# Patient Record
Sex: FEMALE | Race: WHITE | Employment: OTHER | URBAN - METROPOLITAN AREA
[De-identification: names, ages, dates, MRNs, and addresses within clinical notes are randomized per-mention and may not be internally consistent; named-entity substitution may affect disease eponyms.]

---

## 2017-01-04 ENCOUNTER — APPOINTMENT (OUTPATIENT)
Dept: PHYSICAL THERAPY | Age: 57
End: 2017-01-04

## 2017-01-05 ENCOUNTER — OFFICE VISIT (OUTPATIENT)
Dept: ORTHOPEDIC SURGERY | Age: 57
End: 2017-01-05

## 2017-01-05 VITALS
SYSTOLIC BLOOD PRESSURE: 111 MMHG | WEIGHT: 174 LBS | BODY MASS INDEX: 27.25 KG/M2 | TEMPERATURE: 96.5 F | HEART RATE: 70 BPM | DIASTOLIC BLOOD PRESSURE: 96 MMHG

## 2017-01-05 DIAGNOSIS — G89.29 CHRONIC LOW BACK PAIN, UNSPECIFIED BACK PAIN LATERALITY, WITH SCIATICA PRESENCE UNSPECIFIED: ICD-10-CM

## 2017-01-05 DIAGNOSIS — M54.5 CHRONIC LOW BACK PAIN, UNSPECIFIED BACK PAIN LATERALITY, WITH SCIATICA PRESENCE UNSPECIFIED: ICD-10-CM

## 2017-01-05 DIAGNOSIS — Z96.641 STATUS POST RIGHT HIP REPLACEMENT: Primary | ICD-10-CM

## 2017-01-05 RX ORDER — METHYLPREDNISOLONE 4 MG/1
TABLET ORAL
Qty: 1 DOSE PACK | Refills: 0 | Status: SHIPPED | OUTPATIENT
Start: 2017-01-05 | End: 2017-07-13

## 2017-01-05 RX ORDER — HYDROCODONE BITARTRATE AND ACETAMINOPHEN 7.5; 325 MG/1; MG/1
1-2 TABLET ORAL
Qty: 60 TAB | Refills: 0 | Status: SHIPPED | OUTPATIENT
Start: 2017-01-05 | End: 2017-09-12 | Stop reason: SDUPTHER

## 2017-01-05 NOTE — LETTER
NOTIFICATION RETURN TO WORK / SCHOOL 
 
1/5/2017 4:27 PM 
 
Ms. Javid Burk [facility] To Whom It May Concern: 
 
Javid Burk is currently under the care of [address] [place] [address] Damir Root. She will remain on the same restrictions: no longer than 6 hour days for the next 4 weeks. Please excuse her for missing work on 1-6-17. If there are questions or concerns please have the patient contact our office.  
 
 
 
Sincerely, 
 
 
Grace Giordano PA-C

## 2017-01-05 NOTE — PROGRESS NOTES
[address] [place] [address]  [phone]           Patient: Trena Martin                MRN: [id number]       SSN: [id number]  YOB: 1960        AGE: 61 y.o. SEX: female  Body mass index is 27.25 kg/(m^2). PCP: Dean Arce MD  01/05/17      This office note has been dictated. REVIEW OF SYSTEMS:  Constitutional: Negative for fever, chills, weight loss and malaise/fatigue. HENT: Negative. Eyes: Negative. Respiratory: Negative. Cardiovascular: Negative. Gastrointestinal: No bowel incontinence or constipation. Genitourinary: No bladder incontinence or saddle anesthesia. Skin: Negative. Neurological: Negative. Endo/Heme/Allergies: Negative. Psychiatric/Behavioral: Negative. Musculoskeletal: As per HPI above. Past Medical History   Diagnosis Date    Allergic rhinitis     Dysphagia     GERD (gastroesophageal reflux disease)     Nausea & vomiting     Neuralgia     Osteoarthritis of hip     Spinal stenosis          Current Outpatient Prescriptions:     methylPREDNISolone (MEDROL, CATHY,) 4 mg tablet, Per dose pack instructions, Disp: 1 Dose Pack, Rfl: 0    HYDROcodone-acetaminophen (NORCO) 7.5-325 mg per tablet, Take 1-2 Tabs by mouth every eight (8) hours as needed for Pain. Max Daily Amount: 6 Tabs., Disp: 60 Tab, Rfl: 0    meloxicam (MOBIC) 15 mg tablet, Take 15 mg by mouth daily. substituted for celebrex, Disp: , Rfl:     aspirin (ASPIRIN) 325 mg tablet, Take 1 Tab by mouth two (2) times a day., Disp: 60 Tab, Rfl: 0    venlafaxine-SR (EFFEXOR-XR) 75 mg capsule, Take 37.5 mg by mouth daily. , Disp: , Rfl:     estradiol (ESTRACE) 1 mg tablet, Take 1 mg by mouth every seven (7) days.  Indications: Patient takes half tab daily, Disp: , Rfl:     albuterol (PROVENTIL HFA, VENTOLIN HFA) 90 mcg/actuation inhaler, Take 2 Puffs by inhalation every four (4) hours as needed for Wheezing., Disp: 1 Inhaler, Rfl: 0    alprazolam (XANAX) 0.25 mg tablet, Take 0.25 mg by mouth nightly as needed. PRN sleep, Disp: , Rfl:     oxyCODONE-acetaminophen (PERCOCET 10)  mg per tablet, Take 1-2 Tabs by mouth every six (6) hours as needed. Max Daily Amount: 8 Tabs., Disp: 60 Tab, Rfl: 0    promethazine (PHENERGAN) 25 mg tablet, Take 1 Tab by mouth every six (6) hours as needed for Nausea., Disp: 30 Tab, Rfl: 0    ondansetron hcl (ZOFRAN, AS HYDROCHLORIDE,) 4 mg tablet, Take 1 Tab by mouth every eight (8) hours as needed for Nausea., Disp: 30 Tab, Rfl: 1    celecoxib (CELEBREX) 200 mg capsule, Take 1 Cap by mouth two (2) times a day for 90 days. , Disp: 60 Cap, Rfl: 2    ferrous sulfate 325 mg (65 mg iron) tablet, Take 1 Tab by mouth two (2) times daily (with meals). , Disp: 60 Tab, Rfl: 2    zolpidem (AMBIEN) 5 mg tablet, Take 1 Tab by mouth nightly as needed for Sleep., Disp: 25 Tab, Rfl: 0    Allergies   Allergen Reactions    Other Plant, Animal, Environmental Anaphylaxis     poison ivy       Social History     Social History    Marital status:      Spouse name: N/A    Number of children: N/A    Years of education: N/A     Occupational History    Not on file. Social History Main Topics    Smoking status: Never Smoker    Smokeless tobacco: Never Used    Alcohol use 0.0 oz/week     0 Standard drinks or equivalent per week      Comment: socially    Drug use: No    Sexual activity: Not on file      Comment: Hysterectomy     Other Topics Concern    Not on file     Social History Narrative       Past Surgical History   Procedure Laterality Date    Endoscopy, colon, diagnostic  03-18-11     normal colon to cecum    Hx tonsil and adenoidectomy      Hx tubal ligation      Hx hysterectomy      Hx breast reduction      Hx tonsillectomy      Hx heent       skin cancer between eyes    Hx cholecystectomy  11/2015             We did see MsSuzanne  Fatuma Ireneon for follow-up in regards to her right lateral approach hip replacement. The patient is now approximately 10 to 11 weeks status hip replacement surgery. The patient has had some discomfort in her hip bilateral based and some buttocks discomfort, as well as some anterior discomfort. She is having discomfort with extended sitting, as well as ambulation. There has been no radiating pain, numbness, or tingling down the lower extremity. There have been no recent fevers, chills, systemic changes, or injuries to report. PHYSICAL EXAMINATION: In general the patient is alert and oriented x 3 and is in no acute distress. The patient is well-developed and well-nourished with a normal affect. The patient is afebrile. Examination of the right hip reveals the skin to be intact. The surgical wound has healed nicely. There is no erythema or ecchymosis. There is no warmth or signs for infection or cellulitis. There is discomfort with palpation to the trochanteric bursa, as well as discomfort with palpation to the psoas tendon. There is discomfort with resisted hip flexion. She has negative calf tenderness and swelling. There is negative Homans. There is no evidence of DVT noted. Abduction strength is progressing at 4+ to 5-/5 on the right and 5/5 on the left. RADIOGRAPHS:  Review of radiographs including AP pelvis and AP crosstable lateral of the right hip reveals the total hip components to be well fixed and in excellent position without evidence for loosening or fracture noted. ASSESSMENT:      1. Status post right hip replacement. 2. Trochanteric bursitis right hip. 3. Psoas tendinitis right hip.    4. Ischial tuberosity bursitis right side. PLAN:  At this point I will start her on a Medrol Dosepak. We will obtain some basic labs including a CBC, ESR, CRP, and IL-6, which I expect will come back as negative. She is given a note to be off tomorrow and Monday and then resume six hours per day.  We will plan on seeing her back next week for reevaluation and possibility of a Cortisone injection for the trochanteric bursitis.                   JR Wolf KENNEDY, PAMALIKA, ATC

## 2017-01-05 NOTE — LETTER
NOTIFICATION RETURN TO WORK / SCHOOL 
 
1/5/2017 4:33 PM 
 
Ms. Jimmy Loaiza [facility] To Whom It May Concern: 
 
Jimmy Loaiza is currently under the care of [address] [place], [place] [address]. She will remain on the same restrictions: no longer than 6 hour days for the next 4 weeks. She will remain out of work until 1/9/17. If there are questions or concerns please have the patient contact our office.  
 
 
 
Sincerely, 
 
 
Geraldine Saeed PA-C

## 2017-01-06 ENCOUNTER — HOSPITAL ENCOUNTER (OUTPATIENT)
Dept: LAB | Age: 57
Discharge: HOME | End: 2017-01-06

## 2017-01-06 LAB — SENTARA SPECIMEN COL,SENBCF: NORMAL

## 2017-01-06 PROCEDURE — 99001 SPECIMEN HANDLING PT-LAB: CPT | Performed by: PHYSICIAN ASSISTANT

## 2017-01-07 LAB
ABSOLUTE LYMPHOCYTE COUNT, 10803: 2.7 K/UL (ref 1–4.8)
BASOPHILS # BLD: 0 K/UL (ref 0–0.2)
BASOPHILS NFR BLD: 1 % (ref 0–2)
C-REACTIVE PROTEIN, QT, 006627: 0.5 MG/DL (ref 0–0.5)
EOSINOPHIL # BLD: 0.3 K/UL (ref 0–0.5)
EOSINOPHIL NFR BLD: 6 % (ref 0–6)
ERYTHROCYTE [DISTWIDTH] IN BLOOD BY AUTOMATED COUNT: 12.9 % (ref 10–16)
GRANULOCYTES,GRANS: 40 % (ref 40–75)
HCT VFR BLD AUTO: 41.2 % (ref 35.1–48)
HGB BLD-MCNC: 12.6 G/DL (ref 11.7–16)
LYMPHOCYTES, LYMLT: 46 % (ref 27–45)
MCH RBC QN AUTO: 29 PG (ref 26–34)
MCHC RBC AUTO-ENTMCNC: 31 G/DL (ref 32–36)
MCV RBC AUTO: 94 FL (ref 80–95)
MONOCYTES # BLD: 0.4 K/UL (ref 0.1–0.9)
MONOCYTES NFR BLD: 7 % (ref 3–9)
NEUTROPHILS # BLD AUTO: 2.3 K/UL (ref 1.8–7.7)
PLATELET # BLD AUTO: 290 K/UL (ref 140–440)
PMV BLD AUTO: 11.6 FL (ref 6–10.8)
RBC # BLD AUTO: 4.39 M/UL (ref 3.8–5.2)
SED RATE (ESR): 14 MM/HR (ref 0–30)
URATE SERPL-MCNC: 3.7 MG/DL (ref 2.2–7.7)
WBC # BLD AUTO: 5.8 K/UL (ref 4–11)

## 2017-01-10 DIAGNOSIS — M21.70 LEG LENGTH DISCREPANCY: Primary | ICD-10-CM

## 2017-01-10 LAB — IL-6, 10347: 1.6 PG/ML

## 2017-01-20 ENCOUNTER — TELEPHONE (OUTPATIENT)
Dept: ORTHOPEDIC SURGERY | Facility: CLINIC | Age: 57
End: 2017-01-20

## 2017-01-20 NOTE — TELEPHONE ENCOUNTER
Sujatha Manning called, she would like to be released to work full time. Please write Return to Work full time note as of 1/23/2017.

## 2017-01-20 NOTE — TELEPHONE ENCOUNTER
[address] notifying patient that a work note has been written for her to go back to work full duty as of Jan. 23, 2017 per PA Tauna Snellen

## 2017-01-23 ENCOUNTER — TELEPHONE (OUTPATIENT)
Dept: ORTHOPEDIC SURGERY | Facility: CLINIC | Age: 57
End: 2017-01-23

## 2017-01-23 NOTE — TELEPHONE ENCOUNTER
Patient phoned the RTW note was supposed to start 8 hours as tolerated on 1/30/2017. I had mistakenly requested 1/23/2017. Could we please issue new adjusted RTW note.

## 2017-01-23 NOTE — TELEPHONE ENCOUNTER
New note written. Contacted patient and informed changed and available for  at the  of any of our offices. It can be printed from her chart.

## 2017-01-23 NOTE — LETTER
NOTIFICATION RETURN TO WORK / SCHOOL 
 
1/23/2017 4:30 PM 
 
Ms. Clydia Angelucci [facility] To Whom It May Concern: 
 
Clydia Angelucci is currently under the care of [address] [place] [address]. She will return to work/school on: 1/30/17 with no restrictions. If there are questions or concerns please have the patient contact our office. Sincerely, Cherry Herrera MD

## 2017-01-26 DIAGNOSIS — Z96.641 STATUS POST RIGHT HIP REPLACEMENT: ICD-10-CM

## 2017-02-02 ENCOUNTER — OFFICE VISIT (OUTPATIENT)
Dept: ORTHOPEDIC SURGERY | Facility: CLINIC | Age: 57
End: 2017-02-02

## 2017-02-02 VITALS
HEIGHT: 67 IN | DIASTOLIC BLOOD PRESSURE: 75 MMHG | WEIGHT: 174 LBS | HEART RATE: 72 BPM | SYSTOLIC BLOOD PRESSURE: 124 MMHG | BODY MASS INDEX: 27.31 KG/M2 | TEMPERATURE: 97.2 F

## 2017-02-02 DIAGNOSIS — M17.12 PRIMARY OSTEOARTHRITIS OF LEFT KNEE: ICD-10-CM

## 2017-02-02 DIAGNOSIS — Z96.641 STATUS POST RIGHT HIP REPLACEMENT: ICD-10-CM

## 2017-02-02 DIAGNOSIS — M16.12 PRIMARY OSTEOARTHRITIS OF LEFT HIP: Primary | ICD-10-CM

## 2017-02-02 RX ORDER — TRIAMCINOLONE ACETONIDE 40 MG/ML
40 INJECTION, SUSPENSION INTRA-ARTICULAR; INTRAMUSCULAR ONCE
Qty: 1 ML | Refills: 0
Start: 2017-02-02 | End: 2017-02-02 | Stop reason: CLARIF

## 2017-02-02 RX ORDER — MELOXICAM 15 MG/1
15 TABLET ORAL DAILY
Qty: 30 TAB | Refills: 2 | Status: SHIPPED | OUTPATIENT
Start: 2017-02-02 | End: 2017-11-02

## 2017-02-02 NOTE — MR AVS SNAPSHOT
Visit Information Date & Time Provider Department Dept. Phone Encounter #  
 2/2/2017  3:15 PM Cedrick Burnham, [address] [facility] [phone] [id number] Upcoming Health Maintenance Date Due  
 BREAST CANCER SCRN MAMMOGRAM 7/7/2018 COLONOSCOPY 3/18/2021 DTaP/Tdap/Td series (2 - Td) 11/10/2024 Allergies as of 2/2/2017  Review Complete On: 2/2/2017 By: Susanne Stephens Severity Noted Reaction Type Reactions Other Plant, Animal, Environmental High 06/05/2014   Systemic Anaphylaxis  
 poison ivy Current Immunizations  Reviewed on 11/10/2014 Name Date Influenza Vaccine 10/12/2016 Tdap 11/10/2014 Not reviewed this visit You Were Diagnosed With   
  
 Codes Comments Primary osteoarthritis of left hip    -  Primary ICD-10-CM: M16.12 
ICD-9-CM: 715.15 Vitals BP Pulse Temp Height(growth percentile) Weight(growth percentile) BMI  
 124/75 72 97.2 °F (36.2 °C) 5' 7\" (1.702 m) 174 lb (78.9 kg) 27.25 kg/m2 OB Status Smoking Status Hysterectomy Never Smoker Vitals History BMI and BSA Data Body Mass Index Body Surface Area  
 27.25 kg/m 2 1.93 m 2 Preferred Pharmacy Pharmacy Name Phone [phone], [phone] [address] [phone] Your Updated Medication List  
  
   
This list is accurate as of: 2/2/17  3:37 PM.  Always use your most recent med list.  
  
  
  
  
 albuterol 90 mcg/actuation inhaler Commonly known as:  PROVENTIL HFA, VENTOLIN HFA, PROAIR HFA Take 2 Puffs by inhalation every four (4) hours as needed for Wheezing. aspirin 325 mg tablet Commonly known as:  ASPIRIN Take 1 Tab by mouth two (2) times a day. estradiol 1 mg tablet Commonly known as:  ESTRACE Take 1 mg by mouth every seven (7) days. Indications: Patient takes half tab daily  
  
 ferrous sulfate 325 mg (65 mg iron) tablet Take 1 Tab by mouth two (2) times daily (with meals). HYDROcodone-acetaminophen 7.5-325 mg per tablet Commonly known as:  Brendan Ill Take 1-2 Tabs by mouth every eight (8) hours as needed for Pain. Max Daily Amount: 6 Tabs. methylPREDNISolone 4 mg tablet Commonly known as:  MEDROL (CATHY) Per dose pack instructions MOBIC 15 mg tablet Generic drug:  meloxicam  
Take 15 mg by mouth daily. substituted for celebrex  
  
 ondansetron hcl 4 mg tablet Commonly known as:  ZOFRAN (AS HYDROCHLORIDE) Take 1 Tab by mouth every eight (8) hours as needed for Nausea. oxyCODONE-acetaminophen  mg per tablet Commonly known as:  PERCOCET 10 Take 1-2 Tabs by mouth every six (6) hours as needed. Max Daily Amount: 8 Tabs. promethazine 25 mg tablet Commonly known as:  PHENERGAN Take 1 Tab by mouth every six (6) hours as needed for Nausea. venlafaxine-SR 75 mg capsule Commonly known as:  EFFEXOR-XR Take 37.5 mg by mouth daily. XANAX 0.25 mg tablet Generic drug:  ALPRAZolam  
Take 0.25 mg by mouth nightly as needed. PRN sleep  
  
 zolpidem 5 mg tablet Commonly known as:  AMBIEN Take 1 Tab by mouth nightly as needed for Sleep. Introducing [facility]! Dear Neha Ordaz: Thank you for requesting a [email or web address] account. Our records indicate that you have previously registered for a [email or web address] account but its currently inactive. Please call our [email or web address] support line at [phone]. Additional Information If you have questions, please visit the Frequently Asked Questions section of the [email or web address] website at [email or web address] Remember, [email or web address] is NOT to be used for urgent needs. For medical emergencies, dial 911. Now available from your iPhone and Android! Please provide this summary of care documentation to your next provider. Your primary care clinician is listed as Tremayne Red.  If you have any questions after today's visit, please call [phone].

## 2017-02-02 NOTE — PROGRESS NOTES
HISTORY OF PRESENT ILLNESS:  Ms. Nicholas Gill returns to the office for followup regarding her left knee. She has end-staged osteoarthritis of the left knee medial joint space. She is limited in her abilities to walk for extended period or stand for an extended period secondary to severe pain. She has developed swelling and effusion, again, over the past week to 10 days. Her pain is an easy 8-9/10 to the left knee. She underwent a right total hip replacement on October 14, 2016. She has done well from the replacement. PHYSICAL EXAM:  On examination today, she is a healthy-appearing, well-nourished, well-developed, pleasant, 23-year-old, obese,  female, atraumatic, normocephalic, alert and oriented times three sitting on the table comfortable. She lies supine without difficulties. The left knee reveals a 2+ effusion. There is no warmth, erythema, or ecchymosis. Her motion is guarded today at 95-5° with pain and crepitus throughout. The patella does track midline. PROCEDURE:  Using sterile technique, after verbal and written consent were obtained and appropriate time out performed, 5 cc of 1% Lidocaine was used to anesthetize the left knee using a superior lateral intra-articular approach. There were no complications. Through this same portal, 22 cc of straw-colored synovial fluid was removed with no complications. To follow, 10 cc of 0.25% Marcaine mixed with 2 cc of Kenalog 40 mg per ml were injected. There were no complications. Post-procedure assessment revealed the patients pain improved to barely a 3-4/10. She did leave the office under her own power in good spirits with a reported more comfortable left knee.

## 2017-02-06 RX ORDER — TRIAMCINOLONE ACETONIDE 40 MG/ML
40 INJECTION, SUSPENSION INTRA-ARTICULAR; INTRAMUSCULAR ONCE
Qty: 1 ML | Refills: 0
Start: 2017-02-06 | End: 2017-02-06

## 2017-03-20 ENCOUNTER — TELEPHONE (OUTPATIENT)
Dept: ORTHOPEDIC SURGERY | Facility: CLINIC | Age: 57
End: 2017-03-20

## 2017-03-20 DIAGNOSIS — M25.551 BILATERAL HIP PAIN: Primary | ICD-10-CM

## 2017-03-20 DIAGNOSIS — M25.552 BILATERAL HIP PAIN: Primary | ICD-10-CM

## 2017-03-20 NOTE — TELEPHONE ENCOUNTER
Patient phoned and said she is still pain, stiffness and limp. She is requesting to be sent back to therapy. Please advise.

## 2017-03-29 ENCOUNTER — TELEPHONE (OUTPATIENT)
Dept: ORTHOPEDIC SURGERY | Facility: CLINIC | Age: 57
End: 2017-03-29

## 2017-03-29 NOTE — TELEPHONE ENCOUNTER
Called and spoke with Huong at [facility] and advised her that patients with total joint replacements must be pre-medicated before any dental work procedures.

## 2017-03-29 NOTE — TELEPHONE ENCOUNTER
KEENAN FROM [facility] CALLED FOR DR. Micaela Sibley. KEENAN SAID THAT THE PATIENT HAD A HIP REPLACEMENT DONE BY DR. SCHAEFER AND WOULD LIKE TO KNOW IF THE PATIENT NEEDS TO BE PREMEDICATED. KEENAN SAID THE PATIENT IS ON THE DENTIST CHAIR NOW. [address] TEL. [phone].

## 2017-03-30 RX ORDER — AMOXICILLIN 500 MG/1
CAPSULE ORAL
Qty: 4 CAP | Refills: 3 | Status: SHIPPED | OUTPATIENT
Start: 2017-03-30 | End: 2017-07-13

## 2017-03-30 NOTE — TELEPHONE ENCOUNTER
Patient called she has upcoming dental work and needs the antibiotic pre-med prescription. She had total hip October 19,2016. Patient is requesting that the pre-med rx be sufficient to cover several upcoming dental appointments. Also, she would like this called in to the [id number].

## 2017-04-19 ENCOUNTER — OFFICE VISIT (OUTPATIENT)
Dept: ORTHOPEDIC SURGERY | Facility: CLINIC | Age: 57
End: 2017-04-19

## 2017-04-19 VITALS
WEIGHT: 175 LBS | BODY MASS INDEX: 27.41 KG/M2 | DIASTOLIC BLOOD PRESSURE: 79 MMHG | TEMPERATURE: 98 F | SYSTOLIC BLOOD PRESSURE: 116 MMHG | HEART RATE: 88 BPM

## 2017-04-19 DIAGNOSIS — Z96.641 HISTORY OF RIGHT HIP REPLACEMENT: ICD-10-CM

## 2017-04-19 DIAGNOSIS — M17.12 PRIMARY OSTEOARTHRITIS OF LEFT KNEE: Primary | ICD-10-CM

## 2017-04-19 RX ORDER — MELOXICAM 15 MG/1
15 TABLET ORAL DAILY
Qty: 30 TAB | Refills: 2 | Status: SHIPPED | OUTPATIENT
Start: 2017-04-19 | End: 2017-07-13 | Stop reason: SDUPTHER

## 2017-04-19 RX ORDER — HYDROCODONE BITARTRATE AND ACETAMINOPHEN 7.5; 325 MG/1; MG/1
1 TABLET ORAL
Qty: 40 TAB | Refills: 0 | Status: SHIPPED | OUTPATIENT
Start: 2017-04-19 | End: 2017-07-13 | Stop reason: SDUPTHER

## 2017-04-19 NOTE — PATIENT INSTRUCTIONS
You should follow up in 3-4 weeks. If your condition worsens, contact our office. Hip Bursitis: Exercises  Your Care Instructions  Here are some examples of typical rehabilitation exercises for your condition. Start each exercise slowly. Ease off the exercise if you start to have pain. Your doctor or physical therapist will tell you when you can start these exercises and which ones will work best for you. How to do the exercises  Hip rotator stretch    1. Lie on your back with both knees bent and your feet flat on the floor. 2. Put the ankle of your affected leg on your opposite thigh near your knee. 3. Use your hand to gently push your knee away from your body until you feel a gentle stretch around your hip. 4. Hold the stretch for 15 to 30 seconds. 5. Repeat 2 to 4 times. 6. Repeat steps 1 through 5, but this time use your hand to gently pull your knee toward your opposite shoulder. Iliotibial band stretch    1. Lean sideways against a wall. If you are not steady on your feet, hold on to a chair or counter. 2. Stand on the leg with the affected hip, with that leg close to the wall. Then cross your other leg in front of it. 3. Let your affected hip drop out to the side of your body and against wall. Then lean away from your affected hip until you feel a stretch. 4. Hold the stretch for 15 to 30 seconds. 5. Repeat 2 to 4 times. Straight-leg raises to the outside    1. Lie on your side, with your affected hip on top. 2. Tighten the front thigh muscles of your top leg to keep your knee straight. 3. Keep your hip and your leg straight in line with the rest of your body, and keep your knee pointing forward. Do not drop your hip back. 4. Lift your top leg straight up toward the ceiling, about 12 inches off the floor. Hold for about 6 seconds, then slowly lower your leg. 5. Repeat 8 to 12 times. Clamshell    1. Lie on your side, with your affected hip on top and your head propped on a pillow.  Keep your feet and knees together and your knees bent. 2. Raise your top knee, but keep your feet together. Do not let your hips roll back. Your legs should open up like a clamshell. 3. Hold for 6 seconds. 4. Slowly lower your knee back down. Rest for 10 seconds. 5. Repeat 8 to 12 times. Follow-up care is a key part of your treatment and safety. Be sure to make and go to all appointments, and call your doctor if you are having problems. It's also a good idea to know your test results and keep a list of the medicines you take. Where can you learn more? Go to http://sarahi-musa.info/. Enter K861 in the search box to learn more about \"Hip Bursitis: Exercises. \"  Current as of: May 23, 2016  Content Version: 11.2  © 5649-0416 [employer]. Care instructions adapted under license by [employer] (which disclaims liability or warranty for this information). If you have questions about a medical condition or this instruction, always ask your healthcare professional. [address] any warranty or liability for your use of this information.

## 2017-04-19 NOTE — PROGRESS NOTES
Patient: Nava Fonseca                MRN: [id number]       SSN: [id number]  YOB: 1960        AGE: 61 y.o. SEX: female  Body mass index is 27.41 kg/(m^2). PCP: Joseline Oropeza MD  04/19/17    HISTORY: Ms. Garcia is seen today in followup. She had a total hip replacement six months ago. She is really not having much pain at all with the hip. It is mainly the left knee. She received an injection and an aspiration from Sudhakarjean-paul Tineo just over two and a half months ago. She did fairly well. She gets a good eight weeks. She takes one Norco per day. She is otherwise feeling well, and she is quite happy with the hip replacement other than that she notices she still limps at the end of the day, but the pain is just minimal.  It is the knee that slows her down the most.      PHYSICAL EXAMINATION:  On examination today, she does have a slight Trendelenburg gait and an antalgic gait owing to the opposite knee. She is examined with a female assistant present. Her wound looks perfect. There is no evidence for infection or DVT. Her strength in the hip is actually pretty good. The abductors are about 80-85% normal.  The rest of the muscle strength is very good. The calf is nontender. Fredy's sign is negative. The left knee is consistent with end-staged arthritis, varus malalignment, and fixed flexion deformity of a degree or two. The calf is nontender. Fredy's sign is negative. Examination of the knee reveals what looks like a herniation regarding her medial scope portal.  We will make sure this is what it is and also rule out lipoma. RADIOGRAPHS:  X-rays of the knee, including AP, tunnel, lateral, and skyline, confirm end-staged arthritis. AP of the pelvis and lateral of the hip shows the bone graft is incorporated beautifully. I am very pleased with this. PLAN:  I would like her to have some gait training physical therapy to get the hip better.   She is thinking of the fall for knee replacement. I think that is very reasonable, sooner if we need to. We will plan on injecting the knee at the next visit. We can do a knee replacement when she would like. She is thinking of the fall. We will see her back in a week or two to review the ultrasound and inject the knee. REVIEW OF SYSTEMS:      CON: negative for weight loss, fever  EYE: negative for double vision  ENT: negative for hoarseness  RS:   negative for Tb  GI:    negative for blood in stool  :  negative for blood in urine  Other systems reviewed and noted below. Past Medical History:   Diagnosis Date    Allergic rhinitis     Dysphagia     GERD (gastroesophageal reflux disease)     Nausea & vomiting     Neuralgia     Osteoarthritis of hip     Spinal stenosis        Family History   Problem Relation Age of Onset    Other Mother      atrial fibrillation    Colon Polyps Mother     Breast Cancer Mother     Colon Polyps Brother      half brother    Heart Disease Sister      half sister ASHD    Heart Attack Sister     Other Son      transposition of the great vessels and surgery for this    Other Son      congestive heart failure       Current Outpatient Prescriptions   Medication Sig Dispense Refill    meloxicam (MOBIC) 15 mg tablet Take 1 Tab by mouth daily. 30 Tab 2    HYDROcodone-acetaminophen (NORCO) 7.5-325 mg per tablet Take 1-2 Tabs by mouth every eight (8) hours as needed for Pain. Max Daily Amount: 6 Tabs. 60 Tab 0    venlafaxine-SR (EFFEXOR-XR) 75 mg capsule Take 37.5 mg by mouth daily.  estradiol (ESTRACE) 1 mg tablet Take 1 mg by mouth every seven (7) days. Indications: Patient takes half tab daily      albuterol (PROVENTIL HFA, VENTOLIN HFA) 90 mcg/actuation inhaler Take 2 Puffs by inhalation every four (4) hours as needed for Wheezing.  1 Inhaler 0    amoxicillin (AMOXIL) 500 mg capsule Take 4 capsules one hour prior to appointment 4 Cap 3    methylPREDNISolone (MEDROL, CATHY,) 4 mg tablet Per dose pack instructions 1 Dose Pack 0    oxyCODONE-acetaminophen (PERCOCET 10)  mg per tablet Take 1-2 Tabs by mouth every six (6) hours as needed. Max Daily Amount: 8 Tabs. 60 Tab 0    meloxicam (MOBIC) 15 mg tablet Take 15 mg by mouth daily. substituted for celebrex      promethazine (PHENERGAN) 25 mg tablet Take 1 Tab by mouth every six (6) hours as needed for Nausea. 30 Tab 0    ondansetron hcl (ZOFRAN, AS HYDROCHLORIDE,) 4 mg tablet Take 1 Tab by mouth every eight (8) hours as needed for Nausea. 30 Tab 1    ferrous sulfate 325 mg (65 mg iron) tablet Take 1 Tab by mouth two (2) times daily (with meals). 60 Tab 2    aspirin (ASPIRIN) 325 mg tablet Take 1 Tab by mouth two (2) times a day. 60 Tab 0    zolpidem (AMBIEN) 5 mg tablet Take 1 Tab by mouth nightly as needed for Sleep. 25 Tab 0    alprazolam (XANAX) 0.25 mg tablet Take 0.25 mg by mouth nightly as needed. PRN sleep         Allergies   Allergen Reactions    Other Plant, Animal, Environmental Anaphylaxis     poison ivy       Past Surgical History:   Procedure Laterality Date    ENDOSCOPY, COLON, DIAGNOSTIC  03-18-11    normal colon to cecum    HX BREAST REDUCTION      HX CHOLECYSTECTOMY  11/2015    HX HEENT      skin cancer between eyes    HX HYSTERECTOMY      HX TONSIL AND ADENOIDECTOMY      HX TONSILLECTOMY      HX TUBAL LIGATION         Social History     Social History    Marital status:      Spouse name: N/A    Number of children: N/A    Years of education: N/A     Occupational History    Not on file.      Social History Main Topics    Smoking status: Never Smoker    Smokeless tobacco: Never Used    Alcohol use 0.0 oz/week     0 Standard drinks or equivalent per week      Comment: socially    Drug use: No    Sexual activity: Not on file      Comment: Hysterectomy     Other Topics Concern    Not on file     Social History Narrative       Visit Vitals    /79 (BP 1 Location: Left arm, BP Patient Position: Sitting)    Pulse 88    Temp 98 °F (36.7 °C) (Oral)    Wt 175 lb (79.4 kg)    BMI 27.41 kg/m2         PHYSICAL EXAMINATION:  GENERAL: Alert and oriented x3, in no acute distress, well-developed, well-nourished, afebrile. HEART: No JVD. EYES: No scleral icterus   NECK: No significant lymphadenopathy   LUNGS: No respiratory compromise or indrawing  ABDOMEN: Soft, non-tender, non-distended. Electronically signed by:  Nat Lynne MD

## 2017-04-25 ENCOUNTER — OFFICE VISIT (OUTPATIENT)
Dept: ORTHOPEDIC SURGERY | Facility: CLINIC | Age: 57
End: 2017-04-25

## 2017-04-25 VITALS
BODY MASS INDEX: 28.41 KG/M2 | HEART RATE: 80 BPM | WEIGHT: 181 LBS | TEMPERATURE: 96 F | HEIGHT: 67 IN | DIASTOLIC BLOOD PRESSURE: 75 MMHG | SYSTOLIC BLOOD PRESSURE: 124 MMHG

## 2017-04-25 DIAGNOSIS — M25.662 DECREASED ROM OF LEFT KNEE: ICD-10-CM

## 2017-04-25 DIAGNOSIS — M25.40 EFFUSION INTO JOINT: ICD-10-CM

## 2017-04-25 DIAGNOSIS — M17.12 PRIMARY OSTEOARTHRITIS OF LEFT KNEE: Primary | ICD-10-CM

## 2017-04-25 DIAGNOSIS — R52 PAIN: ICD-10-CM

## 2017-04-25 RX ORDER — TRIAMCINOLONE ACETONIDE 40 MG/ML
40 INJECTION, SUSPENSION INTRA-ARTICULAR; INTRAMUSCULAR ONCE
Qty: 1 ML | Refills: 0
Start: 2017-04-25 | End: 2017-04-25

## 2017-04-25 NOTE — PROGRESS NOTES
HISTORY OF PRESENT ILLNESS:  Augustina Right returns for followup regarding her left knee. She has a well-documented history of end-staged osteoarthritis of the left knee and has undergone an arthroscopic debridement of her meniscus under the care of Dr. Bharathi Delgado. She had a recent total hip replacement under the care of Dr. Lizzy Prado. Date of surgery was January 12, 2017, to the right hip. She has completed her initial courses of outpatient and in-home physical therapy to the right hip. Regarding her left knee, she has pain when she stands for any extended period and walks both short and long distances. She has taken Norco in the past for symptom management. She was recently refilled in the form of Norco by Dr. Hermelinda galaviz. REVIEW OF SYSTEMS:  No chest pain, shortness of breath, and no fevers, chills, or night sweats. Her pain at rest to the left knee is a 6/10 and with activity increases to 10/10. She has swelling that she has noted to the left knee increasing over the past two weeks. She has difficulty bending her knee and relaxing comfortably in the overnight hours due to the swelling. PHYSICAL EXAM:  She is a healthy-appearing, well-developed, well-nourished, pleasant, 58-year-old  female, atraumatic, normocephalic, alert and oriented times three sitting on the table comfortably. Examination to the left knee reveals a 2+ effusion. There is pain associated with the lateral joint line. Her active range of motion is noted at 80-20°. The patella tracks midline. PROCEDURE:  Using sterile technique, after verbal and written consent obtained and appropriate time out performed, 5 cc of 1% Lidocaine was used to anesthetize the left knee using a superolateral, intra-articular approach. There were no complications. The patient tolerated the procedure well.  To follow, 31 cc of clear, straw-colored aspirate was removed from the same portal with a hint of blood tint at the very end of the aspiration. Two follow, 12 cc of Marcaine 0.25% mixed with 2 mL of Kenalog at 40 mg per mL was reinjected through the same portal.  There are no complications. There was some pain at the injection site following the procedure. The patient did stand safely and was able to ambulate from the office with no difficulties. PLAN:  We are going to see her back on a PRN basis. Consideration for a left total knee replacement in the future if her pain persists and becomes unresponsive to aggressive conservative management to include cortisone as today.

## 2017-04-25 NOTE — MR AVS SNAPSHOT
Visit Information Date & Time Provider Department Dept. Phone Encounter #  
 4/25/2017  2:30 PM Sanjana Garcia, [address] [place] [facility] [phone] [id number] Upcoming Health Maintenance Date Due  
 BREAST CANCER SCRN MAMMOGRAM 7/7/2018 COLONOSCOPY 3/18/2021 DTaP/Tdap/Td series (2 - Td) 11/10/2024 Allergies as of 4/25/2017  Review Complete On: 4/25/2017 By: Sanjana Garcia PA-C Severity Noted Reaction Type Reactions Other Plant, Animal, Environmental High 06/05/2014   Systemic Anaphylaxis  
 poison ivy Current Immunizations  Reviewed on 11/10/2014 Name Date Influenza Vaccine 10/12/2016 Tdap 11/10/2014 Not reviewed this visit You Were Diagnosed With   
  
 Codes Comments Primary osteoarthritis of left knee    -  Primary ICD-10-CM: M17.12 
ICD-9-CM: 715.16 Decreased ROM of left knee     ICD-10-CM: K80.201 ICD-9-CM: 719.56 Pain     ICD-10-CM: R52 ICD-9-CM: 780.96 Effusion into joint     ICD-10-CM: M25.40 ICD-9-CM: 719.00 Vitals BP Pulse Temp Height(growth percentile) Weight(growth percentile) BMI  
 124/75 80 96 °F (35.6 °C) 5' 7\" (1.702 m) 181 lb (82.1 kg) 28.35 kg/m2 OB Status Smoking Status Hysterectomy Never Smoker Vitals History BMI and BSA Data Body Mass Index Body Surface Area  
 28.35 kg/m 2 1.97 m 2 Preferred Pharmacy Pharmacy Name Phone [phone] [place] [address] [phone] Your Updated Medication List  
  
   
This list is accurate as of: 4/25/17  3:34 PM.  Always use your most recent med list.  
  
  
  
  
 albuterol 90 mcg/actuation inhaler Commonly known as:  PROVENTIL HFA, VENTOLIN HFA, PROAIR HFA Take 2 Puffs by inhalation every four (4) hours as needed for Wheezing. amoxicillin 500 mg capsule Commonly known as:  AMOXIL Take 4 capsules one hour prior to appointment aspirin 325 mg tablet Commonly known as:  ASPIRIN Take 1 Tab by mouth two (2) times a day. estradiol 1 mg tablet Commonly known as:  ESTRACE Take 1 mg by mouth every seven (7) days. Indications: Patient takes half tab daily  
  
 ferrous sulfate 325 mg (65 mg iron) tablet Take 1 Tab by mouth two (2) times daily (with meals). * HYDROcodone-acetaminophen 7.5-325 mg per tablet Commonly known as:  [place] Gonsales Take 1-2 Tabs by mouth every eight (8) hours as needed for Pain. Max Daily Amount: 6 Tabs. * HYDROcodone-acetaminophen 7.5-325 mg per tablet Commonly known as:  [place] Gonsales Take 1 Tab by mouth daily as needed for Pain. methylPREDNISolone 4 mg tablet Commonly known as:  MEDROL (CATHY) Per dose pack instructions * MOBIC 15 mg tablet Generic drug:  meloxicam  
Take 15 mg by mouth daily. substituted for celebrex * meloxicam 15 mg tablet Commonly known as:  MOBIC Take 1 Tab by mouth daily. * meloxicam 15 mg tablet Commonly known as:  MOBIC Take 1 Tab by mouth daily. ondansetron hcl 4 mg tablet Commonly known as:  ZOFRAN (AS HYDROCHLORIDE) Take 1 Tab by mouth every eight (8) hours as needed for Nausea. oxyCODONE-acetaminophen  mg per tablet Commonly known as:  PERCOCET 10 Take 1-2 Tabs by mouth every six (6) hours as needed. Max Daily Amount: 8 Tabs. promethazine 25 mg tablet Commonly known as:  PHENERGAN Take 1 Tab by mouth every six (6) hours as needed for Nausea. venlafaxine-SR 75 mg capsule Commonly known as:  EFFEXOR-XR Take 37.5 mg by mouth daily. XANAX 0.25 mg tablet Generic drug:  ALPRAZolam  
Take 0.25 mg by mouth nightly as needed. PRN sleep  
  
 zolpidem 5 mg tablet Commonly known as:  AMBIEN Take 1 Tab by mouth nightly as needed for Sleep. * Notice: This list has 5 medication(s) that are the same as other medications prescribed for you.  Read the directions carefully, and ask your doctor or other care provider to review them with you. To-Do List   
 05/08/2017 4:00 PM  
  Appointment with Christopher Oliver PT at [facility] [address] [place] [address] ([phone]) Introducing hospitals & [facility] SERVICES! Dear Blaine Bowen: Thank you for requesting a SIZES[employer] account. Our records indicate that you have previously registered for a [employer]ESEEKER account but its currently inactive. Please call our SIZESEEKER support line at [phone]. Additional Information If you have questions, please visit the Frequently Asked Questions section of the SI[employer]EEK[employer] website at [email or web address] Remember, SIZESEEKER is NOT to be used for urgent needs. For medical emergencies, dial 911. Now available from your iPhone and Android! Please provide this summary of care documentation to your next provider. Your primary care clinician is listed as Shlomo Carrillo. If you have any questions after today's visit, please call [phone].

## 2017-05-08 ENCOUNTER — HOSPITAL ENCOUNTER (OUTPATIENT)
Dept: PHYSICAL THERAPY | Age: 57
End: 2017-05-08

## 2017-07-13 ENCOUNTER — OFFICE VISIT (OUTPATIENT)
Dept: INTERNAL MEDICINE CLINIC | Age: 57
End: 2017-07-13

## 2017-07-13 VITALS
SYSTOLIC BLOOD PRESSURE: 118 MMHG | WEIGHT: 176 LBS | DIASTOLIC BLOOD PRESSURE: 72 MMHG | OXYGEN SATURATION: 94 % | TEMPERATURE: 98 F | HEART RATE: 68 BPM | RESPIRATION RATE: 14 BRPM | BODY MASS INDEX: 27.62 KG/M2 | HEIGHT: 67 IN

## 2017-07-13 DIAGNOSIS — R10.9 ABDOMINAL PAIN, UNSPECIFIED LOCATION: Primary | ICD-10-CM

## 2017-07-13 DIAGNOSIS — M19.90 OSTEOARTHRITIS, UNSPECIFIED OSTEOARTHRITIS TYPE, UNSPECIFIED SITE: ICD-10-CM

## 2017-07-13 LAB
ABSOLUTE LYMPHOCYTE COUNT, 10803: 2.8 K/UL (ref 1–4.8)
BASOPHILS # BLD: 0 K/UL (ref 0–0.2)
BASOPHILS NFR BLD: 0 % (ref 0–2)
EOSINOPHIL # BLD: 0.2 K/UL (ref 0–0.5)
EOSINOPHIL NFR BLD: 3 % (ref 0–6)
ERYTHROCYTE [DISTWIDTH] IN BLOOD BY AUTOMATED COUNT: 12.9 % (ref 10–16)
GRANULOCYTES,GRANS: 46 % (ref 40–75)
HCT VFR BLD AUTO: 43.4 % (ref 35.1–48)
HGB BLD-MCNC: 13.5 G/DL (ref 11.7–16)
LYMPHOCYTES, LYMLT: 45 % (ref 27–45)
MCH RBC QN AUTO: 30 PG (ref 26–34)
MCHC RBC AUTO-ENTMCNC: 31 G/DL (ref 32–36)
MCV RBC AUTO: 98 FL (ref 80–95)
MONOCYTES # BLD: 0.4 K/UL (ref 0.1–0.9)
MONOCYTES NFR BLD: 6 % (ref 3–9)
NEUTROPHILS # BLD AUTO: 2.9 K/UL (ref 1.8–7.7)
PLATELET # BLD AUTO: 306 K/UL (ref 140–440)
PMV BLD AUTO: 11.2 FL (ref 6–10.8)
RBC # BLD AUTO: 4.44 M/UL (ref 3.8–5.2)
WBC # BLD AUTO: 6.3 K/UL (ref 4–11)
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
## 2017-07-13 NOTE — PROGRESS NOTES
1. Have you been to the ER, urgent care clinic or hospitalized since your last visit? YES.     2. Have you seen or consulted any other health care providers outside of the [address] [place] [address] since your last visit (Include any pap smears or colon screening)? NO      Do you have an Advanced Directive? NO    Would you like information on Advanced Directives?  NO

## 2017-07-13 NOTE — MR AVS SNAPSHOT
Visit Information Date & Time Provider Department Dept. Phone Encounter #  
 7/13/2017  3:00 PM Negra Joshi Internist of [address] [id number] Upcoming Health Maintenance Date Due INFLUENZA AGE 9 TO ADULT 8/1/2017 BREAST CANCER SCRN MAMMOGRAM 7/7/2018 COLONOSCOPY 3/18/2021 DTaP/Tdap/Td series (2 - Td) 11/10/2024 Allergies as of 7/13/2017  Review Complete On: 7/13/2017 By: Mauro Mallory Severity Noted Reaction Type Reactions Other Plant, Animal, Environmental High 06/05/2014   Systemic Anaphylaxis  
 poison ivy Current Immunizations  Reviewed on 11/10/2014 Name Date Influenza Vaccine 10/12/2016 Tdap 11/10/2014 Not reviewed this visit You Were Diagnosed With   
  
 Codes Comments Abdominal pain, unspecified location    -  Primary ICD-10-CM: R10.9 ICD-9-CM: 789.00 Vitals BP Pulse Temp Resp Height(growth percentile) Weight(growth percentile) 118/72 (BP 1 Location: Left arm, BP Patient Position: Sitting) 68 98 °F (36.7 °C) (Oral) 14 5' 7\" (1.702 m) 176 lb (79.8 kg) SpO2 BMI OB Status Smoking Status 94% 27.57 kg/m2 Hysterectomy Never Smoker Vitals History BMI and BSA Data Body Mass Index Body Surface Area  
 27.57 kg/m 2 1.94 m 2 Preferred Pharmacy Pharmacy Name Phone [phone] [place] [address] [phone] Your Updated Medication List  
  
   
This list is accurate as of: 7/13/17  3:36 PM.  Always use your most recent med list.  
  
  
  
  
 albuterol 90 mcg/actuation inhaler Commonly known as:  PROVENTIL HFA, VENTOLIN HFA, PROAIR HFA Take 2 Puffs by inhalation every four (4) hours as needed for Wheezing. amoxicillin 500 mg capsule Commonly known as:  AMOXIL Take 4 capsules one hour prior to appointment  
  
 aspirin 325 mg tablet Commonly known as:  ASPIRIN Take 1 Tab by mouth two (2) times a day. estradiol 1 mg tablet Commonly known as:  ESTRACE Take 1 mg by mouth every seven (7) days. Indications: Patient takes half tab daily  
  
 ferrous sulfate 325 mg (65 mg iron) tablet Take 1 Tab by mouth two (2) times daily (with meals). HYDROcodone-acetaminophen 7.5-325 mg per tablet Commonly known as:  [place] Dieter Take 1-2 Tabs by mouth every eight (8) hours as needed for Pain. Max Daily Amount: 6 Tabs. meloxicam 15 mg tablet Commonly known as:  MOBIC Take 1 Tab by mouth daily. methylPREDNISolone 4 mg tablet Commonly known as:  MEDROL (CATHY) Per dose pack instructions  
  
 ondansetron hcl 4 mg tablet Commonly known as:  ZOFRAN (AS HYDROCHLORIDE) Take 1 Tab by mouth every eight (8) hours as needed for Nausea. oxyCODONE-acetaminophen  mg per tablet Commonly known as:  PERCOCET 10 Take 1-2 Tabs by mouth every six (6) hours as needed. Max Daily Amount: 8 Tabs. promethazine 25 mg tablet Commonly known as:  PHENERGAN Take 1 Tab by mouth every six (6) hours as needed for Nausea. venlafaxine-SR 75 mg capsule Commonly known as:  EFFEXOR-XR Take 37.5 mg by mouth daily. XANAX 0.25 mg tablet Generic drug:  ALPRAZolam  
Take 0.25 mg by mouth nightly as needed. PRN sleep  
  
 zolpidem 5 mg tablet Commonly known as:  AMBIEN Take 1 Tab by mouth nightly as needed for Sleep. We Performed the Following CBC WITH AUTOMATED DIFF [33111 CPT(R)] LIPASE [id number] CPT(R)] METABOLIC PANEL, COMPREHENSIVE [73390 CPT(R)] Introducing [facility] & HEALTH SERVICES! Dear Dawna Whiting: Thank you for requesting a [employer] account. Our records indicate that you have previously registered for a [employer] account but its currently inactive. Please call our [employer] support line at [phone]. Additional Information If you have questions, please visit the Frequently Asked Questions section of the [employer] website at [email or web address] Remember, Ubimohart is NOT to be used for urgent needs. For medical emergencies, dial 911. Now available from your iPhone and Android! Please provide this summary of care documentation to your next provider. Your primary care clinician is listed as Sukhjinder Lott. If you have any questions after today's visit, please call [phone].

## 2017-07-14 ENCOUNTER — TELEPHONE (OUTPATIENT)
Dept: INTERNAL MEDICINE CLINIC | Age: 57
End: 2017-07-14

## 2017-07-14 LAB
A-G RATIO,AGRAT: 1.7 RATIO (ref 1.1–2.6)
ALBUMIN SERPL-MCNC: 4.6 G/DL (ref 3.5–5)
ALP SERPL-CCNC: 72 U/L (ref 25–115)
ALT SERPL-CCNC: 25 U/L (ref 5–40)
ANION GAP SERPL CALC-SCNC: 17 MMOL/L
AST SERPL W P-5'-P-CCNC: 23 U/L (ref 10–37)
BILIRUB SERPL-MCNC: 0.2 MG/DL (ref 0.2–1.2)
BUN SERPL-MCNC: 16 MG/DL (ref 6–22)
CALCIUM SERPL-MCNC: 9.8 MG/DL (ref 8.4–10.5)
CHLORIDE SERPL-SCNC: 97 MMOL/L (ref 98–110)
CO2 SERPL-SCNC: 25 MMOL/L (ref 20–32)
CREAT SERPL-MCNC: 0.7 MG/DL (ref 0.5–1.2)
GFRAA, 66117: >60
GFRNA, 66118: >60
GLOBULIN,GLOB: 2.7 G/DL (ref 2–4)
GLUCOSE SERPL-MCNC: 95 MG/DL (ref 65–99)
LIPASE SERPL-CCNC: 60 U/L (ref 7–60)
POTASSIUM SERPL-SCNC: 4.8 MMOL/L (ref 3.5–5.5)
PROT SERPL-MCNC: 7.3 G/DL (ref 6.4–8.3)
SODIUM SERPL-SCNC: 139 MMOL/L (ref 133–145)

## 2017-07-14 NOTE — TELEPHONE ENCOUNTER
All labs normal.  Particularly, WBC wnl and no anemia. Liver and pancreatic enzymes wnl. Continue with plan as discussed at visit.

## 2017-07-14 NOTE — LETTER
7/14/2017 10:38 AM 
 
Ms. Carmelita Prakash [facility] All labs were normal.  Continue plan as discussed at appointment. Sincerely, ARTHUR Langley

## 2017-07-24 ENCOUNTER — TELEPHONE (OUTPATIENT)
Dept: ORTHOPEDIC SURGERY | Facility: CLINIC | Age: 57
End: 2017-07-24

## 2017-07-24 DIAGNOSIS — M17.12 PRIMARY OSTEOARTHRITIS OF LEFT KNEE: ICD-10-CM

## 2017-07-24 DIAGNOSIS — M17.12 PRIMARY OSTEOARTHRITIS OF LEFT KNEE: Primary | ICD-10-CM

## 2017-07-24 DIAGNOSIS — Z01.818 ENCOUNTER FOR PREADMISSION TESTING: Primary | ICD-10-CM

## 2017-09-12 ENCOUNTER — OFFICE VISIT (OUTPATIENT)
Dept: ORTHOPEDIC SURGERY | Facility: CLINIC | Age: 57
End: 2017-09-12

## 2017-09-12 VITALS
HEIGHT: 67 IN | DIASTOLIC BLOOD PRESSURE: 86 MMHG | WEIGHT: 178 LBS | BODY MASS INDEX: 27.94 KG/M2 | OXYGEN SATURATION: 95 % | RESPIRATION RATE: 20 BRPM | HEART RATE: 77 BPM | SYSTOLIC BLOOD PRESSURE: 129 MMHG

## 2017-09-12 DIAGNOSIS — M16.9 OSTEOARTHRITIS OF HIP, UNSPECIFIED LATERALITY, UNSPECIFIED OSTEOARTHRITIS TYPE: Primary | ICD-10-CM

## 2017-09-12 DIAGNOSIS — M17.12 PRIMARY OSTEOARTHRITIS OF LEFT KNEE: ICD-10-CM

## 2017-09-12 DIAGNOSIS — M25.40 EFFUSION INTO JOINT: ICD-10-CM

## 2017-09-12 DIAGNOSIS — Z96.641 STATUS POST RIGHT HIP REPLACEMENT: ICD-10-CM

## 2017-09-12 RX ORDER — HYDROCODONE BITARTRATE AND ACETAMINOPHEN 7.5; 325 MG/1; MG/1
1-2 TABLET ORAL
Qty: 60 TAB | Refills: 0 | Status: SHIPPED | OUTPATIENT
Start: 2017-09-12 | End: 2017-11-15

## 2017-09-12 RX ORDER — TRIAMCINOLONE ACETONIDE 40 MG/ML
40 INJECTION, SUSPENSION INTRA-ARTICULAR; INTRAMUSCULAR ONCE
Qty: 1 ML | Refills: 0
Start: 2017-09-12 | End: 2017-09-12

## 2017-09-12 NOTE — PROGRESS NOTES
HISTORY OF PRESENT ILLNESS:  Elin Smart returns for followup and assessment of a recurrent painful effusion to the left knee. She is plagued with severe osteoarthritis of the left knee and does have a chronic history of left knee pain. She has been extremely active over the past several weeks and believes that has created a return of swelling and pain to her left knee worse than her normal baseline. She does use hydrocodone on an intermittent basis for symptom management. Her pain is worse when she stands for an extended period, climbs and descends stairs, or walks distance. Her pain is, at rest, a 6/10 to the left knee and with activity mentioned previous, her pain is 8-9/10. She denies any injury to the left knee. She is status post left knee arthroscopy two years ago. REVIEW OF SYSTEMS:  No chest pain or shortness of breath. No fevers, chills, or night sweats. She is status post right total hip replacement doing well. Pain is per HPI. No nausea and no vomiting. PHYSICAL EXAM:  She is a healthy-appearing, 50-year-old, obese  female, atraumatic, normocephalic, alert and oriented times three sitting on the table comfortably. She lies supine with no difficulties. The left knee reveals a 1+ effusion. She has crepitation through passive ranging while supine with her left knee noted at 100-10° today. The patella tracks midline. There is pain over the medial and lateral anterior joint line. The calf is nontender. There is no evidence of DVT. Distal sensation is intact fully to the left lower extremity. IMPRESSION:      1. Chronic left knee pain with acute exacerbation secondary to recurrent effusion. 2. Left knee range of motion decreased secondary to above. 3. Status post right total hip replacement.      PROCEDURE:  Today, using sterile technique, after verbal and written consent obtained and appropriate time out performed, 5 cc of 1% Lidocaine was used to anesthetize the left knee using the superolateral intra-articular approach. To follow, 21 cc of straw-colored synovial fluid was removed with no complications. To follow, 2 mL of Kenalog at 40 mg per mL mixed with 10 cc of Marcaine 0.25% was reinjected through the same portal.  There were no complications. PLAN:   I am currently recommending a left knee aspiration and injection. The patient was refilled of [place] today to be used one to two every eight hours for pain, #60 dispensed. We will follow her back on a p.r.n. basis. All her questions were answered to her satisfaction. Consideration for left total knee replacement if the patient persists with reoccurring acute exacerbations of her chronic left knee pain.

## 2017-09-12 NOTE — MR AVS SNAPSHOT
Visit Information Date & Time Provider Department Dept. Phone Encounter #  
 9/12/2017  3:00 PM Kimi Stewart PA-C [address] [facility] [phone] [id number] Your Appointments 10/5/2017  3:00 PM  
Office Visit with ARTHUR Phelps [facility] (Jules Sandhoff) Appt Note: left total knee Dr. Atif Ledezma; left total knee Dr. Atif Ledezma [address] [facility], [address] [place] [address] [address]  
  
   
 [address] [facility], [address]  
  
    
 10/11/2017  9:15 AM  
HISTORY AND PHYSICAL with Cristian Brewster PA-C  
[facility] Jules Sandhoff) Appt Note: SX 10/16/2017 LEFT TOTAL KNEE ARTHROPLASTY NEEDS FILMS  
 [address] [facility], [address] [place] [address]  
[phone]  
  
   
 [address] [facility], [address] [address]  
  
    
 11/1/2017  9:00 AM  
POST OP with Cristian Brewster PA-C  
[facility] Jules Sandhoff) Appt Note: SX 10/16/2017 LEFT TOTAL KNEE ARTHROPLASTY  
 [address] [facility], [address] [facility] [address]  
[phone]  
  
   
 [address] [facility], [address] [address] Upcoming Health Maintenance Date Due INFLUENZA AGE 9 TO ADULT 8/1/2017 BREAST CANCER SCRN MAMMOGRAM 7/7/2018 COLONOSCOPY 3/18/2021 DTaP/Tdap/Td series (2 - Td) 11/10/2024 Allergies as of 9/12/2017  Review Complete On: 9/12/2017 By: Van Favre, LPN Severity Noted Reaction Type Reactions Other Plant, Animal, Environmental High 06/05/2014   Systemic Anaphylaxis  
 poison ivy Current Immunizations  Reviewed on 11/10/2014 Name Date Influenza Vaccine 10/12/2016 Tdap 11/10/2014 Not reviewed this visit You Were Diagnosed With   
  
 Codes Comments Osteoarthritis of hip, unspecified laterality, unspecified osteoarthritis type    -  Primary ICD-10-CM: M16.9 ICD-9-CM: 715.95 Vitals BP Pulse Resp Height(growth percentile) Weight(growth percentile) SpO2  
 129/86 (BP 1 Location: Left arm, BP Patient Position: Sitting) 77 20 5' 7\" (1.702 m) 178 lb (80.7 kg) 95% BMI OB Status Smoking Status 27.88 kg/m2 Hysterectomy Never Smoker BMI and BSA Data Body Mass Index Body Surface Area  
 27.88 kg/m 2 1.95 m 2 Preferred Pharmacy Pharmacy Name Phone [phone], [phone] [facility]  [phone] Your Updated Medication List  
  
   
This list is accurate as of: 9/12/17  3:34 PM.  Always use your most recent med list.  
  
  
  
  
 albuterol 90 mcg/actuation inhaler Commonly known as:  PROVENTIL HFA, VENTOLIN HFA, PROAIR HFA Take 2 Puffs by inhalation every four (4) hours as needed for Wheezing. estradiol 1 mg tablet Commonly known as:  ESTRACE Take 1 mg by mouth every seven (7) days. Indications: Patient takes half tab daily HYDROcodone-acetaminophen 7.5-325 mg per tablet Commonly known as:  Hurman Grist Take 1-2 Tabs by mouth every eight (8) hours as needed for Pain. Max Daily Amount: 6 Tabs. meloxicam 15 mg tablet Commonly known as:  MOBIC Take 1 Tab by mouth daily. venlafaxine-SR 75 mg capsule Commonly known as:  EFFEXOR-XR Take 37.5 mg by mouth daily. XANAX 0.25 mg tablet Generic drug:  ALPRAZolam  
Take 0.25 mg by mouth nightly as needed. PRN sleep  
  
 zolpidem 5 mg tablet Commonly known as:  AMBIEN Take 1 Tab by mouth nightly as needed for Sleep. Introducing [facility]! Dear Nicho Thornton: Thank you for requesting a The New Daily account. Our records indicate that you already have an active The New Daily account. You can access your account anytime at [email or web address] Did you know that you can access your hospital and ER discharge instructions at any time in The New Daily?   You can also review all of your test results from your hospital stay or ER visit. Additional Information If you have questions, please visit the Frequently Asked Questions section of the [employer] website at [email or web address] Remember, [facility] is NOT to be used for urgent needs. For medical emergencies, dial 911. Now available from your iPhone and Android! Please provide this summary of care documentation to your next provider. Your primary care clinician is listed as Oumar Damon. If you have any questions after today's visit, please call [phone].

## 2017-10-31 ENCOUNTER — HOSPITAL ENCOUNTER (OUTPATIENT)
Dept: LAB | Age: 57
Discharge: HOME | End: 2017-10-31

## 2017-10-31 ENCOUNTER — HOSPITAL ENCOUNTER (OUTPATIENT)
Dept: PREADMISSION TESTING | Age: 57
Discharge: HOME | End: 2017-10-31
Payer: COMMERCIAL

## 2017-10-31 ENCOUNTER — HOSPITAL ENCOUNTER (OUTPATIENT)
Dept: GENERAL RADIOLOGY | Age: 57
Discharge: HOME | End: 2017-10-31
Payer: COMMERCIAL

## 2017-10-31 DIAGNOSIS — Z01.818 ENCOUNTER FOR PREADMISSION TESTING: ICD-10-CM

## 2017-10-31 DIAGNOSIS — M17.12 PRIMARY OSTEOARTHRITIS OF LEFT KNEE: ICD-10-CM

## 2017-10-31 LAB
ABO + RH BLD: NORMAL
ATRIAL RATE: 68 BPM
BLOOD GROUP ANTIBODIES SERPL: NORMAL
CALCULATED P AXIS, ECG09: 53 DEGREES
CALCULATED R AXIS, ECG10: 4 DEGREES
CALCULATED T AXIS, ECG11: 23 DEGREES
DIAGNOSIS, 93000: NORMAL
P-R INTERVAL, ECG05: 192 MS
Q-T INTERVAL, ECG07: 390 MS
QRS DURATION, ECG06: 108 MS
QTC CALCULATION (BEZET), ECG08: 414 MS
SENTARA SPECIMEN COL,SENBCF: NORMAL
SPECIMEN EXP DATE BLD: NORMAL
VENTRICULAR RATE, ECG03: 68 BPM

## 2017-10-31 PROCEDURE — 86900 BLOOD TYPING SEROLOGIC ABO: CPT | Performed by: PHYSICIAN ASSISTANT

## 2017-10-31 PROCEDURE — 93005 ELECTROCARDIOGRAM TRACING: CPT

## 2017-10-31 PROCEDURE — 99001 SPECIMEN HANDLING PT-LAB: CPT | Performed by: PHYSICIAN ASSISTANT

## 2017-10-31 PROCEDURE — 71020 XR CHEST PA LAT: CPT

## 2017-11-01 ENCOUNTER — OFFICE VISIT (OUTPATIENT)
Dept: ORTHOPEDIC SURGERY | Facility: CLINIC | Age: 57
End: 2017-11-01

## 2017-11-01 VITALS
HEART RATE: 75 BPM | OXYGEN SATURATION: 94 % | RESPIRATION RATE: 16 BRPM | BODY MASS INDEX: 27.31 KG/M2 | DIASTOLIC BLOOD PRESSURE: 92 MMHG | SYSTOLIC BLOOD PRESSURE: 132 MMHG | HEIGHT: 67 IN | WEIGHT: 174 LBS

## 2017-11-01 DIAGNOSIS — M17.12 PRIMARY OSTEOARTHRITIS OF LEFT KNEE: ICD-10-CM

## 2017-11-01 DIAGNOSIS — M25.562 LEFT KNEE PAIN, UNSPECIFIED CHRONICITY: Primary | ICD-10-CM

## 2017-11-01 RX ORDER — CELECOXIB 100 MG/1
200 CAPSULE ORAL ONCE
Status: CANCELLED | OUTPATIENT
Start: 2017-11-01 | End: 2017-11-01

## 2017-11-01 RX ORDER — ACETAMINOPHEN 325 MG/1
1000 TABLET ORAL ONCE
Status: CANCELLED | OUTPATIENT
Start: 2017-11-01 | End: 2017-11-01

## 2017-11-01 RX ORDER — OXYCODONE HCL 10 MG/1
20 TABLET, FILM COATED, EXTENDED RELEASE ORAL EVERY 12 HOURS
Status: CANCELLED | OUTPATIENT
Start: 2017-11-01

## 2017-11-01 RX ORDER — WARFARIN 1 MG/1
10 TABLET ORAL ONCE
Status: CANCELLED | OUTPATIENT
Start: 2017-11-01 | End: 2017-11-01

## 2017-11-01 RX ORDER — PREGABALIN 25 MG/1
75 CAPSULE ORAL ONCE
Status: CANCELLED | OUTPATIENT
Start: 2017-11-01 | End: 2017-11-01

## 2017-11-01 NOTE — H&P
[address], [address] [facility]  [phone]           HISTORY & PHYSICAL      Patient: Dinesh Madden                MRN: [id number]       SSN: [id number]  YOB: 1960        AGE: 61 y.o. SEX: female  Body mass index is 27.25 kg/(m^2). PCP: Zandra Bowles MD  11/01/17      CC: left knee end stage OA  Problem List Items Addressed This Visit     None      Visit Diagnoses     Left knee pain, unspecified chronicity    -  Primary    Relevant Orders    AMB POC XRAY, KNEE; COMPLETE, 4+ VIEW (Completed)    Primary osteoarthritis of left knee                HPI:  The patient is a pleasant 61 y.o. whom has end stage OA of their Left knee and has failed conservative treatment including but not limited to NSAIDS, cortisone injections, viscosupplementation, PT, and pain medicine. Due to the current findings and affected activities of daily living, surgical intervention is indicated. The alternatives, risks, complications, as well as expected outcome were discussed. These include but are not limited to infection, blood loss, need for blood transfusion, neurovascular damage, DVT, PE,  post-op stiffness and pain, leg length discrepancy, dislocation, anesthetic complications, prothesis longevity, need for more surgery, MI, stroke, and even death. The patient understands and wishes to proceed with surgery. Past Medical History:   Diagnosis Date    Allergic rhinitis     Nausea & vomiting     Osteoarthritis of hip     Spinal stenosis          Current Outpatient Prescriptions:     HYDROcodone-acetaminophen (NORCO) 7.5-325 mg per tablet, Take 1-2 Tabs by mouth every eight (8) hours as needed for Pain. Max Daily Amount: 6 Tabs. Indications: acute exacerbation of chronic left knee pain secondary to severe osteoarthritis, Disp: 60 Tab, Rfl: 0    venlafaxine-SR (EFFEXOR-XR) 75 mg capsule, Take 37.5 mg by mouth daily. , Disp: , Rfl:     estradiol (ESTRACE) 1 mg tablet, Take 1 mg by mouth every seven (7) days. Indications: Patient takes half tab daily, Disp: , Rfl:     zolpidem (AMBIEN) 5 mg tablet, Take 1 Tab by mouth nightly as needed for Sleep., Disp: 25 Tab, Rfl: 0    albuterol (PROVENTIL HFA, VENTOLIN HFA) 90 mcg/actuation inhaler, Take 2 Puffs by inhalation every four (4) hours as needed for Wheezing., Disp: 1 Inhaler, Rfl: 0    alprazolam (XANAX) 0.25 mg tablet, Take 0.25 mg by mouth nightly as needed. PRN sleep, Disp: , Rfl:     meloxicam (MOBIC) 15 mg tablet, Take 1 Tab by mouth daily. (Patient not taking: Reported on 9/12/2017), Disp: 30 Tab, Rfl: 2    Allergies   Allergen Reactions    Other Plant, Animal, Environmental Anaphylaxis     poison ivy       Social History     Social History    Marital status:      Spouse name: N/A    Number of children: N/A    Years of education: N/A     Occupational History    Not on file. Social History Main Topics    Smoking status: Never Smoker    Smokeless tobacco: Never Used    Alcohol use 0.0 oz/week     0 Standard drinks or equivalent per week      Comment: socially    Drug use: No    Sexual activity: Not on file      Comment: Hysterectomy     Other Topics Concern    Not on file     Social History Narrative       Past Surgical History:   Procedure Laterality Date    ENDOSCOPY, COLON, DIAGNOSTIC  03-18-11    normal colon to cecum    HX BREAST REDUCTION      HX HEENT      skin cancer between eyes    HX HYSTERECTOMY      HX LAP CHOLECYSTECTOMY  11/2015    HX TONSIL AND ADENOIDECTOMY      HX TUBAL LIGATION         Family History:  Non-contributory.      PE:  Visit Vitals    BP (!) 132/92 (BP 1 Location: Right arm, BP Patient Position: Sitting)    Pulse 75    Resp 16    Ht 5' 7\" (1.702 m)    Wt 174 lb (78.9 kg)    SpO2 94%    BMI 27.25 kg/m2     A&O X3, NAD, well develop, well nourished  Heart: S1-S2, rrr  Lungs: CTA bilat  Abd: soft, nt, nt, + bs in all quadrants  Ext:  Pos distal pulses to DP, PT      X-ray: left knee shows end stage OA    Labs: labs were reviewed and wnl.  ua neg    A:  Left  knee end stage OA    P:  At this point we will move forward with surgery. Again, the alternatives, risks, complications, as well as expected outcome were discussed and the patient wishes to proceed with surgery. Pt has been instructed to stop aspirin, nsaids, rheumatologic medications and blood thinners. They have also been instructed to continue on any heart and bp meds and to take them the morning of surgery with sips of water.          Dearl Kareen Osullivan

## 2017-11-01 NOTE — PATIENT INSTRUCTIONS
Patient: Krystle Raza                MRN: [id number]       SSN: [id number]  YOB: 1960        AGE: 61 y.o. SEX: female  Body mass index is 27.25 kg/(m^2). 11/01/17    DO:  1:  Sit with the leg out straight 5-10 min every hour while awake. Keep the toes pointed       up towards the mayra. 2.  Bend your knee 5-10 min every hour. Bend it to the point of pain and hold it for 5-10       Min. 3.  ICE your knee 20 min every hour    DO NOT:    1. Do not place anything under your knee to prop it up  2. Do not sit in the recliner chair.

## 2017-11-02 ENCOUNTER — OFFICE VISIT (OUTPATIENT)
Dept: INTERNAL MEDICINE CLINIC | Age: 57
End: 2017-11-02

## 2017-11-02 VITALS
DIASTOLIC BLOOD PRESSURE: 84 MMHG | BODY MASS INDEX: 27.47 KG/M2 | OXYGEN SATURATION: 94 % | WEIGHT: 175 LBS | HEART RATE: 69 BPM | HEIGHT: 67 IN | RESPIRATION RATE: 14 BRPM | SYSTOLIC BLOOD PRESSURE: 116 MMHG | TEMPERATURE: 97.9 F

## 2017-11-02 DIAGNOSIS — E78.5 HYPERLIPIDEMIA, UNSPECIFIED HYPERLIPIDEMIA TYPE: ICD-10-CM

## 2017-11-02 DIAGNOSIS — M17.12 OSTEOARTHRITIS OF LEFT KNEE, UNSPECIFIED OSTEOARTHRITIS TYPE: Primary | ICD-10-CM

## 2017-11-02 DIAGNOSIS — E55.9 VITAMIN D INSUFFICIENCY: ICD-10-CM

## 2017-11-02 DIAGNOSIS — F41.9 ANXIETY: ICD-10-CM

## 2017-11-02 DIAGNOSIS — Z01.818 PREOPERATIVE CLEARANCE: ICD-10-CM

## 2017-11-02 DIAGNOSIS — K21.00 REFLUX ESOPHAGITIS: ICD-10-CM

## 2017-11-02 RX ORDER — ALPRAZOLAM 0.25 MG/1
0.25 TABLET ORAL
Qty: 20 TAB | Refills: 0 | Status: SHIPPED | OUTPATIENT
Start: 2017-11-02 | End: 2018-04-20

## 2017-11-02 NOTE — PROGRESS NOTES
HPI/History  Juma Bright is a 61 y.o.  female who presents for med clearance for LTKR with Dr. Wiliam Ugalde on 11/13. Left knee OA with pain and limited function. Undergoing above procedure. Hx of HLD and not on medications. Uncertain status and due for labs. Hx of reflux. Abdominal discomfort back in July, suspected ulcer/gastritis/similar and potentially NSAID induced. Labs were unremarkable at the time. No reflux or issues since stopping mobic. Never followed through with GI eval. No alarm sxs. Hx of vit D insufficiency. Uncertain status and due for labs. Pt with rare episodes of anxiety. Usually with stressful events such as upcoming procedure or with family issues such as son moving. Requesting small refill of xanax. No violent ideation or other complaints. Aside from left knee, pt states she is doing well with no other complaints. Patient Active Problem List   Diagnosis Code    Allergic rhinitis 80    Hyperlipidemia E78.5    Osteoarthritis of left knee M16.9    Family history of colonic polyps Z83.71    Vitamin D insufficiency E55.9    Reflux esophagitis, Status post Stricture K21.0    Family history of breast cancer Z80.3    Foot pain M79.673    Cellulitis L03.90    Hip arthritis M16.10    Menopausal syndrome (hot flashes) N95.1     Past Medical History:   Diagnosis Date    Allergic rhinitis     Nausea & vomiting     Osteoarthritis of hip     Spinal stenosis      Past Surgical History:   Procedure Laterality Date    ENDOSCOPY, COLON, DIAGNOSTIC  03-18-11    normal colon to cecum    HX BREAST REDUCTION      HX HEENT      skin cancer between eyes    HX HYSTERECTOMY      HX LAP CHOLECYSTECTOMY  11/2015    HX TONSIL AND ADENOIDECTOMY      HX TUBAL LIGATION       Social History     Social History    Marital status:      Spouse name: N/A    Number of children: N/A    Years of education: N/A     Occupational History    Not on file.      Social History Main Topics    Smoking status: Never Smoker    Smokeless tobacco: Never Used    Alcohol use 0.0 oz/week     0 Standard drinks or equivalent per week      Comment: socially    Drug use: No    Sexual activity: Not on file      Comment: Hysterectomy     Other Topics Concern    Not on file     Social History Narrative     Family History   Problem Relation Age of Onset    Other Mother      atrial fibrillation    Colon Polyps Mother     Breast Cancer Mother     Colon Polyps Brother      half brother    Heart Disease Sister      half sister ASHD    Heart Attack Sister     Other Son      transposition of the great vessels and surgery for this    Other Son      congestive heart failure     Current Outpatient Prescriptions   Medication Sig    ALPRAZolam (XANAX) 0.25 mg tablet Take 1 Tab by mouth three (3) times daily as needed. Max Daily Amount: 0.75 mg. PRN sleep    HYDROcodone-acetaminophen (NORCO) 7.5-325 mg per tablet Take 1-2 Tabs by mouth every eight (8) hours as needed for Pain. Max Daily Amount: 6 Tabs. Indications: acute exacerbation of chronic left knee pain secondary to severe osteoarthritis    venlafaxine-SR (EFFEXOR-XR) 75 mg capsule Take 37.5 mg by mouth daily.  estradiol (ESTRACE) 1 mg tablet Take 1 mg by mouth every seven (7) days. Indications: Patient takes half tab daily    zolpidem (AMBIEN) 5 mg tablet Take 1 Tab by mouth nightly as needed for Sleep.  albuterol (PROVENTIL HFA, VENTOLIN HFA) 90 mcg/actuation inhaler Take 2 Puffs by inhalation every four (4) hours as needed for Wheezing. No current facility-administered medications for this visit. Allergies   Allergen Reactions    Other Plant, Animal, Environmental Anaphylaxis     poison ivy       Review of Systems  Aside from those included in HPI, remainder of complete ROS negative.     Physical Examination  Visit Vitals    /84 (BP 1 Location: Right arm, BP Patient Position: Sitting)    Pulse 69    Temp 97.9 °F (36.6 °C) (Oral)    Resp 14    Ht 5' 7\" (1.702 m)    Wt 175 lb (79.4 kg)    SpO2 94%    BMI 27.41 kg/m2     Preop EKG: SR, 68bpm, IRBBB, no other changes from 10/6/2016 or significant findings (Reviewed by Dr. Shayne Rider). Preop CXR unremarkable. Preop labs unremarkable. General - Alert and in no acute distress. Pt appears well, comfortable, and in good spirits. Pleasant, engaging. Nontoxic. Not anxious, non-diaphoretic. Mental status - Appropriate mood, behavior, speech content, dress, and thought processes. Eyes - Pupils equal and reactive, extraocular movements intact. No erythema or discharge. Ears - Auditory canals appear normal.  TMs appear normal.  Nose - No erythema. No rhinorrhea. Mouth - Mucous membranes moist. Oropharynx unremarkable. Neck - Supple without rigidity. Lymph - No periauricular, perimandibular, or cervical tenderness or swelling. Pulm - No tachypnea, retractions, or cyanosis. Good respiratory effort. Clear to auscultation bilat. No appreciable wheezes, rales, or rhonchi. Cardiovascular - Normal rate, regular rhythm. No appreciable murmurs or gallops. No peripheral edema. Distal pulses intact. Abdomen - Nondistended. Active bowel sounds. Soft, nontender. No guarding, rigidity, or rebound. No appreciable masses. Neuromuscular - No focal findings noted. Assessment and Plan  1. HLD - Uncertain status. Due for PE. 2. Reflux; past abdominal discomfort (suspected ulcer or similar from NSAIDs) - Never followed through with GI eval. Resolved and doing well after stopping mobic with no issues or alarm sxs. Observation. 3. Vit D insuff - Uncertain status. Due for PE. 4. Anxiety/stress reaction (episodic, rare) - Refilled small supply xanax as needed. 5. Med clearance for left knee surgery - Pt appears medically stable to undergo procedure as planned. Further planning as warranted. Pt happily agrees with plan.     PLEASE NOTE:   This document has been produced using voice recognition software. Unrecognized errors in transcription may be present.     Kelley Rogers [employer] [address] [place] [address]  [phone]  11/2/2017

## 2017-11-02 NOTE — PROGRESS NOTES
1. Have you been to the ER, urgent care clinic or hospitalized since your last visit? NO.     2. Have you seen or consulted any other health care providers outside of the [address] [place] [address] since your last visit (Include any pap smears or colon screening)? NO      Do you have an Advanced Directive? NO    Would you like information on Advanced Directives?  NO

## 2017-11-09 ENCOUNTER — HOSPITAL ENCOUNTER (OUTPATIENT)
Dept: LAB | Age: 57
Discharge: HOME | End: 2017-11-09

## 2017-11-09 DIAGNOSIS — N39.0 URINARY TRACT INFECTION WITHOUT HEMATURIA, SITE UNSPECIFIED: ICD-10-CM

## 2017-11-09 DIAGNOSIS — Z01.818 PREOP EXAMINATION: Primary | ICD-10-CM

## 2017-11-09 LAB
APPEARANCE UR: CLEAR
BILIRUB UR QL: NEGATIVE
COLOR UR: YELLOW
GLUCOSE UR STRIP.AUTO-MCNC: NEGATIVE MG/DL
HGB UR QL STRIP: NEGATIVE
KETONES UR QL STRIP.AUTO: NEGATIVE MG/DL
LEUKOCYTE ESTERASE UR QL STRIP.AUTO: NEGATIVE
NITRITE UR QL STRIP.AUTO: NEGATIVE
PH UR STRIP: 5 [PH] (ref 5–8)
PROT UR STRIP-MCNC: NEGATIVE MG/DL
SENTARA SPECIMEN COL,SENBCF: NORMAL
SP GR UR REFRACTOMETRY: 1.03 (ref 1–1.03)
UROBILINOGEN UR QL STRIP.AUTO: 0.2 EU/DL (ref 0.2–1)

## 2017-11-09 PROCEDURE — 81003 URINALYSIS AUTO W/O SCOPE: CPT | Performed by: ORTHOPAEDIC SURGERY

## 2017-11-09 PROCEDURE — 99001 SPECIMEN HANDLING PT-LAB: CPT | Performed by: ORTHOPAEDIC SURGERY

## 2017-11-11 ENCOUNTER — ANESTHESIA EVENT (OUTPATIENT)
Dept: SURGERY | Age: 57
DRG: 470 | End: 2017-11-11
Payer: COMMERCIAL

## 2017-11-13 ENCOUNTER — ANESTHESIA (OUTPATIENT)
Dept: SURGERY | Age: 57
DRG: 470 | End: 2017-11-13
Payer: COMMERCIAL

## 2017-11-13 ENCOUNTER — HOSPITAL ENCOUNTER (INPATIENT)
Age: 57
LOS: 2 days | Discharge: HOME HEALTH | DRG: 470 | End: 2017-11-15
Attending: ORTHOPAEDIC SURGERY | Admitting: ORTHOPAEDIC SURGERY
Payer: COMMERCIAL

## 2017-11-13 ENCOUNTER — APPOINTMENT (OUTPATIENT)
Dept: GENERAL RADIOLOGY | Age: 57
DRG: 470 | End: 2017-11-13
Attending: PHYSICIAN ASSISTANT
Payer: COMMERCIAL

## 2017-11-13 DIAGNOSIS — M17.10 ARTHRITIS OF KNEE: Primary | ICD-10-CM

## 2017-11-13 PROCEDURE — 77030018719 HC DRSG PTCH ANTIMIC J&J -A: Performed by: ORTHOPAEDIC SURGERY

## 2017-11-13 PROCEDURE — 74011000250 HC RX REV CODE- 250

## 2017-11-13 PROCEDURE — 77030011640 HC PAD GRND REM COVD -A: Performed by: ORTHOPAEDIC SURGERY

## 2017-11-13 PROCEDURE — 74011250637 HC RX REV CODE- 250/637: Performed by: NURSE ANESTHETIST, CERTIFIED REGISTERED

## 2017-11-13 PROCEDURE — 77030010785: Performed by: ORTHOPAEDIC SURGERY

## 2017-11-13 PROCEDURE — 74011000250 HC RX REV CODE- 250: Performed by: ORTHOPAEDIC SURGERY

## 2017-11-13 PROCEDURE — 76942 ECHO GUIDE FOR BIOPSY: CPT | Performed by: ANESTHESIOLOGY

## 2017-11-13 PROCEDURE — 77030018883 HC BLD SAW SAG4 STRY -B: Performed by: ORTHOPAEDIC SURGERY

## 2017-11-13 PROCEDURE — C9290 INJ, BUPIVACAINE LIPOSOME: HCPCS | Performed by: PHYSICIAN ASSISTANT

## 2017-11-13 PROCEDURE — 97161 PT EVAL LOW COMPLEX 20 MIN: CPT

## 2017-11-13 PROCEDURE — 77030008467 HC STPLR SKN COVD -B: Performed by: ORTHOPAEDIC SURGERY

## 2017-11-13 PROCEDURE — 77030019557 HC ELECTRD VES SEAL MEDT -F: Performed by: ORTHOPAEDIC SURGERY

## 2017-11-13 PROCEDURE — L1830 KO IMMOB CANVAS LONG PRE OTS: HCPCS

## 2017-11-13 PROCEDURE — 0SRD0J9 REPLACEMENT OF LEFT KNEE JOINT WITH SYNTHETIC SUBSTITUTE, CEMENTED, OPEN APPROACH: ICD-10-PCS | Performed by: ORTHOPAEDIC SURGERY

## 2017-11-13 PROCEDURE — 77030020753 HC CUF TRNQT 1BLA STRY -B: Performed by: ORTHOPAEDIC SURGERY

## 2017-11-13 PROCEDURE — 77030003029 HC SUT VCRL J&J -B: Performed by: ORTHOPAEDIC SURGERY

## 2017-11-13 PROCEDURE — 74011250636 HC RX REV CODE- 250/636

## 2017-11-13 PROCEDURE — 97530 THERAPEUTIC ACTIVITIES: CPT

## 2017-11-13 PROCEDURE — 74011250636 HC RX REV CODE- 250/636: Performed by: PHYSICIAN ASSISTANT

## 2017-11-13 PROCEDURE — 77030016544 HC BLD SAW RECIP1 STRY -B: Performed by: ORTHOPAEDIC SURGERY

## 2017-11-13 PROCEDURE — 77030020782 HC GWN BAIR PAWS FLX 3M -B: Performed by: ORTHOPAEDIC SURGERY

## 2017-11-13 PROCEDURE — 77030018836 HC SOL IRR NACL ICUM -A: Performed by: ORTHOPAEDIC SURGERY

## 2017-11-13 PROCEDURE — 77030032490 HC SLV COMPR SCD KNE COVD -B: Performed by: ORTHOPAEDIC SURGERY

## 2017-11-13 PROCEDURE — 77030012935 HC DRSG AQUACEL BMS -B: Performed by: ORTHOPAEDIC SURGERY

## 2017-11-13 PROCEDURE — C1776 JOINT DEVICE (IMPLANTABLE): HCPCS | Performed by: ORTHOPAEDIC SURGERY

## 2017-11-13 PROCEDURE — 74011250636 HC RX REV CODE- 250/636: Performed by: ORTHOPAEDIC SURGERY

## 2017-11-13 PROCEDURE — 76010000153 HC OR TIME 1.5 TO 2 HR: Performed by: ORTHOPAEDIC SURGERY

## 2017-11-13 PROCEDURE — 74011250636 HC RX REV CODE- 250/636: Performed by: NURSE ANESTHETIST, CERTIFIED REGISTERED

## 2017-11-13 PROCEDURE — 65270000029 HC RM PRIVATE

## 2017-11-13 PROCEDURE — 74011000258 HC RX REV CODE- 258: Performed by: ORTHOPAEDIC SURGERY

## 2017-11-13 PROCEDURE — 74011000258 HC RX REV CODE- 258: Performed by: PHYSICIAN ASSISTANT

## 2017-11-13 PROCEDURE — C1713 ANCHOR/SCREW BN/BN,TIS/BN: HCPCS | Performed by: ORTHOPAEDIC SURGERY

## 2017-11-13 PROCEDURE — 74011250637 HC RX REV CODE- 250/637: Performed by: ORTHOPAEDIC SURGERY

## 2017-11-13 PROCEDURE — 76060000034 HC ANESTHESIA 1.5 TO 2 HR: Performed by: ORTHOPAEDIC SURGERY

## 2017-11-13 PROCEDURE — 77030002933 HC SUT MCRYL J&J -A: Performed by: ORTHOPAEDIC SURGERY

## 2017-11-13 PROCEDURE — 77030013708 HC HNDPC SUC IRR PULS STRY –B: Performed by: ORTHOPAEDIC SURGERY

## 2017-11-13 PROCEDURE — 74011000250 HC RX REV CODE- 250: Performed by: PHYSICIAN ASSISTANT

## 2017-11-13 PROCEDURE — 74011250637 HC RX REV CODE- 250/637: Performed by: PHYSICIAN ASSISTANT

## 2017-11-13 PROCEDURE — 77030031139 HC SUT VCRL2 J&J -A: Performed by: ORTHOPAEDIC SURGERY

## 2017-11-13 PROCEDURE — 77030019605: Performed by: ORTHOPAEDIC SURGERY

## 2017-11-13 PROCEDURE — 77030003666 HC NDL SPINAL BD -A: Performed by: ORTHOPAEDIC SURGERY

## 2017-11-13 PROCEDURE — 77030012411 HC DRN WND CARD -A: Performed by: ORTHOPAEDIC SURGERY

## 2017-11-13 PROCEDURE — 76210000016 HC OR PH I REC 1 TO 1.5 HR: Performed by: ORTHOPAEDIC SURGERY

## 2017-11-13 PROCEDURE — 73560 X-RAY EXAM OF KNEE 1 OR 2: CPT

## 2017-11-13 PROCEDURE — 64447 NJX AA&/STRD FEMORAL NRV IMG: CPT | Performed by: ANESTHESIOLOGY

## 2017-11-13 DEVICE — UNIVERSAL TIBIAL BASEPLATE
Type: IMPLANTABLE DEVICE | Site: KNEE | Status: FUNCTIONAL
Brand: TRIATHLON

## 2017-11-13 DEVICE — COMPONENT KNEE CEM X3 TRIATHLON: Type: IMPLANTABLE DEVICE | Site: KNEE | Status: FUNCTIONAL

## 2017-11-13 DEVICE — CEMENT BNE 20ML 41GM FULL DOSE PMMA W/ TOBRA M VISC RADPQ: Type: IMPLANTABLE DEVICE | Site: KNEE | Status: FUNCTIONAL

## 2017-11-13 DEVICE — POSTERIOR STABILIZED FEMORAL
Type: IMPLANTABLE DEVICE | Site: KNEE | Status: FUNCTIONAL
Brand: TRIATHLON

## 2017-11-13 DEVICE — TIBIAL BEARING INSERT - PS
Type: IMPLANTABLE DEVICE | Site: KNEE | Status: FUNCTIONAL
Brand: TRIATHLON

## 2017-11-13 DEVICE — ASYMMETRIC PATELLA
Type: IMPLANTABLE DEVICE | Site: KNEE | Status: FUNCTIONAL
Brand: TRIATHLON

## 2017-11-13 RX ORDER — ZOLPIDEM TARTRATE 5 MG/1
5 TABLET ORAL
Status: DISCONTINUED | OUTPATIENT
Start: 2017-11-13 | End: 2017-11-15 | Stop reason: HOSPADM

## 2017-11-13 RX ORDER — SODIUM CHLORIDE, SODIUM LACTATE, POTASSIUM CHLORIDE, CALCIUM CHLORIDE 600; 310; 30; 20 MG/100ML; MG/100ML; MG/100ML; MG/100ML
75 INJECTION, SOLUTION INTRAVENOUS CONTINUOUS
Status: DISCONTINUED | OUTPATIENT
Start: 2017-11-13 | End: 2017-11-13 | Stop reason: HOSPADM

## 2017-11-13 RX ORDER — GLYCOPYRROLATE 0.2 MG/ML
INJECTION INTRAMUSCULAR; INTRAVENOUS AS NEEDED
Status: DISCONTINUED | OUTPATIENT
Start: 2017-11-13 | End: 2017-11-13 | Stop reason: HOSPADM

## 2017-11-13 RX ORDER — ROCURONIUM BROMIDE 10 MG/ML
INJECTION, SOLUTION INTRAVENOUS AS NEEDED
Status: DISCONTINUED | OUTPATIENT
Start: 2017-11-13 | End: 2017-11-13 | Stop reason: HOSPADM

## 2017-11-13 RX ORDER — LIDOCAINE HYDROCHLORIDE 10 MG/ML
INJECTION, SOLUTION EPIDURAL; INFILTRATION; INTRACAUDAL; PERINEURAL
Status: COMPLETED
Start: 2017-11-13 | End: 2017-11-13

## 2017-11-13 RX ORDER — SODIUM CHLORIDE 0.9 % (FLUSH) 0.9 %
5-10 SYRINGE (ML) INJECTION EVERY 8 HOURS
Status: DISCONTINUED | OUTPATIENT
Start: 2017-11-13 | End: 2017-11-13 | Stop reason: HOSPADM

## 2017-11-13 RX ORDER — PROPOFOL 10 MG/ML
INJECTION, EMULSION INTRAVENOUS AS NEEDED
Status: DISCONTINUED | OUTPATIENT
Start: 2017-11-13 | End: 2017-11-13 | Stop reason: HOSPADM

## 2017-11-13 RX ORDER — ACETAMINOPHEN 500 MG
1000 TABLET ORAL
Status: DISCONTINUED | OUTPATIENT
Start: 2017-11-13 | End: 2017-11-15 | Stop reason: HOSPADM

## 2017-11-13 RX ORDER — HYDROMORPHONE HYDROCHLORIDE 2 MG/ML
0.5 INJECTION, SOLUTION INTRAMUSCULAR; INTRAVENOUS; SUBCUTANEOUS
Status: DISCONTINUED | OUTPATIENT
Start: 2017-11-13 | End: 2017-11-13 | Stop reason: HOSPADM

## 2017-11-13 RX ORDER — FAMOTIDINE 20 MG/1
20 TABLET, FILM COATED ORAL ONCE
Status: COMPLETED | OUTPATIENT
Start: 2017-11-13 | End: 2017-11-13

## 2017-11-13 RX ORDER — NEOSTIGMINE METHYLSULFATE 5 MG/5 ML
SYRINGE (ML) INTRAVENOUS AS NEEDED
Status: DISCONTINUED | OUTPATIENT
Start: 2017-11-13 | End: 2017-11-13 | Stop reason: HOSPADM

## 2017-11-13 RX ORDER — HYDROMORPHONE HYDROCHLORIDE 2 MG/ML
INJECTION, SOLUTION INTRAMUSCULAR; INTRAVENOUS; SUBCUTANEOUS
Status: COMPLETED
Start: 2017-11-13 | End: 2017-11-13

## 2017-11-13 RX ORDER — POLYMYXIN B 500000 [USP'U]/1
INJECTION, POWDER, LYOPHILIZED, FOR SOLUTION INTRAMUSCULAR; INTRATHECAL; INTRAVENOUS; OPHTHALMIC AS NEEDED
Status: DISCONTINUED | OUTPATIENT
Start: 2017-11-13 | End: 2017-11-13 | Stop reason: HOSPADM

## 2017-11-13 RX ORDER — SODIUM CHLORIDE 0.9 % (FLUSH) 0.9 %
5-10 SYRINGE (ML) INJECTION AS NEEDED
Status: DISCONTINUED | OUTPATIENT
Start: 2017-11-13 | End: 2017-11-13 | Stop reason: HOSPADM

## 2017-11-13 RX ORDER — SODIUM CHLORIDE 0.9 % (FLUSH) 0.9 %
5-10 SYRINGE (ML) INJECTION AS NEEDED
Status: DISCONTINUED | OUTPATIENT
Start: 2017-11-13 | End: 2017-11-15 | Stop reason: HOSPADM

## 2017-11-13 RX ORDER — VANCOMYCIN HYDROCHLORIDE 1 G/20ML
INJECTION, POWDER, LYOPHILIZED, FOR SOLUTION INTRAVENOUS AS NEEDED
Status: DISCONTINUED | OUTPATIENT
Start: 2017-11-13 | End: 2017-11-13 | Stop reason: HOSPADM

## 2017-11-13 RX ORDER — VENLAFAXINE HYDROCHLORIDE 37.5 MG/1
37.5 CAPSULE, EXTENDED RELEASE ORAL DAILY
Status: DISCONTINUED | OUTPATIENT
Start: 2017-11-13 | End: 2017-11-15 | Stop reason: HOSPADM

## 2017-11-13 RX ORDER — ZOLPIDEM TARTRATE 5 MG/1
5 TABLET ORAL
Status: DISCONTINUED | OUTPATIENT
Start: 2017-11-13 | End: 2017-11-13

## 2017-11-13 RX ORDER — ROPIVACAINE HYDROCHLORIDE 2 MG/ML
60 INJECTION, SOLUTION EPIDURAL; INFILTRATION; PERINEURAL
Status: COMPLETED | OUTPATIENT
Start: 2017-11-13 | End: 2017-11-13

## 2017-11-13 RX ORDER — CEFAZOLIN SODIUM 2 G/50ML
2 SOLUTION INTRAVENOUS
Status: COMPLETED | OUTPATIENT
Start: 2017-11-13 | End: 2017-11-13

## 2017-11-13 RX ORDER — LIDOCAINE HYDROCHLORIDE 20 MG/ML
INJECTION, SOLUTION EPIDURAL; INFILTRATION; INTRACAUDAL; PERINEURAL AS NEEDED
Status: DISCONTINUED | OUTPATIENT
Start: 2017-11-13 | End: 2017-11-13 | Stop reason: HOSPADM

## 2017-11-13 RX ORDER — PREGABALIN 75 MG/1
75 CAPSULE ORAL ONCE
Status: COMPLETED | OUTPATIENT
Start: 2017-11-13 | End: 2017-11-13

## 2017-11-13 RX ORDER — ALPRAZOLAM 0.25 MG/1
0.25 TABLET ORAL
Status: DISCONTINUED | OUTPATIENT
Start: 2017-11-13 | End: 2017-11-15 | Stop reason: HOSPADM

## 2017-11-13 RX ORDER — CELECOXIB 100 MG/1
200 CAPSULE ORAL ONCE
Status: COMPLETED | OUTPATIENT
Start: 2017-11-13 | End: 2017-11-13

## 2017-11-13 RX ORDER — ALBUTEROL SULFATE 90 UG/1
2 AEROSOL, METERED RESPIRATORY (INHALATION)
Status: DISCONTINUED | OUTPATIENT
Start: 2017-11-13 | End: 2017-11-13

## 2017-11-13 RX ORDER — DOCUSATE SODIUM 100 MG/1
100 CAPSULE, LIQUID FILLED ORAL 2 TIMES DAILY
Status: DISCONTINUED | OUTPATIENT
Start: 2017-11-13 | End: 2017-11-15 | Stop reason: HOSPADM

## 2017-11-13 RX ORDER — FENTANYL CITRATE 50 UG/ML
INJECTION, SOLUTION INTRAMUSCULAR; INTRAVENOUS AS NEEDED
Status: DISCONTINUED | OUTPATIENT
Start: 2017-11-13 | End: 2017-11-13 | Stop reason: HOSPADM

## 2017-11-13 RX ORDER — SODIUM CHLORIDE 9 MG/ML
100 INJECTION, SOLUTION INTRAVENOUS CONTINUOUS
Status: DISPENSED | OUTPATIENT
Start: 2017-11-13 | End: 2017-11-14

## 2017-11-13 RX ORDER — EPHEDRINE SULFATE/0.9% NACL/PF 25 MG/5 ML
SYRINGE (ML) INTRAVENOUS AS NEEDED
Status: DISCONTINUED | OUTPATIENT
Start: 2017-11-13 | End: 2017-11-13 | Stop reason: HOSPADM

## 2017-11-13 RX ORDER — EPINEPHRINE 1 MG/ML
INJECTION, SOLUTION, CONCENTRATE INTRAVENOUS AS NEEDED
Status: DISCONTINUED | OUTPATIENT
Start: 2017-11-13 | End: 2017-11-13 | Stop reason: HOSPADM

## 2017-11-13 RX ORDER — FENTANYL CITRATE 50 UG/ML
100 INJECTION, SOLUTION INTRAMUSCULAR; INTRAVENOUS ONCE
Status: COMPLETED | OUTPATIENT
Start: 2017-11-13 | End: 2017-11-13

## 2017-11-13 RX ORDER — BUPIVACAINE HYDROCHLORIDE 5 MG/ML
INJECTION, SOLUTION EPIDURAL; INTRACAUDAL AS NEEDED
Status: DISCONTINUED | OUTPATIENT
Start: 2017-11-13 | End: 2017-11-13 | Stop reason: HOSPADM

## 2017-11-13 RX ORDER — SODIUM CHLORIDE 0.9 % (FLUSH) 0.9 %
5-10 SYRINGE (ML) INJECTION EVERY 8 HOURS
Status: DISCONTINUED | OUTPATIENT
Start: 2017-11-13 | End: 2017-11-15 | Stop reason: HOSPADM

## 2017-11-13 RX ORDER — ALBUTEROL SULFATE 0.83 MG/ML
2.5 SOLUTION RESPIRATORY (INHALATION)
Status: DISCONTINUED | OUTPATIENT
Start: 2017-11-13 | End: 2017-11-15 | Stop reason: HOSPADM

## 2017-11-13 RX ORDER — MIDAZOLAM HYDROCHLORIDE 1 MG/ML
2 INJECTION, SOLUTION INTRAMUSCULAR; INTRAVENOUS ONCE
Status: COMPLETED | OUTPATIENT
Start: 2017-11-13 | End: 2017-11-13

## 2017-11-13 RX ORDER — ONDANSETRON 2 MG/ML
INJECTION INTRAMUSCULAR; INTRAVENOUS AS NEEDED
Status: DISCONTINUED | OUTPATIENT
Start: 2017-11-13 | End: 2017-11-13 | Stop reason: HOSPADM

## 2017-11-13 RX ORDER — ONDANSETRON 2 MG/ML
4 INJECTION INTRAMUSCULAR; INTRAVENOUS
Status: DISCONTINUED | OUTPATIENT
Start: 2017-11-13 | End: 2017-11-15 | Stop reason: HOSPADM

## 2017-11-13 RX ORDER — LANOLIN ALCOHOL/MO/W.PET/CERES
1 CREAM (GRAM) TOPICAL 2 TIMES DAILY WITH MEALS
Status: DISCONTINUED | OUTPATIENT
Start: 2017-11-13 | End: 2017-11-15 | Stop reason: HOSPADM

## 2017-11-13 RX ORDER — CEFAZOLIN SODIUM 2 G/50ML
2 SOLUTION INTRAVENOUS EVERY 8 HOURS
Status: COMPLETED | OUTPATIENT
Start: 2017-11-13 | End: 2017-11-14

## 2017-11-13 RX ORDER — ACETAMINOPHEN 500 MG
1000 TABLET ORAL ONCE
Status: COMPLETED | OUTPATIENT
Start: 2017-11-13 | End: 2017-11-13

## 2017-11-13 RX ORDER — CELECOXIB 100 MG/1
200 CAPSULE ORAL 2 TIMES DAILY
Status: DISCONTINUED | OUTPATIENT
Start: 2017-11-13 | End: 2017-11-15 | Stop reason: HOSPADM

## 2017-11-13 RX ORDER — SUCCINYLCHOLINE CHLORIDE 20 MG/ML
INJECTION INTRAMUSCULAR; INTRAVENOUS AS NEEDED
Status: DISCONTINUED | OUTPATIENT
Start: 2017-11-13 | End: 2017-11-13 | Stop reason: HOSPADM

## 2017-11-13 RX ORDER — ONDANSETRON 2 MG/ML
4 INJECTION INTRAMUSCULAR; INTRAVENOUS AS NEEDED
Status: DISCONTINUED | OUTPATIENT
Start: 2017-11-13 | End: 2017-11-13 | Stop reason: HOSPADM

## 2017-11-13 RX ORDER — NALOXONE HYDROCHLORIDE 0.4 MG/ML
0.4 INJECTION, SOLUTION INTRAMUSCULAR; INTRAVENOUS; SUBCUTANEOUS AS NEEDED
Status: DISCONTINUED | OUTPATIENT
Start: 2017-11-13 | End: 2017-11-15 | Stop reason: HOSPADM

## 2017-11-13 RX ORDER — OXYCODONE HYDROCHLORIDE 5 MG/1
15-25 TABLET ORAL
Status: DISCONTINUED | OUTPATIENT
Start: 2017-11-13 | End: 2017-11-15

## 2017-11-13 RX ORDER — PREGABALIN 50 MG/1
50 CAPSULE ORAL 2 TIMES DAILY
Status: DISCONTINUED | OUTPATIENT
Start: 2017-11-13 | End: 2017-11-15 | Stop reason: HOSPADM

## 2017-11-13 RX ORDER — KETOROLAC TROMETHAMINE 30 MG/ML
INJECTION, SOLUTION INTRAMUSCULAR; INTRAVENOUS AS NEEDED
Status: DISCONTINUED | OUTPATIENT
Start: 2017-11-13 | End: 2017-11-13 | Stop reason: HOSPADM

## 2017-11-13 RX ORDER — WARFARIN 10 MG/1
10 TABLET ORAL ONCE
Status: COMPLETED | OUTPATIENT
Start: 2017-11-13 | End: 2017-11-13

## 2017-11-13 RX ORDER — DIPHENHYDRAMINE HYDROCHLORIDE 50 MG/ML
12.5 INJECTION, SOLUTION INTRAMUSCULAR; INTRAVENOUS
Status: DISCONTINUED | OUTPATIENT
Start: 2017-11-13 | End: 2017-11-15 | Stop reason: HOSPADM

## 2017-11-13 RX ADMIN — HYDROMORPHONE HYDROCHLORIDE 0.5 MG: 2 INJECTION, SOLUTION INTRAMUSCULAR; INTRAVENOUS; SUBCUTANEOUS at 12:16

## 2017-11-13 RX ADMIN — CEFAZOLIN SODIUM 2 G: 2 SOLUTION INTRAVENOUS at 10:02

## 2017-11-13 RX ADMIN — WARFARIN SODIUM 10 MG: 10 TABLET ORAL at 08:57

## 2017-11-13 RX ADMIN — Medication 4 MG: at 11:39

## 2017-11-13 RX ADMIN — LIDOCAINE HYDROCHLORIDE 60 MG: 20 INJECTION, SOLUTION EPIDURAL; INFILTRATION; INTRACAUDAL; PERINEURAL at 10:11

## 2017-11-13 RX ADMIN — ROCURONIUM BROMIDE 35 MG: 10 INJECTION, SOLUTION INTRAVENOUS at 10:25

## 2017-11-13 RX ADMIN — LIDOCAINE HYDROCHLORIDE 5 ML: 10 INJECTION, SOLUTION EPIDURAL; INFILTRATION; INTRACAUDAL; PERINEURAL at 09:23

## 2017-11-13 RX ADMIN — OXYCODONE HYDROCHLORIDE 15 MG: 5 TABLET ORAL at 14:40

## 2017-11-13 RX ADMIN — GLYCOPYRROLATE 0.6 MG: 0.2 INJECTION INTRAMUSCULAR; INTRAVENOUS at 11:39

## 2017-11-13 RX ADMIN — ACETAMINOPHEN 1000 MG: 500 TABLET ORAL at 08:57

## 2017-11-13 RX ADMIN — CELECOXIB 200 MG: 100 CAPSULE ORAL at 18:08

## 2017-11-13 RX ADMIN — FENTANYL CITRATE 100 MCG: 50 INJECTION INTRAMUSCULAR; INTRAVENOUS at 09:21

## 2017-11-13 RX ADMIN — SUCCINYLCHOLINE CHLORIDE 120 MG: 20 INJECTION INTRAMUSCULAR; INTRAVENOUS at 10:11

## 2017-11-13 RX ADMIN — Medication 325 MG: at 18:09

## 2017-11-13 RX ADMIN — FENTANYL CITRATE 25 MCG: 50 INJECTION, SOLUTION INTRAMUSCULAR; INTRAVENOUS at 10:41

## 2017-11-13 RX ADMIN — MIDAZOLAM HYDROCHLORIDE 2 MG: 1 INJECTION, SOLUTION INTRAMUSCULAR; INTRAVENOUS at 09:21

## 2017-11-13 RX ADMIN — FAMOTIDINE 20 MG: 20 TABLET, FILM COATED ORAL at 08:57

## 2017-11-13 RX ADMIN — CELECOXIB 200 MG: 100 CAPSULE ORAL at 08:57

## 2017-11-13 RX ADMIN — Medication 10 ML: at 09:09

## 2017-11-13 RX ADMIN — ROCURONIUM BROMIDE 10 MG: 10 INJECTION, SOLUTION INTRAVENOUS at 10:58

## 2017-11-13 RX ADMIN — TRANEXAMIC ACID 1 G: 100 INJECTION, SOLUTION INTRAVENOUS at 10:07

## 2017-11-13 RX ADMIN — PROPOFOL 150 MG: 10 INJECTION, EMULSION INTRAVENOUS at 10:11

## 2017-11-13 RX ADMIN — SODIUM CHLORIDE 100 ML/HR: 900 INJECTION, SOLUTION INTRAVENOUS at 14:00

## 2017-11-13 RX ADMIN — SODIUM CHLORIDE, SODIUM LACTATE, POTASSIUM CHLORIDE, AND CALCIUM CHLORIDE 75 ML/HR: 600; 310; 30; 20 INJECTION, SOLUTION INTRAVENOUS at 09:09

## 2017-11-13 RX ADMIN — ROCURONIUM BROMIDE 5 MG: 10 INJECTION, SOLUTION INTRAVENOUS at 10:11

## 2017-11-13 RX ADMIN — PREGABALIN 75 MG: 75 CAPSULE ORAL at 08:57

## 2017-11-13 RX ADMIN — Medication 2.5 MG: at 10:51

## 2017-11-13 RX ADMIN — FENTANYL CITRATE 50 MCG: 50 INJECTION, SOLUTION INTRAMUSCULAR; INTRAVENOUS at 10:11

## 2017-11-13 RX ADMIN — Medication 10 ML: at 21:56

## 2017-11-13 RX ADMIN — SODIUM CHLORIDE, SODIUM LACTATE, POTASSIUM CHLORIDE, AND CALCIUM CHLORIDE: 600; 310; 30; 20 INJECTION, SOLUTION INTRAVENOUS at 11:40

## 2017-11-13 RX ADMIN — VENLAFAXINE HYDROCHLORIDE 37.5 MG: 37.5 CAPSULE, EXTENDED RELEASE ORAL at 20:15

## 2017-11-13 RX ADMIN — Medication 2.5 MG: at 10:36

## 2017-11-13 RX ADMIN — DOCUSATE SODIUM 100 MG: 100 CAPSULE, LIQUID FILLED ORAL at 18:09

## 2017-11-13 RX ADMIN — CEFAZOLIN SODIUM 2 G: 2 SOLUTION INTRAVENOUS at 18:09

## 2017-11-13 RX ADMIN — ROPIVACAINE HYDROCHLORIDE 60 MG: 2 INJECTION, SOLUTION EPIDURAL; INFILTRATION at 09:26

## 2017-11-13 RX ADMIN — HYDROMORPHONE HYDROCHLORIDE 0.5 MG: 2 INJECTION, SOLUTION INTRAMUSCULAR; INTRAVENOUS; SUBCUTANEOUS at 12:50

## 2017-11-13 RX ADMIN — ONDANSETRON 4 MG: 2 INJECTION INTRAMUSCULAR; INTRAVENOUS at 11:34

## 2017-11-13 RX ADMIN — ONDANSETRON 4 MG: 2 SOLUTION INTRAMUSCULAR; INTRAVENOUS at 12:20

## 2017-11-13 RX ADMIN — FENTANYL CITRATE 25 MCG: 50 INJECTION, SOLUTION INTRAMUSCULAR; INTRAVENOUS at 10:43

## 2017-11-13 RX ADMIN — FENTANYL CITRATE 50 MCG: 50 INJECTION, SOLUTION INTRAMUSCULAR; INTRAVENOUS at 11:56

## 2017-11-13 RX ADMIN — PREGABALIN 50 MG: 50 CAPSULE ORAL at 18:09

## 2017-11-13 RX ADMIN — OXYCODONE HYDROCHLORIDE 15 MG: 5 TABLET ORAL at 20:48

## 2017-11-13 NOTE — CONSULTS
[employer] [facility]  Pulmonary, Critical Care, and Sleep Medicine    Name: Deon Callas MRN: [id number]   : 1960 Hospital: [facility]   Date: 2017        Pulmonary Medicine- Initial Patient Consult      IMPRESSION:   1. Osteoarthritis of left knee, unspecified osteoarthritis type [M17.12]  2. S/P Left Total Knee Arthroplasty with Femoral Nerve Block 17: POD #0  3. Hot flashes- menopausal (treated with Effexor)  4. Anxiety- mild: takes PRN ativan on rare occasion        RECOMMENDATIONS:   · Keep Head of bed elevated. · SpO2 goal> 88%  · Pulmonary hygiene with IS at bed side- Encourage frequent use while awake. · Prophylactic - post OP antibiotic management per Ortho- primary team  · Pain management per Ortho- primary team  · DVT prophylaxis per Ortho- primary team  · PT per Ortho - primary team  · Zofran 4mg IVPB PRN nausea/emesis  · Continue with OP Effexor- rapid discontinuation can cause withdrawal side effects  · Will continue to follow along with primary team  · Further recommendations pending clinical course     Subjective/History: This patient has been seen and evaluated at the request of Dr. Juan Carlos Vega for post operative medical managment. Patient is a 61 y.o. female with a history of OA- Left knee. No hypertension, diabetes or known CAD. S/P Left TKA today. Currently resting in hameed bed. + nausea without emesis in PACU. Currently eating broth and Jello without nausea or emesis. Left leg sensation returned but not quite back to baseline. Currently reports 4/10 post op pain.        Past Medical History:   Diagnosis Date    Allergic rhinitis     Nausea & vomiting     Osteoarthritis of hip     Spinal stenosis       Past Surgical History:   Procedure Laterality Date    ENDOSCOPY, COLON, DIAGNOSTIC  11    normal colon to cecum    HX BREAST REDUCTION      HX HEENT      skin cancer between eyes    HX HYSTERECTOMY      HX LAP CHOLECYSTECTOMY 11/2015    HX TONSIL AND ADENOIDECTOMY      HX TUBAL LIGATION        Prior to Admission medications    Medication Sig Start Date End Date Taking? Authorizing Provider   ALPRAZolam Josemanuel Lazar) 0.25 mg tablet Take 1 Tab by mouth three (3) times daily as needed. Max Daily Amount: 0.75 mg. PRN sleep 11/2/17  Yes ARTHUR Morrison   HYDROcodone-acetaminophen (NORCO) 7.5-325 mg per tablet Take 1-2 Tabs by mouth every eight (8) hours as needed for Pain. Max Daily Amount: 6 Tabs. Indications: acute exacerbation of chronic left knee pain secondary to severe osteoarthritis 9/12/17  Yes Rao Ram PA-C   venlafaxine-SR [facility] 75 mg capsule Take 37.5 mg by mouth daily. Yes Historical Provider   estradiol (ESTRACE) 1 mg tablet Take 1 mg by mouth every seven (7) days. Indications: Patient takes half tab daily   Yes Historical Provider   zolpidem (AMBIEN) 5 mg tablet Take 1 Tab by mouth nightly as needed for Sleep. 5/10/16  Yes Sandria Curling, MD   albuterol (PROVENTIL HFA, VENTOLIN HFA) 90 mcg/actuation inhaler Take 2 Puffs by inhalation every four (4) hours as needed for Wheezing.  12/10/12  Yes ARTHUR Morrison     Current Facility-Administered Medications   Medication Dose Route Frequency    [START ON 11/14/2017] WARFARIN INFORMATION NOTE (COUMADIN)   Other Q24H    [START ON 11/14/2017] venlafaxine-SR (EFFEXOR-XR) capsule 37.5 mg  37.5 mg Oral DAILY    0.9% sodium chloride infusion  100 mL/hr IntraVENous CONTINUOUS    sodium chloride (NS) flush 5-10 mL  5-10 mL IntraVENous Q8H    ferrous sulfate tablet 325 mg  1 Tab Oral BID WITH MEALS    ceFAZolin (ANCEF) 2g IVPB in 50 mL D5W  2 g IntraVENous Q8H    docusate sodium (COLACE) capsule 100 mg  100 mg Oral BID    celecoxib (CELEBREX) capsule 200 mg  200 mg Oral BID    pregabalin (LYRICA) capsule 50 mg  50 mg Oral BID     Allergies   Allergen Reactions    Other Plant, Animal, Environmental Anaphylaxis     poison ivy      Social History Substance Use Topics    Smoking status: Never Smoker    Smokeless tobacco: Never Used    Alcohol use 0.0 oz/week     0 Standard drinks or equivalent per week      Comment: socially      Family History   Problem Relation Age of Onset    Other Mother      atrial fibrillation    Colon Polyps Mother     Breast Cancer Mother     Colon Polyps Brother      half brother    Heart Disease Sister      half sister ASHD    Heart Attack Sister     Other Son      transposition of the great vessels and surgery for this    Other Son      congestive heart failure        Review of Systems:  Pertinent items are noted in HPI. Objective:   Vital Signs:    Visit Vitals    /66    Pulse 81    Temp 97.8 °F (36.6 °C)    Resp 16    Ht 5' 7\" (1.702 m)    Wt 77.6 kg (171 lb)    SpO2 95%    BMI 26.78 kg/m2       O2 Device: Nasal cannula   O2 Flow Rate (L/min): 3 l/min   Temp (24hrs), Av.6 °F (36.4 °C), Min:97.2 °F (36.2 °C), Max:98.1 °F (36.7 °C)       Intake/Output:   Last shift:       0701 -  1900  In: 1100 [I.V.:1100]  Out: 90 [Drains:40]  Last 3 shifts:      Intake/Output Summary (Last 24 hours) at 17 1356  Last data filed at 17 1300   Gross per 24 hour   Intake             1100 ml   Output               90 ml   Net             1010 ml       Physical Exam:    General:  Alert, cooperative, no distress, appears stated age. IS at bed side, currently on  Supplemental oxygen at 3LPM   Head:  Normocephalic, without obvious abnormality, atraumatic. Eyes:  Conjunctivae/corneas clear. PERRL, EOMs intact. Nose: Nares normal. Septum midline. Mucosa normal. No drainage or sinus tenderness. Throat: Lips, mucosa, and tongue normal. Teeth and gums normal.   Neck: Supple, symmetrical, trachea midline, no adenopathy, thyroid: no enlargment/tenderness/nodules, no carotid bruit and no JVD. Back:   Symmetric, no curvature. ROM normal.   Lungs:   Clear to auscultation bilaterally.    Chest wall:  No tenderness or deformity. Heart:  Regular rate and rhythm, S1, S2 normal, no murmur, click, rub or gallop. Abdomen:   Soft, non-tender. Bowel sounds normal. No masses   Extremities: Extremities normal, atraumatic, no cyanosis or edema.- Right leg: Left leg: ace wrap with cool wrap in place over distal thigh to proximal leg. Drain in place with minimal dark bloody drainage, SCD is place and functioning. Toes /feet warm   Pulses: 2+ and symmetric all extremities. Skin: Skin color, texture, turgor normal. No rashes or lesions   Lymph nodes:      Cervical, supraclavicular, normal.   Neurologic: Grossly non-focal except for  Post operative pain and resolving numbness- moves toes on both feet, + sensation in foot and toes both feet. Data:   No results found for this or any previous visit (from the past 24 hour(s)). Pre-Op Labs reviewed in Care Everywhere    Imaging:  I have personally reviewed the patients radiographs and have reviewed the reports:  CXR: 10/21/17:    XR CHEST PA LAT     Indication: Pre-Op     Comparison: Chest x-ray from 10/6/2016     Findings: Moderate sized sliding-type hiatal hernia. The lungs are clear. No effusion or  pneumothorax. Cardiomediastinal silhouette is normal in size.     IMPRESSION  Impression:  1. No acute cardiopulmonary process.     2.  Hiatal hernia.         Total clinical  care time exclusive of procedures: 25 minutes    Tona Bustamante DO, [facility]  Pulmonary, Sleep, Critical Care Medicine

## 2017-11-13 NOTE — IP AVS SNAPSHOT
Saeid Betancourt 
 
 
 [address] Patient: Tiffanie Childs MRN: [id number] About your hospitalization You were admitted on:  November 13, 2017 You last received care in the:  [facility] [address] [facility] You were discharged on:  November 15, 2017 Why you were hospitalized Your primary diagnosis was:  Not on File Your diagnoses also included: Arthritis Of Knee Things You Need To Do (next 8 weeks) Wednesday Nov 22, 2017 Office Visit with ARTHUR Weston at 10:30 AM  
Where:  [facility] [address] [place] [address] ([facility]) Follow up with Kamille Galaviz MD  
November 22, 2017 @ 10:30 with Dr. nAkit Freedman. Phone:  [phone] Where:  [address] [facility], [facility] Friday Dec 01, 2017 POST OP with Candis Hall PA-C at  3:00 PM  
Where:  [address] ([facility]) Discharge Orders Procedure Order Date Status Priority Quantity Spec Type Associated Dx WALKER STANDARD 11/14/17 1028 Normal Routine 1  Arthritis of knee [[id number]] ELEVATED TOILET SEAT 11/14/17 1028 Normal Routine 1  Arthritis of knee [[id number]] COMMODE CHAIR 11/14/17 1028 Normal Routine 1  Arthritis of knee [[id number]] SHOWER CHAIR 11/14/17 1028 Normal Routine 1  Arthritis of knee [[id number]] [address] [place] 11/14/17 1028 Normal Routine 1  Arthritis of knee [[id number]] Comments: Total knee protocol, wbat Aspirin therapy 
aquacel ag dressing pod 7 and prn A check angel indicates which time of day the medication should be taken. My Medications STOP taking these medications HYDROcodone-acetaminophen 7.5-325 mg per tablet Commonly known as:  [address] these medications as instructed Instructions Each Dose to Equal  
 Morning Noon Evening Bedtime albuterol 90 mcg/actuation inhaler Commonly known as:  PROVENTIL HFA, VENTOLIN HFA, PROAIR HFA Your last dose was: Your next dose is: Take 2 Puffs by inhalation every four (4) hours as needed for Wheezing. 2 Puff ALPRAZolam 0.25 mg tablet Commonly known as:  Pablito Mainch Your last dose was: Your next dose is: Take 1 Tab by mouth three (3) times daily as needed. Max Daily Amount: 0.75 mg. PRN sleep  
 0.25 mg  
    
   
   
   
  
 aspirin 325 mg tablet Commonly known as:  ASPIRIN Your last dose was: Your next dose is: Take 1 Tab by mouth daily. 325 mg  
    
   
   
   
  
 celecoxib 200 mg capsule Commonly known as:  CELEBREX Your last dose was: Your next dose is: Take 1 Cap by mouth two (2) times a day for 90 days. 200 mg  
    
   
   
   
  
 estradiol 1 mg tablet Commonly known as:  ESTRACE Your last dose was: Your next dose is: Take 1 mg by mouth every seven (7) days. Indications: Patient takes half tab daily 1 mg  
    
   
   
   
  
 ferrous sulfate 325 mg (65 mg iron) tablet Your last dose was: Your next dose is: Take 1 Tab by mouth two (2) times daily (with meals). 325 mg  
    
   
   
   
  
 ondansetron hcl 4 mg tablet Commonly known as:  ZOFRAN (AS HYDROCHLORIDE) Your last dose was: Your next dose is: Take 1 Tab by mouth every eight (8) hours as needed for Nausea. 4 mg  
    
   
   
   
  
 oxyCODONE IR 15 mg immediate release tablet Commonly known as:  OXY-IR Your last dose was: Your next dose is: Take 1-2 Tabs by mouth every four (4) hours as needed. Max Daily Amount: 180 mg.  
 15-25 mg  
    
   
   
   
  
 oxyCODONE-acetaminophen 7.5-325 mg per tablet Commonly known as:  PERCOCET 7.5 Your last dose was: Your next dose is: Take 1-2 Tabs by mouth every four (4) hours as needed. Max Daily Amount: 12 Tabs. 1-2 Tab  
    
   
   
   
  
 venlafaxine-SR 75 mg capsule Commonly known as:  EFFEXOR-XR Your last dose was: Your next dose is: Take 37.5 mg by mouth daily. 37.5 mg  
    
   
   
   
  
 zolpidem 5 mg tablet Commonly known as:  AMBIEN Your last dose was: Your next dose is: Take 1 Tab by mouth nightly as needed for Sleep.  
 5 mg Where to Get Your Medications Information on where to get these meds will be given to you by the nurse or doctor. ! Ask your nurse or doctor about these medications  
  aspirin 325 mg tablet  
 celecoxib 200 mg capsule  
 ferrous sulfate 325 mg (65 mg iron) tablet  
 ondansetron hcl 4 mg tablet  
 oxyCODONE IR 15 mg immediate release tablet  
 oxyCODONE-acetaminophen 7.5-325 mg per tablet Discharge Instructions Discharge Instructions for Total Knee Replacement Patients · The dressing on your knee will be changed by the Home Health professional at the appropriate time. Keep your incision clean and dry. Do not apply any ointments to the incision. · You may shower as long as you keep you incision dry. When showering, leave your dressing on. The dressing is waterproof as long as the edges are sealed. · Notify your surgeon if: 
· Your temperature is greater than 100.5 · You have pain not controlled by your pain medication · You have increased drainage from your incision · You have increased redness or swelling in your leg · You have chest pain, shortness of breath, or any other problems · Do your exercises as instructed by the home physical therapist. 
 
· Bend and straighten your operative leg every hour. Walk once an hour during normal walking hours.  
 
· During periods of inactivity or rest, your leg should be elevated with a rolled towel or folded pillow under the heel to keep the knee straight. · Do not place anything under the knee. · Do not sit in a recliner chair with the footrest elevated. · You may use ice to your knee for 23-30 minutes after exercise and as needed. Do not apply the ice pack directly to your skin. Use a barrier such as your pant leg or a thin towel. · If you have SANTIAGO hose (the white support stockings), remove them at bedtime and re-apply the hose in the morning for the next 2 weeks. Best of luck with your new knee and [address] YOU for choosing the [address]! Patient armband removed and shredded DISCHARGE SUMMARY from Nurse PATIENT INSTRUCTIONS: 
 
After general anesthesia or intravenous sedation, for 24 hours or while taking prescription Narcotics: · Limit your activities · Do not drive and operate hazardous machinery · Do not make important personal or business decisions · Do  not drink alcoholic beverages · If you have not urinated within 8 hours after discharge, please contact your surgeon on call. Report the following to your surgeon: 
· Excessive pain, swelling, redness or odor of or around the surgical area · Temperature over 100.5 · Nausea and vomiting lasting longer than 4 hours or if unable to take medications · Any signs of decreased circulation or nerve impairment to extremity: change in color, persistent  numbness, tingling, coldness or increase pain · Any questions What to do at Home: 
Recommended activity: Activity as tolerated within restrictions detailed by provider If you experience any of the following symptoms Nausea, vomiting, diarrhea, fever greater than 100.5, dizziness, severe headache, shortness of breath, chest pain, increased pain, please follow up with PCP. *  Please give a list of your current medications to your Primary Care Provider.  
 
*  Please update this list whenever your medications are discontinued, doses are 
    changed, or new medications (including over-the-counter products) are added. *  Please carry medication information at all times in case of emergency situations. These are general instructions for a healthy lifestyle: No smoking/ No tobacco products/ Avoid exposure to second hand smoke Surgeon General's Warning:  Quitting smoking now greatly reduces serious risk to your health. Obesity, smoking, and sedentary lifestyle greatly increases your risk for illness A healthy diet, regular physical exercise & weight monitoring are important for maintaining a healthy lifestyle You may be retaining fluid if you have a history of heart failure or if you experience any of the following symptoms:  Weight gain of 3 pounds or more overnight or 5 pounds in a week, increased swelling in our hands or feet or shortness of breath while lying flat in bed. Please call your doctor as soon as you notice any of these symptoms; do not wait until your next office visit. Recognize signs and symptoms of STROKE: 
 
F-face looks uneven A-arms unable to move or move unevenly S-speech slurred or non-existent T-time-call 911 as soon as signs and symptoms begin-DO NOT go Back to bed or wait to see if you get better-TIME IS BRAIN. Warning Signs of HEART ATTACK Call 911 if you have these symptoms: 
? Chest discomfort. Most heart attacks involve discomfort in the center of the chest that lasts more than a few minutes, or that goes away and comes back. It can feel like uncomfortable pressure, squeezing, fullness, or pain. ? Discomfort in other areas of the upper body. Symptoms can include pain or discomfort in one or both arms, the back, neck, jaw, or stomach. ? Shortness of breath with or without chest discomfort. ? Other signs may include breaking out in a cold sweat, nausea, or lightheadedness. Don't wait more than five minutes to call [address]!  Fast action can save your life. Calling 911 is almost always the fastest way to get lifesaving treatment. Emergency Medical Services staff can begin treatment when they arrive  up to an hour sooner than if someone gets to the hospital by car. The discharge information has been reviewed with the patient. The patient verbalized understanding. Discharge medications reviewed with the patient and appropriate educational materials and side effects teaching were provided. ___________________________________________________________________________________________________________________________________ [facility] Announcement We are excited to announce that we are making your provider's discharge notes available to you in [employer]aks[employer]. You will see these notes when they are completed and signed by the physician that discharged you from your recent hospital stay. If you have any questions or concerns about any information you see in BiTaksi, please call the Health Information Department where you were seen or reach out to your Primary Care Provider for more information about your plan of care. Introducing [facility]! Dear [facility]: Thank you for requesting a BiTaksi account. Our records indicate that you already have an active BiTaksi account. You can access your account anytime at [email or web address] Did you know that you can access your hospital and ER discharge instructions at any time in BiTaksi? You can also review all of your test results from your hospital stay or ER visit. Additional Information If you have questions, please visit the Frequently Asked Questions section of the BiTaksi website at [email or web address] Remember, BiTaksi is NOT to be used for urgent needs. For medical emergencies, dial 911. Now available from your iPhone and Android! Providers Seen During Your Hospitalization Provider Specialty Primary office phone Rigoberto Sykes, [address] [facility] Orthopedic Surgery [phone] Your Primary Care Physician (PCP) Primary Care Physician Office Phone Office Fax Tim Horne [phone] You are allergic to the following Allergen Reactions Other Plant, Animal, Environmental Anaphylaxis  
 poison ivy Recent Documentation Height Weight Breastfeeding? BMI OB Status Smoking Status 1.702 m 77.6 kg No 26.78 kg/m2 Hysterectomy Never Smoker Emergency Contacts Name Discharge Info Relation Home Work Mobile [address] [place] [address] CAREGIVER [3] Spouse [3] [phone] Patient Belongings The following personal items are in your possession at time of discharge: 
  Dental Appliances: None  Visual Aid: Glasses      Home Medications: None   Jewelry: None  Clothing: At bedside, Footwear, Jacket/Coat, Pants, Shirt, Undergarments    Other Valuables: Cell Phone Discharge Instructions Attachments/References ASPIRIN (BY MOUTH) (ENGLISH) CELECOXIB (BY MOUTH) (ENGLISH) IRON SUPPLEMENTS (BY MOUTH) (ENGLISH) OXYCODONE, RAPID RELEASE (BY MOUTH) (ENGLISH) Patient Handouts Aspirin (By mouth) Aspirin (AS-pir-in) Treats pain, fever, and inflammation. May lower risk of heart attack and stroke. Brand Name(s): Ascriptin Regular Strength, Aspergum, Aspir Low, Aspirin Adult Low Dose, Aspirin Low Dose, Neva Aspirin Children's, Neva Aspirin Regimen, Neva Extra Strength, Neva Genuine Aspirin, Neva Low Dose, Bufferin, Bufferin Low Dose, Durlaza, Ecotrin, Ecpirin There may be other brand names for this medicine. When This Medicine Should Not Be Used: This medicine is not right for everyone. Do not use it if you had an allergic reaction to aspirin or other NSAIDs, or if you have a history of asthma with nasal polyps and rhinitis.  
How to Use This Medicine:  
Delayed Release Capsule, Long Acting Capsule, Gum, Tablet, Chewable Tablet, Fizzy Tablet, Coated Tablet, Long Acting Tablet, 24 Hour Capsule · Your doctor will tell you how much medicine to use. Do not use more than directed. · It is best to take this medicine with food or milk. · Capsule, tablet, or coated tablet: Swallow whole. Do not crush, break, or chew it. · Chewable tablet: You may chew it completely or swallow it whole. · Gum: Chew completely to make sure you get as much medicine as possible. Drink a full glass (8 ounces) of water after chewing the gum. · Swallow the extended-release capsule whole. Do not crush, break, or chew it. Take the capsule with a full glass of water at the same time each day. · Follow the instructions on the medicine label if you are using this medicine without a prescription. · Missed dose: If you miss a dose of Durlaza, skip the missed dose and go back to your regular dosing schedule. Do not take extra medicine to make up for a missed dose. · Store the medicine in a closed container at room temperature, away from heat, moisture, and direct light. Drugs and Foods to Avoid: Ask your doctor or pharmacist before using any other medicine, including over-the-counter medicines, vitamins, and herbal products. · Some foods and medicines can affect how aspirin works. Tell your doctor if you are using any of the following: ¨ Dipyridamole, methotrexate, probenecid, sulfinpyrazone, ticlopidine ¨ Blood thinner (including clopidogrel, prasugrel, ticagrelor, warfarin) ¨ Blood pressure medicine ¨ Medicine to treat seizures (including phenytoin, valproic acid) ¨ NSAID pain or arthritis medicine (including celecoxib, diclofenac, ibuprofen, naproxen) ¨ Steroid medicine (including dexamethasone, hydrocortisone, methylprednisolone, prednisolone, prednisone) · Do not take Durlaza 2 hours before or 1 hour after you drink alcohol or take medicines that contain alcohol. Warnings While Using This Medicine: · Tell your doctor if you are pregnant or breastfeeding. Do not use this medicine during the later part of a pregnancy unless your doctor tells you to. · Tell your doctor if you have kidney disease, liver disease, high blood pressure, heart disease, or a history of stomach bleeding or ulcers. · This medicine may increase your risk for bleeding, including stomach ulcers. · Do not give aspirin to a child or teenager who has chickenpox or flu symptoms, unless the doctor says it is okay. Aspirin can cause a life-threatening reaction called Reye syndrome. · Tell any doctor or dentist who treats you that you are using this medicine. This medicine may affect certain medical test results. · Keep all medicine out of the reach of children. Never share your medicine with anyone. Possible Side Effects While Using This Medicine:  
Call your doctor right away if you notice any of these side effects: · Allergic reaction: Itching or hives, swelling in your face or hands, swelling or tingling in your mouth or throat, chest tightness, trouble breathing · Bloody or black stools, bloody vomit or vomit that looks like coffee grounds · Chest tightness, wheezing · Ringing in the ears · Severe stomach pain · Unusual bleeding, bruising, or weakness If you notice other side effects that you think are caused by this medicine, tell your doctor. Call your doctor for medical advice about side effects. You may report side effects to FDA at 8-136-FDA-7777 © 2017 [facility] Information is for End User's use only and may not be sold, redistributed or otherwise used for commercial purposes. The above information is an  only. It is not intended as medical advice for individual conditions or treatments. Talk to your doctor, nurse or pharmacist before following any medical regimen to see if it is safe and effective for you. Celecoxib (By mouth) Celecoxib ([id number]) Treats pain. This medicine is an NSAID. Brand Name(s): CeleBREX, SmartRx CapXib Kit There may be other brand names for this medicine. When This Medicine Should Not Be Used: This medicine is not right for everyone. Do not use it if you had an allergic reaction (including asthma) to celecoxib, aspirin, NSAIDs, or a sulfa drug (such as sulfamethoxazole). Do not use this medicine right before or right after coronary artery bypass graft (CABG). How to Use This Medicine:  
Capsule · Your doctor will tell you how much medicine to use. Do not use more than directed. · It is best to take this medicine with food or milk so it does not upset your stomach. · Use this medicine for the shortest time possible and in the smallest dose possible. This will help lower the risk of side effects. · If you cannot swallow the capsule, you may open it and pour the medicine into a teaspoon of applesauce. Stir the mixture well and swallow right away. Drink enough water to make sure you swallow all of the medicine. · This medicine should come with a Medication Guide. Ask your pharmacist for a copy if you do not have one. · Missed dose: Take a dose as soon as you remember. If it is almost time for your next dose, wait until then and take a regular dose. Do not take extra medicine to make up for a missed dose. · Store the medicine in a closed container at room temperature, away from heat, moisture, and direct light. Any medicine that has been mixed with applesauce may be stored in a refrigerator and used within 6 hours. Drugs and Foods to Avoid: Ask your doctor or pharmacist before using any other medicine, including over-the-counter medicines, vitamins, and herbal products. · Some medicines and foods can affect how celecoxib works. Tell your doctor if you are taking any of the following: ¨ A blood thinner, such as warfarin ¨ A diuretic (water pill), such as furosemide, hydrochlorothiazide (HCTZ), torsemide ¨ Blood pressure medicine, such as enalapril, lisinopril, losartan, olmesartan, valsartan ¨ Fluconazole ¨ Lithium ¨ Other pain or arthritis medicine, such as aspirin, diclofenac, ibuprofen, naproxen ¨ Steroid medicine, such as hydrocortisone, methylprednisolone, prednisone · Do not drink alcohol while you are using this medicine. Warnings While Using This Medicine: · Tell your doctor if you are pregnant or breastfeeding. Do not use this medicine during the later part of pregnancy, unless your doctor tells you to. · Tell your doctor if you have a history of ulcers or other stomach problems, kidney or liver disease, anemia, aspirin-sensitive asthma, high blood pressure, congestive heart failure, or other heart or circulation problems. · This medicine may cause the following problems: ¨ A serious liver problem ¨ Bleeding in your stomach or intestines ¨ Increased risk for a heart attack or stroke ¨ Risk for disseminated intravascular coagulation (bleeding problem) in children younger than 18 years · Tell any doctor or dentist who treats you that you are using this medicine. · Your doctor will do lab tests at regular visits to check on the effects of this medicine. Keep all appointments. · Keep all medicine out of the reach of children. Never share your medicine with anyone. Possible Side Effects While Using This Medicine:  
Call your doctor right away if you notice any of these side effects: · Allergic reaction: Itching or hives, swelling in your face or hands, swelling or tingling in your mouth or throat, chest tightness, trouble breathing · Blistering, peeling, red skin rash · Bloody or black, tarry stools · Change in how much or how often you urinate, bloody or cloudy urine · Chest pain, shortness of breath, or coughing up blood · Fast or slow heartbeat · Dark urine or pale stools, nausea, vomiting, loss of appetite, stomach pain, yellow skin or eyes · Numbness or weakness in your arm or leg, or on one side of your body · Pain in your calf · Shortness of breath, cold sweat, and bluish skin · Sudden or severe headache, dizziness, or problems with vision, speech, or walking · Swelling in your hands, ankles, or feet, rapid weight gain · Unusual tiredness or weakness, pale skin · Vomiting blood or material that looks like coffee grounds If you notice these less serious side effects, talk with your doctor: · Mild skin rash · Muscle or joint pain If you notice other side effects that you think are caused by this medicine, tell your doctor. Call your doctor for medical advice about side effects. You may report side effects to FDA at 4-272-FDA-5828 © 2017 [address]  Information is for End User's use only and may not be sold, redistributed or otherwise used for commercial purposes. The above information is an  only. It is not intended as medical advice for individual conditions or treatments. Talk to your doctor, nurse or pharmacist before following any medical regimen to see if it is safe and effective for you. Iron Supplements (By mouth) Treats low blood iron or anemia by helping your body make red blood cells. Brand Name(s): Beef/Iron/Wine, Bifera, BiferaRx, Corvite FE, Duofer, EZFE 200, Enfamil Saul-In-Sol, [facility] Iron Tablets, Fe-20, Femcon Fe, Femiron, Feosol, Saul-Iron, Sarah haute, [facility] There may be other brand names for this medicine. When This Medicine Should Not Be Used: You should not use this medicine if you have had an allergic reaction to iron supplements, or if you have a condition called hemachromatosis (iron overload disease) or hemosiderosis (iron in the lungs), or any type of anemia that is not caused by iron deficiency. How to Use This Medicine:  
Liquid Filled Capsule, Coated Tablet, Tablet, Capsule, Chewable Tablet, Liquid, Long Acting Capsule, Long Acting Tablet · Your doctor will tell you how much of this medicine to take and how often. Do not take more medicine or take it more often than your doctor tells you to. Carefully follow your doctor's instructions about any special diet. · It is best to take this medicine on an empty stomach, 1 hour before or 2 hours after a meal. Take the medicine with a full glass or water or fruit juice. If the medicine upsets your stomach, you may take it with food. · The chewable tablet must be chewed or crushed before you swallow it. · Measure the oral liquid medicine with a marked measuring spoon or medicine cup. · The oral liquid may stain your teeth. These stains can be prevented by mixing the medicine with water or other liquids (such as fruit juice, tomato juice), and drinking the medicine with a straw. To remove any iron stains, brush your teeth with baking soda or peroxide. If a dose is missed: · If you miss a dose or forget to take your medicine, take it as soon as you can. If it is almost time for your next dose, wait until then to take the medicine and skip the missed dose. · Do not use extra medicine to make up for a missed dose. How to Store and Dispose of This Medicine: · Store the medicine at room temperature, away from heat, moisture, and direct light. · Keep all medicine away from children, and never share your medicine with anyone. Drugs and Foods to Avoid: Ask your doctor or pharmacist before using any other medicine, including over-the-counter medicines, vitamins, and herbal products. · Do not take iron supplements by mouth if you are also receiving iron injections. · Make sure your doctor knows if you are also using phenytoin (Dilantin®), acetohydroxamic acid (Lithostat®), or antibiotics such as demeclocycline, doxycycline (Vibramycin®), Cipro®, Levaquin®, minocycline, moxifloxacin (Avelox®), Tequin®, or tetracycline. · Tell your doctor if you are using antacids (such as Maalox® or Mylanta®). · Avoid the following foods, or eat them in small amounts at least 1 hour before or 2 hours after taking your iron: eggs, milk, cheese, yogurt, tea or coffee, whole-grain cereals, and breads. Warnings While Using This Medicine: · Make sure your doctor knows if you are pregnant or breastfeeding, or if you have stomach or intestinal problems, an active infection, diabetes, porphyria, or other medical problems. · Make sure any doctor or dentist who treats you knows that you are using this medicine. Iron may affect the results of certain medical tests. · Iron can cause your stools to be darker in color. This is normal and is not a cause for concern. Possible Side Effects While Using This Medicine:  
Call your doctor right away if you notice any of these side effects: · Bloody diarrhea · Bluish-colored lips, hands, or fingernails · Chest pain · Fever · Pale or clammy skin · Severe or continuing stomach cramps, vomiting (with or without blood) · Shallow breathing, weakness, weak but fast heartbeat If you notice these less serious side effects, talk with your doctor: · Constipation, diarrhea, nausea · Dark-colored urine · Leg cramps If you notice other side effects that you think are caused by this medicine, tell your doctor. Call your doctor for medical advice about side effects. You may report side effects to FDA at 6-600-FDA-4595 © 2017 [address] Information is for End User's use only and may not be sold, redistributed or otherwise used for commercial purposes. The above information is an  only. It is not intended as medical advice for individual conditions or treatments. Talk to your doctor, nurse or pharmacist before following any medical regimen to see if it is safe and effective for you. Oxycodone, Rapid Release (By mouth) Oxycodone Hydrochloride (jc-p-QMM-done edgar-droe-KLOR-sam) Treats moderate to severe pain. This medicine is a narcotic pain reliever. Brand Name(s): Oxaydo, Oxy IR, Roxicodone There may be other brand names for this medicine. When This Medicine Should Not Be Used: This medicine is not right for everyone. Do not use it if you had an allergic reaction to oxycodone, codeine, hydrocodone, dihydrocodeine, or morphine, or you have a stomach or bowel blockage. How to Use This Medicine:  
Capsule, Liquid, Tablet · Take your medicine as directed. Your dose may need to be changed several times to find what works best for you. · An overdose can be dangerous. Follow directions carefully so you do not get too much medicine at one time. · Oral liquid: Measure the oral liquid medicine with a marked measuring spoon, oral syringe, or medicine cup. · Oxaydo® tablet: Swallow it whole with enough water to swallow it completely. Do not break, crush, chew, or dissolve it. Do not wet the tablet before you put it in your mouth. · This medicine should come with a Medication Guide. Ask your pharmacist for a copy if you do not have one. · Missed dose: Take a dose as soon as you remember. If it is almost time for your next dose, wait until then and take a regular dose. Do not take extra medicine to make up for a missed dose. · Store the medicine in a closed container at room temperature, away from heat, moisture, and direct light. Store the medicine in a secure place to prevent others from getting it. Ask your pharmacist about the best way to dispose of medicine you do not use. Drugs and Foods to Avoid: Ask your doctor or pharmacist before using any other medicine, including over-the-counter medicines, vitamins, and herbal products. · Do not use this medicine if you are using or have used an MAO inhibitor within the past 14 days. · Some medicines can affect how oxycodone works. Tell your doctor if you are using any of the following: ¨ Amiodarone, carbamazepine, erythromycin, ketoconazole, phenytoin, quinidine, rifampin, ritonavir ¨ Diuretic (water pill) ¨ Medicine to treat depression or anxiety ¨ Medicine to treat migraine headaches ¨ Phenothiazine medicine · Tell your doctor if you use anything else that makes you sleepy. Some examples are allergy medicine, narcotic pain medicine, and alcohol. Tell your doctor if you are using buprenorphine, butorphanol, nalbuphine, pentazocine, or a muscle relaxer. · Do not drink alcohol while you are using this medicine. Warnings While Using This Medicine: · Tell your doctor if you are pregnant or breastfeeding, or if you have kidney disease, liver disease, heart disease, low blood pressure, lung disease or breathing problems (such as asthma, COPD), scoliosis, an enlarged prostate or trouble urinating, an underactive thyroid, [place] disease, gallbladder or pancreas problems, or digestion problems. Tell your doctor if you have a history of head injury, brain tumor, mental health problems, seizures, or alcohol or drug addiction. · This medicine may cause the following problems: 
¨ High risk of overdose, which can lead to death ¨ Respiratory depression (serious breathing problem that can be life-threatening) ¨ Serotonin syndrome, when used with certain medicines · This medicine may make you dizzy, drowsy, or faint. Do not drive or do anything else that could be dangerous until you know how this medicine affects you. Sit or lie down if you feel dizzy. Stand up carefully. · This medicine can be habit-forming. Do not use more than your prescribed dose. Call your doctor if you think your medicine is not working. · Do not stop using this medicine suddenly. Your doctor will need to slowly decrease your dose before you stop it completely. · This medicine may cause constipation, especially with long-term use.  Ask your doctor if you should use a laxative to prevent and treat constipation. Drink plenty of liquids to help avoid constipation. · This medicine could cause infertility. Talk with your doctor before using this medicine if you plan to have children. · Keep all medicine out of the reach of children. Never share your medicine with anyone. Possible Side Effects While Using This Medicine:  
Call your doctor right away if you notice any of these side effects: · Allergic reaction: Itching or hives, swelling in your face or hands, swelling or tingling in your mouth or throat, chest tightness, trouble breathing · Anxiety, restlessness, fast heartbeat, fever, sweating, muscle spasms, twitching, nausea, vomiting, diarrhea, seeing or hearing things that are not there · Blue lips, fingernails, or skin, trouble breathing · Extreme dizziness or weakness, shallow breathing, slow heartbeat, sweating, cold or clammy skin, seizures · Lightheadedness, dizziness, fainting · Severe constipation, stomach pain If you notice these less serious side effects, talk with your doctor: · Mild constipation · Sleepiness, tiredness If you notice other side effects that you think are caused by this medicine, tell your doctor. Call your doctor for medical advice about side effects. You may report side effects to FDA at 9-584-FDA-6342 © 2017 [address] Information is for End User's use only and may not be sold, redistributed or otherwise used for commercial purposes. The above information is an  only. It is not intended as medical advice for individual conditions or treatments. Talk to your doctor, nurse or pharmacist before following any medical regimen to see if it is safe and effective for you. Please provide this summary of care documentation to your next provider. Signatures-by signing, you are acknowledging that this After Visit Summary has been reviewed with you and you have received a copy.   
  
 
  
    
    
 Patient Signature: ____________________________________________________________ Date:  ____________________________________________________________  
  
Soto Shove Provider Signature:  ____________________________________________________________ Date:  ____________________________________________________________

## 2017-11-13 NOTE — PROGRESS NOTES
Problem: Mobility Impaired (Adult and Pediatric)  Goal: *Acute Goals and Plan of Care (Insert Text)  STG's to be addressed within 3 days:  1. Bed mobility:  Supine to sit to supine S with HR for meals. 2. Activity tolerance: Tolerate up in chair 1-2 hrs for ADLs. 3. Transfers:  Sit to stand to chair S with LRAD for ADL's. LTG's to be addressed within 7 days:  1. Standing/Ambulation Balance:  Increase to Good with LRAD for safe transfers and gait. 2. Ambulation:  Ambulate > 200 ft. S with LRAD for home mobility. 3. Patient Education:  Independent with HEP for home safety. 4. Stairs:  Up/Down 4 steps CGA with HR for home entry. Outcome: Progressing Towards Goal  physical Therapy EVALUATION    Patient: Nancye Rubinstein (12 y.o. female)  Date: 11/13/2017  Primary Diagnosis: Osteoarthritis of left knee, unspecified osteoarthritis type [M17.12]  Procedure(s) (LRB):  LEFT TOTAL KNEE ARTHROPLASTY/ELVIS/SANTORO TO ASSIST/FEMORAL NERVE BLOCK (Left) Day of Surgery   Precautions:   Fall, WBAT (L LE, Knee immobilizer when OOB)    ASSESSMENT :  Based on the objective data described below, the patient presents to PT s/p L TKA with decreased functional mobility with regard to bed mobility, transfers, gait and overall tolerance for activity. Patient reports that she was independent with ADLs PTA, ambulated without assistive device. Patient drowsy upon arrival, increased verbal cues required for maintaining alertness. Patient unable to complete SLR on L LE without significant extensor lag, KI applied per MD order for safety with transfers and gait. Patient instructed with HEP and bed therex for B LE, L knee flexion measured 87 degrees, mild increase in pain noted with flexion. Patient was educated with WBAT and correct gt sequencing with RW. Patient verbalized need to void. Verbal/tactile cues provided for correct task sequencing with sit-->stand, mild unsteadiness noted, buckling noted as well with KI in place. Patient required increased 2 person assist for transfer to/from [facility], ongoing buckling noted of L LE with KI in place. Educated patient with TKE on L LE for improved quad engagement. Patient able to return back to bed with assist, positioned for comfort. Patient would benefit from PT to address above impairments and assist with discharge planning. Patient will benefit from skilled intervention to address the above impairments. Patients rehabilitation potential is considered to be Good  Factors which may influence rehabilitation potential include:   [x]         None noted  []         Mental ability/status  []         Medical condition  []         Home/family situation and support systems  []         Safety awareness  []         Pain tolerance/management  []         Other:      PLAN :  Recommendations and Planned Interventions:  [x]           Bed Mobility Training             [x]    Neuromuscular Re-Education  [x]           Transfer Training                   []    Orthotic/Prosthetic Training  [x]           Gait Training                          []    Modalities  [x]           Therapeutic Exercises          []    Edema Management/Control  [x]           Therapeutic Activities            [x]    Patient and Family Training/Education  []           Other (comment):    Frequency/Duration: Patient will be followed by physical therapy twice daily to address goals. Discharge Recommendations: Home Health  Further Equipment Recommendations for Discharge: rolling walker     SUBJECTIVE:   Patient stated I need to use the bathroom.     OBJECTIVE DATA SUMMARY:     Past Medical History:   Diagnosis Date    Allergic rhinitis     Nausea & vomiting     Osteoarthritis of hip     Spinal stenosis      Past Surgical History:   Procedure Laterality Date    ENDOSCOPY, COLON, DIAGNOSTIC  03-18-11    normal colon to cecum    HX BREAST REDUCTION      HX HEENT      skin cancer between eyes    HX HYSTERECTOMY      HX LAP CHOLECYSTECTOMY  11/2015    HX TONSIL AND ADENOIDECTOMY      HX TUBAL LIGATION       Barriers to Learning/Limitations: None  Compensate with: N/A  Prior Level of Function/Home Situation:   Home Situation  Home Environment: Private residence  # Steps to Enter: 2  Rails to Enter: No  One/Two Story Residence: Two story, live on 1st floor  Living Alone: No  Support Systems: Spouse/Significant Other/Partner, Family member(s)  Patient Expects to be Discharged to[de-identified] Private residence  Current DME Used/Available at Home: Mary Velasco, joe, Com[employer], bedside, Walker, rolling  Tub or Shower Type: Tub/Shower combination  Critical Behavior:  Neurologic State: Drowsy; Eyes open to voice  Psychosocial  Patient Behaviors: Calm; Cooperative  Family  Behaviors: Calm; Cooperative;Supportive  Strength:    Strength: Generally decreased, functional (L LE 2/5, R LE 5/5)  Tone & Sensation:   Tone: Abnormal (L LE decreased due to femoral nerve shot)  Sensation: Intact (B LE intact to LT)   Range Of Motion:  AROM: Generally decreased, functional (L knee flexion 87 degrees)  Functional Mobility:  Bed Mobility:  Rolling: Supervision  Supine to Sit: Minimum assistance; Additional time  Sit to Supine: Minimum assistance; Additional time  Scooting: Contact guard assistance  Transfers:  Sit to Stand: Minimum assistance; Additional time (with RW/KI)  Stand to Sit: Minimum assistance; Additional time (with RW/KI)  Balance:   Sitting: Intact  Standing: Impaired;Pull to stand; With support (with RW/KI)  Standing - Static: Fair;Constant support (with RW/KI)  Standing - Dynamic : Fair (with RW/KI)  Ambulation/Gait Training:  Distance (ft): 2 Feet (ft) (bed to [facility] to bed)  Assistive Device: Gait belt;Walker, rolling;Brace/Splint  Ambulation - Level of Assistance: Moderate assistance; Additional time  Left Side Weight Bearing: As tolerated  Base of Support: Narrowed  Speed/Felicia: Pace decreased (<100 feet/min); Slow  Interventions: Visual/Demos; Verbal cues; Tactile cues;Safety awareness training;Manual cues  Stairs:  Stairs - Level of Assistance:  (N/A)  Therapeutic Exercises:   AP/circles, quad set, HS, SLR (1 set x 10 reps each)  Pain:  Pain Scale 1: Numeric (0 - 10)  Pain Intensity 1: 4  Pain Location 1: Knee  Pain Orientation 1: Left  Pain Description 1: Aching  Pain Intervention(s) 1: Rest;Repositioned  Activity Tolerance:   Good  Please refer to the flowsheet for vital signs taken during this treatment. After treatment:   [] Patient left in no apparent distress sitting up in chair  [] Patient left sitting on EOB  [x] Patient left in no apparent distress in bed  [] Patient declined to be OOB at this time due to   [x] Call bell left within reach  [x] Nursing notified(Amber RN)  [x] Caregiver present  [] Bed alarm activated    COMMUNICATION/EDUCATION:   [x]         Fall prevention education was provided and the patient/caregiver indicated understanding. [x]         Patient/family have participated as able in goal setting and plan of care. [x]         Patient/family agree to work toward stated goals and plan of care. []         Patient understands intent and goals of therapy, but is neutral about his/her participation. []         Patient is unable to participate in goal setting and plan of care. Thank you for this referral.  Jyoti Blackmon, PT   Time Calculation: 32 mins     G-codes:  Mobility  Current  CK= 40-59%   Goal  CI= 1-19%. The severity rating is based on the Level of Assistance required for Functional Mobility and ADLs.     Eval Complexity: History: LOW Complexity : Zero comorbidities / personal factors that will impact the outcome / POCExam:LOW Complexity : 1-2 Standardized tests and measures addressing body structure, function, activity limitation and / or participation in recreation  Presentation: LOW Complexity : Stable, uncomplicated  Overall Complexity:LOW

## 2017-11-13 NOTE — ANESTHESIA PROCEDURE NOTES
Peripheral Block    Start time: 11/13/2017 9:27 AM  End time: 11/13/2017 9:27 AM  Performed by: Claudia Rivera  Authorized by: Claudia Rivera       Pre-procedure: Indications: at surgeon's request, post-op pain management and procedure for pain    Preanesthetic Checklist: patient identified, risks and benefits discussed, site marked, timeout performed, anesthesia consent given and patient being monitored    Timeout time: see nursing note. Block Type:   Block Type:  Femoral single shot  Laterality:  Left  Monitoring:  Standard ASA monitoring, continuous pulse ox, frequent vital sign checks, oxygen, heart rate and responsive to questions  Injection Technique:  Single shot  Procedures: ultrasound guided and nerve stimulator    Patient Position: supine  Prep: chlorhexidine    Location:  Upper thigh  Needle Type:  Stimuplex  Needle Gauge:  21 G  Needle Localization:  Ultrasound guidance and nerve stimulator  Medication Injected:  0.2%  ropivacaine  Volume (mL):  30  Add'l Medication Injected:  1.0%  lidocaine  Volume (mL):  1    Assessment:  Number of attempts:  1  Injection Assessment:  No paresthesia, incremental injection every 5 mL, ultrasound image on chart, no intravascular symptoms, negative aspiration for blood and local visualized surrounding nerve on ultrasound  Patient tolerance:  Patient tolerated the procedure well with no immediate complications  Location:  PREOP HOLDING    Patient given 2 mg IV Versed and 100 mcg IV Fentanyl for sedation.     11/13/2017     9:28 AM     Alisha Leach MD

## 2017-11-13 NOTE — INTERVAL H&P NOTE
H&P Update:  Bon Morejon was seen and examined. History and physical has been reviewed. The patient has been examined.  There have been no significant clinical changes since the completion of the originally dated History and Physical.    Signed By: Nahun Ontiveros MD     November 13, 2017 9:20 AM

## 2017-11-13 NOTE — PERIOP NOTES
TRANSFER - OUT REPORT:    Verbal report given to Fe RN on Rika Mask  being transferred to [facility] for routine post - op       Report consisted of patients Situation, Background, Assessment and   Recommendations(SBAR). Information from the following report(s) SBAR and MAR was reviewed with the receiving nurse. Lines:   Peripheral IV 11/13/17 Right Forearm (Active)   Site Assessment Clean, dry, & intact 11/13/2017 12:13 PM   Phlebitis Assessment 0 11/13/2017 12:13 PM   Infiltration Assessment 0 11/13/2017 12:13 PM   Dressing Status Clean, dry, & intact 11/13/2017 12:13 PM   Dressing Type Tape;Transparent 11/13/2017 12:13 PM   Hub Color/Line Status Pink; Infusing 11/13/2017 12:13 PM   Alcohol Cap Used Yes 11/13/2017  9:06 AM        Opportunity for questions and clarification was provided.       Patient transported with:   O2 @ 3 liters

## 2017-11-13 NOTE — OP NOTES
[address]    Name:  Carmen Bhakta  MR#:  [id number]  :  1960  Account #:  [de-identified]  Date of Adm:  2017  Date of Surgery:  2017      PREOPERATIVE DIAGNOSIS: End-stage arthritis of the left knee. POSTOPERATIVE DIAGNOSIS: End-stage arthritis of the left knee. PROCEDURES PERFORMED: Left total knee replacement using the  Triathlon system with a size 4 left posterior stabilized femoral  component, size 4 tibia, size 4, 9 mm tibial bearing insert, and a 32  asymmetric patella. COMPLICATIONS: None. SPECIMENS REMOVED: No specimen. ESTIMATED BLOOD LOSS: 50.    SURGEON: Rey Javier MD    FIRST ASSISTANT: Dolly Hardin    SECOND ASSISTANT: Corie Teresa    ANESTHESIA: Dr. Nura Saravia; preoperative femoral nerve block with light  general.    DESCRIPTION OF PROCEDURE: After the anesthetic was  successfully induced, it was confirmed the patient did receive  preoperative antibiotics and a standard prep and drape and a time-out  performed. Midline incision. I did excise a small area where she had  some gravel subcutaneously that was just essentially pigmented skin  in the midline, just took a small ellipse out, approximately 1 cm. Otherwise, I exposed the knee in normal fashion. Femoral canal  aspirated, lavaged, and reaspirated prior to instrumentation, cut for 5  degrees for the appropriate side. The crab claw was utilized to prevent  undercutting. All cuts checked for trueness and squareness and all soft  tissues protected during the sawing process. The knee would be gap  balanced. We paid specific attention to femoral rotation as well. Only preliminary femoral cut was made and we switched our attention  to the tibia, used the external alignment guide with appropriate  landmarks and resected enough to get a decent cleanup cut, ensuring  no skive, and double checked our alignment with a drop subhash.     While protecting the neurovascular bundle, removed posterior  osteophytes and used the Aquamantys and Exparel cocktail. It should  be noted that the patient had significant 3 compartmental disease and  especially with a large divot out of the lateral femur as well. Otherwise, normal appearance for a severely arthritic knee. We then used the Exparel cocktail after using the Aquamantys  posteriorly, while protecting neurovascular structures. We then gap  balanced the knee between medial and lateral, flexion and extension,  thus confirming correct femoral rotation. We then did our femoral  finishing, followed by placement of the trial components to set our tibial  rotation, which was marked and later repunched. We then resurfaced the patella, restoring patellar thickness  anatomically and using a rongeur to smooth out the edges. With all the trial components in place, we checked the overall  alignment, range of motion, soft tissue balance, patellar tracking,  stability and alignment, all of which we were delighted with. Fashioned  a bone plug for the femoral canal, further controlled hemostasis,  cemented in the knee, removing all extraneous cement and holding the  knee in full extension, the cement was fully cured. Further cement  removal, further pulse lavage, further trialing. I was happiest with the 9,  locked it in place, again reducing the knee. Let the tourniquet down,  routine closure, fully flexed the knee prior to closure of the skin. At the  end of the case, instrument, sponge and needle count was correct, no  complications. The patient tolerated the procedure well and blood loss  less than 50. Excellent outcome of the case.         Melida Ellis MD AM / Pauly Maher  D:  11/13/2017   11:50  T:  11/13/2017   15:19  Job #:  [id number]

## 2017-11-13 NOTE — PROGRESS NOTES
Patients arrives to room 548 alert awake and oriented, area to [facility] dry and intact polar ice placed CMS+ family members at bed side.

## 2017-11-13 NOTE — IP AVS SNAPSHOT
[address] Michele Wise Patient: Dinesh Madden MRN: [id number] SUX:6/16/2910 My Medications STOP taking these medications HYDROcodone-acetaminophen 7.5-325 mg per tablet Commonly known as:  [address]sw[address] these medications as instructed Instructions Each Dose to Equal  
 Morning Noon Evening Bedtime  
 albuterol 90 mcg/actuation inhaler Commonly known as:  PROVENTIL HFA, VENTOLIN HFA, PROAIR HFA Your last dose was: Your next dose is: Take 2 Puffs by inhalation every four (4) hours as needed for Wheezing. 2 Puff ALPRAZolam 0.25 mg tablet Commonly known as:  Jose Sigalaa Your last dose was: Your next dose is: Take 1 Tab by mouth three (3) times daily as needed. Max Daily Amount: 0.75 mg. PRN sleep  
 0.25 mg  
    
   
   
   
  
 aspirin 325 mg tablet Commonly known as:  ASPIRIN Your last dose was: Your next dose is: Take 1 Tab by mouth daily. 325 mg  
    
   
   
   
  
 celecoxib 200 mg capsule Commonly known as:  CELEBREX Your last dose was: Your next dose is: Take 1 Cap by mouth two (2) times a day for 90 days. 200 mg  
    
   
   
   
  
 estradiol 1 mg tablet Commonly known as:  ESTRACE Your last dose was: Your next dose is: Take 1 mg by mouth every seven (7) days. Indications: Patient takes half tab daily 1 mg  
    
   
   
   
  
 ferrous sulfate 325 mg (65 mg iron) tablet Your last dose was: Your next dose is: Take 1 Tab by mouth two (2) times daily (with meals). 325 mg  
    
   
   
   
  
 ondansetron hcl 4 mg tablet Commonly known as:  ZOFRAN (AS HYDROCHLORIDE) Your last dose was: Your next dose is: Take 1 Tab by mouth every eight (8) hours as needed for Nausea. 4 mg  
    
   
   
   
  
 oxyCODONE IR 15 mg immediate release tablet Commonly known as:  OXY-IR Your last dose was: Your next dose is: Take 1-2 Tabs by mouth every four (4) hours as needed. Max Daily Amount: 180 mg.  
 15-25 mg  
    
   
   
   
  
 oxyCODONE-acetaminophen 7.5-325 mg per tablet Commonly known as:  PERCOCET 7.5 Your last dose was: Your next dose is: Take 1-2 Tabs by mouth every four (4) hours as needed. Max Daily Amount: 12 Tabs. 1-2 Tab  
    
   
   
   
  
 venlafaxine-SR 75 mg capsule Commonly known as:  EFFEXOR-XR Your last dose was: Your next dose is: Take 37.5 mg by mouth daily. 37.5 mg  
    
   
   
   
  
 zolpidem 5 mg tablet Commonly known as:  AMBIEN Your last dose was: Your next dose is: Take 1 Tab by mouth nightly as needed for Sleep.  
 5 mg Where to Get Your Medications Information on where to get these meds will be given to you by the nurse or doctor. ! Ask your nurse or doctor about these medications  
  aspirin 325 mg tablet  
 celecoxib 200 mg capsule  
 ferrous sulfate 325 mg (65 mg iron) tablet  
 ondansetron hcl 4 mg tablet  
 oxyCODONE IR 15 mg immediate release tablet  
 oxyCODONE-acetaminophen 7.5-325 mg per tablet

## 2017-11-13 NOTE — H&P (VIEW-ONLY)
[address] [facility], [address] [facility]  [phone]           HISTORY & PHYSICAL      Patient: Kathrene Saint                MRN: [id number]       SSN: [id number]  YOB: 1960        AGE: 61 y.o. SEX: female  Body mass index is 27.25 kg/(m^2). PCP: Lucero Gore MD  11/01/17      CC: left knee end stage OA  Problem List Items Addressed This Visit     None      Visit Diagnoses     Left knee pain, unspecified chronicity    -  Primary    Relevant Orders    AMB POC XRAY, KNEE; COMPLETE, 4+ VIEW (Completed)    Primary osteoarthritis of left knee                HPI:  The patient is a pleasant 61 y.o. whom has end stage OA of their Left knee and has failed conservative treatment including but not limited to NSAIDS, cortisone injections, viscosupplementation, PT, and pain medicine. Due to the current findings and affected activities of daily living, surgical intervention is indicated. The alternatives, risks, complications, as well as expected outcome were discussed. These include but are not limited to infection, blood loss, need for blood transfusion, neurovascular damage, DVT, PE,  post-op stiffness and pain, leg length discrepancy, dislocation, anesthetic complications, prothesis longevity, need for more surgery, MI, stroke, and even death. The patient understands and wishes to proceed with surgery. Past Medical History:   Diagnosis Date    Allergic rhinitis     Nausea & vomiting     Osteoarthritis of hip     Spinal stenosis          Current Outpatient Prescriptions:     HYDROcodone-acetaminophen (NORCO) 7.5-325 mg per tablet, Take 1-2 Tabs by mouth every eight (8) hours as needed for Pain. Max Daily Amount: 6 Tabs. Indications: acute exacerbation of chronic left knee pain secondary to severe osteoarthritis, Disp: 60 Tab, Rfl: 0    venlafaxine-SR (EFFEXOR-XR) 75 mg capsule, Take 37.5 mg by mouth daily. , Disp: , Rfl:     estradiol (ESTRACE) 1 mg tablet, Take 1 mg by mouth every seven (7) days. Indications: Patient takes half tab daily, Disp: , Rfl:     zolpidem (AMBIEN) 5 mg tablet, Take 1 Tab by mouth nightly as needed for Sleep., Disp: 25 Tab, Rfl: 0    albuterol (PROVENTIL HFA, VENTOLIN HFA) 90 mcg/actuation inhaler, Take 2 Puffs by inhalation every four (4) hours as needed for Wheezing., Disp: 1 Inhaler, Rfl: 0    alprazolam (XANAX) 0.25 mg tablet, Take 0.25 mg by mouth nightly as needed. PRN sleep, Disp: , Rfl:     meloxicam (MOBIC) 15 mg tablet, Take 1 Tab by mouth daily. (Patient not taking: Reported on 9/12/2017), Disp: 30 Tab, Rfl: 2    Allergies   Allergen Reactions    Other Plant, Animal, Environmental Anaphylaxis     poison ivy       Social History     Social History    Marital status:      Spouse name: N/A    Number of children: N/A    Years of education: N/A     Occupational History    Not on file. Social History Main Topics    Smoking status: Never Smoker    Smokeless tobacco: Never Used    Alcohol use 0.0 oz/week     0 Standard drinks or equivalent per week      Comment: socially    Drug use: No    Sexual activity: Not on file      Comment: Hysterectomy     Other Topics Concern    Not on file     Social History Narrative       Past Surgical History:   Procedure Laterality Date    ENDOSCOPY, COLON, DIAGNOSTIC  03-18-11    normal colon to cecum    HX BREAST REDUCTION      HX HEENT      skin cancer between eyes    HX HYSTERECTOMY      HX LAP CHOLECYSTECTOMY  11/2015    HX TONSIL AND ADENOIDECTOMY      HX TUBAL LIGATION         Family History:  Non-contributory.      PE:  Visit Vitals    BP (!) 132/92 (BP 1 Location: Right arm, BP Patient Position: Sitting)    Pulse 75    Resp 16    Ht 5' 7\" (1.702 m)    Wt 174 lb (78.9 kg)    SpO2 94%    BMI 27.25 kg/m2     A&O X3, NAD, well develop, well nourished  Heart: S1-S2, rrr  Lungs: CTA bilat  Abd: soft, nt, nt, + bs in all quadrants  Ext:  Pos distal pulses to DP, PT      X-ray: left knee shows end stage OA    Labs: labs were reviewed and wnl.  ua neg    A:  Left  knee end stage OA    P:  At this point we will move forward with surgery. Again, the alternatives, risks, complications, as well as expected outcome were discussed and the patient wishes to proceed with surgery. Pt has been instructed to stop aspirin, nsaids, rheumatologic medications and blood thinners. They have also been instructed to continue on any heart and bp meds and to take them the morning of surgery with sips of water.          Carolee Graham

## 2017-11-13 NOTE — BRIEF OP NOTE
BRIEF OPERATIVE NOTE    Date of Procedure: 11/13/2017   Preoperative Diagnosis: Osteoarthritis of left knee, unspecified osteoarthritis type [M17.12]  Postoperative Diagnosis: Osteoarthritis of left knee, unspecified osteoarthritis type [M17.12]    Procedure(s):  LEFT TOTAL KNEE ARTHROPLASTY/ELVIS/MAUDE TO ASSIST/FEMORAL NERVE BLOCK  Surgeon(s) and Role:     * Francheska Palacios MD - Primary         Assistant Staff:  Physician Assistant: Sita Brennan PA-C    Surgical Staff:  Circ-1: Viviane Gonzalez RN  Circ-2: Klaudia Díaz RN  Physician Assistant: Sita Brennan PA-C  Scrub Tech-1: Jessica Ken  Surg Asst-1: Avis Barfield  Event Time In   Incision Start 1033   Incision Close      Anesthesia: General   Estimated Blood Loss: 50ml  Specimens: * No specimens in log *   Findings: same   Complications: none  Implants:   Implant Name Type Inv.  Item Serial No.  Lot No. LRB No. Used Action   CEMENT BNE SIMPLEX TOBRA 4 --  - [id number]  CEMENT BNE SIMPLEX TOBRA 4 --   [employer] HOW [id number] Left 1 Implanted   CEMENT BNE SIMPLEX TOBRA 4 --  - [id number]  CEMENT BNE SIMPLEX TOBRA 4 --   [employer] HOW [id number] Left 1 Implanted   PAT ASYM TRITHLON X3 [id number] -- TRIATHLON ASYMMETRIC X3 - [id number]  PAT ASYM TRITHLON X3 [id number] -- TRIATHLON ASYMMETRIC X3  [employer] HOW [id number] Left 1 Implanted   BASEPLT TIB UNIV TRIATHLN 4 --  - [id number]  BASEPLT TIB UNIV TRIATHLN 4 --   [employer] HOW [id number] Left 1 Implanted   COMPNT FEM PS GABRIEL TRIATHLN 4 L -- TRIATHLON - [id number]  COMPNT FEM PS GABRIEL TRIATHLN 4 L -- TRIATHLON  [employer] HOW [id number] Left 1 Implanted   INSERT TIB PS SZ 4 9MM -- TRIATHLON X3 - [id number]   INSERT TIB PS SZ 4 9MM -- TRIATHLON X3   [employer] HOW [id number] Left 1 Implanted

## 2017-11-13 NOTE — ANESTHESIA PREPROCEDURE EVALUATION
Anesthetic History               Review of Systems / Medical History  Patient summary reviewed and pertinent labs reviewed    Pulmonary  Within defined limits                 Neuro/Psych   Within defined limits           Cardiovascular                  Exercise tolerance: >4 METS     GI/Hepatic/Renal     GERD: well controlled           Endo/Other        Arthritis     Other Findings   Comments:   Risk Factors for Postoperative nausea/vomiting:       History of postoperative nausea/vomiting? NO       Female? YES       Motion sickness? NO       Intended opioid administration for postoperative analgesia? NO      Smoking Abstinence  Current Smoker? NO  Elective Surgery? YES  Seen preoperatively by anesthesiologist or proxy prior to day of surgery? YES  Pt abstained from smoking 24 hours prior to anesthesia?  N/A           Physical Exam    Airway  Mallampati: II  TM Distance: 4 - 6 cm  Neck ROM: normal range of motion   Mouth opening: Normal     Cardiovascular  Regular rate and rhythm,  S1 and S2 normal,  no murmur, click, rub, or gallop             Dental  No notable dental hx       Pulmonary  Breath sounds clear to auscultation               Abdominal  GI exam deferred       Other Findings            Anesthetic Plan    ASA: 2  Anesthesia type: general and regional - femoral single shot          Induction: Intravenous  Anesthetic plan and risks discussed with: Patient

## 2017-11-13 NOTE — ANESTHESIA POSTPROCEDURE EVALUATION
Post-Anesthesia Evaluation and Assessment    Patient: Matthew Partida MRN: [id number]  SSN: [id number]    YOB: 1960  Age: 61 y.o. Sex: female       Cardiovascular Function/Vital Signs  Visit Vitals    /66    Pulse 81    Temp 36.6 °C (97.8 °F)    Resp 16    Ht 5' 7\" (1.702 m)    Wt 77.6 kg (171 lb)    SpO2 95%    BMI 26.78 kg/m2       Patient is status post general, regional anesthesia for Procedure(s):  LEFT TOTAL KNEE ARTHROPLASTY/ELVIS/SANTORO TO ASSIST/FEMORAL NERVE BLOCK. Nausea/Vomiting: None    Postoperative hydration reviewed and adequate. Pain:  Pain Scale 1: Visual (11/13/17 1312)  Pain Intensity 1: 0 (11/13/17 1312)   Managed    Neurological Status:   Neuro (WDL): Within Defined Limits (11/13/17 0832)   At baseline    Mental Status and Level of Consciousness: Arousable    Pulmonary Status:   O2 Device: Nasal cannula (11/13/17 1221)   Adequate oxygenation and airway patent    Complications related to anesthesia: None    Post-anesthesia assessment completed.  No concerns    Signed By: Kirstin Mckeon MD     November 13, 2017

## 2017-11-13 NOTE — PROGRESS NOTES
Rounded on patient. Reinforced importance of getting OOB for all meals, going to bathroom to help prevent blood clots. Reviewed pain medications patient is taking and the importance of keeping pain under control to help with getting OOB and therapy. Discussed the importance of keeping ice on surgery site when in bed or chair to decrease swelling. .. Encouraged patient monitor for constipation and to take a stool softner/laxative while recovering on pain medication. Also reviewed how to use incentive spirometer with return demonstration by patient. Discussed pain medication, bowel medication with patient. Finally, educated patient on the importance of eating three well balanced meals a day with protein to promote bone/muscle healing. Reminded patient to drink lots of fluids to protect kidneys from all the medications being taken currently with recovery. Patient verbalizing understanding. Patient using incentive spirometer and doing ankle pumps. Tolerating clear liquid lunch and crackers. Dressing to surgical site dry and intact. Ice in place per protocol. Call light in reach. Patient reminded to call for help to get OOB or when leaving bathroom for safety. Patient given the opportunity for asking questions. Asked patient if there is anything they need.      Orthopedic

## 2017-11-14 ENCOUNTER — HOME HEALTH ADMISSION (OUTPATIENT)
Dept: HOME HEALTH SERVICES | Facility: HOME HEALTH | Age: 57
End: 2017-11-14
Payer: COMMERCIAL

## 2017-11-14 LAB
ALBUMIN SERPL-MCNC: 3 G/DL (ref 3.4–5)
ALBUMIN/GLOB SERPL: 1 {RATIO} (ref 0.8–1.7)
ALP SERPL-CCNC: 59 U/L (ref 45–117)
ALT SERPL-CCNC: 26 U/L (ref 13–56)
ANION GAP SERPL CALC-SCNC: 8 MMOL/L (ref 3–18)
AST SERPL-CCNC: 17 U/L (ref 15–37)
BASOPHILS # BLD: 0 K/UL (ref 0–0.1)
BASOPHILS NFR BLD: 0 % (ref 0–2)
BILIRUB SERPL-MCNC: <0.1 MG/DL (ref 0.2–1)
BUN SERPL-MCNC: 10 MG/DL (ref 7–18)
BUN/CREAT SERPL: 15 (ref 12–20)
CALCIUM SERPL-MCNC: 8 MG/DL (ref 8.5–10.1)
CHLORIDE SERPL-SCNC: 104 MMOL/L (ref 100–108)
CO2 SERPL-SCNC: 28 MMOL/L (ref 21–32)
CREAT SERPL-MCNC: 0.66 MG/DL (ref 0.6–1.3)
DIFFERENTIAL METHOD BLD: ABNORMAL
EOSINOPHIL # BLD: 0.1 K/UL (ref 0–0.4)
EOSINOPHIL NFR BLD: 2 % (ref 0–5)
ERYTHROCYTE [DISTWIDTH] IN BLOOD BY AUTOMATED COUNT: 12.8 % (ref 11.6–14.5)
GLOBULIN SER CALC-MCNC: 3 G/DL (ref 2–4)
GLUCOSE SERPL-MCNC: 92 MG/DL (ref 74–99)
HCT VFR BLD AUTO: 32.6 % (ref 35–45)
HGB BLD-MCNC: 10.5 G/DL (ref 12–16)
INR PPP: 1 (ref 0.8–1.2)
LYMPHOCYTES # BLD: 2.2 K/UL (ref 0.9–3.6)
LYMPHOCYTES NFR BLD: 35 % (ref 21–52)
MCH RBC QN AUTO: 30.6 PG (ref 24–34)
MCHC RBC AUTO-ENTMCNC: 32.2 G/DL (ref 31–37)
MCV RBC AUTO: 95 FL (ref 74–97)
MONOCYTES # BLD: 0.4 K/UL (ref 0.05–1.2)
MONOCYTES NFR BLD: 7 % (ref 3–10)
NEUTS SEG # BLD: 3.5 K/UL (ref 1.8–8)
NEUTS SEG NFR BLD: 56 % (ref 40–73)
PLATELET # BLD AUTO: 216 K/UL (ref 135–420)
PMV BLD AUTO: 9.6 FL (ref 9.2–11.8)
POTASSIUM SERPL-SCNC: 3.9 MMOL/L (ref 3.5–5.5)
PROT SERPL-MCNC: 6 G/DL (ref 6.4–8.2)
PROTHROMBIN TIME: 12.7 SEC (ref 11.5–15.2)
RBC # BLD AUTO: 3.43 M/UL (ref 4.2–5.3)
SODIUM SERPL-SCNC: 140 MMOL/L (ref 136–145)
WBC # BLD AUTO: 6.3 K/UL (ref 4.6–13.2)

## 2017-11-14 PROCEDURE — 74011250636 HC RX REV CODE- 250/636: Performed by: PHYSICIAN ASSISTANT

## 2017-11-14 PROCEDURE — 74011250637 HC RX REV CODE- 250/637: Performed by: INTERNAL MEDICINE

## 2017-11-14 PROCEDURE — 74011250637 HC RX REV CODE- 250/637: Performed by: PHYSICIAN ASSISTANT

## 2017-11-14 PROCEDURE — 97530 THERAPEUTIC ACTIVITIES: CPT

## 2017-11-14 PROCEDURE — 65270000029 HC RM PRIVATE

## 2017-11-14 PROCEDURE — 97116 GAIT TRAINING THERAPY: CPT

## 2017-11-14 PROCEDURE — 85610 PROTHROMBIN TIME: CPT | Performed by: ORTHOPAEDIC SURGERY

## 2017-11-14 PROCEDURE — 74011250637 HC RX REV CODE- 250/637: Performed by: ORTHOPAEDIC SURGERY

## 2017-11-14 PROCEDURE — 97110 THERAPEUTIC EXERCISES: CPT

## 2017-11-14 PROCEDURE — 36415 COLL VENOUS BLD VENIPUNCTURE: CPT | Performed by: ORTHOPAEDIC SURGERY

## 2017-11-14 PROCEDURE — 74011250636 HC RX REV CODE- 250/636: Performed by: ORTHOPAEDIC SURGERY

## 2017-11-14 PROCEDURE — 97165 OT EVAL LOW COMPLEX 30 MIN: CPT

## 2017-11-14 PROCEDURE — 80053 COMPREHEN METABOLIC PANEL: CPT | Performed by: ORTHOPAEDIC SURGERY

## 2017-11-14 PROCEDURE — 85025 COMPLETE CBC W/AUTO DIFF WBC: CPT | Performed by: ORTHOPAEDIC SURGERY

## 2017-11-14 RX ORDER — SODIUM CHLORIDE 9 MG/ML
500 INJECTION, SOLUTION INTRAVENOUS ONCE
Status: COMPLETED | OUTPATIENT
Start: 2017-11-14 | End: 2017-11-14

## 2017-11-14 RX ORDER — PANTOPRAZOLE SODIUM 40 MG/1
40 GRANULE, DELAYED RELEASE ORAL
Status: DISCONTINUED | OUTPATIENT
Start: 2017-11-14 | End: 2017-11-14

## 2017-11-14 RX ORDER — OXYCODONE HYDROCHLORIDE 15 MG/1
15-25 TABLET ORAL
Qty: 60 TAB | Refills: 0 | Status: SHIPPED | OUTPATIENT
Start: 2017-11-14 | End: 2017-12-08

## 2017-11-14 RX ORDER — PANTOPRAZOLE SODIUM 40 MG/1
40 TABLET, DELAYED RELEASE ORAL
Status: DISCONTINUED | OUTPATIENT
Start: 2017-11-14 | End: 2017-11-15 | Stop reason: HOSPADM

## 2017-11-14 RX ORDER — CELECOXIB 200 MG/1
200 CAPSULE ORAL 2 TIMES DAILY
Qty: 60 CAP | Refills: 2 | Status: SHIPPED | OUTPATIENT
Start: 2017-11-14 | End: 2018-01-29

## 2017-11-14 RX ORDER — LANOLIN ALCOHOL/MO/W.PET/CERES
325 CREAM (GRAM) TOPICAL 2 TIMES DAILY WITH MEALS
Qty: 60 TAB | Refills: 2 | Status: SHIPPED | OUTPATIENT
Start: 2017-11-14 | End: 2017-12-08

## 2017-11-14 RX ORDER — WARFARIN 4 MG/1
8 TABLET ORAL EVERY EVENING
Status: COMPLETED | OUTPATIENT
Start: 2017-11-14 | End: 2017-11-14

## 2017-11-14 RX ORDER — ASPIRIN 325 MG
325 TABLET ORAL DAILY
Qty: 30 TAB | Refills: 0 | Status: SHIPPED | OUTPATIENT
Start: 2017-11-14 | End: 2017-12-08

## 2017-11-14 RX ADMIN — PANTOPRAZOLE SODIUM 40 MG: 40 TABLET, DELAYED RELEASE ORAL at 11:14

## 2017-11-14 RX ADMIN — Medication 10 ML: at 06:42

## 2017-11-14 RX ADMIN — CELECOXIB 200 MG: 100 CAPSULE ORAL at 18:01

## 2017-11-14 RX ADMIN — Medication 325 MG: at 10:10

## 2017-11-14 RX ADMIN — PREGABALIN 50 MG: 50 CAPSULE ORAL at 10:10

## 2017-11-14 RX ADMIN — DOCUSATE SODIUM 100 MG: 100 CAPSULE, LIQUID FILLED ORAL at 18:01

## 2017-11-14 RX ADMIN — DOCUSATE SODIUM 100 MG: 100 CAPSULE, LIQUID FILLED ORAL at 10:10

## 2017-11-14 RX ADMIN — CEFAZOLIN SODIUM 2 G: 2 SOLUTION INTRAVENOUS at 01:52

## 2017-11-14 RX ADMIN — PREGABALIN 50 MG: 50 CAPSULE ORAL at 18:01

## 2017-11-14 RX ADMIN — Medication 325 MG: at 18:01

## 2017-11-14 RX ADMIN — OXYCODONE HYDROCHLORIDE 15 MG: 5 TABLET ORAL at 21:20

## 2017-11-14 RX ADMIN — VENLAFAXINE HYDROCHLORIDE 37.5 MG: 37.5 CAPSULE, EXTENDED RELEASE ORAL at 21:19

## 2017-11-14 RX ADMIN — Medication 10 ML: at 21:22

## 2017-11-14 RX ADMIN — OXYCODONE HYDROCHLORIDE 15 MG: 5 TABLET ORAL at 07:45

## 2017-11-14 RX ADMIN — CELECOXIB 200 MG: 100 CAPSULE ORAL at 10:10

## 2017-11-14 RX ADMIN — SODIUM CHLORIDE 500 ML: 900 INJECTION, SOLUTION INTRAVENOUS at 10:21

## 2017-11-14 RX ADMIN — WARFARIN SODIUM 8 MG: 4 TABLET ORAL at 18:01

## 2017-11-14 RX ADMIN — ACETAMINOPHEN 1000 MG: 500 TABLET ORAL at 18:01

## 2017-11-14 RX ADMIN — OXYCODONE HYDROCHLORIDE 15 MG: 5 TABLET ORAL at 13:10

## 2017-11-14 RX ADMIN — OXYCODONE HYDROCHLORIDE 15 MG: 5 TABLET ORAL at 18:01

## 2017-11-14 RX ADMIN — CEFAZOLIN SODIUM 2 G: 2 SOLUTION INTRAVENOUS at 10:10

## 2017-11-14 NOTE — PROGRESS NOTES
Discussed HH orders with pt and spouse. Signed FOC for [facility] on chart. Referral submitted to [facility] and [facility] made aware. Pt denies need for ordered DME stating that she already has all of it at home .

## 2017-11-14 NOTE — PROGRESS NOTES
conducted an initial consultation and Spiritual Assessment for Cody Sood, who is a 61 y.o.,female. Patients Primary Language is: [place]. According to the patients EMR Church Affiliation is: [employer].     The reason the Patient came to the hospital is:   Patient Active Problem List    Diagnosis Date Noted    Arthritis of knee 11/13/2017    Menopausal syndrome (hot flashes) 05/10/2016    Hip arthritis 03/03/2016    Foot pain 02/23/2015    Cellulitis 02/23/2015    Vitamin D insufficiency 11/10/2014    Reflux esophagitis, Status post Stricture 11/10/2014    Family history of breast cancer 11/10/2014    Allergic rhinitis     Hyperlipidemia     Osteoarthritis of left knee     Family history of colonic polyps         The  provided the following Interventions:  Initiated a relationship of care and support. Explored issues of adal, belief, spirituality and Restoration/ritual needs while hospitalized. Listened empathically. Provided chaplaincy education. Provided information about Spiritual Care Services. Offered prayer and assurance of continued prayers on patient's behalf. Chart reviewed. The following outcomes where achieved:  Patient shared limited information about both their medical narrative and spiritual journey/beliefs.  confirmed Patient's Church Affiliation. Patient processed feeling about current hospitalization. Patient expressed gratitude for 's visit. Assessment:  Patient does not have any Restoration/cultural needs that will affect patients preferences in health care. There are no spiritual or Restoration issues which require intervention at this time. Plan:  Chaplains will continue to follow and will provide pastoral care on an as needed/requested basis.  recommends bedside caregivers page  on duty if patient shows signs of acute spiritual or emotional distress.     [address] [place] [address] Care  [phone]

## 2017-11-14 NOTE — ROUTINE PROCESS
Mobility Intervention:       [] Pt dangled at edge of bed    [x] Pt assisted OOB to bedside commode    [] Pt assisted OOB to chair    [] Pt ambulated to bathroom    [] Patient was ambulated in room/hallway    Assistive Device Utilized:       [x] Rolling walker   [] Crutches   [] Straight Cane   [] Knee immobilizer   [] IV pole    After Mobilization:     [] Patient left in no apparent distress sitting up in chair  [x] Patient left in no apparent distress in bed  [x] Call bell left within reach  [] SCDs on & machine turned on  [] Ice applied  [] RN notified  [] Caregiver present  [] Bed alarm activated    Reason patient not mobilized:      [] Patient refused   [] Nausea/vomiting   [] Low blood pressure   [] Drowsy/lethargic    Pain Rating:     [] 0  [] 1  Assistive Device:        [] 2  [] 3  [] 4  [] 5  [] 6  Assistive Device:        [] 7  [] 8  [] 9  [] 10    Comments:

## 2017-11-14 NOTE — PROGRESS NOTES
Rounded on patient. Reinforced importance of getting OOB for all meals, going to bathroom to help prevent blood clots. Reviewed pain medications patient is taking and the importance of keeping pain under control to help with getting OOB and therapy. Discussed the importance of keeping ice on surgery site when in bed or chair to decrease swelling. .. Encouraged patient monitor for constipation and to take a stool softner/laxative while recovering on pain medication. Also reviewed how to use incentive spirometer with return demonstration by patient. Discussed pain medication, bowel medication with patient. Finally, educated patient on the importance of eating three well balanced meals a day with protein to promote bone/muscle healing. Reminded patient to drink lots of fluids to protect kidneys from all the medications being taken currently with recovery. Patient verbalizing understanding. Hemovac drain removed and opsite dressing applied via sterile technique. Aquacel dressing intact with no drainage noted. Dressing to surgical site dry and intact. Ice in place per protocol. Call light in reach. Patient reminded to call for help to get OOB or when leaving bathroom for safety. Patient given the opportunity for asking questions. Asked patient if there is anything they need. Mobility Intervention:Pateint has quad control via assessment so no knee immobilizer needed for ambulation.        [] Pt dangled at edge of bed    [] Pt assisted OOB to bedside commode    [x] Pt assisted OOB to chair    [] Pt ambulated to bathroom    [] Patient was ambulated in room/hallway    Assistive Device Utilized:       [x] Rolling walker   [] Crutches   [] Straight Cane   [] Knee immobilizer   [] IV pole    After Mobilization:     [x] Patient left in no apparent distress sitting up in chair  [] Patient left in no apparent distress in bed  [x] Call bell left within reach  [x] SCDs on & machine turned on  [x] Ice applied  [] RN notified  [] Caregiver present  [] Bed alarm activated    Reason patient not mobilized:      [] Patient refused   [] Nausea/vomiting   [] Low blood pressure   [] Drowsy/lethargic    Pain Rating:     [] 0  [] 1  Assistive Device:        [] 2  [] 3  [] 4  [x] 5  [] 6  Assistive Device:        [] 7  [] 8  [] 9  [] 10    Comments:       Orthopedic

## 2017-11-14 NOTE — ROUTINE PROCESS
Bedside and Verbal shift change report given to Elian Cruz RN (oncoming nurse) by Rachel Harris RN (offgoing nurse). Report included the following information SBAR, Kardex, Intake/Output and MAR.

## 2017-11-14 NOTE — ROUTINE PROCESS
Bedside and Verbal shift change report given to Alcira Mao (oncoming nurse) by Kristian Mitchell RN (offgoing nurse). Report included the following information SBAR, Kardex, MAR and Recent Results.     SITUATION:    Code Status: No Order   Reason for Admission: Osteoarthritis of left knee, unspecified osteoarthritis type Leyla  1560 day: 1   Problem List:       Hospital Problems  Date Reviewed: 2017          Codes Class Noted POA    Arthritis of knee ICD-10-CM: M17.10  ICD-9-CM: 716.96  2017 Unknown              BACKGROUND:    Past Medical History:   Past Medical History:   Diagnosis Date    Allergic rhinitis     Nausea & vomiting     Osteoarthritis of hip     Spinal stenosis          Patient taking anticoagulants no     ASSESSMENT:    Changes in Assessment Throughout Shift: no     Patient has Central Line: no Reasons if yes: no   Patient has Gomez Cath: no Reasons if yes: no      Last Vitals:     Vitals:    17 1332 17 1831 17 2326 17 0423   BP: 104/66 115/77 111/64 103/66   Pulse: 81 75 64 67   Resp: 16 18 18 16   Temp: 97.8 °F (36.6 °C) 97.5 °F (36.4 °C) 98.4 °F (36.9 °C) 97.8 °F (36.6 °C)   SpO2: 95% 94% 96%    Weight:       Height:            IV and DRAINS (will only show if present)   Peripheral IV 17 Right Forearm-Site Assessment: Clean, dry, & intact  Anish-Beard Drain 17 Left Knee-Site Assessment: Clean, dry, & intact     WOUND (if present)   Wound Type:  none   Dressing present Dressing Present : Yes   Wound Concerns/Notes:  none     PAIN    Pain Assessment    Pain Intensity 1: 1 (17)    Pain Location 1: Knee    Pain Intervention(s) 1: Rest, Repositioned    Patient Stated Pain Goal: 5  o Interventions for Pain:  none  o Intervention effective: no  o Time of last intervention:    o Reassessment Completed: no      Last 3 Weights:  Last 3 Recorded Weights in this Encounter    10/24/17 0914 17 0847   Weight: 76.7 kg (169 lb) 77.6 kg (171 lb)     Weight change:      INTAKE/OUPUT    Current Shift:      Last three shifts: 11/12 1901 - 11/14 0700  In: 5724 [P.O.:85; I.V.:1100]  Out: 820 [Urine:600; Drains:170]     LAB RESULTS     Recent Labs      11/14/17   0248   WBC  6.3   HGB  10.5*   HCT  32.6*   PLT  216        Recent Labs      11/14/17   0248   NA  140   K  3.9   GLU  92   BUN  10   CREA  0.66   CA  8.0*   INR  1.0       RECOMMENDATIONS AND DISCHARGE PLANNING     1. Pending tests/procedures/ Plan of Care or Other Needs: OT/OT     2. Discharge plan for patient and Needs/Barriers: no    3. Estimated Discharge Date: 11/15/17 Posted on Whiteboard in Patients Room: no      4. The patient's care plan was reviewed with the oncoming nurse. \"HEALS\" SAFETY CHECK      Fall Risk    Total Score: 3    Safety Measures: Safety Measures: Bed in low position, Call light within reach, Fall prevention (comment)    A safety check occurred in the patient's room between off going nurse and oncoming nurse listed above. The safety check included the below items  Area Items   H  High Alert Medications - Verify all high alert medication drips (heparin, PCA, etc.)   E  Equipment - Suction is set up for ALL patients (with lois)  - Red plugs utilized for all equipment (IV pumps, etc.)  - WOWs wiped down at end of shift.  - Room stocked with oxygen, suction, and other unit-specific supplies   A  Alarms - Bed alarm is set for fall risk patients  - Ensure chair alarm is in place and activated if patient is up in a chair   L  Lines - Check IV for any infiltration  - Gomez bag is empty if patient has a Gomez   - Tubing and IV bags are labeled   S  Safety   - Room is clean, patient is clean, and equipment is clean. - Hallways are clear from equipment besides carts.    - Fall bracelet on for fall risk patients  - Ensure room is clear and free of clutter  - Suction is set up for ALL patients (with lois)  - Hallways are clear from equipment besides carts.    - Isolation precautions followed, supplies available outside room, sign posted     Sal Horne, BRYANNA

## 2017-11-14 NOTE — PROGRESS NOTES
Anesthesia:  Femoral Block Post Operative rounds Note      Patient status post;Procedure(s):  LEFT TOTAL KNEE ARTHROPLASTY/ELVIS/MAUDE TO ASSIST/FEMORAL NERVE BLOCK    Post op day # 1    Visit Vitals    BP 96/64 (BP 1 Location: Right arm, BP Patient Position: At rest)    Pulse 79    Temp 36.2 °C (97.2 °F)    Resp 16    Ht 5' 7\" (1.702 m)    Wt 77.6 kg (171 lb)    SpO2 93%    BMI 26.78 kg/m2       Patient rates pain 0at rest and 1 with movement. Patient is subjectively rated by patient as very mild discomfort in back of knee    Location of pain:Left knee  . Sensory and Motor function:intact. No complication from Nerve Block.     Darrius Pineda MD

## 2017-11-14 NOTE — PROGRESS NOTES
Problem: Mobility Impaired (Adult and Pediatric)  Goal: *Acute Goals and Plan of Care (Insert Text)  STG's to be addressed within 3 days:  1. Bed mobility:  Supine to sit to supine S with HR for meals. 2. Activity tolerance: Tolerate up in chair 1-2 hrs for ADLs. 3. Transfers:  Sit to stand to chair S with LRAD for ADL's. LTG's to be addressed within 7 days:  1. Standing/Ambulation Balance:  Increase to Good with LRAD for safe transfers and gait. 2. Ambulation:  Ambulate > 200 ft. S with LRAD for home mobility. 3. Patient Education:  Independent with HEP for home safety. 4. Stairs:  Up/Down 4 steps CGA with HR for home entry. physical Therapy TREATMENT    Patient: Risa Jameson (51 y.o. female)  Date: 11/14/2017  Diagnosis: Osteoarthritis of left knee, unspecified osteoarthritis type [M17.12] <principal problem not specified>  Procedure(s) (LRB):  LEFT TOTAL KNEE ARTHROPLASTY/ELVIS/SANTORO TO ASSIST/FEMORAL NERVE BLOCK (Left) 1 Day Post-Op  Precautions: Fall, WBAT  Chart, physical therapy assessment, plan of care and goals were reviewed. ASSESSMENT:  Pt demo's fantastic gains throughout functional mobility and with L knee ROM. Decreased assist req'd for increase to community level ambulation distances. Good weight bearing tolerance L LE noted as pt is able to perform step through pattern throughout gait training (post instruction). Pt's ROM noted in reclined position to be 0-115 degrees flexion with minimal sign of discomfort. Pt will benefit from stair training at next opportunity to maximize carryover with technique prior to d/c home. Pt performs full SLR L LE w/o extensor lag.   Progression toward goals:  [x]      Improving appropriately and progressing toward goals  []      Improving slowly and progressing toward goals  []      Not making progress toward goals and plan of care will be adjusted     PLAN:  Patient continues to benefit from skilled intervention to address the above impairments. Continue treatment per established plan of care. Discharge Recommendations:  Home Health  Further Equipment Recommendations for Discharge:  rolling walker and N/A     G-CODES:     Mobility   Goal  CI= 1-19%. The severity rating is based on the Level of Assistance required for Functional Mobility and ADLs. Mobility [id number] Current  CI= 1-19%. The severity rating is based on the Level of Assistance required for Functional Mobility and ADLs. SUBJECTIVE:   Patient stated It feels fine. The nerve block worked great.     OBJECTIVE DATA SUMMARY:   Critical Behavior:  Neurologic State: Alert  Orientation Level: Oriented X4  Cognition: Appropriate decision making, Follows commands  Safety/Judgement: Awareness of environment, Fall prevention  Functional Mobility Training:  Bed Mobility:  Rolling: Supervision  Supine to Sit: Supervision  Sit to Supine: Supervision  Scooting: Supervision  Transfers:  Sit to Stand: Supervision (cues for use of functional L knee ROM (as tolerated))  Stand to Sit: Supervision  Bed to Chair: Supervision  Other: stand step with RW  Balance:  Sitting: Intact  Standing: With support; Intact  Ambulation/Gait Training:  Distance (ft): 170 Feet (ft)  Assistive Device: Walker, rolling  Ambulation - Level of Assistance: Supervision;Modified independent  Gait Abnormalities: Antalgic;Decreased step clearance (slightly antalgic with progression to step through pattern)  Left Side Weight Bearing: As tolerated  Speed/Feliica: Pace decreased (<100 feet/min)  Interventions: Verbal cues (posture and proximity to RW)  Therapeutic Exercises:   Quad sets,heel slides 2 x's 10 each with 10\" hold at end range of heel slide  AAROM L Knee: 0-115 degrees flexion (pt with minimal signs of discomfort. Pain:  Pt reports 4/10 pain or discomfort prior to treatment.    Pt reports 4/10 pain or discomfort post treatment. 5/10 with weight bearing L LE.   Activity Tolerance:   Fair+  Please refer to the flowsheet for vital signs taken during this treatment.   After treatment:   [x] Patient left in no apparent distress sitting up in chair  [] Patient left in no apparent distress in bed  [x] Call bell left within reach  [] Nursing notified  [x] Caregiver present  [] Bed alarm activated      Tia Naranjo PTA   Time Calculation: 23 mins

## 2017-11-14 NOTE — DISCHARGE SUMMARY
11/13/2017  8:09 AM    11/15/2017, 10:29 AM    Primary Dx:left Orthopedic / Rheumatologic: Total Knee Replacement  Secondary Dx: Etiological Diagnoses: none    HPI:  Pt has end stage OA and had failed conservative treatment. Due to the current findings and affected activity of daily living surgical intervention is indicated.   The alternatives, risks, complications as well as expected outcome were discussed, the patient understands and wishes to proceed with surgery    Past Medical History:   Diagnosis Date    Allergic rhinitis     Nausea & vomiting     Osteoarthritis of hip     Spinal stenosis          Current Facility-Administered Medications:     warfarin (COUMADIN) tablet 8 mg, 8 mg, Oral, QPM, Gali Krause PA-C    pantoprazole (PROTONIX) tablet 40 mg, 40 mg, Oral, ACB, Kayley Lara,     WARFARIN INFORMATION NOTE (COUMADIN), , Other, Q24H, Gali Krause PA-C    ALPRAZolam Alonza Settle) tablet 0.25 mg, 0.25 mg, Oral, TID PRN, Zackery Haque MD    venlafaxine-SR [facility]) capsule 37.5 mg, 37.5 mg, Oral, DAILY, Zackery Haque MD, 37.5 mg at 11/13/17 2015    0.9% sodium chloride infusion, 100 mL/hr, IntraVENous, CONTINUOUS, Zackery Haque MD, Last Rate: 100 mL/hr at 11/13/17 1400, 100 mL/hr at 11/13/17 1400    sodium chloride (NS) flush 5-10 mL, 5-10 mL, IntraVENous, Q8H, Zackery Haque MD, 10 mL at 11/14/17 9565    sodium chloride (NS) flush 5-10 mL, 5-10 mL, IntraVENous, PRN, Zackery Haque MD    [facility]) injection 0.4 mg, 0.4 mg, IntraVENous, PRN, Zackery Haque MD    ferrous sulfate tablet 325 mg, 1 Tab, Oral, BID WITH MEALS, Zackery Haque MD, 325 mg at 11/14/17 1010    diphenhydrAMINE (BENADRYL) injection 12.5 mg, 12.5 mg, IntraVENous, Q6H PRN, Zackery Haque MD    acetaminophen (TYLENOL) tablet 1,000 mg, 1,000 mg, Oral, Q6H PRN, Zackery Haque MD    oxyCODONE IR (ROXICODONE) tablet 15-25 mg, 15-25 mg, Oral, Q4H PRN, Zackery Haque MD, 15 mg at 11/14/17 0756   ceFAZolin (ANCEF) 2g IVPB in 50 mL D5W, 2 g, IntraVENous, Q8H, Vanita Dos Santos MD, Last Rate: 100 mL/hr at 11/14/17 1010, 2 g at 11/14/17 1010    ondansetron (ZOFRAN) injection 4 mg, 4 mg, IntraVENous, Q4H PRN, Vanita Dos Santos MD    docusate sodium (COLACE) capsule 100 mg, 100 mg, Oral, BID, Vanita Dos Santos MD, 100 mg at 11/14/17 1010    celecoxib (CELEBREX) capsule 200 mg, 200 mg, Oral, BID, Vanita Dos Santos MD, 200 mg at 11/14/17 1010    pregabalin (LYRICA) capsule 50 mg, 50 mg, Oral, BID, Vanita Dos Santos MD, 50 mg at 11/14/17 1010    zolpidem (AMBIEN) tablet 5 mg, 5 mg, Oral, QHS PRN, Sofia Lara DO    albuterol (PROVENTIL VENTOLIN) nebulizer solution 2.5 mg, 2.5 mg, Nebulization, Q6H PRN, Vanita Dos Santos MD      Other plant, animal, environmental    Physical Exam:  General A&O x3 NAD, well developed, well nourished, normal affect  Heart: S1-S2, RRR  Lungs: CTA Bilat  Abd: soft NT, ND  Ext: n/v intact    Hospital Course:    Pt. Had leftOrthopedic / Rheumatologic: Total Knee Replacement    Post -op Course: The patient tolerated the procedure well. They were followed by internal medicine for help with medical management. Pt. Was place on Abx pre and post-op for prophylaxis against infection as well as coumadin pre and post-op for prophylaxis against DVT. Vitals signs remained stable, remained af. The wound wasclean, dry, no drainage. Pain was well controlled. Pt. Had negative calf tenderness or swelling, no evidence for DVT. Patient had PT/OT consult for evaluation and treatment.     CBC  Lab Results   Component Value Date/Time    WBC 6.3 11/14/2017 02:48 AM    RBC 3.43 (L) 11/14/2017 02:48 AM    HCT 32.6 (L) 11/14/2017 02:48 AM    MCV 95.0 11/14/2017 02:48 AM    MCH 30.6 11/14/2017 02:48 AM    MCHC 32.2 11/14/2017 02:48 AM    RDW 12.8 11/14/2017 02:48 AM     Coagulation  Lab Results   Component Value Date    INR 1.0 11/14/2017    APTT 26.0 10/06/2016      Basic Metabolic Profile  Lab Results Component Value Date     11/14/2017    CO2 28 11/14/2017    BUN 10 11/14/2017       Discharge Plan:  The patient will be d/c'd to home, total knee protocol, WBAT. [facility]. Total joint protocol. Pt safe for homebound transfer, sp Total joint replacement. A walker, bedside commode, and shower chair will be utilized for ADL's. Follow up with Dr. Juan Carlos Vega in 10-12 days. Call with any questions or concerns.

## 2017-11-14 NOTE — HOME CARE
[facility] received referral for SN/PT - Antonio Knee Protocol - no discharge orders noted - has all needed DME at this point - liaison nurses will continue to monitor for home care needs thru discharge - IVORY Bach LPN

## 2017-11-14 NOTE — ROUTINE PROCESS
Lisa ordered 15 to 25mg but patient just request for 10mg stated 15 mg is too much so 5 mg was return to pyxis.

## 2017-11-14 NOTE — PROGRESS NOTES
Ortho    Pt. Seen and evaluated. Doing well, pain well controlled  Denies cp, sob, abd pain    Blood pressure 114/72, pulse 71, temperature 97.4 °F (36.3 °C), resp. rate 17, height 5' 7\" (1.702 m), weight 171 lb (77.6 kg), SpO2 96 %.    leftKnee woundclean, dry, no drainage  Sensory intact to LT  Motor intact  nv intact  Neg calf tenderness    Labs:  CBC  @  CBC:   Lab Results   Component Value Date/Time    WBC 6.3 11/14/2017 02:48 AM    RBC 3.43 11/14/2017 02:48 AM    HGB 10.5 11/14/2017 02:48 AM    HCT 32.6 11/14/2017 02:48 AM    PLATELET 350 76/04/1576 02:48 AM     BMP:   Lab Results   Component Value Date/Time    Glucose 92 11/14/2017 02:48 AM    Sodium 140 11/14/2017 02:48 AM    Potassium 3.9 11/14/2017 02:48 AM    Chloride 104 11/14/2017 02:48 AM    CO2 28 11/14/2017 02:48 AM    BUN 10 11/14/2017 02:48 AM    Creatinine 0.66 11/14/2017 02:48 AM    Calcium 8.0 11/14/2017 02:48 AM   @  Coagulation  Lab Results   Component Value Date    INR 1.0 11/14/2017    APTT 26.0 10/06/2016      Basic Metabolic Profile  Lab Results   Component Value Date     11/14/2017    CO2 28 11/14/2017    BUN 10 11/14/2017       Assesment: left Orthopedic / Rheumatologic: Total Knee Replacement    Plan: coumadin, PT, home today or tomorrow depending how she progresses with PT

## 2017-11-14 NOTE — PROGRESS NOTES
0010 Patient assisted to bedside commode, voided 300 of yellow urine. Pain rating 2/10, surgical dressing dry and clean, CMS intact , pedal pulses palpable.

## 2017-11-14 NOTE — CONSULTS
[facility]  Pulmonary, Critical Care, and Sleep Medicine    Name: Nacho Maravilla MRN: [id number]   : 1960 Hospital: [address] [place] [address]   Date: 2017        Pulmonary Medicine- Follow Up Patient Consult      IMPRESSION:   1. Osteoarthritis of left knee, unspecified osteoarthritis type [M17.12]  2. S/P Left Total Knee Arthroplasty with Femoral Nerve Block 17: POD #1  3. Hot flashes- menopausal (treated with Effexor)  4. Anxiety- mild: takes PRN ativan on rare occasion  5. GERD- take PRN Prilosec at home- currently symptomatic  6. Anemia- secondary to perioperative blood loss- 10: no signs of active bleeding        RECOMMENDATIONS:   · Keep Head of bed elevated. · SpO2 goal> 88%  · Pulmonary hygiene with IS at bed side- Encourage frequent use while awake. · Prophylactic - post OP antibiotic management per Ortho- primary team  · Trend HCT/HB  · Start PPI for symptomatic GERD  · Pain management per Ortho- primary team  · DVT prophylaxis per Ortho- primary team  · PT per Ortho - primary team  · Zofran 4mg IVPB PRN nausea/emesis  · Continue with OP Effexor- rapid discontinuation can cause withdrawal side effects  · Will continue to follow along with primary team  · Further recommendations pending clinical course     Subjective/History: This patient has been seen and evaluated at the request of Dr. Stanislaw Logan for post operative medical managment. Patient is a 61 y.o. female with a history of OA- Left knee. No hypertension, diabetes or known CAD. S/P Left TKA 17 POD #1    Patient sitting up in chair. Has gotten out of bed and participated in PT. Nausea resolved, currently eating breakfast.  Reports that she is have heartburn similar to OP GERD for which she takes PRN Prilosec. States that H2 blockers have not been effective in the past.  Post Op pain 3/10 and well controlled. Anxiety stable. Afebrile overnight and room air.  States she is performing IS and can move pistol to the top. Drain removed. Labs reviewed      Past Medical History:   Diagnosis Date    Allergic rhinitis     Nausea & vomiting     Osteoarthritis of hip     Spinal stenosis       Past Surgical History:   Procedure Laterality Date    ENDOSCOPY, COLON, DIAGNOSTIC  03-18-11    normal colon to cecum    HX BREAST REDUCTION      HX HEENT      skin cancer between eyes    HX HYSTERECTOMY      HX LAP CHOLECYSTECTOMY  11/2015    HX TONSIL AND ADENOIDECTOMY      HX TUBAL LIGATION        Prior to Admission medications    Medication Sig Start Date End Date Taking? Authorizing Provider   ALPRAZolam Gilson Null) 0.25 mg tablet Take 1 Tab by mouth three (3) times daily as needed. Max Daily Amount: 0.75 mg. PRN sleep 11/2/17  Yes ARTHUR Whitley   HYDROcodone-acetaminophen (NORCO) 7.5-325 mg per tablet Take 1-2 Tabs by mouth every eight (8) hours as needed for Pain. Max Daily Amount: 6 Tabs. Indications: acute exacerbation of chronic left knee pain secondary to severe osteoarthritis 9/12/17  Yes Timothy Carmona PA-C   venlafaxine-SR [facility]) 75 mg capsule Take 37.5 mg by mouth daily. Yes Historical Provider   estradiol (ESTRACE) 1 mg tablet Take 1 mg by mouth every seven (7) days. Indications: Patient takes half tab daily   Yes Historical Provider   zolpidem (AMBIEN) 5 mg tablet Take 1 Tab by mouth nightly as needed for Sleep. 5/10/16  Yes Farrukh Angulo MD   albuterol (PROVENTIL HFA, VENTOLIN HFA) 90 mcg/actuation inhaler Take 2 Puffs by inhalation every four (4) hours as needed for Wheezing.  12/10/12  Yes ARTHUR Whitley     Current Facility-Administered Medications   Medication Dose Route Frequency    warfarin (COUMADIN) tablet 8 mg  8 mg Oral QPM    0.9% sodium chloride infusion 500 mL  500 mL IntraVENous ONCE    pantoprazole (PROTONIX) granules for oral suspension 40 mg  40 mg Oral ACB    WARFARIN INFORMATION NOTE (COUMADIN)   Other Q24H    venlafaxine-SR (EFFEXOR-XR) capsule 37.5 mg  37.5 mg Oral DAILY    0.9% sodium chloride infusion  100 mL/hr IntraVENous CONTINUOUS    sodium chloride (NS) flush 5-10 mL  5-10 mL IntraVENous Q8H    ferrous sulfate tablet 325 mg  1 Tab Oral BID WITH MEALS    ceFAZolin (ANCEF) 2g IVPB in 50 mL D5W  2 g IntraVENous Q8H    docusate sodium (COLACE) capsule 100 mg  100 mg Oral BID    celecoxib (CELEBREX) capsule 200 mg  200 mg Oral BID    pregabalin (LYRICA) capsule 50 mg  50 mg Oral BID     Allergies   Allergen Reactions    Other Plant, Animal, Environmental Anaphylaxis     poison ivy      Social History   Substance Use Topics    Smoking status: Never Smoker    Smokeless tobacco: Never Used    Alcohol use 0.0 oz/week     0 Standard drinks or equivalent per week      Comment: socially      Family History   Problem Relation Age of Onset    Other Mother      atrial fibrillation    Colon Polyps Mother     Breast Cancer Mother     Colon Polyps Brother      half brother    Heart Disease Sister      half sister ASHD    Heart Attack Sister     Other Son      transposition of the great vessels and surgery for this    Other Son      congestive heart failure        Review of Systems:  Pertinent items are noted in HPI.     Objective:   Vital Signs:    Visit Vitals    /72 (BP 1 Location: Right arm, BP Patient Position: At rest)    Pulse 71    Temp 97.4 °F (36.3 °C)    Resp 17    Ht 5' 7\" (1.702 m)    Wt 77.6 kg (171 lb)    SpO2 96%    BMI 26.78 kg/m2       O2 Device: Nasal cannula   O2 Flow Rate (L/min): 3 l/min   Temp (24hrs), Av.6 °F (36.4 °C), Min:97.2 °F (36.2 °C), Max:98.4 °F (36.9 °C)       Intake/Output:   Last shift:      701 - 1900  In: -   Out: 1200 [Urine:1200]  Last 3 shifts: 1901 -  07  In: 5557 [P.O.:85; I.V.:1100]  Out: 820 [Urine:600; Drains:170]    Intake/Output Summary (Last 24 hours) at 17 0933  Last data filed at 17 0805   Gross per 24 hour   Intake             1185 ml Output             2020 ml   Net             -835 ml       Physical Exam:    General:  Alert, cooperative, no distress, appears stated age. IS at bed side, currently on  Room air   Head:  Normocephalic, without obvious abnormality, atraumatic. Eyes:  Conjunctivae/corneas clear. PERRL, EOMs intact. Nose: Nares normal. Septum midline. Mucosa normal. No drainage or sinus tenderness. Throat: Lips, mucosa, and tongue normal. Teeth and gums normal.   Neck: Supple, symmetrical, trachea midline, no adenopathy, thyroid: no enlargment/tenderness/nodules, no carotid bruit and no JVD. Back:   Symmetric, no curvature. ROM normal.   Lungs:   Clear to auscultation bilaterally. Chest wall:  No tenderness or deformity. Heart:  Regular rate and rhythm, S1, S2 normal, no murmur, click, rub or gallop. Abdomen:   Soft, non-tender. Bowel sounds normal. No masses   Extremities: Extremities normal, atraumatic, no cyanosis or edema.- Right leg: Left leg: ace wrap with cool wrap in place over distal thigh to proximal leg. SCD is place and functioning. Toes /feet warm   Pulses: 2+ and symmetric all extremities. Skin: Skin color, texture, turgor normal. No rashes or lesions   Lymph nodes:      Cervical, supraclavicular, normal.   Neurologic: Grossly non-focal except for  Post operative pain and resolving numbness- moves toes on both feet, + sensation in foot and toes both feet.        Data:     Recent Results (from the past 24 hour(s))   CBC WITH AUTOMATED DIFF    Collection Time: 11/14/17  2:48 AM   Result Value Ref Range    WBC 6.3 4.6 - 13.2 K/uL    RBC 3.43 (L) 4.20 - 5.30 M/uL    HGB 10.5 (L) 12.0 - 16.0 g/dL    HCT 32.6 (L) 35.0 - 45.0 %    MCV 95.0 74.0 - 97.0 FL    MCH 30.6 24.0 - 34.0 PG    MCHC 32.2 31.0 - 37.0 g/dL    RDW 12.8 11.6 - 14.5 %    PLATELET 571 344 - 814 K/uL    MPV 9.6 9.2 - 11.8 FL    NEUTROPHILS 56 40 - 73 %    LYMPHOCYTES 35 21 - 52 %    MONOCYTES 7 3 - 10 %    EOSINOPHILS 2 0 - 5 %    BASOPHILS 0 0 - 2 %    ABS. NEUTROPHILS 3.5 1.8 - 8.0 K/UL    ABS. LYMPHOCYTES 2.2 0.9 - 3.6 K/UL    ABS. MONOCYTES 0.4 0.05 - 1.2 K/UL    ABS. EOSINOPHILS 0.1 0.0 - 0.4 K/UL    ABS. BASOPHILS 0.0 0.0 - 0.1 K/UL    DF AUTOMATED     PROTHROMBIN TIME + INR    Collection Time: 11/14/17  2:48 AM   Result Value Ref Range    Prothrombin time 12.7 11.5 - 15.2 sec    INR 1.0 0.8 - 1.2     METABOLIC PANEL, COMPREHENSIVE    Collection Time: 11/14/17  2:48 AM   Result Value Ref Range    Sodium 140 136 - 145 mmol/L    Potassium 3.9 3.5 - 5.5 mmol/L    Chloride 104 100 - 108 mmol/L    CO2 28 21 - 32 mmol/L    Anion gap 8 3.0 - 18 mmol/L    Glucose 92 74 - 99 mg/dL    BUN 10 7.0 - 18 MG/DL    Creatinine 0.66 0.6 - 1.3 MG/DL    BUN/Creatinine ratio 15 12 - 20      GFR est AA >60 >60 ml/min/1.73m2    GFR est non-AA >60 >60 ml/min/1.73m2    Calcium 8.0 (L) 8.5 - 10.1 MG/DL    Bilirubin, total <0.1 (L) 0.2 - 1.0 MG/DL    ALT (SGPT) 26 13 - 56 U/L    AST (SGOT) 17 15 - 37 U/L    Alk. phosphatase 59 45 - 117 U/L    Protein, total 6.0 (L) 6.4 - 8.2 g/dL    Albumin 3.0 (L) 3.4 - 5.0 g/dL    Globulin 3.0 2.0 - 4.0 g/dL    A-G Ratio 1.0 0.8 - 1.7               Imaging:  I have personally reviewed the patients radiographs and have reviewed the reports:  CXR: 10/21/17:    XR CHEST PA LAT     Indication: Pre-Op     Comparison: Chest x-ray from 10/6/2016     Findings: Moderate sized sliding-type hiatal hernia. The lungs are clear. No effusion or  pneumothorax. Cardiomediastinal silhouette is normal in size.     IMPRESSION  Impression:  1. No acute cardiopulmonary process.     2.  Hiatal hernia.         Total clinical  care time exclusive of procedures: 25 minutes    Yenni Hdz DO, [facility]  Pulmonary, Sleep, Critical Care Medicine

## 2017-11-14 NOTE — PROGRESS NOTES
Problem: Falls - Risk of  Goal: *Absence of Falls  Document Quiana Fall Risk and appropriate interventions in the flowsheet.    Outcome: Progressing Towards Goal  Fall Risk Interventions:  Mobility Interventions: Patient to call before getting OOB         Medication Interventions: Patient to call before getting OOB    Elimination Interventions: Patient to call for help with toileting needs, Call light in reach

## 2017-11-14 NOTE — PROGRESS NOTES
Mobility Intervention:       [] Pt dangled at edge of bed    [x] Pt assisted OOB to bedside commode    [] Pt assisted OOB to chair    [] Pt ambulated to bathroom    [] Patient was ambulated in room/hallway    Assistive Device Utilized:       [x] Rolling walker   [] Crutches   [] Straight Cane   [] Knee immobilizer   [] IV pole    After Mobilization:     [] Patient left in no apparent distress sitting up in chair  [x] Patient left in no apparent distress in bed  [] Call bell left within reach  [] SCDs on & machine turned on  [] Ice applied  [] RN notified  [] Caregiver present  [] Bed alarm activated    Reason patient not mobilized:      [] Patient refused   [] Nausea/vomiting   [] Low blood pressure   [] Drowsy/lethargic    Pain Rating:     [] 0  [] 1  Assistive Device:        [] 2  [] 3  [] 4  [] 5  [] 6  Assistive Device:        [] 7  [] 8  [] 9  [] 10    Comments:

## 2017-11-14 NOTE — PROGRESS NOTES
Problem: Self Care Deficits Care Plan (Adult)  Goal: *Acute Goals and Plan of Care (Insert Text)  Outcome: Resolved/Met Date Met: 11/14/17  Occupational Therapy EVALUATION/discharge    Patient: Dinesh Madden (89 y.o. female)  Date: 11/14/2017  Primary Diagnosis: Osteoarthritis of left knee, unspecified osteoarthritis type [M17.12]  Procedure(s) (LRB):  LEFT TOTAL KNEE ARTHROPLASTY/ELVIS/SANTORO TO ASSIST/FEMORAL NERVE BLOCK (Left) 1 Day Post-Op   Precautions:   Fall, WBAT    ASSESSMENT AND RECOMMENDATIONS:  Based on the objective data described below, the patient is able to perform basic self care tasks without assistance. Supervision given for functional standing and transfers. Will defer to PT for mobility training. She has all needed DME (built in shower seat, raised toilet seat) for bathroom safety. Skilled occupational therapy is not indicated at this time. Discharge Recommendations: None  Further Equipment Recommendations for Discharge: N/A      Barriers to Learning/Limitations: None  Compensate with: visual, verbal, tactile, kinesthetic cues/model     COMPLEXITY     Eval Complexity: History: LOW Complexity : Brief history review ; Examination: LOW Complexity : 1-3 performance deficits relating to physical, cognitive , or psychosocial skils that result in activity limitations and / or participation restrictions ; Decision Making:LOW Complexity : No comorbidities that affect functional and no verbal or physical assistance needed to complete eval tasks  Assessment: Low Complexity        G-CODES:     Self Care  Current  CI= 1-19%   Goal  CI= 1-19%   D/C  CI= 1-19%. The severity rating is based on the Level of Assistance required for Functional Mobility and ADLs. SUBJECTIVE:   Patient stated Donavan Rios had my hip replaced last year.     OBJECTIVE DATA SUMMARY:     Past Medical History:   Diagnosis Date    Allergic rhinitis     Nausea & vomiting     Osteoarthritis of hip     Spinal stenosis      Past Surgical History:   Procedure Laterality Date    ENDOSCOPY, COLON, DIAGNOSTIC  03-18-11    normal colon to cecum    HX BREAST REDUCTION      HX HEENT      skin cancer between eyes    HX HYSTERECTOMY      HX LAP CHOLECYSTECTOMY  11/2015    HX TONSIL AND ADENOIDECTOMY      HX TUBAL LIGATION       Prior Level of Function/Home Situation: Pt was independent with basic self care tasks and functional mobility PTA. Home Situation  Home Environment: Private residence  # Steps to Enter: 2  Rails to Enter: No  One/Two Story Residence: Two story, live on 1st floor  Living Alone: No  Support Systems: Spouse/Significant Other/Partner, Family member(s)  Patient Expects to be Discharged to[de-identified] Private residence  Current DME Used/Available at Home: Cane, straight, Commode, bedside, Walker, rolling  Tub or Shower Type: Shower (with built in seat)  [x]     Right hand dominant   []     Left hand dominant  Cognitive/Behavioral Status:  Neurologic State: Alert  Orientation Level: Oriented X4  Cognition: Appropriate decision making; Follows commands  Safety/Judgement: Awareness of environment; Fall prevention    Skin: Intact on UEs    Edema: None noted in UEs    Vision/Perceptual:    Acuity: Within Defined Limits      Coordination:  Fine Motor Skills-Upper: Left Intact; Right Intact    Gross Motor Skills-Upper: Left Intact; Right Intact    Balance:  Sitting: Intact  Standing: With support    Strength:  Strength:  Within functional limits (UEs)    Tone & Sensation:  Tone: Normal (UEs)  Sensation: Intact (UEs)    Range of Motion:  AROM: Within functional limits (UEs)  PROM: Within functional limits (UEs)    Functional Mobility and Transfers for ADLs:  Bed Mobility:  Supine to Sit: Supervision  Sit to Supine: Supervision  Transfers:  Sit to Stand: Supervision   Toilet Transfer : Supervision     ADL Assessment:  Feeding: Independent    Oral Facial Hygiene/Grooming: Independent    Bathing: Supervision    Upper Body Dressing: Independent    Lower Body Dressing: Supervision    Toileting: Modified independent    Pain:  Pt reports 0/10 pain or discomfort prior to treatment.    Pt reports 0/10 pain or discomfort post treatment. Activity Tolerance:   Good    Please refer to the flowsheet for vital signs taken during this treatment. After treatment:   []  Patient left in no apparent distress sitting up in chair  [x]  Patient left in no apparent distress in bed  [x]  Call bell left within reach  []  Nursing notified  []  Caregiver present  []  Bed alarm activated    COMMUNICATION/EDUCATION:   Communication/Collaboration:  [x]      Home safety education was provided and the patient/caregiver indicated understanding. [x]      Patient/family have participated as able and agree with findings and recommendations. []      Patient is unable to participate in plan of care at this time.     Mya Freire MS OTR/L  Time Calculation: 15 mins

## 2017-11-15 VITALS
OXYGEN SATURATION: 90 % | HEART RATE: 81 BPM | SYSTOLIC BLOOD PRESSURE: 132 MMHG | RESPIRATION RATE: 14 BRPM | WEIGHT: 171 LBS | TEMPERATURE: 98.7 F | HEIGHT: 67 IN | BODY MASS INDEX: 26.84 KG/M2 | DIASTOLIC BLOOD PRESSURE: 85 MMHG

## 2017-11-15 DIAGNOSIS — Z01.818 ENCOUNTER FOR PREADMISSION TESTING: ICD-10-CM

## 2017-11-15 DIAGNOSIS — M17.12 PRIMARY OSTEOARTHRITIS OF LEFT KNEE: ICD-10-CM

## 2017-11-15 LAB
BASOPHILS # BLD: 0 K/UL (ref 0–0.06)
BASOPHILS NFR BLD: 0 % (ref 0–2)
DIFFERENTIAL METHOD BLD: ABNORMAL
EOSINOPHIL # BLD: 0.2 K/UL (ref 0–0.4)
EOSINOPHIL NFR BLD: 3 % (ref 0–5)
ERYTHROCYTE [DISTWIDTH] IN BLOOD BY AUTOMATED COUNT: 12.9 % (ref 11.6–14.5)
HCT VFR BLD AUTO: 34.2 % (ref 35–45)
HGB BLD-MCNC: 10.8 G/DL (ref 12–16)
INR PPP: 1.2 (ref 0.8–1.2)
LYMPHOCYTES # BLD: 2.3 K/UL (ref 0.9–3.6)
LYMPHOCYTES NFR BLD: 31 % (ref 21–52)
MCH RBC QN AUTO: 30.7 PG (ref 24–34)
MCHC RBC AUTO-ENTMCNC: 31.6 G/DL (ref 31–37)
MCV RBC AUTO: 97.2 FL (ref 74–97)
MONOCYTES # BLD: 0.4 K/UL (ref 0.05–1.2)
MONOCYTES NFR BLD: 6 % (ref 3–10)
NEUTS SEG # BLD: 4.5 K/UL (ref 1.8–8)
NEUTS SEG NFR BLD: 60 % (ref 40–73)
PLATELET # BLD AUTO: 167 K/UL (ref 135–420)
PMV BLD AUTO: 11 FL (ref 9.2–11.8)
PROTHROMBIN TIME: 14.2 SEC (ref 11.5–15.2)
RBC # BLD AUTO: 3.52 M/UL (ref 4.2–5.3)
WBC # BLD AUTO: 7.4 K/UL (ref 4.6–13.2)

## 2017-11-15 PROCEDURE — 77030012935 HC DRSG AQUACEL BMS -B

## 2017-11-15 PROCEDURE — 74011250637 HC RX REV CODE- 250/637: Performed by: INTERNAL MEDICINE

## 2017-11-15 PROCEDURE — 36415 COLL VENOUS BLD VENIPUNCTURE: CPT | Performed by: ORTHOPAEDIC SURGERY

## 2017-11-15 PROCEDURE — 74011250637 HC RX REV CODE- 250/637: Performed by: PHYSICIAN ASSISTANT

## 2017-11-15 PROCEDURE — 85610 PROTHROMBIN TIME: CPT | Performed by: ORTHOPAEDIC SURGERY

## 2017-11-15 PROCEDURE — 85025 COMPLETE CBC W/AUTO DIFF WBC: CPT | Performed by: ORTHOPAEDIC SURGERY

## 2017-11-15 PROCEDURE — 74011000258 HC RX REV CODE- 258: Performed by: PHYSICIAN ASSISTANT

## 2017-11-15 PROCEDURE — 74011250637 HC RX REV CODE- 250/637: Performed by: ORTHOPAEDIC SURGERY

## 2017-11-15 PROCEDURE — 74011250636 HC RX REV CODE- 250/636: Performed by: ORTHOPAEDIC SURGERY

## 2017-11-15 PROCEDURE — 74011250636 HC RX REV CODE- 250/636: Performed by: PHYSICIAN ASSISTANT

## 2017-11-15 RX ORDER — WARFARIN 4 MG/1
8 TABLET ORAL ONCE
Status: DISCONTINUED | OUTPATIENT
Start: 2017-11-15 | End: 2017-11-15 | Stop reason: HOSPADM

## 2017-11-15 RX ORDER — WARFARIN 4 MG/1
8 TABLET ORAL ONCE
Status: DISCONTINUED | OUTPATIENT
Start: 2017-11-15 | End: 2017-11-15

## 2017-11-15 RX ORDER — ONDANSETRON 4 MG/1
4 TABLET, FILM COATED ORAL
Qty: 30 TAB | Refills: 1 | Status: SHIPPED | OUTPATIENT
Start: 2017-11-15 | End: 2018-01-29

## 2017-11-15 RX ORDER — OXYCODONE AND ACETAMINOPHEN 7.5; 325 MG/1; MG/1
1-2 TABLET ORAL
Status: DISCONTINUED | OUTPATIENT
Start: 2017-11-15 | End: 2017-11-15 | Stop reason: HOSPADM

## 2017-11-15 RX ORDER — OXYCODONE AND ACETAMINOPHEN 7.5; 325 MG/1; MG/1
1-2 TABLET ORAL
Qty: 60 TAB | Refills: 0 | Status: SHIPPED | OUTPATIENT
Start: 2017-11-15 | End: 2018-01-29

## 2017-11-15 RX ADMIN — OXYCODONE HYDROCHLORIDE 10 MG: 5 TABLET ORAL at 01:00

## 2017-11-15 RX ADMIN — OXYCODONE HYDROCHLORIDE 15 MG: 5 TABLET ORAL at 06:59

## 2017-11-15 RX ADMIN — Medication 10 ML: at 06:59

## 2017-11-15 RX ADMIN — OXYCODONE HYDROCHLORIDE AND ACETAMINOPHEN 1 TABLET: 7.5; 325 TABLET ORAL at 11:25

## 2017-11-15 RX ADMIN — PROMETHAZINE HYDROCHLORIDE 12.5 MG: 25 INJECTION INTRAMUSCULAR; INTRAVENOUS at 09:55

## 2017-11-15 RX ADMIN — ONDANSETRON 4 MG: 2 INJECTION INTRAMUSCULAR; INTRAVENOUS at 07:39

## 2017-11-15 RX ADMIN — PANTOPRAZOLE SODIUM 40 MG: 40 TABLET, DELAYED RELEASE ORAL at 06:59

## 2017-11-15 NOTE — PROGRESS NOTES
Patient vomited two times per patient and visitor. Zofran given at 0739. No new orders at this time.

## 2017-11-15 NOTE — PROGRESS NOTES
Ortho    Pt. Seen and evaluated. Doing well, nausea improved  Progressed well with PT  Denies cp, sob, abd pain    Blood pressure 132/85, pulse 81, temperature 98.7 °F (37.1 °C), resp. rate 14, height 5' 7\" (1.702 m), weight 171 lb (77.6 kg), SpO2 90 %, not currently breastfeeding.    leftKnee woundclean, dry, no drainage  Sensory intact to LT  Motor intact  nv intact  Neg calf tenderness    Labs:  CBC  @  CBC:   Lab Results   Component Value Date/Time    WBC 7.4 11/15/2017 01:16 AM    RBC 3.52 11/15/2017 01:16 AM    HGB 10.8 11/15/2017 01:16 AM    HCT 34.2 11/15/2017 01:16 AM    PLATELET 275 18/77/8379 01:16 AM     BMP:   Lab Results   Component Value Date/Time    Glucose 92 11/14/2017 02:48 AM    Sodium 140 11/14/2017 02:48 AM    Potassium 3.9 11/14/2017 02:48 AM    Chloride 104 11/14/2017 02:48 AM    CO2 28 11/14/2017 02:48 AM    BUN 10 11/14/2017 02:48 AM    Creatinine 0.66 11/14/2017 02:48 AM    Calcium 8.0 11/14/2017 02:48 AM   @  Coagulation  Lab Results   Component Value Date    INR 1.2 11/15/2017    APTT 26.0 10/06/2016      Basic Metabolic Profile  Lab Results   Component Value Date     11/14/2017    CO2 28 11/14/2017    BUN 10 11/14/2017       Assesment: left Orthopedic / Rheumatologic: Total Knee Replacement    Plan: coumadin, PT,home today

## 2017-11-15 NOTE — DISCHARGE INSTRUCTIONS
Discharge Instructions for Total Knee Replacement Patients    · The dressing on your knee will be changed by the Home Health professional at the appropriate time. Keep your incision clean and dry. Do not apply any ointments to the incision. · You may shower as long as you keep you incision dry. When showering, leave your dressing on. The dressing is waterproof as long as the edges are sealed. · Notify your surgeon if:  · Your temperature is greater than 100.5  · You have pain not controlled by your pain medication  · You have increased drainage from your incision  · You have increased redness or swelling in your leg  · You have chest pain, shortness of breath, or any other problems    · Do your exercises as instructed by the home physical therapist.    · Bend and straighten your operative leg every hour. Walk once an hour during normal walking hours. · During periods of inactivity or rest, your leg should be elevated with a rolled towel or folded pillow under the heel to keep the knee straight. · Do not place anything under the knee. · Do not sit in a recliner chair with the footrest elevated. · You may use ice to your knee for 23-30 minutes after exercise and as needed. Do not apply the ice pack directly to your skin. Use a barrier such as your pant leg or a thin towel. · If you have SANTIAGO hose (the white support stockings), remove them at bedtime and re-apply the hose in the morning for the next 2 weeks. Best of luck with your new knee and [address] YOU for choosing the [address]!       Patient armband removed and shredded      DISCHARGE SUMMARY from Nurse    PATIENT INSTRUCTIONS:    After general anesthesia or intravenous sedation, for 24 hours or while taking prescription Narcotics:  · Limit your activities  · Do not drive and operate hazardous machinery  · Do not make important personal or business decisions  · Do  not drink alcoholic beverages  · If you have not urinated within 8 hours after discharge, please contact your surgeon on call. Report the following to your surgeon:  · Excessive pain, swelling, redness or odor of or around the surgical area  · Temperature over 100.5  · Nausea and vomiting lasting longer than 4 hours or if unable to take medications  · Any signs of decreased circulation or nerve impairment to extremity: change in color, persistent  numbness, tingling, coldness or increase pain  · Any questions    What to do at Home:  Recommended activity: Activity as tolerated within restrictions detailed by provider    If you experience any of the following symptoms Nausea, vomiting, diarrhea, fever greater than 100.5, dizziness, severe headache, shortness of breath, chest pain, increased pain, please follow up with PCP. *  Please give a list of your current medications to your Primary Care Provider. *  Please update this list whenever your medications are discontinued, doses are      changed, or new medications (including over-the-counter products) are added. *  Please carry medication information at all times in case of emergency situations. These are general instructions for a healthy lifestyle:    No smoking/ No tobacco products/ Avoid exposure to second hand smoke  Surgeon General's Warning:  Quitting smoking now greatly reduces serious risk to your health. Obesity, smoking, and sedentary lifestyle greatly increases your risk for illness    A healthy diet, regular physical exercise & weight monitoring are important for maintaining a healthy lifestyle    You may be retaining fluid if you have a history of heart failure or if you experience any of the following symptoms:  Weight gain of 3 pounds or more overnight or 5 pounds in a week, increased swelling in our hands or feet or shortness of breath while lying flat in bed.   Please call your doctor as soon as you notice any of these symptoms; do not wait until your next office visit. Recognize signs and symptoms of STROKE:    F-face looks uneven    A-arms unable to move or move unevenly    S-speech slurred or non-existent    T-time-call 911 as soon as signs and symptoms begin-DO NOT go       Back to bed or wait to see if you get better-TIME IS BRAIN. Warning Signs of HEART ATTACK     Call 911 if you have these symptoms:   Chest discomfort. Most heart attacks involve discomfort in the center of the chest that lasts more than a few minutes, or that goes away and comes back. It can feel like uncomfortable pressure, squeezing, fullness, or pain.  Discomfort in other areas of the upper body. Symptoms can include pain or discomfort in one or both arms, the back, neck, jaw, or stomach.  Shortness of breath with or without chest discomfort.  Other signs may include breaking out in a cold sweat, nausea, or lightheadedness. Don't wait more than five minutes to call 911 - MINUTES MATTER! Fast action can save your life. Calling 911 is almost always the fastest way to get lifesaving treatment. Emergency Medical Services staff can begin treatment when they arrive -- up to an hour sooner than if someone gets to the hospital by car. The discharge information has been reviewed with the patient. The patient verbalized understanding. Discharge medications reviewed with the patient and appropriate educational materials and side effects teaching were provided.   ___________________________________________________________________________________________________________________________________

## 2017-11-15 NOTE — CONSULTS
[facility]  Pulmonary, Critical Care, and Sleep Medicine    Name: Cedric Reynoso MRN: [id number]   : 1960 Hospital: [address] [place] [address]   Date: 11/15/2017        Pulmonary Medicine- Follow Up Patient Consult      IMPRESSION:   1. Osteoarthritis of left knee, unspecified osteoarthritis type [M17.12]  2. S/P Left Total Knee Arthroplasty with Femoral Nerve Block 17: POD #2  3. Hot flashes- menopausal (treated with Effexor)  4. Anxiety- mild: takes PRN ativan on rare occasion  5. GERD- take PRN Prilosec at home- currently symptomatic  6. Anemia- secondary to perioperative blood loss- 10: no signs of active bleeding  7. Nausea        RECOMMENDATIONS:   · Keep Head of bed elevated. · SpO2 goal> 88%  · Pulmonary hygiene with IS at bed side- Encourage frequent use while awake. · Prophylactic - post OP antibiotic management per Ortho- primary team  · Trend HCT/HB  · Start PPI for symptomatic GERD  · Pain management per Ortho- primary team  · DVT prophylaxis per Ortho- primary team  · PT per Ortho - primary team  · Zofran 4mg IVPB PRN nausea/emesis, Phenergan PRN has also been added  · Continue with OP Effexor- rapid discontinuation can cause withdrawal side effects  · Will continue to follow along with primary team  · Further recommendations pending clinical course     Subjective/History: This patient has been seen and evaluated at the request of Dr. Constantine Mcbride for post operative medical managment. Patient is a 61 y.o. female with a history of OA- Left knee. No hypertension, diabetes or known CAD. S/P Left TKA 11/13/17    11/15/17 POD #2    Patient laying in bed. Has been experiencing nausea, emesis and dry heaves. Has been given Zofran and just given dose of Phenergan. May be due to taking pain meds early this morning on empty stomach. Roxicodone discontinued and patient started on Percocet. Patient reporting 7/10 pain with movement.   No PT this morning due to nausea/ emesis. No chest pain, or dyspnea. Afebrile. Currently off oxygen. Past Medical History:   Diagnosis Date    Allergic rhinitis     Nausea & vomiting     Osteoarthritis of hip     Spinal stenosis       Past Surgical History:   Procedure Laterality Date    ENDOSCOPY, COLON, DIAGNOSTIC  03-18-11    normal colon to cecum    HX BREAST REDUCTION      HX HEENT      skin cancer between eyes    HX HYSTERECTOMY      HX LAP CHOLECYSTECTOMY  11/2015    HX TONSIL AND ADENOIDECTOMY      HX TUBAL LIGATION        Prior to Admission medications    Medication Sig Start Date End Date Taking? Authorizing Provider   oxyCODONE-acetaminophen (PERCOCET 7.5) 7.5-325 mg per tablet Take 1-2 Tabs by mouth every four (4) hours as needed. Max Daily Amount: 12 Tabs. 11/15/17  Yes Preston Martin PA-C   ondansetron hcl (ZOFRAN, AS HYDROCHLORIDE,) 4 mg tablet Take 1 Tab by mouth every eight (8) hours as needed for Nausea. 11/15/17  Yes Preston Martin PA-C   celecoxib (CELEBREX) 200 mg capsule Take 1 Cap by mouth two (2) times a day for 90 days. 11/14/17 2/12/18 Yes Preston Martin PA-C   oxyCODONE IR (OXY-IR) 15 mg immediate release tablet Take 1-2 Tabs by mouth every four (4) hours as needed. Max Daily Amount: 180 mg. 11/14/17  Yes Preston Martin PA-C   ferrous sulfate 325 mg (65 mg iron) tablet Take 1 Tab by mouth two (2) times daily (with meals). 11/14/17  Yes Preston Martin PA-C   aspirin (ASPIRIN) 325 mg tablet Take 1 Tab by mouth daily. 11/14/17  Yes Preston Martin PA-C   ALPRAZolam Annye Poser) 0.25 mg tablet Take 1 Tab by mouth three (3) times daily as needed. Max Daily Amount: 0.75 mg. PRN sleep 11/2/17  Yes ARTHUR Bass   HYDROcodone-acetaminophen (NORCO) 7.5-325 mg per tablet Take 1-2 Tabs by mouth every eight (8) hours as needed for Pain. Max Daily Amount: 6 Tabs.  Indications: acute exacerbation of chronic left knee pain secondary to severe osteoarthritis 9/12/17  Yes Berto Landaverde PA-C   venlafaxine-SR (EFFEXOR-XR) 75 mg capsule Take 37.5 mg by mouth daily. Yes Historical Provider   estradiol (ESTRACE) 1 mg tablet Take 1 mg by mouth every seven (7) days. Indications: Patient takes half tab daily   Yes Historical Provider   zolpidem (AMBIEN) 5 mg tablet Take 1 Tab by mouth nightly as needed for Sleep. 5/10/16  Yes Greg Trevino MD   albuterol (PROVENTIL HFA, VENTOLIN HFA) 90 mcg/actuation inhaler Take 2 Puffs by inhalation every four (4) hours as needed for Wheezing. 12/10/12  Yes ARTHUR Chao     Current Facility-Administered Medications   Medication Dose Route Frequency    warfarin (COUMADIN) tablet 8 mg  8 mg Oral ONCE    pantoprazole (PROTONIX) tablet 40 mg  40 mg Oral ACB    WARFARIN INFORMATION NOTE (COUMADIN)   Other Q24H    venlafaxine-SR (EFFEXOR-XR) capsule 37.5 mg  37.5 mg Oral DAILY    sodium chloride (NS) flush 5-10 mL  5-10 mL IntraVENous Q8H    ferrous sulfate tablet 325 mg  1 Tab Oral BID WITH MEALS    docusate sodium (COLACE) capsule 100 mg  100 mg Oral BID    celecoxib (CELEBREX) capsule 200 mg  200 mg Oral BID    pregabalin (LYRICA) capsule 50 mg  50 mg Oral BID     Allergies   Allergen Reactions    Other Plant, Animal, Environmental Anaphylaxis     poison ivy      Social History   Substance Use Topics    Smoking status: Never Smoker    Smokeless tobacco: Never Used    Alcohol use 0.0 oz/week     0 Standard drinks or equivalent per week      Comment: socially      Family History   Problem Relation Age of Onset    Other Mother      atrial fibrillation    Colon Polyps Mother     Breast Cancer Mother     Colon Polyps Brother      half brother    Heart Disease Sister      half sister ASHD    Heart Attack Sister     Other Son      transposition of the great vessels and surgery for this    Other Son      congestive heart failure        Review of Systems:  Pertinent items are noted in HPI.     Objective:   Vital Signs:    Visit Vitals    /74 (BP 1 Location: Right arm, BP Patient Position: At rest)    Pulse 86    Temp 99 °F (37.2 °C)    Resp 16    Ht 5' 7\" (1.702 m)    Wt 77.6 kg (171 lb)    SpO2 91%    Breastfeeding No    BMI 26.78 kg/m2       O2 Device: Nasal cannula   O2 Flow Rate (L/min): 3 l/min   Temp (24hrs), Av °F (36.7 °C), Min:97.2 °F (36.2 °C), Max:99 °F (37.2 °C)       Intake/Output:   Last shift:      11/15 0701 - 11/15 1900  In: -   Out: 400 [Urine:400]  Last 3 shifts: 1901 - 11/15 0700  In: 1703 [P.O.:1753]  Out: 4620 [Urine:4620]    Intake/Output Summary (Last 24 hours) at 11/15/17 1106  Last data filed at 11/15/17 0754   Gross per 24 hour   Intake             1513 ml   Output             2920 ml   Net            -1407 ml       Physical Exam:    General:  Alert, cooperative, no distress but uncomfortable due to nausea- also a little groggy from phenergan, appears stated age. IS at bed side, currently on  Room air   Head:  Normocephalic, without obvious abnormality, atraumatic. Eyes:  Conjunctivae/corneas clear. PERRL, EOMs intact. Nose: Nares normal. Septum midline. Mucosa normal. No drainage or sinus tenderness. Throat: Lips, mucosa, and tongue normal. Teeth and gums normal.   Neck: Supple, symmetrical, trachea midline, no adenopathy, thyroid: no enlargment/tenderness/nodules, no carotid bruit and no JVD. Lungs:   Clear to auscultation bilaterally. Chest wall:  No tenderness or deformity. Heart:  Regular rate and rhythm, S1, S2 normal, no murmur, click, rub or gallop. Abdomen:   Soft, non-tender. Bowel sounds normal. No masses   Extremities: Extremities normal, atraumatic, no cyanosis or edema.- Right leg: Left leg: ace wrap with cool wrap in place over distal thigh to proximal leg. SCD is place and functioning. Toes /feet warm   Pulses: 2+ and symmetric all extremities.    Skin: Skin color, texture, turgor normal. No rashes or lesions   Lymph nodes:      Cervical, supraclavicular, normal.   Neurologic: Grossly non-focal except for  Post operative pain, denies numbness or tingling,  moves toes on both feet, + sensation in foot and toes both feet. Data:     Recent Results (from the past 24 hour(s))   CBC WITH AUTOMATED DIFF    Collection Time: 11/15/17  1:16 AM   Result Value Ref Range    WBC 7.4 4.6 - 13.2 K/uL    RBC 3.52 (L) 4.20 - 5.30 M/uL    HGB 10.8 (L) 12.0 - 16.0 g/dL    HCT 34.2 (L) 35.0 - 45.0 %    MCV 97.2 (H) 74.0 - 97.0 FL    MCH 30.7 24.0 - 34.0 PG    MCHC 31.6 31.0 - 37.0 g/dL    RDW 12.9 11.6 - 14.5 %    PLATELET 191 714 - 559 K/uL    MPV 11.0 9.2 - 11.8 FL    NEUTROPHILS 60 40 - 73 %    LYMPHOCYTES 31 21 - 52 %    MONOCYTES 6 3 - 10 %    EOSINOPHILS 3 0 - 5 %    BASOPHILS 0 0 - 2 %    ABS. NEUTROPHILS 4.5 1.8 - 8.0 K/UL    ABS. LYMPHOCYTES 2.3 0.9 - 3.6 K/UL    ABS. MONOCYTES 0.4 0.05 - 1.2 K/UL    ABS. EOSINOPHILS 0.2 0.0 - 0.4 K/UL    ABS. BASOPHILS 0.0 0.0 - 0.06 K/UL    DF AUTOMATED     PROTHROMBIN TIME + INR    Collection Time: 11/15/17  1:16 AM   Result Value Ref Range    Prothrombin time 14.2 11.5 - 15.2 sec    INR 1.2 0.8 - 1.2               Imaging:  I have personally reviewed the patients radiographs and have reviewed the reports:  CXR: 10/21/17:    XR CHEST PA LAT     Indication: Pre-Op     Comparison: Chest x-ray from 10/6/2016     Findings: Moderate sized sliding-type hiatal hernia. The lungs are clear. No effusion or  pneumothorax. Cardiomediastinal silhouette is normal in size.     IMPRESSION  Impression:  1. No acute cardiopulmonary process.     2.  Hiatal hernia.         Total clinical  care time exclusive of procedures: 25 minutes    Karina Koenig DO, [facility]  Pulmonary, Sleep, Critical Care Medicine

## 2017-11-15 NOTE — PROGRESS NOTES
Bedside shift change report given to Jazmin Alva RN (oncoming nurse) by Salma Perez RN (offgoing nurse). Report included the following information SBAR, Kardex, Intake/Output, MAR, Recent Results and Med Rec Status.

## 2017-11-15 NOTE — ROUTINE PROCESS
2000 Patient is alert and oriented times three with no signs or symptoms of distress.  Dressing is clean dry and intact and pulses palpable  2030   Mobility Intervention:       [] Pt dangled at edge of bed    [] Pt assisted OOB to bedside commode    [] Pt assisted OOB to chair    [x] Pt ambulated to bathroom    [] Patient was ambulated in room/hallway    Assistive Device Utilized:       [x] Rolling walker   [] Crutches   [] Straight Cane   [] Knee immobilizer   [] IV pole    After Mobilization:     [] Patient left in no apparent distress sitting up in chair  [x] Patient left in no apparent distress in bed  [x] Call bell left within reach  [x] SCDs on & machine turned on  [x] Ice applied  [] RN notified  [] Caregiver present  [] Bed alarm activated    Reason patient not mobilized:      [] Patient refused   [] Nausea/vomiting   [] Low blood pressure   [] Drowsy/lethargic    Pain Rating:     [] 0  [] 1  Assistive Device:        [] 2  [] 3  [] 4  [] 5  [] 6  Assistive Device:        [x] 7  [] 8  [] 9  [] 10    Comments:   2200   Mobility Intervention:       [] Pt dangled at edge of bed    [] Pt assisted OOB to bedside commode    [] Pt assisted OOB to chair    [] Pt ambulated to bathroom    [x] Patient was ambulated in room/hallway    Assistive Device Utilized:       [x] Rolling walker   [] Crutches   [] Straight Cane   [] Knee immobilizer   [] IV pole    After Mobilization:     [] Patient left in no apparent distress sitting up in chair  [x] Patient left in no apparent distress in bed  [x] Call bell left within reach  [x] SCDs on & machine turned on  [x] Ice applied  [] RN notified  [] Caregiver present  [] Bed alarm activated    Reason patient not mobilized:      [] Patient refused   [] Nausea/vomiting   [] Low blood pressure   [] Drowsy/lethargic    Pain Rating:     [] 0  [] 1  Assistive Device:        [] 2  [] 3  [] 4  [] 5  [] 6  Assistive Device:        [x] 7  [] 8  [] 9  [] 10    Comments:     0000 Patient is alert and oriented times three with no signs or symptoms of distress. Dressing is clean dry and intact and pulses palpable  0100   Mobility Intervention:       [] Pt dangled at edge of bed    [] Pt assisted OOB to bedside commode    [] Pt assisted OOB to chair    [x] Pt ambulated to bathroom    [] Patient was ambulated in room/hallway    Assistive Device Utilized:       [x] Rolling walker   [] Crutches   [] Straight Cane   [] Knee immobilizer   [] IV pole    After Mobilization:     [] Patient left in no apparent distress sitting up in chair  [x] Patient left in no apparent distress in bed  [x] Call bell left within reach  [x] SCDs on & machine turned on  [x] Ice applied  [] RN notified  [] Caregiver present  [] Bed alarm activated    Reason patient not mobilized:      [] Patient refused   [] Nausea/vomiting   [] Low blood pressure   [] Drowsy/lethargic    Pain Rating:     [] 0  [] 1  Assistive Device:        [] 2  [] 3  [] 4  [] 5  [x] 6  Assistive Device:        [] 7  [] 8  [] 9  [] 10    Comments:     0400 Patient remains alert and oriented times three with no signs or symptoms of distress.  Dressing is clean dry and intact and pulses palpable

## 2017-11-15 NOTE — PROGRESS NOTES
Bedside shift change report given to Katrina Ro (oncoming nurse) by Yobani Hopkins RN (offgoing nurse). Report included the following information SBAR, Kardex, Intake/Output, MAR, Recent Results and Med Rec Status.

## 2017-11-15 NOTE — HOME CARE
Discharge orders noted - referral processed for PT [facility] on 11/16/17 - S.  Beverely Angers LPN

## 2017-11-16 ENCOUNTER — HOME CARE VISIT (OUTPATIENT)
Dept: SCHEDULING | Facility: HOME HEALTH | Age: 57
End: 2017-11-16
Payer: COMMERCIAL

## 2017-11-16 PROCEDURE — 400013 HH SOC

## 2017-11-16 PROCEDURE — G0151 HHCP-SERV OF PT,EA 15 MIN: HCPCS

## 2017-11-16 NOTE — PROGRESS NOTES
Discharge instructions and prescriptions given and explained to patient. Patient armband removed and shredded. Patient given two aquacel dressings.

## 2017-11-17 ENCOUNTER — HOME CARE VISIT (OUTPATIENT)
Dept: SCHEDULING | Facility: HOME HEALTH | Age: 57
End: 2017-11-17
Payer: COMMERCIAL

## 2017-11-17 ENCOUNTER — HOME CARE VISIT (OUTPATIENT)
Dept: HOME HEALTH SERVICES | Facility: HOME HEALTH | Age: 57
End: 2017-11-17
Payer: COMMERCIAL

## 2017-11-17 VITALS — SYSTOLIC BLOOD PRESSURE: 127 MMHG | HEART RATE: 79 BPM | DIASTOLIC BLOOD PRESSURE: 82 MMHG | OXYGEN SATURATION: 96 %

## 2017-11-17 PROCEDURE — G0151 HHCP-SERV OF PT,EA 15 MIN: HCPCS

## 2017-11-17 PROCEDURE — G0157 HHC PT ASSISTANT EA 15: HCPCS

## 2017-11-20 ENCOUNTER — HOME CARE VISIT (OUTPATIENT)
Dept: HOME HEALTH SERVICES | Facility: HOME HEALTH | Age: 57
End: 2017-11-20
Payer: COMMERCIAL

## 2017-11-20 PROCEDURE — A6255 ABSORPT DRG >16<=48 IN W/BDR: HCPCS

## 2017-11-22 ENCOUNTER — HOME CARE VISIT (OUTPATIENT)
Dept: SCHEDULING | Facility: HOME HEALTH | Age: 57
End: 2017-11-22
Payer: COMMERCIAL

## 2017-11-22 ENCOUNTER — TELEPHONE (OUTPATIENT)
Dept: ORTHOPEDIC SURGERY | Facility: CLINIC | Age: 57
End: 2017-11-22

## 2017-11-22 PROCEDURE — A6255 ABSORPT DRG >16<=48 IN W/BDR: HCPCS

## 2017-11-22 PROCEDURE — G0157 HHC PT ASSISTANT EA 15: HCPCS

## 2017-11-22 PROCEDURE — G0151 HHCP-SERV OF PT,EA 15 MIN: HCPCS

## 2017-11-22 RX ORDER — HYDROCODONE BITARTRATE AND ACETAMINOPHEN 7.5; 325 MG/1; MG/1
1-2 TABLET ORAL
Qty: 60 TAB | Refills: 0 | Status: SHIPPED | OUTPATIENT
Start: 2017-11-22 | End: 2017-12-12

## 2017-11-22 NOTE — TELEPHONE ENCOUNTER
PATIENT STATES THE PERCOCET SHE WAS GIVEN IS TOO STRONG AND WOULD Delrae Major. PLEASE CALL HER [phone].

## 2017-11-23 VITALS — HEART RATE: 98 BPM | OXYGEN SATURATION: 75 % | SYSTOLIC BLOOD PRESSURE: 121 MMHG | DIASTOLIC BLOOD PRESSURE: 88 MMHG

## 2017-11-24 ENCOUNTER — HOME CARE VISIT (OUTPATIENT)
Dept: HOME HEALTH SERVICES | Facility: HOME HEALTH | Age: 57
End: 2017-11-24
Payer: COMMERCIAL

## 2017-11-27 ENCOUNTER — HOME CARE VISIT (OUTPATIENT)
Dept: HOME HEALTH SERVICES | Facility: HOME HEALTH | Age: 57
End: 2017-11-27
Payer: COMMERCIAL

## 2017-11-29 ENCOUNTER — HOME CARE VISIT (OUTPATIENT)
Dept: HOME HEALTH SERVICES | Facility: HOME HEALTH | Age: 57
End: 2017-11-29
Payer: COMMERCIAL

## 2017-12-01 ENCOUNTER — OFFICE VISIT (OUTPATIENT)
Dept: ORTHOPEDIC SURGERY | Age: 57
End: 2017-12-01

## 2017-12-01 VITALS
WEIGHT: 177 LBS | SYSTOLIC BLOOD PRESSURE: 117 MMHG | BODY MASS INDEX: 27.78 KG/M2 | TEMPERATURE: 97 F | HEIGHT: 67 IN | HEART RATE: 90 BPM | DIASTOLIC BLOOD PRESSURE: 78 MMHG

## 2017-12-01 DIAGNOSIS — Z96.652 STATUS POST LEFT KNEE REPLACEMENT: Primary | ICD-10-CM

## 2017-12-01 NOTE — PROGRESS NOTES
[address] [place] [address]  [phone]           Patient: Erna Irizarry                MRN: [id number]       SSN: [id number]  YOB: 1960        AGE: 61 y.o. SEX: female  Body mass index is 27.72 kg/(m^2). PCP: Misha Auguste MD  12/01/17      This office note has been dictated. REVIEW OF SYSTEMS:  Constitutional: Negative for fever, chills, weight loss and malaise/fatigue. HENT: Negative. Eyes: Negative. Respiratory: Negative. Cardiovascular: Negative. Gastrointestinal: No bowel incontinence or constipation. Genitourinary: No bladder incontinence or saddle anesthesia. Skin: Negative. Neurological: Negative. Endo/Heme/Allergies: Negative. Psychiatric/Behavioral: Negative. Musculoskeletal: As per HPI above. Past Medical History:   Diagnosis Date    Allergic rhinitis     Nausea & vomiting     Osteoarthritis of hip     Spinal stenosis          Current Outpatient Prescriptions:     HYDROcodone-acetaminophen (NORCO) 7.5-325 mg per tablet, Take 1-2 Tabs by mouth every six (6) hours as needed for Pain. Max Daily Amount: 8 Tabs., Disp: 60 Tab, Rfl: 0    celecoxib (CELEBREX) 200 mg capsule, Take 1 Cap by mouth two (2) times a day for 90 days. (Patient taking differently: Take 200 mg by mouth two (2) times a day. Indications: OSTEOARTHRITIS), Disp: 60 Cap, Rfl: 2    ferrous sulfate 325 mg (65 mg iron) tablet, Take 1 Tab by mouth two (2) times daily (with meals). , Disp: 60 Tab, Rfl: 2    aspirin (ASPIRIN) 325 mg tablet, Take 1 Tab by mouth daily. (Patient taking differently: Take 325 mg by mouth daily. Indications: Thrombosis Prevention after PCI), Disp: 30 Tab, Rfl: 0    ALPRAZolam (XANAX) 0.25 mg tablet, Take 1 Tab by mouth three (3) times daily as needed. Max Daily Amount: 0.75 mg.  PRN sleep, Disp: 20 Tab, Rfl: 0    venlafaxine-SR (EFFEXOR-XR) 75 mg capsule, Take 37.5 mg by mouth daily., Disp: , Rfl:     estradiol (ESTRACE) 1 mg tablet, Take 1 mg by mouth every seven (7) days. Indications: Patient takes half tab daily, Disp: , Rfl:     zolpidem (AMBIEN) 5 mg tablet, Take 1 Tab by mouth nightly as needed for Sleep., Disp: 25 Tab, Rfl: 0    albuterol (PROVENTIL HFA, VENTOLIN HFA) 90 mcg/actuation inhaler, Take 2 Puffs by inhalation every four (4) hours as needed for Wheezing., Disp: 1 Inhaler, Rfl: 0    oxyCODONE-acetaminophen (PERCOCET 7.5) 7.5-325 mg per tablet, Take 1-2 Tabs by mouth every four (4) hours as needed. Max Daily Amount: 12 Tabs. (Patient taking differently: Take 1-2 Tabs by mouth every four (4) hours as needed for Pain.), Disp: 60 Tab, Rfl: 0    ondansetron hcl (ZOFRAN, AS HYDROCHLORIDE,) 4 mg tablet, Take 1 Tab by mouth every eight (8) hours as needed for Nausea. (Patient taking differently: Take 4 mg by mouth every eight (8) hours as needed for Nausea. Indications: nausea), Disp: 30 Tab, Rfl: 1    oxyCODONE IR (OXY-IR) 15 mg immediate release tablet, Take 1-2 Tabs by mouth every four (4) hours as needed. Max Daily Amount: 180 mg. (Patient not taking: Reported on 11/16/2017), Disp: 60 Tab, Rfl: 0    Allergies   Allergen Reactions    Other Plant, Animal, Environmental Anaphylaxis     poison ivy       Social History     Social History    Marital status:      Spouse name: N/A    Number of children: N/A    Years of education: N/A     Occupational History    Not on file.      Social History Main Topics    Smoking status: Never Smoker    Smokeless tobacco: Never Used    Alcohol use 0.0 oz/week     0 Standard drinks or equivalent per week      Comment: socially    Drug use: No    Sexual activity: Not on file      Comment: Hysterectomy     Other Topics Concern    Not on file     Social History Narrative       Past Surgical History:   Procedure Laterality Date    ENDOSCOPY, COLON, DIAGNOSTIC  03-18-11    normal colon to cecum    HX BREAST REDUCTION      HX HEENT      skin cancer between eyes    HX HYSTERECTOMY      HX KNEE REPLACEMENT      HX LAP CHOLECYSTECTOMY  11/2015    HX TONSIL AND ADENOIDECTOMY      HX TUBAL LIGATION           We did see Ms. Dawna Deluna for followup with regards to her left total hip replacement. The patient is now 18 days status post surgery and is doing quite well. She is quite happy with the results of the knee replacement. She is doing extremely well ambulating. She has finished up her home physical therapy without complications. She has full range of motion and very minimal pain. She denies any chest pain or shortness of breath and has had no fevers or chills. PHYSICAL EXAMINATION:  In general, the patient is alert and oriented x 3 in no acute distress. The patient is well-developed, well-nourished, with a normal affect. The patient is afebrile. HEENT:  Head is normocephalic and atraumatic. Pupils are equally round and reactive to light and accommodation. Extraocular eye movements are intact. Neck is supple. Trachea is midline. No JVD is present. Breathing is nonlabored. Examination of the left knee reveals the left knee is intact. The surgical wounds are healing up nicely. Staples are in place. There is no erythema, ecchymosis, and no warmth. There is minimal swelling. There are no signs for infection or cellulitis present. There is full range of motion and no extensor lag. There are no obvious defects to the retinaculum. On palpation, the medial patellar facet may be a touch thicker. RADIOGRAPHS:  Radiographs in the office today, including three views of the left knee, shows no acute bony abnormalities. There may be a slight 1-2° tilt of the patella. The films were reviewed with Dr. Ranulfo Abebe. The components are well-fixed. There is no evidence of loosening or fracture noted. ASSESSMENT:  Status post left total knee replacement.      PLAN:  At this point, the staples are removed and replaced with Steri-Strips without complications. We will move forward with an ultrasound of the left knee at the medial retinaculum. We will see her back after the ultrasound for further evaluation. She will call with any questions or concerns that shall arise.                      JR Wolf KENNEDY, KATHY, ATC

## 2017-12-06 ENCOUNTER — HOSPITAL ENCOUNTER (OUTPATIENT)
Dept: ULTRASOUND IMAGING | Age: 57
Discharge: HOME | End: 2017-12-06
Attending: PHYSICIAN ASSISTANT
Payer: COMMERCIAL

## 2017-12-06 DIAGNOSIS — Z96.652 STATUS POST LEFT KNEE REPLACEMENT: ICD-10-CM

## 2017-12-06 PROCEDURE — 76882 US LMTD JT/FCL EVL NVASC XTR: CPT

## 2017-12-08 ENCOUNTER — HOSPITAL ENCOUNTER (OUTPATIENT)
Dept: LAB | Age: 57
Discharge: HOME | End: 2017-12-08
Payer: COMMERCIAL

## 2017-12-08 ENCOUNTER — OFFICE VISIT (OUTPATIENT)
Dept: ORTHOPEDIC SURGERY | Age: 57
End: 2017-12-08

## 2017-12-08 VITALS
SYSTOLIC BLOOD PRESSURE: 121 MMHG | BODY MASS INDEX: 27.34 KG/M2 | TEMPERATURE: 97.4 F | WEIGHT: 174.2 LBS | HEIGHT: 67 IN | HEART RATE: 81 BPM | DIASTOLIC BLOOD PRESSURE: 70 MMHG

## 2017-12-08 DIAGNOSIS — Z01.818 PREOP EXAMINATION: ICD-10-CM

## 2017-12-08 DIAGNOSIS — Z01.818 PREOP EXAMINATION: Primary | ICD-10-CM

## 2017-12-08 DIAGNOSIS — S86.912A TEAR OF TENDON OF LEFT LOWER EXTREMITY, INITIAL ENCOUNTER: Primary | ICD-10-CM

## 2017-12-08 LAB
ANION GAP SERPL CALC-SCNC: 5 MMOL/L (ref 3–18)
APTT PPP: 26 SEC (ref 23–36.4)
BASOPHILS # BLD: 0 K/UL (ref 0–0.06)
BASOPHILS NFR BLD: 1 % (ref 0–2)
BUN SERPL-MCNC: 17 MG/DL (ref 7–18)
BUN/CREAT SERPL: 26 (ref 12–20)
CALCIUM SERPL-MCNC: 9.2 MG/DL (ref 8.5–10.1)
CHLORIDE SERPL-SCNC: 105 MMOL/L (ref 100–108)
CO2 SERPL-SCNC: 31 MMOL/L (ref 21–32)
CREAT SERPL-MCNC: 0.65 MG/DL (ref 0.6–1.3)
DIFFERENTIAL METHOD BLD: ABNORMAL
EOSINOPHIL # BLD: 0.5 K/UL (ref 0–0.4)
EOSINOPHIL NFR BLD: 8 % (ref 0–5)
ERYTHROCYTE [DISTWIDTH] IN BLOOD BY AUTOMATED COUNT: 13.5 % (ref 11.6–14.5)
GLUCOSE SERPL-MCNC: 87 MG/DL (ref 74–99)
HCT VFR BLD AUTO: 39.4 % (ref 35–45)
HGB BLD-MCNC: 12.4 G/DL (ref 12–16)
INR PPP: 0.9 (ref 0.8–1.2)
LYMPHOCYTES # BLD: 2.3 K/UL (ref 0.9–3.6)
LYMPHOCYTES NFR BLD: 38 % (ref 21–52)
MCH RBC QN AUTO: 30.3 PG (ref 24–34)
MCHC RBC AUTO-ENTMCNC: 31.5 G/DL (ref 31–37)
MCV RBC AUTO: 96.3 FL (ref 74–97)
MONOCYTES # BLD: 0.4 K/UL (ref 0.05–1.2)
MONOCYTES NFR BLD: 7 % (ref 3–10)
NEUTS SEG # BLD: 2.8 K/UL (ref 1.8–8)
NEUTS SEG NFR BLD: 46 % (ref 40–73)
PLATELET # BLD AUTO: 300 K/UL (ref 135–420)
PMV BLD AUTO: 10.6 FL (ref 9.2–11.8)
POTASSIUM SERPL-SCNC: 4.6 MMOL/L (ref 3.5–5.5)
PROTHROMBIN TIME: 11.5 SEC (ref 11.5–15.2)
RBC # BLD AUTO: 4.09 M/UL (ref 4.2–5.3)
SODIUM SERPL-SCNC: 141 MMOL/L (ref 136–145)
WBC # BLD AUTO: 6 K/UL (ref 4.6–13.2)

## 2017-12-08 PROCEDURE — 85610 PROTHROMBIN TIME: CPT | Performed by: ORTHOPAEDIC SURGERY

## 2017-12-08 PROCEDURE — 85730 THROMBOPLASTIN TIME PARTIAL: CPT | Performed by: ORTHOPAEDIC SURGERY

## 2017-12-08 PROCEDURE — 36415 COLL VENOUS BLD VENIPUNCTURE: CPT | Performed by: ORTHOPAEDIC SURGERY

## 2017-12-08 PROCEDURE — 85025 COMPLETE CBC W/AUTO DIFF WBC: CPT | Performed by: ORTHOPAEDIC SURGERY

## 2017-12-08 PROCEDURE — 80048 BASIC METABOLIC PNL TOTAL CA: CPT | Performed by: ORTHOPAEDIC SURGERY

## 2017-12-08 RX ORDER — ACETAMINOPHEN 325 MG/1
1000 TABLET ORAL ONCE
Status: CANCELLED | OUTPATIENT
Start: 2017-12-08 | End: 2017-12-08

## 2017-12-08 RX ORDER — PREGABALIN 25 MG/1
75 CAPSULE ORAL ONCE
Status: CANCELLED | OUTPATIENT
Start: 2017-12-08 | End: 2017-12-08

## 2017-12-08 RX ORDER — CELECOXIB 100 MG/1
400 CAPSULE ORAL ONCE
Status: CANCELLED | OUTPATIENT
Start: 2017-12-08 | End: 2017-12-08

## 2017-12-08 NOTE — H&P
[address] [facility], [place], operated by [facility], [address] [facility]  [phone]           HISTORY & PHYSICAL      Patient: Nacho Maravilla                MRN: [id number]       SSN: [id number]  YOB: 1960        AGE: 61 y.o. SEX: female  Body mass index is 27.28 kg/(m^2). PCP: Ethan Begum MD  12/08/17      CC: left knee, medial retinacular tear, sp TKA  Problem List Items Addressed This Visit     None      Visit Diagnoses     Tear of tendon of left lower extremity, initial encounter    -  Primary            HPI:  The patient is a pleasant 61 y.o. whom had a TKA of their Left knee and has developed a rent/tear to the medial retinaculum causing some pain and patellar tilt. Due to the current findings and affected activities of daily living, surgical intervention is indicated. The alternatives, risks, complications, as well as expected outcome were discussed. These include but are not limited to infection, blood loss, need for blood transfusion, neurovascular damage, DVT, PE,  post-op stiffness and pain, leg length discrepancy, dislocation, anesthetic complications, prothesis longevity, need for more surgery, MI, stroke, and even death. The patient understands and wishes to proceed with surgery. Past Medical History:   Diagnosis Date    Allergic rhinitis     Nausea & vomiting     Osteoarthritis of hip     Spinal stenosis          Current Outpatient Prescriptions:     HYDROcodone-acetaminophen (NORCO) 7.5-325 mg per tablet, Take 1-2 Tabs by mouth every six (6) hours as needed for Pain. Max Daily Amount: 8 Tabs., Disp: 60 Tab, Rfl: 0    oxyCODONE-acetaminophen (PERCOCET 7.5) 7.5-325 mg per tablet, Take 1-2 Tabs by mouth every four (4) hours as needed. Max Daily Amount: 12 Tabs.  (Patient taking differently: Take 1-2 Tabs by mouth every four (4) hours as needed for Pain.), Disp: 60 Tab, Rfl: 0    ondansetron hcl (ZOFRAN, AS HYDROCHLORIDE,) 4 mg tablet, Take 1 Tab by mouth every eight (8) hours as needed for Nausea. (Patient taking differently: Take 4 mg by mouth every eight (8) hours as needed for Nausea. Indications: nausea), Disp: 30 Tab, Rfl: 1    celecoxib (CELEBREX) 200 mg capsule, Take 1 Cap by mouth two (2) times a day for 90 days. (Patient taking differently: Take 200 mg by mouth two (2) times a day. Indications: OSTEOARTHRITIS), Disp: 60 Cap, Rfl: 2    ferrous sulfate 325 mg (65 mg iron) tablet, Take 1 Tab by mouth two (2) times daily (with meals). , Disp: 60 Tab, Rfl: 2    aspirin (ASPIRIN) 325 mg tablet, Take 1 Tab by mouth daily. (Patient taking differently: Take 325 mg by mouth daily. Indications: Thrombosis Prevention after PCI), Disp: 30 Tab, Rfl: 0    ALPRAZolam (XANAX) 0.25 mg tablet, Take 1 Tab by mouth three (3) times daily as needed. Max Daily Amount: 0.75 mg. PRN sleep, Disp: 20 Tab, Rfl: 0    venlafaxine-SR (EFFEXOR-XR) 75 mg capsule, Take 37.5 mg by mouth daily. , Disp: , Rfl:     estradiol (ESTRACE) 1 mg tablet, Take 1 mg by mouth every seven (7) days. Indications: Patient takes half tab daily, Disp: , Rfl:     zolpidem (AMBIEN) 5 mg tablet, Take 1 Tab by mouth nightly as needed for Sleep., Disp: 25 Tab, Rfl: 0    albuterol (PROVENTIL HFA, VENTOLIN HFA) 90 mcg/actuation inhaler, Take 2 Puffs by inhalation every four (4) hours as needed for Wheezing., Disp: 1 Inhaler, Rfl: 0    oxyCODONE IR (OXY-IR) 15 mg immediate release tablet, Take 1-2 Tabs by mouth every four (4) hours as needed. Max Daily Amount: 180 mg., Disp: 60 Tab, Rfl: 0    Allergies   Allergen Reactions    Other Plant, Animal, Environmental Anaphylaxis     poison ivy       Social History     Social History    Marital status:      Spouse name: N/A    Number of children: N/A    Years of education: N/A     Occupational History    Not on file.      Social History Main Topics    Smoking status: Never Smoker    Smokeless tobacco: Never Used    Alcohol use 0.0 oz/week     0 Standard drinks or equivalent per week      Comment: socially    Drug use: No    Sexual activity: Not on file      Comment: Hysterectomy     Other Topics Concern    Not on file     Social History Narrative       Past Surgical History:   Procedure Laterality Date    ENDOSCOPY, COLON, DIAGNOSTIC  03-18-11    normal colon to cecum    HX BREAST REDUCTION      HX HEENT      skin cancer between eyes    HX HYSTERECTOMY      HX KNEE REPLACEMENT      HX LAP CHOLECYSTECTOMY  11/2015    HX TONSIL AND ADENOIDECTOMY      HX TUBAL LIGATION         Family History:  Non-contributory. PE:  Visit Vitals    /70    Pulse 81    Temp 97.4 °F (36.3 °C)    Ht 5' 7\" (1.702 m)    Wt 174 lb 3.2 oz (79 kg)    BMI 27.28 kg/m2     A&O X3, NAD, well develop, well nourished  Heart: S1-S2, rrr  Lungs: CTA bilat  Abd: soft, nt, nt, + bs in all quadrants  Ext:  Pos distal pulses to DP, PT      X-ray: left knee shows implants to be well fixed with slight tilt of the patella    US- confirms medial retinacular tear    Labs: labs pending    A:  Left  Knee, sp TKA with medial retinacular rent/tear     P:  At this point we will move forward with surgery. Again, the alternatives, risks, complications, as well as expected outcome were discussed and the patient wishes to proceed with surgery. Pt has been instructed to stop aspirin, nsaids, rheumatologic medications and blood thinners. They have also been instructed to continue on any heart and bp meds and to take them the morning of surgery with sips of water.          Mortimer Cheese PA-C Dr. Earnstine Muzzy

## 2017-12-08 NOTE — PROGRESS NOTES
[address] [place], [place] [id number]           Patient: Evin Smith                MRN: [id number]       SSN: [id number]  YOB: 1960        AGE: 61 y.o. SEX: female  Body mass index is 27.28 kg/(m^2). PCP: Elias Marshall MD  12/08/17      This office note has been dictated. REVIEW OF SYSTEMS:  Constitutional: Negative for fever, chills, weight loss and malaise/fatigue. HENT: Negative. Eyes: Negative. Respiratory: Negative. Cardiovascular: Negative. Gastrointestinal: No bowel incontinence or constipation. Genitourinary: No bladder incontinence or saddle anesthesia. Skin: Negative. Neurological: Negative. Endo/Heme/Allergies: Negative. Psychiatric/Behavioral: Negative. Musculoskeletal: As per HPI above. Past Medical History:   Diagnosis Date    Allergic rhinitis     Nausea & vomiting     Osteoarthritis of hip     Spinal stenosis          Current Outpatient Prescriptions:     HYDROcodone-acetaminophen (NORCO) 7.5-325 mg per tablet, Take 1-2 Tabs by mouth every six (6) hours as needed for Pain. Max Daily Amount: 8 Tabs., Disp: 60 Tab, Rfl: 0    oxyCODONE-acetaminophen (PERCOCET 7.5) 7.5-325 mg per tablet, Take 1-2 Tabs by mouth every four (4) hours as needed. Max Daily Amount: 12 Tabs. (Patient taking differently: Take 1-2 Tabs by mouth every four (4) hours as needed for Pain.), Disp: 60 Tab, Rfl: 0    ondansetron hcl (ZOFRAN, AS HYDROCHLORIDE,) 4 mg tablet, Take 1 Tab by mouth every eight (8) hours as needed for Nausea. (Patient taking differently: Take 4 mg by mouth every eight (8) hours as needed for Nausea. Indications: nausea), Disp: 30 Tab, Rfl: 1    celecoxib (CELEBREX) 200 mg capsule, Take 1 Cap by mouth two (2) times a day for 90 days. (Patient taking differently: Take 200 mg by mouth two (2) times a day.  Indications: OSTEOARTHRITIS), Disp: 60 Cap, Rfl: 2   ferrous sulfate 325 mg (65 mg iron) tablet, Take 1 Tab by mouth two (2) times daily (with meals). , Disp: 60 Tab, Rfl: 2    aspirin (ASPIRIN) 325 mg tablet, Take 1 Tab by mouth daily. (Patient taking differently: Take 325 mg by mouth daily. Indications: Thrombosis Prevention after PCI), Disp: 30 Tab, Rfl: 0    ALPRAZolam (XANAX) 0.25 mg tablet, Take 1 Tab by mouth three (3) times daily as needed. Max Daily Amount: 0.75 mg. PRN sleep, Disp: 20 Tab, Rfl: 0    venlafaxine-SR (EFFEXOR-XR) 75 mg capsule, Take 37.5 mg by mouth daily. , Disp: , Rfl:     estradiol (ESTRACE) 1 mg tablet, Take 1 mg by mouth every seven (7) days. Indications: Patient takes half tab daily, Disp: , Rfl:     zolpidem (AMBIEN) 5 mg tablet, Take 1 Tab by mouth nightly as needed for Sleep., Disp: 25 Tab, Rfl: 0    albuterol (PROVENTIL HFA, VENTOLIN HFA) 90 mcg/actuation inhaler, Take 2 Puffs by inhalation every four (4) hours as needed for Wheezing., Disp: 1 Inhaler, Rfl: 0    oxyCODONE IR (OXY-IR) 15 mg immediate release tablet, Take 1-2 Tabs by mouth every four (4) hours as needed. Max Daily Amount: 180 mg., Disp: 60 Tab, Rfl: 0    Allergies   Allergen Reactions    Other Plant, Animal, Environmental Anaphylaxis     poison ivy       Social History     Social History    Marital status:      Spouse name: N/A    Number of children: N/A    Years of education: N/A     Occupational History    Not on file.      Social History Main Topics    Smoking status: Never Smoker    Smokeless tobacco: Never Used    Alcohol use 0.0 oz/week     0 Standard drinks or equivalent per week      Comment: socially    Drug use: No    Sexual activity: Not on file      Comment: Hysterectomy     Other Topics Concern    Not on file     Social History Narrative       Past Surgical History:   Procedure Laterality Date    ENDOSCOPY, COLON, DIAGNOSTIC  03-18-11    normal colon to cecum    HX BREAST REDUCTION      HX HEENT      skin cancer between eyes    HX HYSTERECTOMY      HX KNEE REPLACEMENT      HX LAP CHOLECYSTECTOMY  11/2015    HX TONSIL AND ADENOIDECTOMY      HX TUBAL LIGATION               We did see Ms. Yazmin Whitehead for followup with regards to her left knee replacement. The patient is now 25 days status post surgery, and she is having some discomfort in her knee. She was seen last week and had what felt like a little defect to the medial retinaculum. I sent her for an ultrasound. She returns today for reevaluation. She has had no fevers, chills, systemic changes, or injuries to report, and no chest pain or shortness of breath. She has had no troubles with the wound. PHYSICAL EXAMINATION:  In general, the patient is alert and oriented x 3 in no acute distress. The patient is well-developed, well-nourished, with a normal affect. The patient is afebrile. Examination of the left knee reveals the skin is intact. The surgical wound is healing nicely. Range of motion is well maintained. Stability is good. She does have a little rent to the medial retinaculum of the knee. Slight patellar tilt is noted. ULTRASOUND:  Review of the ultrasound does confirm a tear of the medial retinaculum of the medial patellofemoral ligament associated with lateral tilt and subluxation of the patella. ASSESSMENT:      1. Status post left knee replacement. 2. Left knee medial retinacular tear. PLAN:  At this point, we discussed treatment options. I think we need to move forward with surgical intervention to repair the medial retinacular rent. The alternatives, risks, and, including but not limited to infection, blood loss, need for blood transfusion, neurovascular damage, bone fracture, anesthetic complications, DVT, PE, postoperative stiffness and pain, prosthesis longevity, need for more surgery, MI, and stroke, have been discussed. The patient understands and wishes to proceed with surgery. The patient was seen by Dr. Juan Carlos Vega today in the office as well. JR Bloom MPAS, PAMALIKA, ATC

## 2017-12-09 ENCOUNTER — ANESTHESIA EVENT (OUTPATIENT)
Dept: SURGERY | Age: 57
End: 2017-12-09
Payer: COMMERCIAL

## 2017-12-11 ENCOUNTER — HOSPITAL ENCOUNTER (OUTPATIENT)
Age: 57
Setting detail: OBSERVATION
LOS: 1 days | Discharge: HOME | End: 2017-12-12
Attending: ORTHOPAEDIC SURGERY | Admitting: ORTHOPAEDIC SURGERY
Payer: COMMERCIAL

## 2017-12-11 ENCOUNTER — ANESTHESIA (OUTPATIENT)
Dept: SURGERY | Age: 57
End: 2017-12-11
Payer: COMMERCIAL

## 2017-12-11 PROBLEM — S76.119A QUADRICEPS MUSCLE RUPTURE: Status: ACTIVE | Noted: 2017-12-11

## 2017-12-11 PROCEDURE — 74011250636 HC RX REV CODE- 250/636: Performed by: ORTHOPAEDIC SURGERY

## 2017-12-11 PROCEDURE — 64447 NJX AA&/STRD FEMORAL NRV IMG: CPT | Performed by: ANESTHESIOLOGY

## 2017-12-11 PROCEDURE — L1830 KO IMMOB CANVAS LONG PRE OTS: HCPCS | Performed by: ORTHOPAEDIC SURGERY

## 2017-12-11 PROCEDURE — 76060000033 HC ANESTHESIA 1 TO 1.5 HR: Performed by: ORTHOPAEDIC SURGERY

## 2017-12-11 PROCEDURE — 74011000258 HC RX REV CODE- 258: Performed by: ORTHOPAEDIC SURGERY

## 2017-12-11 PROCEDURE — 77030002916 HC SUT ETHLN J&J -A: Performed by: ORTHOPAEDIC SURGERY

## 2017-12-11 PROCEDURE — 74011000250 HC RX REV CODE- 250

## 2017-12-11 PROCEDURE — 74011250637 HC RX REV CODE- 250/637: Performed by: PHYSICIAN ASSISTANT

## 2017-12-11 PROCEDURE — 77030002933 HC SUT MCRYL J&J -A: Performed by: ORTHOPAEDIC SURGERY

## 2017-12-11 PROCEDURE — 77030011640 HC PAD GRND REM COVD -A: Performed by: ORTHOPAEDIC SURGERY

## 2017-12-11 PROCEDURE — 77030018836 HC SOL IRR NACL ICUM -A: Performed by: ORTHOPAEDIC SURGERY

## 2017-12-11 PROCEDURE — 74011250637 HC RX REV CODE- 250/637: Performed by: ORTHOPAEDIC SURGERY

## 2017-12-11 PROCEDURE — 74011250636 HC RX REV CODE- 250/636: Performed by: NURSE ANESTHETIST, CERTIFIED REGISTERED

## 2017-12-11 PROCEDURE — 77030003029 HC SUT VCRL J&J -B: Performed by: ORTHOPAEDIC SURGERY

## 2017-12-11 PROCEDURE — 99218 HC RM OBSERVATION: CPT

## 2017-12-11 PROCEDURE — 77030002922 HC SUT FBRWRE ARTH -B: Performed by: ORTHOPAEDIC SURGERY

## 2017-12-11 PROCEDURE — 77030008467 HC STPLR SKN COVD -B: Performed by: ORTHOPAEDIC SURGERY

## 2017-12-11 PROCEDURE — 77030020782 HC GWN BAIR PAWS FLX 3M -B: Performed by: ORTHOPAEDIC SURGERY

## 2017-12-11 PROCEDURE — 74011000250 HC RX REV CODE- 250: Performed by: ORTHOPAEDIC SURGERY

## 2017-12-11 PROCEDURE — 97161 PT EVAL LOW COMPLEX 20 MIN: CPT

## 2017-12-11 PROCEDURE — 76010000149 HC OR TIME 1 TO 1.5 HR: Performed by: ORTHOPAEDIC SURGERY

## 2017-12-11 PROCEDURE — 74011250637 HC RX REV CODE- 250/637: Performed by: ANESTHESIOLOGY

## 2017-12-11 PROCEDURE — 74011000272 HC RX REV CODE- 272: Performed by: ORTHOPAEDIC SURGERY

## 2017-12-11 PROCEDURE — 74011250636 HC RX REV CODE- 250/636: Performed by: PHYSICIAN ASSISTANT

## 2017-12-11 PROCEDURE — 77030031139 HC SUT VCRL2 J&J -A: Performed by: ORTHOPAEDIC SURGERY

## 2017-12-11 PROCEDURE — 77030012890: Performed by: ORTHOPAEDIC SURGERY

## 2017-12-11 PROCEDURE — 77030013708 HC HNDPC SUC IRR PULS STRY –B: Performed by: ORTHOPAEDIC SURGERY

## 2017-12-11 PROCEDURE — 76942 ECHO GUIDE FOR BIOPSY: CPT | Performed by: ANESTHESIOLOGY

## 2017-12-11 PROCEDURE — 77030003601 HC NDL NRV BLK BBMI -A: Performed by: ORTHOPAEDIC SURGERY

## 2017-12-11 PROCEDURE — 77030022295 HC SEAL BPLR VSL DISP MEDT -F: Performed by: ORTHOPAEDIC SURGERY

## 2017-12-11 PROCEDURE — 77030008683 HC TU ET CUF COVD -A: Performed by: NURSE ANESTHETIST, CERTIFIED REGISTERED

## 2017-12-11 PROCEDURE — 74011250636 HC RX REV CODE- 250/636

## 2017-12-11 PROCEDURE — 76210000017 HC OR PH I REC 1.5 TO 2 HR: Performed by: ORTHOPAEDIC SURGERY

## 2017-12-11 PROCEDURE — 77030026438 HC STYL ET INTUB CARD -A: Performed by: NURSE ANESTHETIST, CERTIFIED REGISTERED

## 2017-12-11 PROCEDURE — 77030032490 HC SLV COMPR SCD KNE COVD -B: Performed by: ORTHOPAEDIC SURGERY

## 2017-12-11 PROCEDURE — 74011250636 HC RX REV CODE- 250/636: Performed by: ANESTHESIOLOGY

## 2017-12-11 PROCEDURE — C9290 INJ, BUPIVACAINE LIPOSOME: HCPCS | Performed by: ORTHOPAEDIC SURGERY

## 2017-12-11 PROCEDURE — 77010033711 HC HIGH FLOW OXYGEN

## 2017-12-11 RX ORDER — ACETAMINOPHEN 500 MG
1000 TABLET ORAL ONCE
Status: COMPLETED | OUTPATIENT
Start: 2017-12-11 | End: 2017-12-11

## 2017-12-11 RX ORDER — CELECOXIB 100 MG/1
200 CAPSULE ORAL 2 TIMES DAILY
Status: DISCONTINUED | OUTPATIENT
Start: 2017-12-11 | End: 2017-12-12 | Stop reason: HOSPADM

## 2017-12-11 RX ORDER — SODIUM CHLORIDE 0.9 % (FLUSH) 0.9 %
5-10 SYRINGE (ML) INJECTION EVERY 8 HOURS
Status: DISCONTINUED | OUTPATIENT
Start: 2017-12-11 | End: 2017-12-12 | Stop reason: HOSPADM

## 2017-12-11 RX ORDER — ROPIVACAINE HYDROCHLORIDE 2 MG/ML
30 INJECTION, SOLUTION EPIDURAL; INFILTRATION; PERINEURAL ONCE
Status: COMPLETED | OUTPATIENT
Start: 2017-12-11 | End: 2017-12-11

## 2017-12-11 RX ORDER — CEFAZOLIN SODIUM 2 G/50ML
2 SOLUTION INTRAVENOUS
Status: COMPLETED | OUTPATIENT
Start: 2017-12-11 | End: 2017-12-11

## 2017-12-11 RX ORDER — PREGABALIN 75 MG/1
75 CAPSULE ORAL ONCE
Status: COMPLETED | OUTPATIENT
Start: 2017-12-11 | End: 2017-12-11

## 2017-12-11 RX ORDER — SODIUM CHLORIDE 9 MG/ML
INJECTION, SOLUTION INTRAVENOUS
Status: DISCONTINUED | OUTPATIENT
Start: 2017-12-11 | End: 2017-12-11 | Stop reason: HOSPADM

## 2017-12-11 RX ORDER — EPINEPHRINE 1 MG/ML
INJECTION, SOLUTION, CONCENTRATE INTRAVENOUS AS NEEDED
Status: DISCONTINUED | OUTPATIENT
Start: 2017-12-11 | End: 2017-12-11 | Stop reason: HOSPADM

## 2017-12-11 RX ORDER — ZOLPIDEM TARTRATE 5 MG/1
5 TABLET ORAL
Status: DISCONTINUED | OUTPATIENT
Start: 2017-12-11 | End: 2017-12-12 | Stop reason: HOSPADM

## 2017-12-11 RX ORDER — OXYCODONE AND ACETAMINOPHEN 7.5; 325 MG/1; MG/1
1-2 TABLET ORAL
Status: DISCONTINUED | OUTPATIENT
Start: 2017-12-11 | End: 2017-12-12 | Stop reason: HOSPADM

## 2017-12-11 RX ORDER — SODIUM CHLORIDE 9 MG/ML
100 INJECTION, SOLUTION INTRAVENOUS CONTINUOUS
Status: DISPENSED | OUTPATIENT
Start: 2017-12-11 | End: 2017-12-12

## 2017-12-11 RX ORDER — CELECOXIB 100 MG/1
200 CAPSULE ORAL 2 TIMES DAILY
Status: DISCONTINUED | OUTPATIENT
Start: 2017-12-11 | End: 2017-12-11 | Stop reason: SDUPTHER

## 2017-12-11 RX ORDER — NALOXONE HYDROCHLORIDE 0.4 MG/ML
0.4 INJECTION, SOLUTION INTRAMUSCULAR; INTRAVENOUS; SUBCUTANEOUS AS NEEDED
Status: DISCONTINUED | OUTPATIENT
Start: 2017-12-11 | End: 2017-12-12 | Stop reason: HOSPADM

## 2017-12-11 RX ORDER — ONDANSETRON 2 MG/ML
4 INJECTION INTRAMUSCULAR; INTRAVENOUS ONCE
Status: COMPLETED | OUTPATIENT
Start: 2017-12-11 | End: 2017-12-11

## 2017-12-11 RX ORDER — SUCCINYLCHOLINE CHLORIDE 20 MG/ML
INJECTION INTRAMUSCULAR; INTRAVENOUS AS NEEDED
Status: DISCONTINUED | OUTPATIENT
Start: 2017-12-11 | End: 2017-12-11 | Stop reason: HOSPADM

## 2017-12-11 RX ORDER — SODIUM CHLORIDE 0.9 % (FLUSH) 0.9 %
5-10 SYRINGE (ML) INJECTION AS NEEDED
Status: DISCONTINUED | OUTPATIENT
Start: 2017-12-11 | End: 2017-12-12 | Stop reason: HOSPADM

## 2017-12-11 RX ORDER — CELECOXIB 400 MG/1
400 CAPSULE ORAL ONCE
Status: COMPLETED | OUTPATIENT
Start: 2017-12-11 | End: 2017-12-11

## 2017-12-11 RX ORDER — ALBUTEROL SULFATE 90 UG/1
2 AEROSOL, METERED RESPIRATORY (INHALATION)
Status: DISCONTINUED | OUTPATIENT
Start: 2017-12-11 | End: 2017-12-11 | Stop reason: CLARIF

## 2017-12-11 RX ORDER — VENLAFAXINE HYDROCHLORIDE 37.5 MG/1
37.5 CAPSULE, EXTENDED RELEASE ORAL DAILY
Status: DISCONTINUED | OUTPATIENT
Start: 2017-12-12 | End: 2017-12-12 | Stop reason: HOSPADM

## 2017-12-11 RX ORDER — INSULIN LISPRO 100 [IU]/ML
INJECTION, SOLUTION INTRAVENOUS; SUBCUTANEOUS ONCE
Status: DISCONTINUED | OUTPATIENT
Start: 2017-12-11 | End: 2017-12-11 | Stop reason: HOSPADM

## 2017-12-11 RX ORDER — MIDAZOLAM HYDROCHLORIDE 1 MG/ML
2 INJECTION, SOLUTION INTRAMUSCULAR; INTRAVENOUS ONCE
Status: COMPLETED | OUTPATIENT
Start: 2017-12-11 | End: 2017-12-11

## 2017-12-11 RX ORDER — KETOROLAC TROMETHAMINE 30 MG/ML
INJECTION, SOLUTION INTRAMUSCULAR; INTRAVENOUS AS NEEDED
Status: DISCONTINUED | OUTPATIENT
Start: 2017-12-11 | End: 2017-12-11 | Stop reason: HOSPADM

## 2017-12-11 RX ORDER — BUPIVACAINE HYDROCHLORIDE 5 MG/ML
INJECTION, SOLUTION EPIDURAL; INTRACAUDAL AS NEEDED
Status: DISCONTINUED | OUTPATIENT
Start: 2017-12-11 | End: 2017-12-11 | Stop reason: HOSPADM

## 2017-12-11 RX ORDER — CEFAZOLIN SODIUM 2 G/50ML
2 SOLUTION INTRAVENOUS EVERY 8 HOURS
Status: COMPLETED | OUTPATIENT
Start: 2017-12-11 | End: 2017-12-12

## 2017-12-11 RX ORDER — FAMOTIDINE 20 MG/1
20 TABLET, FILM COATED ORAL ONCE
Status: COMPLETED | OUTPATIENT
Start: 2017-12-11 | End: 2017-12-11

## 2017-12-11 RX ORDER — ALPRAZOLAM 0.25 MG/1
0.25 TABLET ORAL
Status: DISCONTINUED | OUTPATIENT
Start: 2017-12-11 | End: 2017-12-12 | Stop reason: HOSPADM

## 2017-12-11 RX ORDER — DEXAMETHASONE SODIUM PHOSPHATE 4 MG/ML
INJECTION, SOLUTION INTRA-ARTICULAR; INTRALESIONAL; INTRAMUSCULAR; INTRAVENOUS; SOFT TISSUE AS NEEDED
Status: DISCONTINUED | OUTPATIENT
Start: 2017-12-11 | End: 2017-12-11 | Stop reason: HOSPADM

## 2017-12-11 RX ORDER — ROCURONIUM BROMIDE 10 MG/ML
INJECTION, SOLUTION INTRAVENOUS AS NEEDED
Status: DISCONTINUED | OUTPATIENT
Start: 2017-12-11 | End: 2017-12-11 | Stop reason: HOSPADM

## 2017-12-11 RX ORDER — HYDROMORPHONE HYDROCHLORIDE 1 MG/ML
0.5 INJECTION, SOLUTION INTRAMUSCULAR; INTRAVENOUS; SUBCUTANEOUS
Status: DISCONTINUED | OUTPATIENT
Start: 2017-12-11 | End: 2017-12-11 | Stop reason: HOSPADM

## 2017-12-11 RX ORDER — SODIUM CHLORIDE, SODIUM LACTATE, POTASSIUM CHLORIDE, CALCIUM CHLORIDE 600; 310; 30; 20 MG/100ML; MG/100ML; MG/100ML; MG/100ML
75 INJECTION, SOLUTION INTRAVENOUS CONTINUOUS
Status: DISCONTINUED | OUTPATIENT
Start: 2017-12-11 | End: 2017-12-11 | Stop reason: HOSPADM

## 2017-12-11 RX ORDER — FENTANYL CITRATE 50 UG/ML
INJECTION, SOLUTION INTRAMUSCULAR; INTRAVENOUS AS NEEDED
Status: DISCONTINUED | OUTPATIENT
Start: 2017-12-11 | End: 2017-12-11 | Stop reason: HOSPADM

## 2017-12-11 RX ORDER — LIDOCAINE HYDROCHLORIDE 10 MG/ML
2 INJECTION INFILTRATION; PERINEURAL ONCE
Status: DISCONTINUED | OUTPATIENT
Start: 2017-12-11 | End: 2017-12-11 | Stop reason: HOSPADM

## 2017-12-11 RX ORDER — LANOLIN ALCOHOL/MO/W.PET/CERES
1 CREAM (GRAM) TOPICAL 2 TIMES DAILY WITH MEALS
Status: DISCONTINUED | OUTPATIENT
Start: 2017-12-11 | End: 2017-12-12 | Stop reason: HOSPADM

## 2017-12-11 RX ORDER — PROPOFOL 10 MG/ML
INJECTION, EMULSION INTRAVENOUS AS NEEDED
Status: DISCONTINUED | OUTPATIENT
Start: 2017-12-11 | End: 2017-12-11 | Stop reason: HOSPADM

## 2017-12-11 RX ORDER — MIDAZOLAM HYDROCHLORIDE 1 MG/ML
INJECTION, SOLUTION INTRAMUSCULAR; INTRAVENOUS AS NEEDED
Status: DISCONTINUED | OUTPATIENT
Start: 2017-12-11 | End: 2017-12-11 | Stop reason: HOSPADM

## 2017-12-11 RX ORDER — GLYCOPYRROLATE 0.2 MG/ML
INJECTION INTRAMUSCULAR; INTRAVENOUS AS NEEDED
Status: DISCONTINUED | OUTPATIENT
Start: 2017-12-11 | End: 2017-12-11 | Stop reason: HOSPADM

## 2017-12-11 RX ORDER — PREGABALIN 50 MG/1
50 CAPSULE ORAL 2 TIMES DAILY
Status: DISCONTINUED | OUTPATIENT
Start: 2017-12-11 | End: 2017-12-12 | Stop reason: HOSPADM

## 2017-12-11 RX ORDER — ONDANSETRON 4 MG/1
4 TABLET, FILM COATED ORAL
Status: DISCONTINUED | OUTPATIENT
Start: 2017-12-11 | End: 2017-12-12 | Stop reason: HOSPADM

## 2017-12-11 RX ORDER — ONDANSETRON 2 MG/ML
4 INJECTION INTRAMUSCULAR; INTRAVENOUS
Status: DISCONTINUED | OUTPATIENT
Start: 2017-12-11 | End: 2017-12-12 | Stop reason: HOSPADM

## 2017-12-11 RX ORDER — SODIUM CHLORIDE 0.9 % (FLUSH) 0.9 %
5-10 SYRINGE (ML) INJECTION AS NEEDED
Status: DISCONTINUED | OUTPATIENT
Start: 2017-12-11 | End: 2017-12-11 | Stop reason: HOSPADM

## 2017-12-11 RX ORDER — LIDOCAINE HYDROCHLORIDE 20 MG/ML
INJECTION, SOLUTION EPIDURAL; INFILTRATION; INTRACAUDAL; PERINEURAL AS NEEDED
Status: DISCONTINUED | OUTPATIENT
Start: 2017-12-11 | End: 2017-12-11 | Stop reason: HOSPADM

## 2017-12-11 RX ORDER — SODIUM CHLORIDE 0.9 % (FLUSH) 0.9 %
5-10 SYRINGE (ML) INJECTION EVERY 8 HOURS
Status: DISCONTINUED | OUTPATIENT
Start: 2017-12-11 | End: 2017-12-11 | Stop reason: HOSPADM

## 2017-12-11 RX ORDER — DOCUSATE SODIUM 100 MG/1
100 CAPSULE, LIQUID FILLED ORAL 2 TIMES DAILY
Status: DISCONTINUED | OUTPATIENT
Start: 2017-12-11 | End: 2017-12-12 | Stop reason: HOSPADM

## 2017-12-11 RX ORDER — ONDANSETRON 2 MG/ML
INJECTION INTRAMUSCULAR; INTRAVENOUS AS NEEDED
Status: DISCONTINUED | OUTPATIENT
Start: 2017-12-11 | End: 2017-12-11 | Stop reason: HOSPADM

## 2017-12-11 RX ORDER — FENTANYL CITRATE 50 UG/ML
50 INJECTION, SOLUTION INTRAMUSCULAR; INTRAVENOUS
Status: COMPLETED | OUTPATIENT
Start: 2017-12-11 | End: 2017-12-11

## 2017-12-11 RX ORDER — DIPHENHYDRAMINE HYDROCHLORIDE 50 MG/ML
12.5 INJECTION, SOLUTION INTRAMUSCULAR; INTRAVENOUS
Status: DISCONTINUED | OUTPATIENT
Start: 2017-12-11 | End: 2017-12-12 | Stop reason: HOSPADM

## 2017-12-11 RX ORDER — FENTANYL CITRATE 50 UG/ML
50 INJECTION, SOLUTION INTRAMUSCULAR; INTRAVENOUS
Status: DISCONTINUED | OUTPATIENT
Start: 2017-12-11 | End: 2017-12-11 | Stop reason: HOSPADM

## 2017-12-11 RX ORDER — ALBUTEROL SULFATE 0.83 MG/ML
2.5 SOLUTION RESPIRATORY (INHALATION)
Status: DISCONTINUED | OUTPATIENT
Start: 2017-12-11 | End: 2017-12-12 | Stop reason: HOSPADM

## 2017-12-11 RX ADMIN — CELECOXIB 200 MG: 100 CAPSULE ORAL at 18:06

## 2017-12-11 RX ADMIN — FENTANYL CITRATE 100 MCG: 50 INJECTION INTRAMUSCULAR; INTRAVENOUS at 13:20

## 2017-12-11 RX ADMIN — ONDANSETRON 4 MG: 2 INJECTION INTRAMUSCULAR; INTRAVENOUS at 14:19

## 2017-12-11 RX ADMIN — CELECOXIB 400 MG: 400 CAPSULE ORAL at 12:49

## 2017-12-11 RX ADMIN — LIDOCAINE HYDROCHLORIDE 60 MG: 20 INJECTION, SOLUTION EPIDURAL; INFILTRATION; INTRACAUDAL; PERINEURAL at 13:52

## 2017-12-11 RX ADMIN — PREGABALIN 75 MG: 75 CAPSULE ORAL at 12:49

## 2017-12-11 RX ADMIN — FAMOTIDINE 20 MG: 20 TABLET, FILM COATED ORAL at 12:49

## 2017-12-11 RX ADMIN — ROPIVACAINE HYDROCHLORIDE 60 MG: 2 INJECTION, SOLUTION EPIDURAL; INFILTRATION at 13:20

## 2017-12-11 RX ADMIN — ONDANSETRON 4 MG: 2 INJECTION INTRAMUSCULAR; INTRAVENOUS at 15:15

## 2017-12-11 RX ADMIN — ROCURONIUM BROMIDE 5 MG: 10 INJECTION, SOLUTION INTRAVENOUS at 13:52

## 2017-12-11 RX ADMIN — MIDAZOLAM HYDROCHLORIDE 2 MG: 1 INJECTION, SOLUTION INTRAMUSCULAR; INTRAVENOUS at 13:20

## 2017-12-11 RX ADMIN — SODIUM CHLORIDE 100 ML/HR: 900 INJECTION, SOLUTION INTRAVENOUS at 17:00

## 2017-12-11 RX ADMIN — DEXAMETHASONE SODIUM PHOSPHATE 8 MG: 4 INJECTION, SOLUTION INTRA-ARTICULAR; INTRALESIONAL; INTRAMUSCULAR; INTRAVENOUS; SOFT TISSUE at 13:58

## 2017-12-11 RX ADMIN — CEFAZOLIN SODIUM 2 G: 2 SOLUTION INTRAVENOUS at 13:48

## 2017-12-11 RX ADMIN — FENTANYL CITRATE 50 MCG: 50 INJECTION INTRAMUSCULAR; INTRAVENOUS at 15:24

## 2017-12-11 RX ADMIN — OXYCODONE HYDROCHLORIDE AND ACETAMINOPHEN 2 TABLET: 7.5; 325 TABLET ORAL at 18:07

## 2017-12-11 RX ADMIN — MIDAZOLAM HYDROCHLORIDE 2 MG: 1 INJECTION, SOLUTION INTRAMUSCULAR; INTRAVENOUS at 13:48

## 2017-12-11 RX ADMIN — FENTANYL CITRATE 50 MCG: 50 INJECTION INTRAMUSCULAR; INTRAVENOUS at 15:14

## 2017-12-11 RX ADMIN — HYDROMORPHONE HYDROCHLORIDE 0.5 MG: 1 INJECTION, SOLUTION INTRAMUSCULAR; INTRAVENOUS; SUBCUTANEOUS at 15:35

## 2017-12-11 RX ADMIN — FENTANYL CITRATE 100 MCG: 50 INJECTION, SOLUTION INTRAMUSCULAR; INTRAVENOUS at 13:52

## 2017-12-11 RX ADMIN — ACETAMINOPHEN 1000 MG: 500 TABLET ORAL at 12:49

## 2017-12-11 RX ADMIN — PROPOFOL 150 MG: 10 INJECTION, EMULSION INTRAVENOUS at 13:53

## 2017-12-11 RX ADMIN — HYDROMORPHONE HYDROCHLORIDE 0.5 MG: 1 INJECTION, SOLUTION INTRAMUSCULAR; INTRAVENOUS; SUBCUTANEOUS at 16:02

## 2017-12-11 RX ADMIN — FERROUS SULFATE TAB 325 MG (65 MG ELEMENTAL FE) 325 MG: 325 (65 FE) TAB at 18:07

## 2017-12-11 RX ADMIN — CEFAZOLIN SODIUM 2 G: 2 SOLUTION INTRAVENOUS at 20:35

## 2017-12-11 RX ADMIN — PREGABALIN 50 MG: 50 CAPSULE ORAL at 18:07

## 2017-12-11 RX ADMIN — DOCUSATE SODIUM 100 MG: 100 CAPSULE, LIQUID FILLED ORAL at 18:07

## 2017-12-11 RX ADMIN — Medication 10 ML: at 20:36

## 2017-12-11 RX ADMIN — SODIUM CHLORIDE, SODIUM LACTATE, POTASSIUM CHLORIDE, AND CALCIUM CHLORIDE: 600; 310; 30; 20 INJECTION, SOLUTION INTRAVENOUS at 13:48

## 2017-12-11 RX ADMIN — Medication 10 ML: at 17:00

## 2017-12-11 RX ADMIN — GLYCOPYRROLATE 0.2 MG: 0.2 INJECTION INTRAMUSCULAR; INTRAVENOUS at 13:59

## 2017-12-11 RX ADMIN — SUCCINYLCHOLINE CHLORIDE 100 MG: 20 INJECTION INTRAMUSCULAR; INTRAVENOUS at 13:53

## 2017-12-11 NOTE — IP AVS SNAPSHOT
[address] [facility], [place], operated by [facility] 
 
 
 [address] [facility] [address] [facility] Patient: Krupa Babin MRN: [id number] About your hospitalization You were admitted on:  December 11, 2017 You last received care in the:  [facility] [address] [facility] You were discharged on:  December 12, 2017 Why you were hospitalized Your primary diagnosis was:  Not on File Your diagnoses also included:  Quadriceps Muscle Rupture Things You Need To Do (next 8 weeks) Tuesday Dec 19, 2017 Office Visit with ARTHUR Kelley at 11:00 AM  
Where:  [facility] ([facility]) Follow up with Chidi Edward MD  
Appointment at 11:00 am  
  
Phone:  [phone] Where:  [address] [facility], [facility], [address] [facility] Thursday Dec 28, 2017 POST OP with Lexx Agustin PA-C at  8:45 AM  
Where:  [address] ([facility]) Discharge Orders None A check angel indicates which time of day the medication should be taken. My Medications STOP taking these medications HYDROcodone-acetaminophen 7.5-325 mg per tablet Commonly known as:  [address] these medications as instructed Instructions Each Dose to Equal  
 Morning Noon Evening Bedtime  
 albuterol 90 mcg/actuation inhaler Commonly known as:  PROVENTIL HFA, VENTOLIN HFA, PROAIR HFA Your last dose was: Your next dose is: Take 2 Puffs by inhalation every four (4) hours as needed for Wheezing. 2 Puff ALPRAZolam 0.25 mg tablet Commonly known as:  Carletha Puller Your last dose was: Your next dose is: Take 1 Tab by mouth three (3) times daily as needed. Max Daily Amount: 0.75 mg. PRN sleep  
 0.25 mg  
    
   
   
   
  
 celecoxib 200 mg capsule Commonly known as:  CELEBREX Your last dose was: Your next dose is: Take 1 Cap by mouth two (2) times a day for 90 days. 200 mg  
    
   
   
   
  
 estradiol 1 mg tablet Commonly known as:  ESTRACE Your last dose was: Your next dose is: Take 1 mg by mouth every seven (7) days. Indications: Patient takes half tab daily 1 mg  
    
   
   
   
  
 ferrous sulfate 325 mg (65 mg iron) tablet Your last dose was: Your next dose is: Take 1 Tab by mouth three (3) times daily (with meals). 325 mg  
    
   
   
   
  
 ondansetron hcl 4 mg tablet Commonly known as:  ZOFRAN (AS HYDROCHLORIDE) Your last dose was: Your next dose is: Take 1 Tab by mouth every eight (8) hours as needed for Nausea. 4 mg * oxyCODONE-acetaminophen 7.5-325 mg per tablet Commonly known as:  PERCOCET 7.5 Your last dose was: Your next dose is: Take 1-2 Tabs by mouth every four (4) hours as needed. Max Daily Amount: 12 Tabs. 1-2 Tab * oxyCODONE-acetaminophen 7.5-325 mg per tablet Commonly known as:  PERCOCET 7.5 Your last dose was: Your next dose is: Take 1-2 Tabs by mouth every four (4) hours as needed. Max Daily Amount: 12 Tabs. Indications: Pain 1-2 Tab  
    
   
   
   
  
 venlafaxine-SR 75 mg capsule Commonly known as:  EFFEXOR-XR Your last dose was: Your next dose is: Take 37.5 mg by mouth daily. 37.5 mg  
    
   
   
   
  
 zolpidem 5 mg tablet Commonly known as:  AMBIEN Your last dose was: Your next dose is: Take 1 Tab by mouth nightly as needed for Sleep.  
 5 mg * Notice: This list has 2 medication(s) that are the same as other medications prescribed for you. Read the directions carefully, and ask your doctor or other care provider to review them with you. Where to Get Your Medications Information on where to get these meds will be given to you by the nurse or doctor. ! Ask your nurse or doctor about these medications  
  ferrous sulfate 325 mg (65 mg iron) tablet  
 oxyCODONE-acetaminophen 7.5-325 mg per tablet Discharge Instructions Patient armband removed and shredded DISCHARGE SUMMARY from Nurse PATIENT INSTRUCTIONS: 
 
 
F-face looks uneven A-arms unable to move or move unevenly S-speech slurred or non-existent T-time-call 911 as soon as signs and symptoms begin-DO NOT go Back to bed or wait to see if you get better-TIME IS BRAIN. Warning Signs of HEART ATTACK Call 911 if you have these symptoms: 
? Chest discomfort. Most heart attacks involve discomfort in the center of the chest that lasts more than a few minutes, or that goes away and comes back. It can feel like uncomfortable pressure, squeezing, fullness, or pain. ? Discomfort in other areas of the upper body. Symptoms can include pain or discomfort in one or both arms, the back, neck, jaw, or stomach. ? Shortness of breath with or without chest discomfort. ? Other signs may include breaking out in a cold sweat, nausea, or lightheadedness. Don't wait more than five minutes to call [address]! Fast action can save your life. Calling 911 is almost always the fastest way to get lifesaving treatment. Emergency Medical Services staff can begin treatment when they arrive  up to an hour sooner than if someone gets to the hospital by car. The discharge information has been reviewed with the patient. The patient verbalized understanding. Discharge medications reviewed with the patient and appropriate educational materials and side effects teaching were provided. ___________________________________________________________________________________________________________________________________ Introducing [facility]! Dear Jeffery Frausto: Thank you for requesting a Mapbox account. Our records indicate that you already have an active Mapbox account. You can access your account anytime at [email or web address] Did you know that you can access your hospital and ER discharge instructions at any time in Mapbox? You can also review all of your test results from your hospital stay or ER visit. Additional Information If you have questions, please visit the Frequently Asked Questions section of the Mapbox website at [email or web address] Remember, Mapbox is NOT to be used for urgent needs. For medical emergencies, dial 911. Now available from your iPhone and Android! Providers Seen During Your Hospitalization Provider Specialty Primary office phone [facility] [place] [facility] [phone] Your Primary Care Physician (PCP) Primary Care Physician Office Phone Office Fax Pamela Angelo [phone] You are allergic to the following Allergen Reactions Other Plant, Animal, Environmental Anaphylaxis  
 poison ivy Recent Documentation Height Weight BMI OB Status Smoking Status 1.702 m 78.9 kg 27.25 kg/m2 Hysterectomy Never Smoker Emergency Contacts Name Discharge Info Relation Home Work Mobile [address]  CAREGIVER [3] Spouse [3] [phone] Patient Belongings The following personal items are in your possession at time of discharge: Dental Appliances: None  Visual Aid: Glasses          Jewelry: None  Clothing: Shirt, Socks, Sweater, Footwear    Other Valuables: None Discharge Instructions Attachments/References IRON SUPPLEMENTS (BY MOUTH) (ENGLISH) OXYCODONE/ACETAMINOPHEN (BY MOUTH) (ENGLISH) Patient Handouts Iron Supplements (By mouth) Treats low blood iron or anemia by helping your body make red blood cells. Brand Name(s): Beef/Iron/Wine, Bifera, BiferaRx, Corvite FE, Duofer, EZFE 200, Enfamil Saul-In-Sol, [facility] Iron Tablets, Fe-20, Femcon Fe, Femiron, Feosol, Saul-Iron, Sarah haute, [place] There may be other brand names for this medicine. When This Medicine Should Not Be Used: You should not use this medicine if you have had an allergic reaction to iron supplements, or if you have a condition called hemachromatosis (iron overload disease) or hemosiderosis (iron in the lungs), or any type of anemia that is not caused by iron deficiency. How to Use This Medicine:  
Liquid Filled Capsule, Coated Tablet, Tablet, Capsule, Chewable Tablet, Liquid, Long Acting Capsule, Long Acting Tablet · Your doctor will tell you how much of this medicine to take and how often. Do not take more medicine or take it more often than your doctor tells you to. Carefully follow your doctor's instructions about any special diet. · It is best to take this medicine on an empty stomach, 1 hour before or 2 hours after a meal. Take the medicine with a full glass or water or fruit juice. If the medicine upsets your stomach, you may take it with food. · The chewable tablet must be chewed or crushed before you swallow it. · Measure the oral liquid medicine with a marked measuring spoon or medicine cup. · The oral liquid may stain your teeth. These stains can be prevented by mixing the medicine with water or other liquids (such as fruit juice, tomato juice), and drinking the medicine with a straw.  To remove any iron stains, brush your teeth with baking soda or peroxide. If a dose is missed: · If you miss a dose or forget to take your medicine, take it as soon as you can. If it is almost time for your next dose, wait until then to take the medicine and skip the missed dose. · Do not use extra medicine to make up for a missed dose. How to Store and Dispose of This Medicine: · Store the medicine at room temperature, away from heat, moisture, and direct light. · Keep all medicine away from children, and never share your medicine with anyone. Drugs and Foods to Avoid: Ask your doctor or pharmacist before using any other medicine, including over-the-counter medicines, vitamins, and herbal products. · Do not take iron supplements by mouth if you are also receiving iron injections. · Make sure your doctor knows if you are also using phenytoin (Dilantin®), acetohydroxamic acid (Lithostat®), or antibiotics such as demeclocycline, doxycycline (Vibramycin®), Cipro®, Levaquin®, minocycline, moxifloxacin (Avelox®), Tequin®, or tetracycline. · Tell your doctor if you are using antacids (such as Maalox® or Mylanta®). · Avoid the following foods, or eat them in small amounts at least 1 hour before or 2 hours after taking your iron: eggs, milk, cheese, yogurt, tea or coffee, whole-grain cereals, and breads. Warnings While Using This Medicine: · Make sure your doctor knows if you are pregnant or breastfeeding, or if you have stomach or intestinal problems, an active infection, diabetes, porphyria, or other medical problems. · Make sure any doctor or dentist who treats you knows that you are using this medicine. Iron may affect the results of certain medical tests. · Iron can cause your stools to be darker in color. This is normal and is not a cause for concern. Possible Side Effects While Using This Medicine:  
Call your doctor right away if you notice any of these side effects: · Bloody diarrhea · Bluish-colored lips, hands, or fingernails · Chest pain · Fever · Pale or clammy skin · Severe or continuing stomach cramps, vomiting (with or without blood) · Shallow breathing, weakness, weak but fast heartbeat If you notice these less serious side effects, talk with your doctor: · Constipation, diarrhea, nausea · Dark-colored urine · Leg cramps If you notice other side effects that you think are caused by this medicine, tell your doctor. Call your doctor for medical advice about side effects. You may report side effects to FDA at 6-396-FWC-7263 © 2017 [facility] Information is for End User's use only and may not be sold, redistributed or otherwise used for commercial purposes. The above information is an  only. It is not intended as medical advice for individual conditions or treatments. Talk to your doctor, nurse or pharmacist before following any medical regimen to see if it is safe and effective for you. Oxycodone/Acetaminophen (By mouth) Acetaminophen (q-socp-u-MIN-oh-fen), Oxycodone Hydrochloride (ho-u-KZH-done edgar-droe-KLOR-sam) Treats moderate to moderately severe pain. This medicine is a narcotic pain reliever. Brand Name(s): Endocet, Percocet, Primlev, Xartemis XR There may be other brand names for this medicine. When This Medicine Should Not Be Used: This medicine is not right for everyone. Do not use it if you had an allergic reaction to acetaminophen or oxycodone, or if you have serious breathing problems or paralytic ileus. How to Use This Medicine:  
Capsule, Liquid, Tablet, Long Acting Tablet · Your doctor will tell you how much medicine to use. Do not use more than directed. · An overdose can be dangerous. Follow directions carefully so you do not get too much medicine at one time. · Oral liquid: Measure the oral liquid medicine with a marked measuring spoon, oral syringe, or medicine cup. · Swallow the extended-release tablet whole. Do not crush, break, or chew it. Do not lick or wet the tablet before placing it in your mouth. Do not give this medicine through a feeding tube. · This medicine should come with a Medication Guide. Ask your pharmacist for a copy if you do not have one. · Missed dose: If you miss a dose of this medicine, skip the missed dose and go back to your regular dosing schedule. Do not double doses. · Store the medicine in a closed container at room temperature, away from heat, moisture, and direct light. Ask your pharmacist about the best way to dispose of medicine you do not use. Drugs and Foods to Avoid: Ask your doctor or pharmacist before using any other medicine, including over-the-counter medicines, vitamins, and herbal products. · Do not use Xartemis XR if you are using or have used an MAO inhibitor in the past 14 days. · Some medicines can affect how this medicine works. Tell your doctor if you are using any of the following: ¨ Carbamazepine, erythromycin, ketoconazole, lamotrigine, mirtazapine, naltrexone, phenytoin, propranolol, rifampin, ritonavir, tramadol, trazodone, or zidovudine ¨ Birth control pills ¨ Diuretic (water pill) ¨ Medicine to treat depression ¨ Phenothiazine medicine ¨ Triptan medicine to treat migraine headaches · Do not drink alcohol while you are using this medicine. Acetaminophen can damage your liver, and alcohol can increase this risk. Do not take acetaminophen without asking your doctor if you have 3 or more drinks of alcohol every day. · Tell your doctor if you use anything else that makes you sleepy. Some examples are allergy medicine, narcotic pain medicine, and alcohol. Tell your doctor if you are using buprenorphine, butorphanol, nalbuphine, pentazocine, a benzodiazepine, or a muscle relaxer. Warnings While Using This Medicine: · Tell your doctor if you are pregnant or breastfeeding, or if you have kidney disease, liver disease, heart disease, low blood pressure, breathing problems or lung disease (such as asthma, COPD), thyroid problems, Bartow disease, pancreas or gallbladder problems, prostate problems, trouble urinating, or a stomach problems, or a history of head injury or brain damage, seizures, or alcohol or drug abuse. Tell your doctor if you are allergic to codeine. · This medicine may cause the following problems: 
¨ High risk of overdose, which can lead to death ¨ Respiratory depression (serious breathing problem that can be life-threatening) ¨ Liver problems ¨ Serious skin reactions ¨ Serotonin syndrome (when used with certain medicines) · This medicine may make you dizzy or drowsy. Do not drive or do anything that could be dangerous until you know how this medicine affects you. Sit or lie down if you feel dizzy. Stand up carefully. · This medicine contains acetaminophen. Read the labels of all other medicines you are using to see if they also contain acetaminophen, or ask your doctor or pharmacist. Sanjana Aidan not use more than 4 grams (4,000 milligrams) total of acetaminophen in one day. · This medicine can be habit-forming. Do not use more than your prescribed dose. Call your doctor if you think your medicine is not working. · Do not stop using this medicine suddenly. Your doctor will need to slowly decrease your dose before you stop it completely. · This medicine could cause infertility. Talk with your doctor before using this medicine if you plan to have children. · This medicine may cause constipation, especially with long-term use. Ask your doctor if you should use a laxative to prevent and treat constipation. · Keep all medicine out of the reach of children. Never share your medicine with anyone. Possible Side Effects While Using This Medicine:  
Call your doctor right away if you notice any of these side effects: · Allergic reaction: Itching or hives, swelling in your face or hands, swelling or tingling in your mouth or throat, chest tightness, trouble breathing · Anxiety, restlessness, fast heartbeat, fever, muscle spasms, twitching, diarrhea, seeing or hearing things that are not there · Blistering, peeling, red skin rash · Blue lips, fingernails, or skin · Dark urine or pale stools, loss of appetite, stomach pain, yellow skin or eyes · Extreme weakness, shallow breathing, uneven heartbeat, seizures, sweating, or cold or clammy skin · Severe confusion, lightheadedness, dizziness, or fainting · Severe constipation, nausea, or vomiting · Trouble breathing or slow breathing If you notice these less serious side effects, talk with your doctor:  
· Headache · Mild constipation, nausea, or vomiting · Mild sleepiness or drowsiness If you notice other side effects that you think are caused by this medicine, tell your doctor. Call your doctor for medical advice about side effects. You may report side effects to FDA at 9-875-FDA-4300 © 2017 [address] Information is for End User's use only and may not be sold, redistributed or otherwise used for commercial purposes. The above information is an  only. It is not intended as medical advice for individual conditions or treatments. Talk to your doctor, nurse or pharmacist before following any medical regimen to see if it is safe and effective for you. Please provide this summary of care documentation to your next provider. Signatures-by signing, you are acknowledging that this After Visit Summary has been reviewed with you and you have received a copy. Patient Signature:  ____________________________________________________________ Date:  ____________________________________________________________  
  
Josie Lee Provider Signature:  ____________________________________________________________ Date:  ____________________________________________________________

## 2017-12-11 NOTE — PROGRESS NOTES
Problem: Mobility Impaired (Adult and Pediatric)  Goal: *Acute Goals and Plan of Care (Insert Text)  Physical Therapy Goals  Initiated 12/11/2017 and to be accomplished within 7 day(s)  1. Patient will move from supine to sit and sit to supine , scoot up and down and roll side to side in bed with supervision/set-up. 2.  Patient will transfer from bed to chair and chair to bed with supervision/set-up using the least restrictive device. 3.  Patient will perform sit to stand with supervision/set-up. 4.  Patient will ambulate with supervision/set-up for >150 feet with the least restrictive device. 5.  Patient will ascend/descend 4 stairs with 1 handrail(s) with minimal assistance/contact guard assist.  physical Therapy EVALUATION    Patient: Nancye Rubinstein (40 y.o. female)  Date: 12/11/2017  Primary Diagnosis: Tear of tendon of left lower extremity, initial encounter [S86.912A]  Procedure(s) (LRB):  LEFT QUADRICEP TENDON REPAIR (Left) Day of Surgery   Precautions: WBAT, knee immobilizer       ASSESSMENT :  Patient is 60yo F admitted to hospital for L quad tendon repair and is WBAT on LLE with knee immobilizer in place and underwent TKA 4 weeks ago. Patient presents today alert and agreeable to therapy and was supine in bed upon arrival; patient reports she had just ambulated to bathroom with nursing. Patient had knee immobilizer donned in bed and fit adjusted as patient reports brace extending too high onto thigh. Patient then transferred to EOB for RLE objective assessment; did not test LLE which was immobilized. Patient then stood and ambulated 40ft in room with CG and transferred back to supine in bed. Pt c/o brace on LLE rubbing R inner thigh and therapist placed ACE bandaged loosely wrapped around exterior of brace to cover velcro strips that were causing skin irritation. Patient was left resting with call bell by her side and instructions to call for assistance if she needed to get up for any reason. Patient demonstrates decreased strength, mobility, and endurance and will benefit from skilled intervention to address the above impairments. Patients rehabilitation potential is considered to be Good  Factors which may influence rehabilitation potential include:   []         None noted  [x]         Mental ability/status  [x]         Medical condition  [x]         Home/family situation and support systems  [x]         Safety awareness  [x]         Pain tolerance/management  []         Other:      PLAN :  Recommendations and Planned Interventions:  [x]           Bed Mobility Training             [x]    Neuromuscular Re-Education  [x]           Transfer Training                   []    Orthotic/Prosthetic Training  [x]           Gait Training                          []    Modalities  [x]           Therapeutic Exercises          []    Edema Management/Control  [x]           Therapeutic Activities            [x]    Patient and Family Training/Education  []           Other (comment):    Frequency/Duration: Patient will be followed by physical therapy 1-2 times per day/4-7 days per week to address goals. Discharge Recommendations: Home Health  Further Equipment Recommendations for Discharge: rolling walker     G-CODES     Mobility  Current  CI= 1-19%   Goal  CI= 1-19%. The severity rating is based on the Level of Assistance required for Functional Mobility and ADLs.        G-CODES     Eval Complexity: History: MEDIUM  Complexity : 1-2 comorbidities / personal factors will impact the outcome/ POC Exam:LOW Complexity : 1-2 Standardized tests and measures addressing body structure, function, activity limitation and / or participation in recreation  Presentation: LOW Complexity : Stable, uncomplicated  Clinical Decision Making:Low Complexity   Overall Complexity:LOW     SUBJECTIVE:   Patient stated I was just up to the bathroom.     OBJECTIVE DATA SUMMARY:     Past Medical History:   Diagnosis Date    Allergic rhinitis     Nausea & vomiting     Osteoarthritis of hip     Spinal stenosis      Past Surgical History:   Procedure Laterality Date    ENDOSCOPY, COLON, DIAGNOSTIC  03-18-11    normal colon to cecum    HX BREAST REDUCTION      HX HEENT      skin cancer between eyes    HX HYSTERECTOMY      HX KNEE REPLACEMENT      HX LAP CHOLECYSTECTOMY  11/2015    HX TONSIL AND ADENOIDECTOMY      HX TUBAL LIGATION       Prior Level of Function/Home Situation: Patient lives with  in 2 story home with 2STE and FFSU. Patient reports she was ambulating with RW and was requiring increasing assistance   Home Situation  Home Environment: Other (comment) (condo )  # Steps to Enter: 2  One/Two Story Residence: One story  Living Alone: No  Support Systems: Child(rome), Family member(s)  Patient Expects to be Discharged to[de-identified] Private residence  Current DME Used/Available at Home: Cane, straight  Critical Behavior:    A&Ox4  Strength:    Strength: Within functional limits (RLE; DNT LLE-in immobilizer)   Tone & Sensation:   Tone: Normal (BLE)   Sensation: Intact (BLE to LT)   Range Of Motion:  AROM: Within functional limits (RLE; DNT LLE-in immobilizer)   Functional Mobility:  Bed Mobility:   Supine to Sit: Modified independent  Sit to Supine: Modified independent  Scooting: Modified independent  Transfers:  Sit to Stand: Contact guard assistance  Stand to Sit: Supervision      Balance:   Sitting: Intact  Standing: Impaired; With support  Standing - Static: Good  Standing - Dynamic : Fair  Ambulation/Gait Training:  Distance (ft): 40 Feet (ft)  Assistive Device: Walker, rolling  Ambulation - Level of Assistance: Contact guard assistance   Gait Abnormalities: Antalgic   Left Side Weight Bearing: As tolerated   Interventions: Verbal cues; Visual/Demos   Pain:  Pt reports 3/10 pain or discomfort prior to treatment.    Pt reports 5/10 pain or discomfort post treatment.      Activity Tolerance:   Patient tolerated activity well and denied dizziness, chest pain, or SOB. Please refer to the flowsheet for vital signs taken during this treatment. After treatment:   []         Patient left in no apparent distress sitting up in chair  [x]         Patient left in no apparent distress in bed  [x]         Call bell left within reach  []         Nursing notified  []         Caregiver present  []         Bed alarm activated    COMMUNICATION/EDUCATION:   [x]         Fall prevention education was provided and the patient/caregiver indicated understanding. [x]         Patient/family have participated as able in goal setting and plan of care. [x]         Patient/family agree to work toward stated goals and plan of care. []         Patient understands intent and goals of therapy, but is neutral about his/her participation. []         Patient is unable to participate in goal setting and plan of care.     Thank you for this referral.  Mahi Connolly, PT   Time Calculation: 18 mins

## 2017-12-11 NOTE — PROGRESS NOTES
26 Pt arrived on floor family at bedside. Pt A&O4. Currently rating pain 4/10. No signs of distress. HOB elevated. Required documentation completed. Incentive spirometer give pt was educated on how to use device and how often to use. Pt assisted in ambulating to bathroom. SCDs and polar ice machine applied. Call bell within reach. Will continue to monitor pt.

## 2017-12-11 NOTE — INTERVAL H&P NOTE
H&P Update:  Preston Okeefe was seen and examined. History and physical has been reviewed. The patient has been examined.  There have been no significant clinical changes since the completion of the originally dated History and Physical.    Signed By: Pino Lew MD     December 11, 2017 1:37 PM

## 2017-12-11 NOTE — H&P (VIEW-ONLY)
[address] [facility], [place], [address] [facility]  [phone]           HISTORY & PHYSICAL      Patient: Monique French                MRN: [id number]       SSN: [id number]  YOB: 1960        AGE: 61 y.o. SEX: female  Body mass index is 27.28 kg/(m^2). PCP: Mendoza Gardiner MD  12/08/17      CC: left knee, medial retinacular tear, sp TKA  Problem List Items Addressed This Visit     None      Visit Diagnoses     Tear of tendon of left lower extremity, initial encounter    -  Primary            HPI:  The patient is a pleasant 61 y.o. whom had a TKA of their Left knee and has developed a rent/tear to the medial retinaculum causing some pain and patellar tilt. Due to the current findings and affected activities of daily living, surgical intervention is indicated. The alternatives, risks, complications, as well as expected outcome were discussed. These include but are not limited to infection, blood loss, need for blood transfusion, neurovascular damage, DVT, PE,  post-op stiffness and pain, leg length discrepancy, dislocation, anesthetic complications, prothesis longevity, need for more surgery, MI, stroke, and even death. The patient understands and wishes to proceed with surgery. Past Medical History:   Diagnosis Date    Allergic rhinitis     Nausea & vomiting     Osteoarthritis of hip     Spinal stenosis          Current Outpatient Prescriptions:     HYDROcodone-acetaminophen (NORCO) 7.5-325 mg per tablet, Take 1-2 Tabs by mouth every six (6) hours as needed for Pain. Max Daily Amount: 8 Tabs., Disp: 60 Tab, Rfl: 0    oxyCODONE-acetaminophen (PERCOCET 7.5) 7.5-325 mg per tablet, Take 1-2 Tabs by mouth every four (4) hours as needed. Max Daily Amount: 12 Tabs.  (Patient taking differently: Take 1-2 Tabs by mouth every four (4) hours as needed for Pain.), Disp: 60 Tab, Rfl: 0    ondansetron hcl (ZOFRAN, AS HYDROCHLORIDE,) 4 mg tablet, Take 1 Tab by mouth every eight (8) hours as needed for Nausea. (Patient taking differently: Take 4 mg by mouth every eight (8) hours as needed for Nausea. Indications: nausea), Disp: 30 Tab, Rfl: 1    celecoxib (CELEBREX) 200 mg capsule, Take 1 Cap by mouth two (2) times a day for 90 days. (Patient taking differently: Take 200 mg by mouth two (2) times a day. Indications: OSTEOARTHRITIS), Disp: 60 Cap, Rfl: 2    ferrous sulfate 325 mg (65 mg iron) tablet, Take 1 Tab by mouth two (2) times daily (with meals). , Disp: 60 Tab, Rfl: 2    aspirin (ASPIRIN) 325 mg tablet, Take 1 Tab by mouth daily. (Patient taking differently: Take 325 mg by mouth daily. Indications: Thrombosis Prevention after PCI), Disp: 30 Tab, Rfl: 0    ALPRAZolam (XANAX) 0.25 mg tablet, Take 1 Tab by mouth three (3) times daily as needed. Max Daily Amount: 0.75 mg. PRN sleep, Disp: 20 Tab, Rfl: 0    venlafaxine-SR (EFFEXOR-XR) 75 mg capsule, Take 37.5 mg by mouth daily. , Disp: , Rfl:     estradiol (ESTRACE) 1 mg tablet, Take 1 mg by mouth every seven (7) days. Indications: Patient takes half tab daily, Disp: , Rfl:     zolpidem (AMBIEN) 5 mg tablet, Take 1 Tab by mouth nightly as needed for Sleep., Disp: 25 Tab, Rfl: 0    albuterol (PROVENTIL HFA, VENTOLIN HFA) 90 mcg/actuation inhaler, Take 2 Puffs by inhalation every four (4) hours as needed for Wheezing., Disp: 1 Inhaler, Rfl: 0    oxyCODONE IR (OXY-IR) 15 mg immediate release tablet, Take 1-2 Tabs by mouth every four (4) hours as needed. Max Daily Amount: 180 mg., Disp: 60 Tab, Rfl: 0    Allergies   Allergen Reactions    Other Plant, Animal, Environmental Anaphylaxis     poison ivy       Social History     Social History    Marital status:      Spouse name: N/A    Number of children: N/A    Years of education: N/A     Occupational History    Not on file.      Social History Main Topics    Smoking status: Never Smoker    Smokeless tobacco: Never Used    Alcohol use 0.0 oz/week     0 Standard drinks or equivalent per week      Comment: socially    Drug use: No    Sexual activity: Not on file      Comment: Hysterectomy     Other Topics Concern    Not on file     Social History Narrative       Past Surgical History:   Procedure Laterality Date    ENDOSCOPY, COLON, DIAGNOSTIC  03-18-11    normal colon to cecum    HX BREAST REDUCTION      HX HEENT      skin cancer between eyes    HX HYSTERECTOMY      HX KNEE REPLACEMENT      HX LAP CHOLECYSTECTOMY  11/2015    HX TONSIL AND ADENOIDECTOMY      HX TUBAL LIGATION         Family History:  Non-contributory. PE:  Visit Vitals    /70    Pulse 81    Temp 97.4 °F (36.3 °C)    Ht 5' 7\" (1.702 m)    Wt 174 lb 3.2 oz (79 kg)    BMI 27.28 kg/m2     A&O X3, NAD, well develop, well nourished  Heart: S1-S2, rrr  Lungs: CTA bilat  Abd: soft, nt, nt, + bs in all quadrants  Ext:  Pos distal pulses to DP, PT      X-ray: left knee shows implants to be well fixed with slight tilt of the patella    US- confirms medial retinacular tear    Labs: labs pending    A:  Left  Knee, sp TKA with medial retinacular rent/tear     P:  At this point we will move forward with surgery. Again, the alternatives, risks, complications, as well as expected outcome were discussed and the patient wishes to proceed with surgery. Pt has been instructed to stop aspirin, nsaids, rheumatologic medications and blood thinners. They have also been instructed to continue on any heart and bp meds and to take them the morning of surgery with sips of water.          Kit Walton

## 2017-12-11 NOTE — PERIOP NOTES
[phone]  Patient's family brought in to see patient. Pt. resting in bed in stable condition. No distress noted. Still working to manage patient's pain to left knee.

## 2017-12-11 NOTE — ANESTHESIA PROCEDURE NOTES
Peripheral Block    Start time: 12/11/2017 1:20 PM  End time: 12/11/2017 1:27 PM  Performed by: Olivia Cantrell  Authorized by: Olivia Cantrell       Pre-procedure: Indications: at surgeon's request, post-op pain management and procedure for pain    Preanesthetic Checklist: patient identified, risks and benefits discussed, site marked, timeout performed, anesthesia consent given and patient being monitored      Block Type:   Block Type:  Femoral single shot  Laterality:  Left  Monitoring:  Standard ASA monitoring, continuous pulse ox, frequent vital sign checks, oxygen, heart rate and responsive to questions  Injection Technique:  Single shot  Procedures: ultrasound guided    Patient Position: supine  Prep: chlorhexidine    Location:  Upper thigh  Needle Type:  Stimuplex  Needle Gauge:  21 G  Needle Localization:  Ultrasound guidance  Medication Injected:  0.2%  ropivacaine  Volume (mL):  30    Assessment:  Number of attempts:  1  Injection Assessment:  No paresthesia, incremental injection every 5 mL, ultrasound image on chart, no intravascular symptoms, negative aspiration for blood and local visualized surrounding nerve on ultrasound  Patient tolerance:  Patient tolerated the procedure well with no immediate complications  Location:  PREOP HOLDING    Patient given 2 mg IV Versed and 100 mcg IV Fentanyl for sedation.     12/11/2017     1:29 PM     Anju Boyd MD

## 2017-12-11 NOTE — IP AVS SNAPSHOT
Summary of Care Report The Summary of Care report has been created to help improve care coordination. Users with access to [employer] or [address] (Web-based application) may access additional patient information including the Discharge Summary. If you are not currently a [address] user and need more information, please call the number listed below in the [place] [address] section and ask to be connected with Medical Records. Facility Information Name Address Phone [address] [facility] [address] [phone] Patient Information Patient Name Sex  Adrianriedgar Flight ([id number]) Female 1960 Discharge Information Admitting Provider Service Area Unit Diamond Roberson MD / [address] [facility] [address] [facility] / [phone] Discharge Provider Discharge Date/Time Discharge Disposition Destination (none) 2017 Afternoon (Pending) AHR (none) Patient Language Language ENGLISH [13] Hospital Problems as of 2017  Reviewed: 2017  9:30 AM by Marylen Counter, PA-C Class Noted - Resolved Last Modified POA Active Problems Quadriceps muscle rupture  2017 - Present 2017 by Diamond Roberson MD Unknown Entered by Diamond Roberson MD  
  
Non-Hospital Problems as of 2017  Reviewed: 2017  9:30 AM by Marylen Counter, PA-C Class Noted - Resolved Last Modified Active Problems Allergic rhinitis  Unknown - Present 2011 by Tavo Bob Entered by Tavo Bob Hyperlipidemia  Unknown - Present 2011 by Tavo Bob Entered by Tavo Bob Osteoarthritis of left knee  Unknown - Present 11/10/2014 by Ronny Marie Entered by Tavo Bob Family history of colonic polyps  Unknown - Present 8/24/2011 by Leah Tran Entered by Leah Tran Vitamin D insufficiency  11/10/2014 - Present 11/10/2014 by Glenys Guevara Entered by Glenys Guevara Reflux esophagitis, Status post Stricture  11/10/2014 - Present 11/10/2014 by Glenys Guevara Entered by Glenys Guevara Family history of breast cancer  11/10/2014 - Present 11/10/2014 by Glenys Guevara Entered by Glenys Guevara Foot pain  2/23/2015 - Present 2/23/2015 by Karol Meredith Entered by Karol Meredith Cellulitis  2/23/2015 - Present 2/23/2015 by Karol Meredith Entered by Karol Meredith Hip arthritis  3/3/2016 - Present 10/19/2016 by Lilliana Castelan MD  
  Entered by Janel Jones NP Menopausal syndrome (hot flashes)  5/10/2016 - Present 5/10/2016 by Rhona Morin MD  
  Entered by Rhona Morin MD  
  Arthritis of knee  11/13/2017 - Present 11/13/2017 by Lilliana Castelan MD  
  Entered by Lilliana Castelan MD  
  
You are allergic to the following Allergen Reactions Other Plant, Animal, Environmental Anaphylaxis  
 poison ivy Current Discharge Medication List  
  
START taking these medications Dose & Instructions Dispensing Information Comments  
 ferrous sulfate 325 mg (65 mg iron) tablet Dose:  325 mg Take 1 Tab by mouth three (3) times daily (with meals). Quantity:  60 Tab Refills:  1 CONTINUE these medications which have CHANGED Dose & Instructions Dispensing Information Comments * oxyCODONE-acetaminophen 7.5-325 mg per tablet Commonly known as:  PERCOCET 7.5 What changed:  reasons to take this Dose:  1-2 Tab Take 1-2 Tabs by mouth every four (4) hours as needed. Max Daily Amount: 12 Tabs. Quantity:  60 Tab Refills:  0  
   
 * oxyCODONE-acetaminophen 7.5-325 mg per tablet Commonly known as:  PERCOCET 7.5 What changed: You were already taking a medication with the same name, and this prescription was added. Make sure you understand how and when to take each. Dose:  1-2 Tab Take 1-2 Tabs by mouth every four (4) hours as needed. Max Daily Amount: 12 Tabs. Indications: Pain Quantity:  60 Tab Refills:  0  
   
 * Notice: This list has 2 medication(s) that are the same as other medications prescribed for you. Read the directions carefully, and ask your doctor or other care provider to review them with you. CONTINUE these medications which have NOT CHANGED Dose & Instructions Dispensing Information Comments  
 albuterol 90 mcg/actuation inhaler Commonly known as:  PROVENTIL HFA, VENTOLIN HFA, PROAIR HFA Dose:  2 Puff Take 2 Puffs by inhalation every four (4) hours as needed for Wheezing. Quantity:  1 Inhaler Refills:  0 ALPRAZolam 0.25 mg tablet Commonly known as:  [place]ossom Hearing Dose:  0.25 mg Take 1 Tab by mouth three (3) times daily as needed. Max Daily Amount: 0.75 mg. PRN sleep Quantity:  20 Tab Refills:  0  
   
 celecoxib 200 mg capsule Commonly known as:  CELEBREX Dose:  200 mg Take 1 Cap by mouth two (2) times a day for 90 days. Quantity:  60 Cap Refills:  2  
   
 estradiol 1 mg tablet Commonly known as:  ESTRACE Dose:  1 mg Take 1 mg by mouth every seven (7) days. Indications: Patient takes half tab daily Refills:  0  
   
 ondansetron hcl 4 mg tablet Commonly known as:  ZOFRAN (AS HYDROCHLORIDE) Dose:  4 mg Take 1 Tab by mouth every eight (8) hours as needed for Nausea. Quantity:  30 Tab Refills:  1  
   
 venlafaxine-SR 75 mg capsule Commonly known as:  EFFEXOR-XR Dose:  37.5 mg Take 37.5 mg by mouth daily. Refills:  0  
   
 zolpidem 5 mg tablet Commonly known as:  AMBIEN Dose:  5 mg Take 1 Tab by mouth nightly as needed for Sleep. Quantity:  25 Tab Refills:  0 STOP taking these medications Comments HYDROcodone-acetaminophen 7.5-325 mg per tablet Commonly known as:  Rayo Whalen Current Immunizations Name Date Influenza Vaccine 10/12/2016 Tdap 11/10/2014 Surgery Information ID Date/Time Status Primary Surgeon All Procedures Location [id number] [facility]11/2017 [facility] [address] [facility], MD LEFT QUADRICEP TENDON REPAIR [facility] MAIN OR Follow-up Information Follow up With Details Comments Contact Info Erin Lamb PA-C On 12/28/2017 Appointment at 8:45 am [address] [facility] [address] 
[phone] Raya Gomes MD On 12/19/2017 Appointment at 11:00 am [address] [facility] [address] [facility] 
[phone] Discharge Instructions Patient armband removed and shredded DISCHARGE SUMMARY from Nurse PATIENT INSTRUCTIONS: 
 
 
F-face looks uneven A-arms unable to move or move unevenly S-speech slurred or non-existent T-time-call 911 as soon as signs and symptoms begin-DO NOT go Back to bed or wait to see if you get better-TIME IS BRAIN. Warning Signs of HEART ATTACK Call 911 if you have these symptoms: 
? Chest discomfort. Most heart attacks involve discomfort in the center of the chest that lasts more than a few minutes, or that goes away and comes back. It can feel like uncomfortable pressure, squeezing, fullness, or pain. ? Discomfort in other areas of the upper body. Symptoms can include pain or discomfort in one or both arms, the back, neck, jaw, or stomach. ? Shortness of breath with or without chest discomfort. ? Other signs may include breaking out in a cold sweat, nausea, or lightheadedness. Don't wait more than five minutes to call [address] [facility] [address]! Fast action can save your life. Calling 911 is almost always the fastest way to get lifesaving treatment. Emergency Medical Services staff can begin treatment when they arrive  up to an hour sooner than if someone gets to the hospital by car. The discharge information has been reviewed with the patient. The patient verbalized understanding. Discharge medications reviewed with the patient and appropriate educational materials and side effects teaching were provided. ___________________________________________________________________________________________________________________________________ Chart Review Routing History Recipient Method Report Sent By Geovani Rayo MD  
Fax: [phone] Phone: [phone] Fax Provider Comm Report Lawrence Villegas [[id number]] 10/2/2012  9:57 AM 02/15/2011 Donte Rayo MD  
Fax: [phone] Phone: [phone] Fax Provider Comm Report Lawrence Villegas [[id number]] 10/2/2012 10:12 AM 02/15/2011 Donte Rayo MD  
Fax: [phone] Phone: [phone] Fax Provider Comm Report William Talavera 2/5/2013  3:10 PM 10/11/2012 Lacy Brown MD  
Phone: [phone] In [address] [[id number]] 2/24/2015  3:26 PM 02/24/2015 Carolee Knutson MD  
Phone: [phone] In [address] [[id number]] 2/24/2015  3:26 PM 02/24/2015 Leonardo Morin MD  
Phone: [phone] In [employer] [address] Shraddha Tompkins MD [[id number]] 11/18/2015  5:43 PM 11/18/2015 Peyton Serrano MD  
Phone: [phone] In [employer] [address] Shraddha Tompkins MD [[id number]] 11/18/2015  5:43 PM 11/18/2015 Elma Degroot MD  
Phone: [phone] In [employer] [address] Root MD [[id number]] 10/20/2016  1:45 PM 10/20/2016 Danica Brenner MD  
Phone: [phone] In [employer] [address] Root MD [[id number]] 10/20/2016  1:45 PM 10/20/2016 Zackery Haque MD  
Phone: [phone] In [employer] [address] Root MD [[id number]] 11/16/2017  9:16 AM 11/16/2017

## 2017-12-11 NOTE — ANESTHESIA PREPROCEDURE EVALUATION
Anesthetic History     PONV          Review of Systems / Medical History  Patient summary reviewed, nursing notes reviewed and pertinent labs reviewed    Pulmonary  Within defined limits                 Neuro/Psych   Within defined limits           Cardiovascular  Within defined limits                     GI/Hepatic/Renal     GERD           Endo/Other        Arthritis     Other Findings   Comments:   Risk Factors for Postoperative nausea/vomiting:       History of postoperative nausea/vomiting? NO       Female? yes       Motion sickness? NO       Intended opioid administration for postoperative analgesia? YES      Smoking Abstinence  Current Smoker? NO  Elective Surgery? YES  Seen preoperatively by anesthesiologist or proxy prior to day of surgery? YES  Pt abstained from smoking 24 hours prior to anesthesia?  YES           Physical Exam    Airway  Mallampati: II  TM Distance: 4 - 6 cm  Neck ROM: normal range of motion   Mouth opening: Normal     Cardiovascular    Rhythm: regular  Rate: normal         Dental    Dentition: Caps/crowns     Pulmonary  Breath sounds clear to auscultation               Abdominal  GI exam deferred       Other Findings            Anesthetic Plan    ASA: 2  Anesthesia type: general      Post-op pain plan if not by surgeon: peripheral nerve block single    Induction: Intravenous  Anesthetic plan and risks discussed with: Patient

## 2017-12-11 NOTE — BRIEF OP NOTE
BRIEF OPERATIVE NOTE    Date of Procedure: 12/11/2017   Preoperative Diagnosis: Tear of tendon of left lower extremity, initial encounter [S86.912A]  Postoperative Diagnosis: Tear of tendon of left lower extremity, initial encounter [J03.860J]    Procedure(s):  LEFT QUADRICEP TENDON REPAIR  Surgeon(s) and Role:     * Monster Elkins MD - Primary         Assistant Staff:  Physician Assistant: James Miranda PA-C    Surgical Staff:  Circ-1: Sundeep Torres RN  Circ-2: Leona Smith RN  Physician Assistant: James Miranda PA-C  Scrub Tech-1: Pepe Hernandez  Surg Asst-1: Jona Meade  Event Time In   Incision Start 1410   Incision Close      Anesthesia: General   Estimated Blood Loss: 10ml  Specimens: * No specimens in log *   Findings: same   Complications: none  Implants: * No implants in log *

## 2017-12-11 NOTE — PROGRESS NOTES
Mobility Intervention:       [] Pt dangled at edge of bed    [] Pt assisted OOB to bedside commode    [] Pt assisted OOB to chair    [x] Pt ambulated to bathroom    [] Patient was ambulated in room/hallway    Assistive Device Utilized:       [x] Rolling walker   [] Crutches   [] Straight Cane   [] Knee immobilizer   [] IV pole    After Mobilization:     [] Patient left in no apparent distress sitting up in chair  [x] Patient left in no apparent distress in bed  [x] Call bell left within reach  [x] SCDs on & machine turned on  [x] Ice applied  [] RN notified  [] Caregiver present  [] Bed alarm activated    Reason patient not mobilized:      [] Patient refused   [] Nausea/vomiting   [] Low blood pressure   [] Drowsy/lethargic    Pain Rating:     [] 0  [] 1  Assistive Device:        [] 2  [] 3  [x] 4  [] 5  [] 6  Assistive Device:        [] 7  [] 8  [] 9  [] 10    Comments:

## 2017-12-11 NOTE — PERIOP NOTES
TRANSFER - OUT REPORT:    Verbal report given to [facility] on [facility]  being transferred to [facility] for routine post - op       Report consisted of patients Situation, Background, Assessment and   Recommendations(SBAR). Information from the following report(s) SBAR, OR Summary, Procedure Summary, Intake/Output, MAR and Recent Results was reviewed with the receiving nurse. Lines:   Peripheral IV 12/11/17 Right Hand (Active)   Site Assessment Clean, dry, & intact 12/11/2017  2:55 PM   Phlebitis Assessment 0 12/11/2017  2:55 PM   Infiltration Assessment 0 12/11/2017  2:55 PM   Dressing Status Clean, dry, & intact 12/11/2017  2:55 PM   Dressing Type Transparent;Tape 12/11/2017  2:55 PM   Hub Color/Line Status Pink; Infusing 12/11/2017  2:55 PM   Alcohol Cap Used No 12/11/2017 12:58 PM        Opportunity for questions and clarification was provided.       Patient transported with:   O2 @ 3 liters  Registered Nurse

## 2017-12-11 NOTE — IP AVS SNAPSHOT
Albert Olivas 
 
 
 [address] Patient: Kathrene Saint MRN: [id number] SGE:[id number] My Medications STOP taking these medications HYDROcodone-acetaminophen 7.5-325 mg per tablet Commonly known as:  Oswald Morenohialvarez Wise these medications as instructed Instructions Each Dose to Equal  
 Morning Noon Evening Bedtime  
 albuterol 90 mcg/actuation inhaler Commonly known as:  PROVENTIL HFA, VENTOLIN HFA, PROAIR HFA Your last dose was: Your next dose is: Take 2 Puffs by inhalation every four (4) hours as needed for Wheezing. 2 Puff ALPRAZolam 0.25 mg tablet Commonly known as:  Nish Garcia Your last dose was: Your next dose is: Take 1 Tab by mouth three (3) times daily as needed. Max Daily Amount: 0.75 mg. PRN sleep  
 0.25 mg  
    
   
   
   
  
 celecoxib 200 mg capsule Commonly known as:  CELEBREX Your last dose was: Your next dose is: Take 1 Cap by mouth two (2) times a day for 90 days. 200 mg  
    
   
   
   
  
 estradiol 1 mg tablet Commonly known as:  ESTRACE Your last dose was: Your next dose is: Take 1 mg by mouth every seven (7) days. Indications: Patient takes half tab daily 1 mg  
    
   
   
   
  
 ferrous sulfate 325 mg (65 mg iron) tablet Your last dose was: Your next dose is: Take 1 Tab by mouth three (3) times daily (with meals). 325 mg  
    
   
   
   
  
 ondansetron hcl 4 mg tablet Commonly known as:  ZOFRAN (AS HYDROCHLORIDE) Your last dose was: Your next dose is: Take 1 Tab by mouth every eight (8) hours as needed for Nausea. 4 mg * oxyCODONE-acetaminophen 7.5-325 mg per tablet Commonly known as:  PERCOCET 7.5 Your last dose was: Your next dose is: Take 1-2 Tabs by mouth every four (4) hours as needed. Max Daily Amount: 12 Tabs. 1-2 Tab * oxyCODONE-acetaminophen 7.5-325 mg per tablet Commonly known as:  PERCOCET 7.5 Your last dose was: Your next dose is: Take 1-2 Tabs by mouth every four (4) hours as needed. Max Daily Amount: 12 Tabs. Indications: Pain 1-2 Tab  
    
   
   
   
  
 venlafaxine-SR 75 mg capsule Commonly known as:  EFFEXOR-XR Your last dose was: Your next dose is: Take 37.5 mg by mouth daily. 37.5 mg  
    
   
   
   
  
 zolpidem 5 mg tablet Commonly known as:  AMBIEN Your last dose was: Your next dose is: Take 1 Tab by mouth nightly as needed for Sleep.  
 5 mg * Notice: This list has 2 medication(s) that are the same as other medications prescribed for you. Read the directions carefully, and ask your doctor or other care provider to review them with you. Where to Get Your Medications Information on where to get these meds will be given to you by the nurse or doctor. ! Ask your nurse or doctor about these medications  
  ferrous sulfate 325 mg (65 mg iron) tablet  
 oxyCODONE-acetaminophen 7.5-325 mg per tablet

## 2017-12-12 ENCOUNTER — HOME HEALTH ADMISSION (OUTPATIENT)
Dept: HOME HEALTH SERVICES | Facility: HOME HEALTH | Age: 57
End: 2017-12-12

## 2017-12-12 VITALS
OXYGEN SATURATION: 96 % | TEMPERATURE: 97.7 F | WEIGHT: 174 LBS | DIASTOLIC BLOOD PRESSURE: 72 MMHG | HEIGHT: 67 IN | BODY MASS INDEX: 27.31 KG/M2 | SYSTOLIC BLOOD PRESSURE: 113 MMHG | RESPIRATION RATE: 16 BRPM | HEART RATE: 83 BPM

## 2017-12-12 PROCEDURE — 97116 GAIT TRAINING THERAPY: CPT

## 2017-12-12 PROCEDURE — 77010033678 HC OXYGEN DAILY

## 2017-12-12 PROCEDURE — 96366 THER/PROPH/DIAG IV INF ADDON: CPT

## 2017-12-12 PROCEDURE — 99218 HC RM OBSERVATION: CPT

## 2017-12-12 PROCEDURE — 96365 THER/PROPH/DIAG IV INF INIT: CPT

## 2017-12-12 PROCEDURE — 74011250636 HC RX REV CODE- 250/636: Performed by: ORTHOPAEDIC SURGERY

## 2017-12-12 PROCEDURE — 74011250637 HC RX REV CODE- 250/637: Performed by: ORTHOPAEDIC SURGERY

## 2017-12-12 PROCEDURE — 97165 OT EVAL LOW COMPLEX 30 MIN: CPT

## 2017-12-12 RX ORDER — OXYCODONE AND ACETAMINOPHEN 7.5; 325 MG/1; MG/1
1-2 TABLET ORAL
Qty: 60 TAB | Refills: 0 | Status: SHIPPED | OUTPATIENT
Start: 2017-12-12 | End: 2018-01-29

## 2017-12-12 RX ORDER — LANOLIN ALCOHOL/MO/W.PET/CERES
325 CREAM (GRAM) TOPICAL
Qty: 60 TAB | Refills: 1 | Status: SHIPPED | OUTPATIENT
Start: 2017-12-12 | End: 2018-01-29

## 2017-12-12 RX ADMIN — OXYCODONE HYDROCHLORIDE AND ACETAMINOPHEN 1 TABLET: 7.5; 325 TABLET ORAL at 07:30

## 2017-12-12 RX ADMIN — CEFAZOLIN SODIUM 2 G: 2 SOLUTION INTRAVENOUS at 13:00

## 2017-12-12 RX ADMIN — Medication 10 ML: at 14:00

## 2017-12-12 RX ADMIN — OXYCODONE HYDROCHLORIDE AND ACETAMINOPHEN 1 TABLET: 7.5; 325 TABLET ORAL at 00:33

## 2017-12-12 RX ADMIN — DOCUSATE SODIUM 100 MG: 100 CAPSULE, LIQUID FILLED ORAL at 10:21

## 2017-12-12 RX ADMIN — OXYCODONE HYDROCHLORIDE AND ACETAMINOPHEN 2 TABLET: 7.5; 325 TABLET ORAL at 16:24

## 2017-12-12 RX ADMIN — PREGABALIN 50 MG: 50 CAPSULE ORAL at 17:38

## 2017-12-12 RX ADMIN — PREGABALIN 50 MG: 50 CAPSULE ORAL at 10:21

## 2017-12-12 RX ADMIN — FERROUS SULFATE TAB 325 MG (65 MG ELEMENTAL FE) 325 MG: 325 (65 FE) TAB at 08:00

## 2017-12-12 RX ADMIN — FERROUS SULFATE TAB 325 MG (65 MG ELEMENTAL FE) 325 MG: 325 (65 FE) TAB at 16:23

## 2017-12-12 RX ADMIN — VENLAFAXINE HYDROCHLORIDE 37.5 MG: 37.5 CAPSULE, EXTENDED RELEASE ORAL at 10:21

## 2017-12-12 RX ADMIN — OXYCODONE HYDROCHLORIDE AND ACETAMINOPHEN 1 TABLET: 7.5; 325 TABLET ORAL at 12:08

## 2017-12-12 RX ADMIN — CELECOXIB 200 MG: 100 CAPSULE ORAL at 17:38

## 2017-12-12 RX ADMIN — DOCUSATE SODIUM 100 MG: 100 CAPSULE, LIQUID FILLED ORAL at 17:38

## 2017-12-12 RX ADMIN — CEFAZOLIN SODIUM 2 G: 2 SOLUTION INTRAVENOUS at 04:14

## 2017-12-12 RX ADMIN — OXYCODONE HYDROCHLORIDE AND ACETAMINOPHEN 1 TABLET: 7.5; 325 TABLET ORAL at 11:30

## 2017-12-12 RX ADMIN — CELECOXIB 200 MG: 100 CAPSULE ORAL at 10:21

## 2017-12-12 RX ADMIN — Medication 10 ML: at 04:14

## 2017-12-12 NOTE — PROGRESS NOTES
Rounded on patient. Reinforced importance of getting OOB for all meals, going to bathroom to help prevent blood clots. Reviewed pain medications patient is taking and the importance of keeping pain under control to help with getting OOB and therapy. Discussed the importance of keeping ice on surgery site when in bed or chair to decrease swelling. .. Encouraged patient monitor for constipation and to take a stool softner/laxative while recovering on pain medication. Also reviewed how to use incentive spirometer with return demonstration by patient. Discussed pain medication, bowel medication with patient. Finally, educated patient on the importance of eating three well balanced meals a day with protein to promote bone/muscle healing. Reminded patient to drink lots of fluids to protect kidneys from all the medications being taken currently with recovery. Patient verbalizing understanding. Patient given CHG wash to use in hospital and at home. Patient reminded to put on clean clothes and use a clean towel daily. Immobilizer on. Patient up in the chair. Dressing to surgical site dry and intact. Patient instructed not to take dressing off at home. Ice in place per protocol. Call light in reach. Patient reminded to call for help to get OOB or when leaving bathroom for safety. Patient given the opportunity for asking questions. Asked patient if there is anything they need. Mobility Intervention:       [] Pt dangled at edge of bed    [] Pt assisted OOB to bedside commode    [] Pt assisted OOB to chair    [] Pt ambulated to bathroom    [] Patient was ambulated in room/hallway    Assistive Device Utilized:       [] Rolling walker   [] Crutches   [] Straight Cane   [] Knee immobilizer   [] IV pole    After Mobilization: patient up in chair.       [x] Patient left in no apparent distress sitting up in chair  [] Patient left in no apparent distress in bed  [x] Call bell left within reach  [x] SCDs on & machine turned on  [x] Ice applied  [] RN notified  [] Caregiver present  [] Bed alarm activated    Reason patient not mobilized:      [] Patient refused   [] Nausea/vomiting   [] Low blood pressure   [] Drowsy/lethargic    Pain Rating:     [] 0  [] 1  Assistive Device:        [] 2  [x] 3  [] 4  [] 5  [] 6  Assistive Device:        [] 7  [] 8  [] 9  [] 10    Comments:       Orthopedic

## 2017-12-12 NOTE — PROGRESS NOTES
2041  Received pt in stable condition,   General: lying in bed in supine, not apparent distress  Neuro: AOx4, able to wiggle toes to bilateral extremities  Cardio: pedal pulses palpable  Respiratory: in room air, denies shortness of breath  Skin: Dressing to left leg clean, dry and intact, polar ice in place  GI: voiding, clear, yellow urine, no odor  : denies nausea and vomiting  Mus: ambulates with assistance  Bed in low position and call bell within reach.      0029  Aox4, NAD, stable    0419  Neuro: AOx4, able to wiggle toes to bilateral extremities  Cardio: pedal pulses palpable  Skin: Dressing to left leg clean, dry and intact, polar ice in place

## 2017-12-12 NOTE — PROGRESS NOTES
I have reviewed discharge instructions with the  Patient and the patient verbalized understanding. Pt awaiting spouse arrival for .

## 2017-12-12 NOTE — ANESTHESIA POSTPROCEDURE EVALUATION
Post-Anesthesia Evaluation and Assessment    Patient: To Valero MRN: [id number]  SSN: [id number]    YOB: 1960  Age: 61 y.o. Sex: female       Cardiovascular Function/Vital Signs  Visit Vitals    /76 (BP 1 Location: Left arm, BP Patient Position: At rest)    Pulse 90    Temp 36.5 °C (97.7 °F)    Resp 14    Ht 5' 7\" (1.702 m)    Wt 78.9 kg (174 lb)    SpO2 96%    BMI 27.25 kg/m2       Patient is status post general anesthesia for Procedure(s):  LEFT QUADRICEP TENDON REPAIR. Nausea/Vomiting: None    Postoperative hydration reviewed and adequate. Pain:  Pain Scale 1: Numeric (0 - 10) (12/11/17 1931)  Pain Intensity 1: 0 (12/11/17 1931)   Managed    Neurological Status:   Neuro (WDL): Within Defined Limits (12/11/17 1455)   At baseline    Mental Status and Level of Consciousness: Arousable    Pulmonary Status:   O2 Device: Nasal cannula (12/11/17 1455)   Adequate oxygenation and airway patent    Complications related to anesthesia: None    Post-anesthesia assessment completed.  No concerns    Signed By: Thalia Lara MD     December 11, 2017

## 2017-12-12 NOTE — PROGRESS NOTES
Mobility Intervention:       [] Pt dangled at edge of bed    [] Pt assisted OOB to bedside commode    [] Pt assisted OOB to chair    [x] Pt ambulated to bathroom    [] Patient was ambulated in room/hallway    Assistive Device Utilized:       [x] Rolling walker   [] Crutches   [] Straight Cane   [] Knee immobilizer   [] IV pole    After Mobilization:     [] Patient left in no apparent distress sitting up in chair  [x] Patient left in no apparent distress in bed  [x] Call bell left within reach  [x] SCDs on & machine turned on  [x] Ice applied  [] RN notified  [] Caregiver present  [] Bed alarm activated    Reason patient not mobilized:      [] Patient refused   [] Nausea/vomiting   [] Low blood pressure   [] Drowsy/lethargic    Pain Rating:     [] 0  [] 1  Assistive Device:        [x] 2  [] 3  [] 4  [] 5  [] 6  Assistive Device:        [] 7  [] 8  [] 9  [] 10    Comments:   reposition

## 2017-12-12 NOTE — PROGRESS NOTES
Mobility Intervention:       [] Pt dangled at edge of bed    [] Pt assisted OOB to bedside commode    [] Pt assisted OOB to chair    [x] Pt ambulated to bathroom    [] Patient was ambulated in room/hallway    Assistive Device Utilized:       [x] Rolling walker   [] Crutches   [] Straight Cane   [] Knee immobilizer   [] IV pole    After Mobilization:     [] Patient left in no apparent distress sitting up in chair  [x] Patient left in no apparent distress in bed  [x] Call bell left within reach  [x] SCDs on & machine turned on  [x] Ice applied  [] RN notified  [] Caregiver present  [] Bed alarm activated    Reason patient not mobilized:      [] Patient refused   [] Nausea/vomiting   [] Low blood pressure   [] Drowsy/lethargic    Pain Rating:     [] 0  [] 1  Assistive Device:        [] 2  [] 3  [x] 4  [] 5  [] 6  Assistive Device:        [] 7  [] 8  [] 9  [] 10    Comments:   Pain med given

## 2017-12-12 NOTE — PROGRESS NOTES
Discussed HH orders with pt. Signed FOC on chart for [facility]. Referral submitted to [facility] and message left on [facility] voicemail. Pt has all DME in the home after recent left knee arthroplasty. She states a family member will provide transportation home.

## 2017-12-12 NOTE — PROGRESS NOTES
Mobility Intervention:       [] Pt dangled at edge of bed    [] Pt assisted OOB to bedside commode    [] Pt assisted OOB to chair    [] Pt ambulated to bathroom    [] Patient was ambulated in room/hallway    Assistive Device Utilized:       [] Rolling walker   [] Crutches   [] Straight Cane   [] Knee immobilizer   [] IV pole    After Mobilization:     [] Patient left in no apparent distress sitting up in chair  [] Patient left in no apparent distress in bed  [] Call bell left within reach  [] SCDs on & machine turned on  [] Ice applied  [] RN notified  [] Caregiver present  [] Bed alarm activated    Reason patient not mobilized:      [] Patient refused   [] Nausea/vomiting   [] Low blood pressure   [] Drowsy/lethargic    Pain Rating:     [] 0  [] 1  Assistive Device:        [] 2  [] 3  [] 4  [] 5  [] 6  Assistive Device:        [] 7  [] 8  [] 9  [] 10    Comments:     Mobility Intervention:       [] Pt dangled at edge of bed    [] Pt assisted OOB to bedside commode    [] Pt assisted OOB to chair    [] Pt ambulated to bathroom    [] Patient was ambulated in room/hallway    Assistive Device Utilized:       [] Rolling walker   [] Crutches   [] Straight Cane   [] Knee immobilizer   [] IV pole    After Mobilization:     [] Patient left in no apparent distress sitting up in chair  [] Patient left in no apparent distress in bed  [] Call bell left within reach  [] SCDs on & machine turned on  [] Ice applied  [] RN notified  [] Caregiver present  [] Bed alarm activated    Reason patient not mobilized:      [] Patient refused   [] Nausea/vomiting   [] Low blood pressure   [] Drowsy/lethargic    Pain Rating:     [] 0  [] 1  Assistive Device:        [] 2  [] 3  [] 4  [] 5  [] 6  Assistive Device:        [] 7  [] 8  [] 9  [] 10    Comments:     Mobility Intervention:       [] Pt dangled at edge of bed    [] Pt assisted OOB to bedside commode    [] Pt assisted OOB to chair    [] Pt ambulated to bathroom    [] Patient was ambulated in room/hallway    Assistive Device Utilized:       [] Rolling walker   [] Crutches   [] Straight Cane   [] Knee immobilizer   [] IV pole    After Mobilization:     [] Patient left in no apparent distress sitting up in chair  [] Patient left in no apparent distress in bed  [] Call bell left within reach  [] SCDs on & machine turned on  [] Ice applied  [] RN notified  [] Caregiver present  [] Bed alarm activated    Reason patient not mobilized:      [] Patient refused   [] Nausea/vomiting   [] Low blood pressure   [] Drowsy/lethargic    Pain Rating:     [] 0  [] 1  Assistive Device:        [] 2  [] 3  [] 4  [] 5  [] 6  Assistive Device:        [] 7  [] 8  [] 9  [] 10    Comments:     Mobility Intervention:       [] Pt dangled at edge of bed    [] Pt assisted OOB to bedside commode    [] Pt assisted OOB to chair    [] Pt ambulated to bathroom    [] Patient was ambulated in room/hallway    Assistive Device Utilized:       [] Rolling walker   [] Crutches   [] Straight Cane   [] Knee immobilizer   [] IV pole    After Mobilization:     [] Patient left in no apparent distress sitting up in chair  [] Patient left in no apparent distress in bed  [] Call bell left within reach  [] SCDs on & machine turned on  [] Ice applied  [] RN notified  [] Caregiver present  [] Bed alarm activated    Reason patient not mobilized:      [] Patient refused   [] Nausea/vomiting   [] Low blood pressure   [] Drowsy/lethargic    Pain Rating:     [] 0  [] 1  Assistive Device:        [] 2  [] 3  [] 4  [] 5  [] 6  Assistive Device:        [] 7  [] 8  [] 9  [] 10    Comments:     Mobility Intervention:       [] Pt dangled at edge of bed    [] Pt assisted OOB to bedside commode    [] Pt assisted OOB to chair    [] Pt ambulated to bathroom    [] Patient was ambulated in room/hallway    Assistive Device Utilized:       [] Rolling walker   [] Crutches   [] Straight Cane   [] Knee immobilizer   [] IV pole    After Mobilization:     [] Patient left in no apparent distress sitting up in chair  [] Patient left in no apparent distress in bed  [] Call bell left within reach  [] SCDs on & machine turned on  [] Ice applied  [] RN notified  [] Caregiver present  [] Bed alarm activated    Reason patient not mobilized:      [] Patient refused   [] Nausea/vomiting   [] Low blood pressure   [] Drowsy/lethargic    Pain Rating:     [] 0  [] 1  Assistive Device:        [] 2  [] 3  [] 4  [] 5  [] 6  Assistive Device:        [] 7  [] 8  [] 9  [] 10    Comments:     Mobility Intervention:       [] Pt dangled at edge of bed    [] Pt assisted OOB to bedside commode    [] Pt assisted OOB to chair    [] Pt ambulated to bathroom    [] Patient was ambulated in room/hallway    Assistive Device Utilized:       [] Rolling walker   [] Crutches   [] Straight Cane   [] Knee immobilizer   [] IV pole    After Mobilization:     [] Patient left in no apparent distress sitting up in chair  [] Patient left in no apparent distress in bed  [] Call bell left within reach  [] SCDs on & machine turned on  [] Ice applied  [] RN notified  [] Caregiver present  [] Bed alarm activated    Reason patient not mobilized:      [] Patient refused   [] Nausea/vomiting   [] Low blood pressure   [] Drowsy/lethargic    Pain Rating:     [] 0  [] 1  Assistive Device:        [] 2  [] 3  [] 4  [] 5  [] 6  Assistive Device:        [] 7  [] 8  [] 9  [] 10    Comments:     Mobility Intervention:       [] Pt dangled at edge of bed    [] Pt assisted OOB to bedside commode    [] Pt assisted OOB to chair    [] Pt ambulated to bathroom    [] Patient was ambulated in room/hallway    Assistive Device Utilized:       [] Rolling walker   [] Crutches   [] Straight Cane   [] Knee immobilizer   [] IV pole    After Mobilization:     [] Patient left in no apparent distress sitting up in chair  [] Patient left in no apparent distress in bed  [] Call bell left within reach  [] SCDs on & machine turned on  [] Ice applied  [] RN notified  [] Caregiver present  [] Bed alarm activated    Reason patient not mobilized:      [] Patient refused   [] Nausea/vomiting   [] Low blood pressure   [] Drowsy/lethargic    Pain Rating:     [] 0  [] 1  Assistive Device:        [] 2  [] 3  [] 4  [] 5  [] 6  Assistive Device:        [] 7  [] 8  [] 9  [] 10    Comments:     Mobility Intervention:       [] Pt dangled at edge of bed    [] Pt assisted OOB to bedside commode    [] Pt assisted OOB to chair    [] Pt ambulated to bathroom    [] Patient was ambulated in room/hallway    Assistive Device Utilized:       [] Rolling walker   [] Crutches   [] Straight Cane   [] Knee immobilizer   [] IV pole    After Mobilization:     [] Patient left in no apparent distress sitting up in chair  [] Patient left in no apparent distress in bed  [] Call bell left within reach  [] SCDs on & machine turned on  [] Ice applied  [] RN notified  [] Caregiver present  [] Bed alarm activated    Reason patient not mobilized:      [] Patient refused   [] Nausea/vomiting   [] Low blood pressure   [] Drowsy/lethargic    Pain Rating:     [] 0  [] 1  Assistive Device:        [] 2  [] 3  [] 4  [] 5  [] 6  Assistive Device:        [] 7  [] 8  [] 9  [] 10    Comments:     Mobility Intervention:       [] Pt dangled at edge of bed    [] Pt assisted OOB to bedside commode    [] Pt assisted OOB to chair    [] Pt ambulated to bathroom    [] Patient was ambulated in room/hallway    Assistive Device Utilized:       [] Rolling walker   [] Crutches   [] Straight Cane   [] Knee immobilizer   [] IV pole    After Mobilization:     [] Patient left in no apparent distress sitting up in chair  [] Patient left in no apparent distress in bed  [] Call bell left within reach  [] SCDs on & machine turned on  [] Ice applied  [] RN notified  [] Caregiver present  [] Bed alarm activated    Reason patient not mobilized:      [] Patient refused   [] Nausea/vomiting   [] Low blood pressure   [] Drowsy/lethargic    Pain Rating:     [] 0  [] 1  Assistive Device:        [] 2  [] 3  [] 4  [] 5  [] 6  Assistive Device:        [] 7  [] 8  [] 9  [] 10    Comments:     Mobility Intervention:       [] Pt dangled at edge of bed    [] Pt assisted OOB to bedside commode    [] Pt assisted OOB to chair    [x] Pt ambulated to bathroom    [] Patient was ambulated in room/hallway    Assistive Device Utilized:       [x] Rolling walker   [] Crutches   [] Straight Cane   [] Knee immobilizer   [] IV pole    After Mobilization:     [] Patient left in no apparent distress sitting up in chair  [x] Patient left in no apparent distress in bed  [x] Call bell left within reach  [x] SCDs on & machine turned on  [x] Ice applied  [] RN notified  [] Caregiver present  [] Bed alarm activated    Reason patient not mobilized:      [] Patient refused   [] Nausea/vomiting   [] Low blood pressure   [] Drowsy/lethargic    Pain Rating:     [x] 0  [] 1  Assistive Device:        [] 2  [] 3  [] 4  [] 5  [] 6  Assistive Device:        [] 7  [] 8  [] 9  [] 10    Comments:     Mobility Intervention:       [] Pt dangled at edge of bed    [] Pt assisted OOB to bedside commode    [] Pt assisted OOB to chair    [] Pt ambulated to bathroom    [] Patient was ambulated in room/hallway    Assistive Device Utilized:       [] Rolling walker   [] Crutches   [] Straight Cane   [] Knee immobilizer   [] IV pole    After Mobilization:     [] Patient left in no apparent distress sitting up in chair  [] Patient left in no apparent distress in bed  [] Call bell left within reach  [] SCDs on & machine turned on  [] Ice applied  [] RN notified  [] Caregiver present  [] Bed alarm activated    Reason patient not mobilized:      [] Patient refused   [] Nausea/vomiting   [] Low blood pressure   [] Drowsy/lethargic    Pain Rating:     [] 0  [] 1  Assistive Device:        [] 2  [] 3  [] 4  [] 5  [] 6  Assistive Device:        [] 7  [] 8  [] 9  [] 10    Comments:

## 2017-12-12 NOTE — PROGRESS NOTES
Bedside shift change report given to BRYANNA Dominguez (oncoming nurse) by Anaylei Fernandes RN (offgoing nurse). Report included the following information SBAR, Kardex, OR Summary, Intake/Output and MAR.

## 2017-12-12 NOTE — PROGRESS NOTES
conducted an initial consultation and Spiritual Assessment for Akshat Farrar, who is a 61 y.o.,female. Patients Primary Language is: [place]. According to the patients EMR Religion Affiliation is: [place].     The reason the Patient came to the hospital is:   Patient Active Problem List    Diagnosis Date Noted    Quadriceps muscle rupture 12/11/2017    Arthritis of knee 11/13/2017    Menopausal syndrome (hot flashes) 05/10/2016    Hip arthritis 03/03/2016    Foot pain 02/23/2015    Cellulitis 02/23/2015    Vitamin D insufficiency 11/10/2014    Reflux esophagitis, Status post Stricture 11/10/2014    Family history of breast cancer 11/10/2014    Allergic rhinitis     Hyperlipidemia     Osteoarthritis of left knee     Family history of colonic polyps         The  provided the following Interventions:  Initiated a relationship of care and support. Explored issues of adal, belief, spirituality and Episcopal/ritual needs while hospitalized. Listened empathically. Provided chaplaincy education. Provided information about Spiritual Care Services. Offered prayer and assurance of continued prayers on patient's behalf. Chart reviewed. The following outcomes where achieved:  Patient shared limited information about both their medical narrative and spiritual journey/beliefs.  confirmed Patient's Religion Affiliation. Patient processed feeling about current hospitalization. Patient expressed gratitude for 's visit. Assessment:  Patient does not have any Episcopal/cultural needs that will affect patients preferences in health care. There are no spiritual or Episcopal issues which require intervention at this time. Plan:  Chaplains will continue to follow and will provide pastoral care on an as needed/requested basis.    recommends bedside caregivers page  on duty if patient shows signs of acute spiritual or emotional distress.     Cristy 58  [phone]

## 2017-12-12 NOTE — PROGRESS NOTES
Problem: Falls - Risk of  Goal: *Absence of Falls  Document Quiana Fall Risk and appropriate interventions in the flowsheet.    Outcome: Progressing Towards Goal  Fall Risk Interventions:  Mobility Interventions: Patient to call before getting OOB         Medication Interventions: Patient to call before getting OOB, Teach patient to arise slowly    Elimination Interventions: Call light in reach, Patient to call for help with toileting needs

## 2017-12-12 NOTE — PROGRESS NOTES
Problem: Self Care Deficits Care Plan (Adult)  Goal: *Acute Goals and Plan of Care (Insert Text)  Outcome: Resolved/Met Date Met: 12/12/17  Occupational Therapy EVALUATION/discharge    Patient: Erna Irizarry (69 y.o. female)  Date: 12/12/2017  Primary Diagnosis: Tear of tendon of left lower extremity, initial encounter [S86.912A]  Procedure(s) (LRB):  LEFT QUADRICEP TENDON REPAIR (Left) 1 Day Post-Op   Precautions: falls      ASSESSMENT AND RECOMMENDATIONS:  Pt seen POD #1 s/p Quadricepts rupture repair, and is 4 weeks s/p TKA. 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 Today observed to be at MOD (I) level for ADL. Immobilizer maintained in OOB activity. Skilled occupational therapy is not indicated at this time. Discharge Recommendations: none  Further Equipment Recommendations for Discharge: none, has built in shower chair if needed      Barriers to Learning/Limitations: None  Compensate with: visual, verbal, tactile, kinesthetic cues/model     COMPLEXITY     Eval Complexity: History: LOW Complexity : Brief history review ; Examination: LOW Complexity : 1-3 performance deficits relating to physical, cognitive , or psychosocial skils that result in activity limitations and / or participation restrictions ; Decision Making:LOW Complexity : No comorbidities that affect functional and no verbal or physical assistance needed to complete eval tasks  Assessment: low Complexity        G-CODES:     Self Care  Current  CI= 1-19%   Goal  CI= 1-19%   D/C  CI= 1-19%. The severity rating is based on the Level of Assistance required for Functional Mobility and ADLs. SUBJECTIVE:   Patient stated no problems.  When asked how self care was going at home post initial surgery    OBJECTIVE DATA SUMMARY:     Past Medical History:   Diagnosis Date    Allergic rhinitis     Nausea & vomiting     Osteoarthritis of hip  Spinal stenosis      Past Surgical History:   Procedure Laterality Date    ENDOSCOPY, COLON, DIAGNOSTIC  03-18-11    normal colon to cecum    HX BREAST REDUCTION      HX HEENT      skin cancer between eyes    HX HYSTERECTOMY      HX KNEE REPLACEMENT      HX LAP CHOLECYSTECTOMY  11/2015    HX TONSIL AND ADENOIDECTOMY      HX TUBAL LIGATION       Prior Level of Function/Home Situation: Mod Independent post previous surgery   Home Situation  Home Environment: Other (comment) (condo )  # Steps to Enter: 2  One/Two Story Residence: One story  Living Alone: No  Support Systems: Child(rome), Family member(s)  Patient Expects to be Discharged to[de-identified] Private residence  Current DME Used/Available at Home: Cane, straight  Tub or Shower Type: Shower    Cognitive/Behavioral Status:     Orientation Level: Oriented X4          Skin: no skin concerns    Edema: no noted edema    Vision/Perceptual:              Acuity: Within Defined Limits         Coordination:  Coordination: Within functional limits (BUE)            Balance:   good with fww    Strength:    Strength:  Within functional limits (BUE)        Tone & Sensation:    Tone: Normal  Sensation: Intact        Range of Motion:    AROM: Within functional limits (BUE)        Functional Mobility and Transfers for ADLs:  Bed Mobility:              Transfers:  Sit to Stand: Modified independent     Bed to Chair: Modified independent          Toilet Transfer : Modified independent       Shower Transfer: Modified independent (simulated)        ADL Assessment:  Feeding: Independent    Oral Facial Hygiene/Grooming: Independent    Bathing: Independent    Upper Body Dressing: Independent    Lower Body Dressing: Modified independent    Toileting: Modified independent       ADL Intervention:     Modified adaptive tech for LB dressing with knee imobilizer     Therapeutic Exercise:      Pain:  Pt reports 0/10 pain or discomfort prior to treatment.    Pt reports not scaled/10 pain or discomfort post treatment. Activity Tolerance:   good    Please refer to the flowsheet for vital signs taken during this treatment. After treatment:   [x]  Patient left in no apparent distress sitting up in chair  []  Patient left in no apparent distress in bed  [x]  Call bell left within reach  []  Nursing notified  []  Caregiver present  []  Bed alarm activated    COMMUNICATION/EDUCATION:   Communication/Collaboration:  [x]      Home safety education was provided and the patient/caregiver indicated understanding. [x]      Patient/family have participated as able and agree with findings and recommendations. []      Patient is unable to participate in plan of care at this time.     Kady Ramírez, OTR\L  Time Calculation: 17 mins

## 2017-12-12 NOTE — HOME CARE
Patient is currently active with [facility] - liaison nurses will continue to follow through discharge for home care needs - IVORY Medrano LPN      4pm - Told by intake, the patient was never discharged from [facility] and is a new referral. Past episode closed and new referral done and called to intake for completion and visit scheduling - IVORY Gaona LPN

## 2017-12-12 NOTE — HOME CARE
Discharge orders noted - referral processed for PT [facility] - the nursing staff needs to get patient's crutches that have been ordered from supply prior to her leaving the facility and Uriah Alarcon is aware of this - IVORY Jimenez LPN

## 2017-12-12 NOTE — PROGRESS NOTES
Problem: Mobility Impaired (Adult and Pediatric)  Goal: *Acute Goals and Plan of Care (Insert Text)  Physical Therapy Goals  Initiated 12/11/2017 and to be accomplished within 7 day(s)  1. Patient will move from supine to sit and sit to supine , scoot up and down and roll side to side in bed with supervision/set-up. 2.  Patient will transfer from bed to chair and chair to bed with supervision/set-up using the least restrictive device. 3.  Patient will perform sit to stand with supervision/set-up. 4.  Patient will ambulate with supervision/set-up for >150 feet with the least restrictive device. 5.  Patient will ascend/descend 4 stairs with 1 handrail(s) with minimal assistance/contact guard assist.   Outcome: Progressing Towards Goal  physical Therapy TREATMENT    Patient: Juma Bright (66 y.o. female)  Date: 12/12/2017  Diagnosis: Tear of tendon of left lower extremity, initial encounter [S86.912A] <principal problem not specified>  Procedure(s) (LRB):  LEFT QUADRICEP TENDON REPAIR (Left) 1 Day Post-Op  Precautions:     Chart, physical therapy assessment, plan of care and goals were reviewed. ASSESSMENT:  Pt found supine in bed willing to work with PT. Pt has recently been changed to PWB 25%, she was WBAT this AM. Pt's pain has increased drastically. Resting at 8/10, 9/10 post PT. Pt was instructed in crutch usage with 25% WB. Pt initially had increased difficulty, but becomes more fluid over time. PT then negotiated 4 total stairs with bilateral crutches as she has no handrails at home, presenting with good adherence to sequencing. Pt returned to supine post ambulation and left with needs in reach and ICEMAN applied. Education: gait, stairs.   Progression toward goals:  [x]      Improving appropriately and progressing toward goals  []      Improving slowly and progressing toward goals  []      Not making progress toward goals and plan of care will be adjusted     PLAN:  Patient continues to benefit from skilled intervention to address the above impairments. Continue treatment per established plan of care. Discharge Recommendations:  Home Health  Further Equipment Recommendations for Discharge:  Crutches. Pt has RW. SUBJECTIVE:   Patient stated I had crutches once and almost broke my neck.     OBJECTIVE DATA SUMMARY:   Critical Behavior:  Orientation Level: Oriented X4  Functional Mobility Training:  Bed Mobility:  Supine to Sit: Modified independent  Sit to Supine: Modified independent  Transfers:  Sit to Stand: Stand-by asssistance  Stand to Sit: Stand-by asssistance  Bed to Chair: Modified independent  Balance:  Sitting: Intact  Standing: Impaired; With support  Standing - Static: Good  Standing - Dynamic : Fair  Ambulation/Gait Training:  Distance (ft): 280 Feet (ft)  Assistive Device: Crutches  Ambulation - Level of Assistance: Stand-by asssistance  Gait Abnormalities: Antalgic  Left Side Weight Bearing: As tolerated  Interventions: Verbal cues  Pain:  Pre tx pain: 8  Post tx pain: 9  Pain Scale 1: Numeric (0 - 10)  Pain Intensity 1: 9  Pain Location 1: Knee  Pain Orientation 1: Left  Pain Description 1: Aching  Activity Tolerance:   Fair  Please refer to the flowsheet for vital signs taken during this treatment. After treatment:   [] Patient left in no apparent distress sitting up in chair  [x] Patient left in no apparent distress in bed  [x] Call bell left within reach  [] Nursing notified  [] Caregiver present  [] Bed alarm activated  [] SCDs applied       Eric Manzanares PTA   Time Calculation: 30 mins    Mobility  Current  CI= 1-19%. The severity rating is based on the Level of Assistance required for Functional Mobility and ADLs. Mobility   Goal  CI= 1-19%. The severity rating is based on the Level of Assistance required for Functional Mobility and ADLs.

## 2017-12-13 ENCOUNTER — HOME CARE VISIT (OUTPATIENT)
Dept: HOME HEALTH SERVICES | Facility: HOME HEALTH | Age: 57
End: 2017-12-13

## 2017-12-13 ENCOUNTER — TELEPHONE (OUTPATIENT)
Dept: ORTHOPEDIC SURGERY | Facility: CLINIC | Age: 57
End: 2017-12-13

## 2017-12-13 NOTE — TELEPHONE ENCOUNTER
Ray Gonzalez from [facility] called stating the patient is refusing to have any home health care until after her appointment on 12/28/17 with Lashell Begum. He wanted the office to be aware.

## 2017-12-13 NOTE — TELEPHONE ENCOUNTER
Patient had Quad Tendon Repair on 12/11/17. Would like for Evan Roach to give her a call about mobilizer and weight bearing. She is getting different instructions and needs clarification. Please call her at [phone].

## 2017-12-13 NOTE — TELEPHONE ENCOUNTER
Called and discussed with patient.    Will follow up next Thursday at [address] [place] [address] in knee immobilizer

## 2017-12-13 NOTE — TELEPHONE ENCOUNTER
Spoke with Kash Bolanos and gave him 's orders. 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 As of now,   She is not getting physical therapy.

## 2017-12-18 NOTE — OP NOTES
[facility]  OPERATIVE REPORT    Johny Hewitt  MR#: [id number]  : 1960  ACCOUNT #: [de-identified]   DATE OF SERVICE: 2017    PREOPERATIVE DIAGNOSIS:  Quadriceps extensor mechanism tear, status post left total knee replacement. POSTOPERATIVE DIAGNOSIS:   Quadriceps extensor mechanism tear, status post left total knee replacement. PROCEDURE PERFORMED:  Repair of arthrotomy, status left total knee. SURGEON:  Cindy James MD     FIRST ASSISTANT:  Marisel Neri. SECOND ASSISTANT:  Nayla Andrew. ANESTHESIA:  Light general, Dr. Pierre Leonardo. COMPLICATIONS. None. SPECIMENS REMOVED:  None. ESTIMATED BLOOD LOSS:  10 mL. DESCRIPTION OF PROCEDURE:  After anesthetic was successfully induced,  it was confirmed the patient did receive her antibiotics. A standard prep and drape and time-out performed. I used the previous skin incision and extended the previous incision an incision approximately a few millimeters. Appearance was very clean. The findings were that the sutures actually held and that the sutures had pulled through the extensor mechanism. It was not a matter of the sutures breaking, it was more overload and sutures being literally pulled from the tendon itself. This indicated a level of trauma. We liberally irrigated out and freshened the edges up for the tendon and reapproximated with some towel clips and then did a repair using a combination of #2 Vicryl as well as some FiberWire. The repair was extremely stable and easy flexion to 120 degrees without any undue tensioning. We did flex up the knee to confirm excellent repair and routine skin closure at the end of the case. Instrument, sponge, needle counts correct. No complications. The patient tolerated the procedure well and blood loss less than 10 mL. The patient brought to recovery room in stable condition without complication.       Geri Langford MD       AM / JARRELL  D: 2017 07:15 T: 12/18/2017 16:38  JOB #: [id number]

## 2017-12-21 ENCOUNTER — OFFICE VISIT (OUTPATIENT)
Dept: ORTHOPEDIC SURGERY | Age: 57
End: 2017-12-21

## 2017-12-21 VITALS — HEIGHT: 67 IN | SYSTOLIC BLOOD PRESSURE: 140 MMHG | DIASTOLIC BLOOD PRESSURE: 87 MMHG | HEART RATE: 85 BPM

## 2017-12-21 DIAGNOSIS — Z96.652 S/P REVISION OF TOTAL KNEE, LEFT: Primary | ICD-10-CM

## 2017-12-21 NOTE — PROGRESS NOTES
[address] [place] [address]  [phone]           Patient: Tiffanie Childs                MRN: [id number]       SSN: [id number]  YOB: 1960        AGE: 61 y.o. SEX: female  There is no height or weight on file to calculate BMI. PCP: Kamille Galaviz MD  12/21/17      This office note has been dictated. REVIEW OF SYSTEMS:  Constitutional: Negative for fever, chills, weight loss and malaise/fatigue. HENT: Negative. Eyes: Negative. Respiratory: Negative. Cardiovascular: Negative. Gastrointestinal: No bowel incontinence or constipation. Genitourinary: No bladder incontinence or saddle anesthesia. Skin: Negative. Neurological: Negative. Endo/Heme/Allergies: Negative. Psychiatric/Behavioral: Negative. Musculoskeletal: As per HPI above. Past Medical History:   Diagnosis Date    Allergic rhinitis     Nausea & vomiting     Osteoarthritis of hip     Spinal stenosis          Current Outpatient Prescriptions:     ferrous sulfate 325 mg (65 mg iron) tablet, Take 1 Tab by mouth three (3) times daily (with meals). , Disp: 60 Tab, Rfl: 1    oxyCODONE-acetaminophen (PERCOCET 7.5) 7.5-325 mg per tablet, Take 1-2 Tabs by mouth every four (4) hours as needed. Max Daily Amount: 12 Tabs. Indications: Pain, Disp: 60 Tab, Rfl: 0    oxyCODONE-acetaminophen (PERCOCET 7.5) 7.5-325 mg per tablet, Take 1-2 Tabs by mouth every four (4) hours as needed. Max Daily Amount: 12 Tabs. (Patient taking differently: Take 1-2 Tabs by mouth every four (4) hours as needed for Pain.), Disp: 60 Tab, Rfl: 0    ondansetron hcl (ZOFRAN, AS HYDROCHLORIDE,) 4 mg tablet, Take 1 Tab by mouth every eight (8) hours as needed for Nausea. (Patient taking differently: Take 4 mg by mouth every eight (8) hours as needed for Nausea.  Indications: nausea), Disp: 30 Tab, Rfl: 1    ALPRAZolam (XANAX) 0.25 mg tablet, Take 1 Tab by mouth three (3) times daily as needed. Max Daily Amount: 0.75 mg. PRN sleep, Disp: 20 Tab, Rfl: 0    venlafaxine-SR (EFFEXOR-XR) 75 mg capsule, Take 37.5 mg by mouth daily. , Disp: , Rfl:     estradiol (ESTRACE) 1 mg tablet, Take 1 mg by mouth every seven (7) days. Indications: Patient takes half tab daily, Disp: , Rfl:     zolpidem (AMBIEN) 5 mg tablet, Take 1 Tab by mouth nightly as needed for Sleep., Disp: 25 Tab, Rfl: 0    albuterol (PROVENTIL HFA, VENTOLIN HFA) 90 mcg/actuation inhaler, Take 2 Puffs by inhalation every four (4) hours as needed for Wheezing., Disp: 1 Inhaler, Rfl: 0    celecoxib (CELEBREX) 200 mg capsule, Take 1 Cap by mouth two (2) times a day for 90 days. (Patient taking differently: Take 200 mg by mouth two (2) times a day. Indications: OSTEOARTHRITIS), Disp: 60 Cap, Rfl: 2    Allergies   Allergen Reactions    Other Plant, Animal, Environmental Anaphylaxis     poison ivy       Social History     Social History    Marital status:      Spouse name: N/A    Number of children: N/A    Years of education: N/A     Occupational History    Not on file. Social History Main Topics    Smoking status: Never Smoker    Smokeless tobacco: Never Used    Alcohol use 0.0 oz/week     0 Standard drinks or equivalent per week      Comment: socially    Drug use: No    Sexual activity: Not on file      Comment: Hysterectomy     Other Topics Concern    Not on file     Social History Narrative       Past Surgical History:   Procedure Laterality Date    ENDOSCOPY, COLON, DIAGNOSTIC  03-18-11    normal colon to cecum    HX BREAST REDUCTION      HX HEENT      skin cancer between eyes    HX HYSTERECTOMY      HX KNEE REPLACEMENT      HX LAP CHOLECYSTECTOMY  11/2015    HX TONSIL AND ADENOIDECTOMY      HX TUBAL LIGATION               We did see Ms. Joyce Frias for followup with regards to her left knee replacement.   The patient is now about five weeks status post knee replacement surgery and 10 days status post medial retinacular repair. She is doing well. We had her in a knee immobilizer for a week. She has been compliant. She has had no fevers, chills, systemic changes, or injuries to report and no chest pain or shortness of breath. She has had no troubles with the wound. PHYSICAL EXAMINATION:  In general, the patient is alert and oriented x 3 in no acute distress. The patient is well-developed, well-nourished, with a normal affect. The patient is afebrile. Examination of the left knee reveals the skin is intact. There is no ecchymosis and no warmth. The surgical wounds are healing nicely. The sutures are intact. She can hold the leg against gravity without complications. There are no defects noted in the extensor mechanism. She flexes to about 105° without much difficulty. ASSESSMENT:      1. Status post left total knee replacement. 2. Status post medial retinacular repair. PLAN:  At this point, she is going to be placed into a hinged brace. We will open the range of motion to 110°. We are going to have Home Health physical therapy work on range of motion activities with her. She is weightbearing as tolerated. She denies the need for analgesics.   She will follow up with us in one weeks time for evaluation and suture removal.                  JR Wolf KENNEDY, KATHY, ATC

## 2017-12-23 ENCOUNTER — HOME CARE VISIT (OUTPATIENT)
Dept: HOME HEALTH SERVICES | Facility: HOME HEALTH | Age: 57
End: 2017-12-23

## 2017-12-27 ENCOUNTER — HOME CARE VISIT (OUTPATIENT)
Dept: HOME HEALTH SERVICES | Facility: HOME HEALTH | Age: 57
End: 2017-12-27

## 2017-12-27 ENCOUNTER — OFFICE VISIT (OUTPATIENT)
Dept: ORTHOPEDIC SURGERY | Facility: CLINIC | Age: 57
End: 2017-12-27

## 2017-12-27 ENCOUNTER — TELEPHONE (OUTPATIENT)
Dept: ORTHOPEDIC SURGERY | Facility: CLINIC | Age: 57
End: 2017-12-27

## 2017-12-27 VITALS
RESPIRATION RATE: 18 BRPM | TEMPERATURE: 96.1 F | OXYGEN SATURATION: 95 % | HEIGHT: 67 IN | SYSTOLIC BLOOD PRESSURE: 124 MMHG | DIASTOLIC BLOOD PRESSURE: 83 MMHG | HEART RATE: 86 BPM

## 2017-12-27 DIAGNOSIS — Z96.652 STATUS POST LEFT PARTIAL KNEE REPLACEMENT: Primary | ICD-10-CM

## 2017-12-27 DIAGNOSIS — Z96.652 STATUS POST LEFT KNEE REPLACEMENT: ICD-10-CM

## 2017-12-27 DIAGNOSIS — Z96.652 S/P REVISION OF TOTAL KNEE, LEFT: Primary | ICD-10-CM

## 2017-12-27 NOTE — PROGRESS NOTES
[address] [place] [address]  [phone]           Patient: Javid Burk                MRN: [id number]       SSN: [id number]  YOB: 1960        AGE: 61 y.o. SEX: female  There is no height or weight on file to calculate BMI. PCP: Anita Quintana MD  12/27/17      This office note has been dictated. REVIEW OF SYSTEMS:  Constitutional: Negative for fever, chills, weight loss and malaise/fatigue. HENT: Negative. Eyes: Negative. Respiratory: Negative. Cardiovascular: Negative. Gastrointestinal: No bowel incontinence or constipation. Genitourinary: No bladder incontinence or saddle anesthesia. Skin: Negative. Neurological: Negative. Endo/Heme/Allergies: Negative. Psychiatric/Behavioral: Negative. Musculoskeletal: As per HPI above. Past Medical History:   Diagnosis Date    Allergic rhinitis     Nausea & vomiting     Osteoarthritis of hip     Spinal stenosis          Current Outpatient Prescriptions:     oxyCODONE-acetaminophen (PERCOCET 7.5) 7.5-325 mg per tablet, Take 1-2 Tabs by mouth every four (4) hours as needed. Max Daily Amount: 12 Tabs. (Patient taking differently: Take 1-2 Tabs by mouth every four (4) hours as needed for Pain.), Disp: 60 Tab, Rfl: 0    venlafaxine-SR (EFFEXOR-XR) 75 mg capsule, Take 37.5 mg by mouth daily. , Disp: , Rfl:     estradiol (ESTRACE) 1 mg tablet, Take 1 mg by mouth every seven (7) days. Indications: Patient takes half tab daily, Disp: , Rfl:     ferrous sulfate 325 mg (65 mg iron) tablet, Take 1 Tab by mouth three (3) times daily (with meals). , Disp: 60 Tab, Rfl: 1    oxyCODONE-acetaminophen (PERCOCET 7.5) 7.5-325 mg per tablet, Take 1-2 Tabs by mouth every four (4) hours as needed. Max Daily Amount: 12 Tabs.  Indications: Pain, Disp: 60 Tab, Rfl: 0    ondansetron hcl (ZOFRAN, AS HYDROCHLORIDE,) 4 mg tablet, Take 1 Tab by mouth every eight (8) hours as needed for Nausea. (Patient taking differently: Take 4 mg by mouth every eight (8) hours as needed for Nausea. Indications: nausea), Disp: 30 Tab, Rfl: 1    celecoxib (CELEBREX) 200 mg capsule, Take 1 Cap by mouth two (2) times a day for 90 days. (Patient taking differently: Take 200 mg by mouth two (2) times a day. Indications: OSTEOARTHRITIS), Disp: 60 Cap, Rfl: 2    ALPRAZolam (XANAX) 0.25 mg tablet, Take 1 Tab by mouth three (3) times daily as needed. Max Daily Amount: 0.75 mg. PRN sleep, Disp: 20 Tab, Rfl: 0    zolpidem (AMBIEN) 5 mg tablet, Take 1 Tab by mouth nightly as needed for Sleep., Disp: 25 Tab, Rfl: 0    albuterol (PROVENTIL HFA, VENTOLIN HFA) 90 mcg/actuation inhaler, Take 2 Puffs by inhalation every four (4) hours as needed for Wheezing., Disp: 1 Inhaler, Rfl: 0    Allergies   Allergen Reactions    Other Plant, Animal, Environmental Anaphylaxis     poison ivy       Social History     Social History    Marital status:      Spouse name: N/A    Number of children: N/A    Years of education: N/A     Occupational History    Not on file. Social History Main Topics    Smoking status: Never Smoker    Smokeless tobacco: Never Used    Alcohol use 0.0 oz/week     0 Standard drinks or equivalent per week      Comment: socially    Drug use: No    Sexual activity: Not on file      Comment: Hysterectomy     Other Topics Concern    Not on file     Social History Narrative       Past Surgical History:   Procedure Laterality Date    ENDOSCOPY, COLON, DIAGNOSTIC  03-18-11    normal colon to cecum    HX BREAST REDUCTION      HX HEENT      skin cancer between eyes    HX HYSTERECTOMY      HX KNEE REPLACEMENT      HX LAP CHOLECYSTECTOMY  11/2015    HX TONSIL AND ADENOIDECTOMY      HX TUBAL LIGATION               We did see Ms. Joyce Frias for followup with regards to her left total knee replacement. The patient has done well with her knee replacement.   Unfortunately, she had a tear, and she had a repeat surgery 16 days ago, primary repair of the medial retinaculum. She did well with it. She was placed into a hinged brace in full range of motion. Unfortunately, the braces were unavailable. She did get a brace from her son, which has worked well. She has been weightbearing as tolerated. Her pain is well controlled. She has had no recent fevers, chills, systemic changes, or injuries to report, and no chest pain or shortness of breath. She has had no troubles with the wound. PHYSICAL EXAMINATION:  In general, the patient is alert and oriented x 3 in no acute distress. The patient is well-developed, well-nourished, with a normal affect. The patient is afebrile. Examination of the left knee reveals the skin is intact. The surgical wounds are healing nicely. There is no ecchymosis, no warmth, and no signs for infection or cellulitis present. There is minimal swelling. No defect is noted to the extensor mechanism. There is no tilt to the patella noted. Stability is excellent. Range of motion is 0-125° without difficulty. ASSESSMENT:  Status post left knee replacement, status post medial retinacular repair. PLAN:  At this point, the patient will continue with the brace that she has, as it is quite sufficient. The sutures are removed today in the office without complications. She was given a prescription for Home Health physical therapy. We will see her back in two weeks time for evaluation. Certainly, I have instructed her not to let the therapist push on her knee at all. She has very good range of motion on her own and only wants to do it actively. I have also instructed her not to do any resistive exercises.                  JR Wolf KENNEDY, PAShayyC, ATC

## 2017-12-27 NOTE — TELEPHONE ENCOUNTER
Rodrigue Newman from [facility] states they could not  this patient because she is not homebound. Rodrigue Newman can be reached at [phone].

## 2018-01-11 ENCOUNTER — OFFICE VISIT (OUTPATIENT)
Dept: ORTHOPEDIC SURGERY | Age: 58
End: 2018-01-11

## 2018-01-11 VITALS
SYSTOLIC BLOOD PRESSURE: 142 MMHG | BODY MASS INDEX: 26.68 KG/M2 | WEIGHT: 170 LBS | OXYGEN SATURATION: 96 % | RESPIRATION RATE: 18 BRPM | HEIGHT: 67 IN | TEMPERATURE: 97.6 F | DIASTOLIC BLOOD PRESSURE: 70 MMHG | HEART RATE: 66 BPM

## 2018-01-11 DIAGNOSIS — Z96.652 S/P REVISION OF TOTAL KNEE, LEFT: Primary | ICD-10-CM

## 2018-01-11 RX ORDER — GABAPENTIN 300 MG/1
300 CAPSULE ORAL 2 TIMES DAILY
Qty: 60 CAP | Refills: 0 | Status: SHIPPED | OUTPATIENT
Start: 2018-01-11 | End: 2018-04-20

## 2018-01-11 RX ORDER — HYDROCODONE BITARTRATE AND ACETAMINOPHEN 7.5; 325 MG/1; MG/1
TABLET ORAL
COMMUNITY
End: 2018-01-11 | Stop reason: SDUPTHER

## 2018-01-11 RX ORDER — HYDROCODONE BITARTRATE AND ACETAMINOPHEN 7.5; 325 MG/1; MG/1
1 TABLET ORAL
Qty: 28 TAB | Refills: 0 | Status: SHIPPED | OUTPATIENT
Start: 2018-01-11 | End: 2018-02-22 | Stop reason: SDUPTHER

## 2018-01-11 NOTE — PROGRESS NOTES
[address] [place] [address]  [phone]           Patient: Paco Kaur                MRN: [id number]       SSN: [id number]  YOB: 1960        AGE: 61 y.o. SEX: female  Body mass index is 26.63 kg/(m^2). PCP: Doc Jiménez MD  01/11/18      This office note has been dictated. REVIEW OF SYSTEMS:  Constitutional: Negative for fever, chills, weight loss and malaise/fatigue. HENT: Negative. Eyes: Negative. Respiratory: Negative. Cardiovascular: Negative. Gastrointestinal: No bowel incontinence or constipation. Genitourinary: No bladder incontinence or saddle anesthesia. Skin: Negative. Neurological: Negative. Endo/Heme/Allergies: Negative. Psychiatric/Behavioral: Negative. Musculoskeletal: As per HPI above. Past Medical History:   Diagnosis Date    Allergic rhinitis     Nausea & vomiting     Osteoarthritis of hip     Spinal stenosis          Current Outpatient Prescriptions:     HYDROcodone-acetaminophen (NORCO) 7.5-325 mg per tablet, Take  by mouth., Disp: , Rfl:     ferrous sulfate 325 mg (65 mg iron) tablet, Take 1 Tab by mouth three (3) times daily (with meals). , Disp: 60 Tab, Rfl: 1    ondansetron hcl (ZOFRAN, AS HYDROCHLORIDE,) 4 mg tablet, Take 1 Tab by mouth every eight (8) hours as needed for Nausea. (Patient taking differently: Take 4 mg by mouth every eight (8) hours as needed for Nausea. Indications: nausea), Disp: 30 Tab, Rfl: 1    celecoxib (CELEBREX) 200 mg capsule, Take 1 Cap by mouth two (2) times a day for 90 days. (Patient taking differently: Take 200 mg by mouth two (2) times a day. Indications: OSTEOARTHRITIS), Disp: 60 Cap, Rfl: 2    ALPRAZolam (XANAX) 0.25 mg tablet, Take 1 Tab by mouth three (3) times daily as needed. Max Daily Amount: 0.75 mg. PRN sleep, Disp: 20 Tab, Rfl: 0    venlafaxine-SR (EFFEXOR-XR) 75 mg capsule, Take 37.5 mg by mouth daily. , Disp: , Rfl:     estradiol (ESTRACE) 1 mg tablet, Take 1 mg by mouth every seven (7) days. Indications: Patient takes half tab daily, Disp: , Rfl:     albuterol (PROVENTIL HFA, VENTOLIN HFA) 90 mcg/actuation inhaler, Take 2 Puffs by inhalation every four (4) hours as needed for Wheezing., Disp: 1 Inhaler, Rfl: 0    oxyCODONE-acetaminophen (PERCOCET 7.5) 7.5-325 mg per tablet, Take 1-2 Tabs by mouth every four (4) hours as needed. Max Daily Amount: 12 Tabs. Indications: Pain, Disp: 60 Tab, Rfl: 0    oxyCODONE-acetaminophen (PERCOCET 7.5) 7.5-325 mg per tablet, Take 1-2 Tabs by mouth every four (4) hours as needed. Max Daily Amount: 12 Tabs. (Patient taking differently: Take 1-2 Tabs by mouth every four (4) hours as needed for Pain.), Disp: 60 Tab, Rfl: 0    zolpidem (AMBIEN) 5 mg tablet, Take 1 Tab by mouth nightly as needed for Sleep., Disp: 25 Tab, Rfl: 0    Allergies   Allergen Reactions    Other Plant, Animal, Environmental Anaphylaxis     poison ivy       Social History     Social History    Marital status:      Spouse name: N/A    Number of children: N/A    Years of education: N/A     Occupational History    Not on file. Social History Main Topics    Smoking status: Never Smoker    Smokeless tobacco: Never Used    Alcohol use 0.0 oz/week     0 Standard drinks or equivalent per week      Comment: socially    Drug use: No    Sexual activity: Not on file      Comment: Hysterectomy     Other Topics Concern    Not on file     Social History Narrative       Past Surgical History:   Procedure Laterality Date    ENDOSCOPY, COLON, DIAGNOSTIC  03-18-11    normal colon to cecum    HX BREAST REDUCTION      HX HEENT      skin cancer between eyes    HX HYSTERECTOMY      HX KNEE REPLACEMENT      HX LAP CHOLECYSTECTOMY  11/2015    HX TONSIL AND ADENOIDECTOMY      HX TUBAL LIGATION                 We did see Ms. Rory Lefort for followup with regards to her left knee replacement.   She is status post left knee replacement and is status post medial retinacular repair. She is now one month status post repair. She has been in a hinged brace. She has had full range of motion. She has tried doing the bike a couple of days ago, and she states her knee became more swollen. It did settle down. She is having some burning discomfort at night. She has had no fevers, chills, systemic changes, or injuries to report. PHYSICAL EXAMINATION:  In general, the patient is alert and oriented x 3 in no acute distress. The patient is well-developed, well-nourished, with a normal affect. The patient is afebrile. Examination of the left knee reveals the skin is intact. The surgical wounds are healed nicely. There is no erythema, no ecchymosis, no warmth, and no signs of infection or cellulitis present. Range of motion is full. There is no extensor lag and no defects noted to the extensor mechanism. She does have a little bit of superficial fluid, which is mild. The knee is soft. There is no calf tenderness. There is a negative Fredy's sign. There are no signs for DVT present distally. ASSESSMENT:      1. Status post left knee replacement. 2. Status post medial retinacular repair. PLAN:  At this point, we are going to hold off on physical therapy still. I think she needs to let the knee settle down a little bit more. She will continue with ice. She was given a refill of her Norco, as well as a prescription for Neurontin. We will see her back in the office in two weeks time for evaluation and x-ray of the left knee and range of motion check. She was placed back into a hinged brace today.                JR Wolf KENNEDY, PAShayyC, ATC

## 2018-01-15 ENCOUNTER — APPOINTMENT (OUTPATIENT)
Dept: PHYSICAL THERAPY | Age: 58
End: 2018-01-15

## 2018-01-25 ENCOUNTER — OFFICE VISIT (OUTPATIENT)
Dept: ORTHOPEDIC SURGERY | Age: 58
End: 2018-01-25

## 2018-01-25 VITALS — DIASTOLIC BLOOD PRESSURE: 85 MMHG | SYSTOLIC BLOOD PRESSURE: 120 MMHG | OXYGEN SATURATION: 98 % | HEART RATE: 90 BPM

## 2018-01-25 DIAGNOSIS — Z96.652 STATUS POST TOTAL LEFT KNEE REPLACEMENT: Primary | ICD-10-CM

## 2018-01-25 NOTE — PROGRESS NOTES
[address] [place] [address]  [phone]           Patient: Marika Veloz                MRN: [id number]       SSN: [id number]  YOB: 1960        AGE: 61 y.o. SEX: female  There is no height or weight on file to calculate BMI. PCP: Myrna Brown MD  01/25/18      This office note has been dictated. REVIEW OF SYSTEMS:  Constitutional: Negative for fever, chills, weight loss and malaise/fatigue. HENT: Negative. Eyes: Negative. Respiratory: Negative. Cardiovascular: Negative. Gastrointestinal: No bowel incontinence or constipation. Genitourinary: No bladder incontinence or saddle anesthesia. Skin: Negative. Neurological: Negative. Endo/Heme/Allergies: Negative. Psychiatric/Behavioral: Negative. Musculoskeletal: As per HPI above. Past Medical History:   Diagnosis Date    Allergic rhinitis     Nausea & vomiting     Osteoarthritis of hip     Spinal stenosis          Current Outpatient Prescriptions:     gabapentin (NEURONTIN) 300 mg capsule, Take 1 Cap by mouth two (2) times a day., Disp: 60 Cap, Rfl: 0    HYDROcodone-acetaminophen (NORCO) 7.5-325 mg per tablet, Take 1 Tab by mouth every six (6) hours as needed for Pain. Max Daily Amount: 4 Tabs., Disp: 28 Tab, Rfl: 0    ferrous sulfate 325 mg (65 mg iron) tablet, Take 1 Tab by mouth three (3) times daily (with meals). , Disp: 60 Tab, Rfl: 1    ondansetron hcl (ZOFRAN, AS HYDROCHLORIDE,) 4 mg tablet, Take 1 Tab by mouth every eight (8) hours as needed for Nausea. (Patient taking differently: Take 4 mg by mouth every eight (8) hours as needed for Nausea. Indications: nausea), Disp: 30 Tab, Rfl: 1    celecoxib (CELEBREX) 200 mg capsule, Take 1 Cap by mouth two (2) times a day for 90 days. (Patient taking differently: Take 200 mg by mouth two (2) times a day.  Indications: OSTEOARTHRITIS), Disp: 60 Cap, Rfl: 2    ALPRAZolam Iza Lynn) 0.25 mg tablet, Take 1 Tab by mouth three (3) times daily as needed. Max Daily Amount: 0.75 mg. PRN sleep, Disp: 20 Tab, Rfl: 0    venlafaxine-SR (EFFEXOR-XR) 75 mg capsule, Take 37.5 mg by mouth daily. , Disp: , Rfl:     estradiol (ESTRACE) 1 mg tablet, Take 1 mg by mouth every seven (7) days. Indications: Patient takes half tab daily, Disp: , Rfl:     zolpidem (AMBIEN) 5 mg tablet, Take 1 Tab by mouth nightly as needed for Sleep., Disp: 25 Tab, Rfl: 0    albuterol (PROVENTIL HFA, VENTOLIN HFA) 90 mcg/actuation inhaler, Take 2 Puffs by inhalation every four (4) hours as needed for Wheezing., Disp: 1 Inhaler, Rfl: 0    oxyCODONE-acetaminophen (PERCOCET 7.5) 7.5-325 mg per tablet, Take 1-2 Tabs by mouth every four (4) hours as needed. Max Daily Amount: 12 Tabs. Indications: Pain, Disp: 60 Tab, Rfl: 0    oxyCODONE-acetaminophen (PERCOCET 7.5) 7.5-325 mg per tablet, Take 1-2 Tabs by mouth every four (4) hours as needed. Max Daily Amount: 12 Tabs. (Patient taking differently: Take 1-2 Tabs by mouth every four (4) hours as needed for Pain.), Disp: 60 Tab, Rfl: 0    Allergies   Allergen Reactions    Other Plant, Animal, Environmental Anaphylaxis     poison ivy       Social History     Social History    Marital status:      Spouse name: N/A    Number of children: N/A    Years of education: N/A     Occupational History    Not on file.      Social History Main Topics    Smoking status: Never Smoker    Smokeless tobacco: Never Used    Alcohol use 0.0 oz/week     0 Standard drinks or equivalent per week      Comment: socially    Drug use: No    Sexual activity: Not on file      Comment: Hysterectomy     Other Topics Concern    Not on file     Social History Narrative       Past Surgical History:   Procedure Laterality Date    ENDOSCOPY, COLON, DIAGNOSTIC  03-18-11    normal colon to cecum    HX BREAST REDUCTION      HX HEENT      skin cancer between eyes    HX HYSTERECTOMY      HX KNEE REPLACEMENT  HX LAP CHOLECYSTECTOMY  11/2015    HX TONSIL AND ADENOIDECTOMY      HX TUBAL LIGATION           We did see Ms. Nolan Metzger for followup with regards to her left knee replacement. The patient is now about three months status post knee replacement. She did have a subsequent medial retinacular tear, which was fixed. She is doing well with it. She has been protected a brace. She has been doing therapy on her own. She has outpatient therapy set up for Monday. The pain is well-controlled. There is no recent fever, chills, systemic changes, or injuries to report, and no chest pain or shortness of breath. PHYSICAL EXAMINATION:  In general, the patient is alert and oriented x 3 in no acute distress. The patient is well-developed, well-nourished, with a normal affect. The patient is afebrile. Examination of the left knee reveals the skin is intact. The surgical wounds are healed nicely. There is no ecchymosis, no warmth, minimal swelling, and no signs for infection or cellulitis present. Range of motion is full. She still has about 3° extensor lag. No defect is noted to the extensor mechanism. The patella tracks nicely. Stability is excellent. RADIOGRAPHS:  Radiographs in the office, AP, lateral, and skyline, show total knee components are well-fixed. No evidence of loosening or fracture is noted. The skyline shows the patella is tracking nicely. ASSESSMENT:      1. Status post left knee replacement. 2. Status post medial retinacular repair, left knee. PLAN:  At this point, the patient will continue with the brace. She will continue with outpatient physical therapy. A prescription was given. She denies the need for analgesics. She will follow up in four weeks time for evaluation. She was given a note to return to work next week four to six hours daily for one week and then start full duty afterwards. She is sedentary at her job.                      Bigg KENNEDY PA-C, ATC

## 2018-01-29 ENCOUNTER — HOSPITAL ENCOUNTER (EMERGENCY)
Age: 58
Discharge: HOME | End: 2018-01-29
Attending: EMERGENCY MEDICINE
Payer: COMMERCIAL

## 2018-01-29 ENCOUNTER — HOSPITAL ENCOUNTER (OUTPATIENT)
Dept: PHYSICAL THERAPY | Age: 58
Discharge: HOME | End: 2018-01-29
Payer: COMMERCIAL

## 2018-01-29 VITALS
DIASTOLIC BLOOD PRESSURE: 73 MMHG | BODY MASS INDEX: 28.09 KG/M2 | HEIGHT: 67 IN | HEART RATE: 82 BPM | OXYGEN SATURATION: 97 % | RESPIRATION RATE: 20 BRPM | TEMPERATURE: 98.4 F | WEIGHT: 179 LBS | SYSTOLIC BLOOD PRESSURE: 115 MMHG

## 2018-01-29 DIAGNOSIS — M79.605 LEFT LEG PAIN: Primary | ICD-10-CM

## 2018-01-29 LAB
APTT PPP: 27.1 SEC (ref 23–36.4)
BASOPHILS # BLD: 0 K/UL (ref 0–0.06)
BASOPHILS NFR BLD: 0 % (ref 0–2)
DIFFERENTIAL METHOD BLD: NORMAL
EOSINOPHIL # BLD: 0.3 K/UL (ref 0–0.4)
EOSINOPHIL NFR BLD: 4 % (ref 0–5)
ERYTHROCYTE [DISTWIDTH] IN BLOOD BY AUTOMATED COUNT: 12.2 % (ref 11.6–14.5)
HCT VFR BLD AUTO: 41.1 % (ref 35–45)
HGB BLD-MCNC: 12.8 G/DL (ref 12–16)
INR PPP: 1 (ref 0.8–1.2)
LYMPHOCYTES # BLD: 3.5 K/UL (ref 0.9–3.6)
LYMPHOCYTES NFR BLD: 44 % (ref 21–52)
MCH RBC QN AUTO: 28.3 PG (ref 24–34)
MCHC RBC AUTO-ENTMCNC: 31.1 G/DL (ref 31–37)
MCV RBC AUTO: 90.7 FL (ref 74–97)
MONOCYTES # BLD: 0.5 K/UL (ref 0.05–1.2)
MONOCYTES NFR BLD: 7 % (ref 3–10)
NEUTS SEG # BLD: 3.5 K/UL (ref 1.8–8)
NEUTS SEG NFR BLD: 45 % (ref 40–73)
PLATELET # BLD AUTO: 317 K/UL (ref 135–420)
PMV BLD AUTO: 10 FL (ref 9.2–11.8)
PROTHROMBIN TIME: 12.4 SEC (ref 11.5–15.2)
RBC # BLD AUTO: 4.53 M/UL (ref 4.2–5.3)
WBC # BLD AUTO: 7.9 K/UL (ref 4.6–13.2)

## 2018-01-29 PROCEDURE — 85610 PROTHROMBIN TIME: CPT | Performed by: EMERGENCY MEDICINE

## 2018-01-29 PROCEDURE — 85025 COMPLETE CBC W/AUTO DIFF WBC: CPT | Performed by: EMERGENCY MEDICINE

## 2018-01-29 PROCEDURE — 99283 EMERGENCY DEPT VISIT LOW MDM: CPT

## 2018-01-29 PROCEDURE — 97162 PT EVAL MOD COMPLEX 30 MIN: CPT

## 2018-01-29 PROCEDURE — 85730 THROMBOPLASTIN TIME PARTIAL: CPT | Performed by: EMERGENCY MEDICINE

## 2018-01-29 PROCEDURE — 93971 EXTREMITY STUDY: CPT

## 2018-01-29 PROCEDURE — 97535 SELF CARE MNGMENT TRAINING: CPT

## 2018-01-29 PROCEDURE — 74011250637 HC RX REV CODE- 250/637: Performed by: EMERGENCY MEDICINE

## 2018-01-29 RX ORDER — HYDROCODONE BITARTRATE AND ACETAMINOPHEN 5; 325 MG/1; MG/1
2 TABLET ORAL ONCE
Status: COMPLETED | OUTPATIENT
Start: 2018-01-29 | End: 2018-01-29

## 2018-01-29 RX ADMIN — HYDROCODONE BITARTRATE AND ACETAMINOPHEN 2 TABLET: 5; 325 TABLET ORAL at 13:16

## 2018-01-29 NOTE — ED NOTES
Patient continues to await results and disposition. Denies needs or concerns. No s/sx of distress noted.

## 2018-01-29 NOTE — PROGRESS NOTES
[facility]  [address]  Je[place]y marie, [address]  [phone] fax    Plan of Care/ Statement of Necessity for Physical Therapy Services    Patient name: Belem Nava Start of Care: 2018   Referral source: Nanci Wilcox MD : 1960    Medical Diagnosis: Presence of left artificial knee joint [Z96.652]   Onset Date: DOS 2017, revision 2017    Treatment Diagnosis: s/p L TKA with subsequent medial retinacular involvement and repair   Prior Hospitalization: see medical history Provider#: [id number]   Medications: Verified on Patient summary List    Comorbidities: R LIEN, arthritis   Prior Level of Function: nursing, unlimited with work activities     The [address] [place] [address] and following information is based on the information from the initial evaluation. Assessment/ key information: Pt is a 61year old female presenting s/p TKA with excellent reported progression 2017, however she reports insidous onset of pain and swelling several weeks afterwards. Upon ultrasound imaging and f/u surgical repair on 2017, it was found that she had pulled her sutures along her medial retinaculum out through the tissue and it was repaired. She since was in an immobilizer for several weeks, then progressed into an IROM brace with it currently unlocked to 110 degrees with ambulation. She reports complaints of continued weakness, buckling, swelling in the L knee. She also reports onset of sharp L calf pain intermittently over the last 3 days that was not present before with additional concerns for potential DVT. She presents with L knee AROM -1 to 0 to 135 degrees, L knee ext and flexin weakness, lag with SLR, impaired gait pattern, 4 cm circumferential L knee edema and diffuse L leg grade 1 pitting edema, and reported impaired stair negotiation and ambulation tolerance.  Pending screening/potential treatment for DVT, she will benefit from skilled PT management to address her impairments and improve her level of function, with emphasis on gradual progression of LLE strengthening, stability, and functional integration. Evaluation Complexity History MEDIUM  Complexity : 1-2 comorbidities / personal factors will impact the outcome/ POC ; Examination HIGH Complexity : 4+ Standardized tests and measures addressing body structure, function, activity limitation and / or participation in recreation  ;Presentation MEDIUM Complexity : Evolving with changing characteristics  ; Clinical Decision Making MEDIUM Complexity : FOTO score of 26-74  Overall Complexity Rating: MEDIUM  Problem List: pain affecting function, decrease ROM, decrease strength, edema affecting function, impaired gait/ balance, decrease ADL/ functional abilitiies, decrease activity tolerance, decrease flexibility/ joint mobility and decrease transfer abilities   Treatment Plan may include any combination of the following: Therapeutic exercise, Therapeutic activities, Neuromuscular re-education, Physical agent/modality, Gait/balance training, Manual therapy, Patient education and Self Care training  Patient / Family readiness to learn indicated by: asking questions, trying to perform skills and interest  Persons(s) to be included in education: patient (P)  Barriers to Learning/Limitations: None  Patient Goal (s): Quad strengthening.   Patient Self Reported Health Status: excellent  Rehabilitation Potential: good    Short Term Goals: To be accomplished in 2 weeks:  1. Pt will be issued updated HEP upon clearance 2/2 DVT concerns to facilitate pt self management. 2. Patient will report performance of HEP at least 2 times per day to facilitate improved outcomes and improved self management. 3. Pt will demonstrate L quad SLR void of lag to improve stability during ambulation. Long Term Goals: To be accomplished in 4 weeks:  1.  Patient will report FOTO score of 68 or better to indicate significant improvement in functional status. 2. Pt will demonstrate normalized gait pattern void of instability 300 feet to improve ease of community ambulation upon return to work. 3. Pt will demonstrate ability to perform sit to stand transfer void of UE assist to improve ease of transfers. 4. Pt will demonstrate ability to negotiate 6 inch stairs void of compensation to improve ease of community mobility. Frequency / Duration: Patient to be seen 1-2 times per week for 4 weeks. Will likely continue 1x/week 2/2 pt request d/t financial concerns. Patient/ Caregiver education and instruction: Diagnosis, prognosis, self care, activity modification and exercises   [x]  Plan of care has been reviewed with RAMIREZ Fernandez, PT, DPT, ATC 1/29/2018 1:01 PM    ________________________________________________________________________    I certify that the above Therapy Services are being furnished while the patient is under my care. I agree with the treatment plan and certify that this therapy is necessary.     [de-identified] Signature:____________________  Date:____________Time: _________    Please sign and return to [facility] [address] Rj Delgado [address] [place] [address], [facility] [address]  [phone] fax

## 2018-01-29 NOTE — ED PROVIDER NOTES
EMERGENCY DEPARTMENT HISTORY AND PHYSICAL EXAM    12:39 PM      Date: 1/29/2018  Patient Name: Luan Kussmaul    History of Presenting Illness     Chief Complaint   Patient presents with    Leg Pain         History Provided By: Patient    Chief Complaint: LE edema  Duration: 7 Weeks  Timing:  Constant and Continued  Location: Left knee  Quality:  Severity: 2 out of 10  Modifying Factors: Knee replacement, quad tendon repair, 1st PT today  Associated Symptoms: Left LE pain. Denies CP or SOB      Additional History (Context): Luan Kussmaul is a 61 y.o. female with hx of spinal stenosis, osteoarthritis, tubal ligation, and cholecystectomy who presents with c/o constant and continued 2/10 left knee LE edema continued since quad tendon repair 7 weeks ago (12/11/17) following knee replacement on Nov 13th. Pt reports that today was her first day of physical therapy and was recommended to come to ED upon arrival secondary to knee pain and swelling. Pt states the left leg has been more tender for the last couple days and that taking Rx Norco is not relieving pain. Denies CP or SOB.  at bedside.     Pt followed by Dr. Eustaquio Goltz (Ortho)    PCP: Jesenia Dawson MD      Past History     Past Medical History:  Past Medical History:   Diagnosis Date    Allergic rhinitis     Nausea & vomiting     Osteoarthritis of hip     Spinal stenosis        Past Surgical History:  Past Surgical History:   Procedure Laterality Date    ENDOSCOPY, COLON, DIAGNOSTIC  03-18-11    normal colon to cecum    HX BREAST REDUCTION      HX HEENT      skin cancer between eyes    HX HYSTERECTOMY      HX KNEE REPLACEMENT      HX LAP CHOLECYSTECTOMY  11/2015    HX TONSIL AND ADENOIDECTOMY      HX TUBAL LIGATION         Family History:  Family History   Problem Relation Age of Onset    Other Mother      atrial fibrillation    Colon Polyps Mother     Breast Cancer Mother     Colon Polyps Brother      half brother   [facility] Heart Disease Sister      half sister ASHD    Heart Attack Sister     Other Son      transposition of the great vessels and surgery for this    Other Son      congestive heart failure       Social History:  Social History   Substance Use Topics    Smoking status: Never Smoker    Smokeless tobacco: Never Used    Alcohol use 0.0 oz/week     0 Standard drinks or equivalent per week      Comment: socially       Allergies: Allergies   Allergen Reactions    Other Plant, Animal, Environmental Anaphylaxis     poison ivy         Review of Systems       Review of Systems   Constitutional: Negative for fever. HENT: Negative for sore throat. Eyes: Negative for redness. Respiratory: Negative for shortness of breath and wheezing. Cardiovascular: Positive for leg swelling (LLE ). Negative for chest pain. Gastrointestinal: Negative for abdominal pain and nausea. Genitourinary: Negative for dysuria. Musculoskeletal: Negative for neck stiffness. Skin: Negative for pallor. Neurological: Negative for headaches. Hematological: Does not bruise/bleed easily. All other systems reviewed and are negative. Physical Exam     Visit Vitals    /87 (BP 1 Location: Left arm)    Pulse 83    Temp 98.4 °F (36.9 °C)    Resp 20    Ht 5' 7\" (1.702 m)    Wt 81.2 kg (179 lb)    SpO2 97%    BMI 28.04 kg/m2         Physical Exam   Constitutional: She is oriented to person, place, and time. She appears well-developed and well-nourished. No distress. HENT:   Head: Normocephalic and atraumatic. Mouth/Throat: Oropharynx is clear and moist.   Eyes: Conjunctivae are normal. Pupils are equal, round, and reactive to light. Neck: Normal range of motion. Neck supple. Cardiovascular: Intact distal pulses. Capillary refill < 3 seconds   Pulmonary/Chest: Effort normal. No respiratory distress. Musculoskeletal: Normal range of motion. She exhibits edema (pitting edema to left LE).    Left popliteal tenderness  Mild upper left calf tenderness, more at popliteal region  No inner thigh tenderness  No LE compartment tightness  No leg discoloration  Good dorsalis pedal pulses   Lymphadenopathy:     She has no cervical adenopathy. Neurological: She is alert and oriented to person, place, and time. No cranial nerve deficit. She exhibits normal muscle tone. Coordination normal.   Sensation intact   Skin: Skin is warm and dry. She is not diaphoretic. Healing wound of left knee from incision with no surrounding erythema. No erythema of LE   Psychiatric: Her behavior is normal.   Nursing note and vitals reviewed. Diagnostic Study Results     Labs -  Recent Results (from the past 12 hour(s))   CBC WITH AUTOMATED DIFF    Collection Time: 01/29/18 12:35 PM   Result Value Ref Range    WBC 7.9 4.6 - 13.2 K/uL    RBC 4.53 4.20 - 5.30 M/uL    HGB 12.8 12.0 - 16.0 g/dL    HCT 41.1 35.0 - 45.0 %    MCV 90.7 74.0 - 97.0 FL    MCH 28.3 24.0 - 34.0 PG    MCHC 31.1 31.0 - 37.0 g/dL    RDW 12.2 11.6 - 14.5 %    PLATELET 349 769 - 110 K/uL    MPV 10.0 9.2 - 11.8 FL    NEUTROPHILS 45 40 - 73 %    LYMPHOCYTES 44 21 - 52 %    MONOCYTES 7 3 - 10 %    EOSINOPHILS 4 0 - 5 %    BASOPHILS 0 0 - 2 %    ABS. NEUTROPHILS 3.5 1.8 - 8.0 K/UL    ABS. LYMPHOCYTES 3.5 0.9 - 3.6 K/UL    ABS. MONOCYTES 0.5 0.05 - 1.2 K/UL    ABS. EOSINOPHILS 0.3 0.0 - 0.4 K/UL    ABS. BASOPHILS 0.0 0.0 - 0.06 K/UL    DF AUTOMATED     PROTHROMBIN TIME + INR    Collection Time: 01/29/18 12:35 PM   Result Value Ref Range    Prothrombin time 12.4 11.5 - 15.2 sec    INR 1.0 0.8 - 1.2     PTT    Collection Time: 01/29/18 12:35 PM   Result Value Ref Range    aPTT 27.1 23.0 - 36.4 SEC       Radiologic Studies -   DUPLEX LOWER EXT VENOUS LEFT         Duplex Prelim Results:  INTERPRETATION/FINDINGS  Duplex images were obtained using 2-D gray scale, color flow, and  spectral Doppler analysis. Left leg :  1.  Deep vein(s) visualized include the common femoral, proximal  femoral, mid femoral, distal femoral, popliteal(above knee),  popliteal(fossa), popliteal(below knee), posterior tibial and peroneal   veins. 2. No evidence of deep venous thrombosis detected in the veins  visualized. 3. No evidence of deep vein thrombosis in the contralateral common  femoral vein. 4. Superficial vein(s) visualized include the great saphenous vein. 5. No evidence of superficial thrombosis detected. Medical Decision Making   I am the first provider for this patient. I reviewed the vital signs, available nursing notes, past medical history, past surgical history, family history and social history. Vital Signs-Reviewed the patient's vital signs. Pulse Oximetry Analysis -  97%   on room air (Interpretation) Normal    Cardiac Monitor:  Rate: 83 bpm   Rhythm:  Normal Sinus Rhythm     Records Reviewed: Nursing Notes (Time of Review: 12:27 PM)    Provider Notes (Medical Decision Making):  ddx dvt, strain, spasm, post op pain    Get duplex  Has no compartment tightness, neurovascular intact. Gave analgesia. MDM    Medications   HYDROcodone-acetaminophen (NORCO) 5-325 mg per tablet 2 Tab (2 Tabs Oral Given 1/29/18 1316)     I have reassessed the patient. I have discussed the workup, results and plan with the patient and patient is in agreement. Patient is feeling better. Patient was discharge in stable condition. Patient was given outpatient follow up. Patient is to return to emergency department if any new or worsening condition. Diagnosis     Clinical Impression:   1.  Left leg pain        Disposition: Discharge    Follow-up Information     Follow up With Details Comments Contact Info    [facility] EMERGENCY DEPT Go to As needed, If symptoms worsen Amanda Root [address] Carlee Childs MD Call in 1 day For follow up [address] [facility] [address]             Patient's Medications   Start Taking    No medications on file   Continue Taking    ALBUTEROL (PROVENTIL HFA, VENTOLIN HFA) 90 MCG/ACTUATION INHALER    Take 2 Puffs by inhalation every four (4) hours as needed for Wheezing. ALPRAZOLAM (XANAX) 0.25 MG TABLET    Take 1 Tab by mouth three (3) times daily as needed. Max Daily Amount: 0.75 mg. PRN sleep    ESTRADIOL (ESTRACE) 1 MG TABLET    Take 1 mg by mouth every seven (7) days. Indications: Patient takes half tab daily    GABAPENTIN (NEURONTIN) 300 MG CAPSULE    Take 1 Cap by mouth two (2) times a day. HYDROCODONE-ACETAMINOPHEN (NORCO) 7.5-325 MG PER TABLET    Take 1 Tab by mouth every six (6) hours as needed for Pain. Max Daily Amount: 4 Tabs. VENLAFAXINE-SR (EFFEXOR-XR) 75 MG CAPSULE    Take 37.5 mg by mouth daily. ZOLPIDEM (AMBIEN) 5 MG TABLET    Take 1 Tab by mouth nightly as needed for Sleep. These Medications have changed    No medications on file   Stop Taking    CELECOXIB (CELEBREX) 200 MG CAPSULE    Take 1 Cap by mouth two (2) times a day for 90 days. FERROUS SULFATE 325 MG (65 MG IRON) TABLET    Take 1 Tab by mouth three (3) times daily (with meals). ONDANSETRON HCL (ZOFRAN, AS HYDROCHLORIDE,) 4 MG TABLET    Take 1 Tab by mouth every eight (8) hours as needed for Nausea. OXYCODONE-ACETAMINOPHEN (PERCOCET 7.5) 7.5-325 MG PER TABLET    Take 1-2 Tabs by mouth every four (4) hours as needed. Max Daily Amount: 12 Tabs. OXYCODONE-ACETAMINOPHEN (PERCOCET 7.5) 7.5-325 MG PER TABLET    Take 1-2 Tabs by mouth every four (4) hours as needed. Max Daily Amount: 12 Tabs.  Indications: Pain     _______________________________    Attestations:  Scribe Attestation     Joanne Mendez acting as a scribe for and in the presence of Maikel Villela, DO      January 29, 2018 at 12:27 PM       Provider Attestation:      I personally performed the services described in the documentation, reviewed the documentation, as recorded by the scribe in my presence, and it accurately and completely records my words and actions.  January 29, 2018 at 12:27 PM - Sameera Cox DO    _______________________________

## 2018-01-29 NOTE — PROCEDURES
[facility]  *** FINAL REPORT ***    Name: Poonam Turner  MRN: [id number]  : 1960  HIS Order #: [id number]  [id number] [facility] Visit #: [id number]  Date: 2018    TYPE OF TEST: Peripheral Venous Testing    REASON FOR TEST  Limb swelling    Left Leg:-  Deep venous thrombosis:           No  Superficial venous thrombosis:    No  Deep venous insufficiency:        Not examined  Superficial venous insufficiency: Not examined      INTERPRETATION/FINDINGS  Duplex images were obtained using 2-D gray scale, color flow, and  spectral Doppler analysis. Left leg :  1. Deep vein(s) visualized include the common femoral, proximal  femoral, mid femoral, distal femoral, popliteal(above knee),  popliteal(fossa), popliteal(below knee), posterior tibial and peroneal   veins. 2. No evidence of deep venous thrombosis detected in the veins  visualized. 3. No evidence of deep vein thrombosis in the contralateral common  femoral vein. 4. Superficial vein(s) visualized include the great saphenous vein. 5. No evidence of superficial thrombosis detected. ADDITIONAL COMMENTS    I have personally reviewed the data relevant to the interpretation of  this  study. TECHNOLOGIST: Bhumi Mari, [facility], RVT/  Signed: 2018 01:36 PM    PHYSICIAN: Nguyen Hutchison D.O.   Signed: 2018 06:35 PM

## 2018-01-29 NOTE — PROGRESS NOTES
PT DAILY TREATMENT NOTE/KNEE EVAL 3-16    Patient Name: Jerald Wise  Date:2018  : 1960  [x]  Patient  Verified  Payor: Angel Kurt / Plan: [place] / Product Type: [employer] /    In time:11:07am  Out time:11:40am  Total Treatment Time (min): 33  Total Timed Codes (min): 11  1:1 Treatment Time ( [address] only): 33   Visit #: 1 of 4-8    Treatment Area: Presence of left artificial knee joint [Z96.652]    SUBJECTIVE  Pain Level (0-10 scale): 8  []constant [x]intermittent []improving []worsening []no change since onset    Any medication changes, allergies to medications, adverse drug reactions, diagnosis change, or new procedure performed?: [x] No    [] Yes (see summary sheet for update)  Subjective functional status/changes:     Pt reports TKA with excellent progression 2017, however she reports insidous onset of pain and swelling several weeks afterwards. Upon ultrasound imaging and f/u surgical repair on 2017, it was found that she had pulled her sutures along her medial retinaculum out through the tissue and it was repaired. She since was in an immobilizer for several weeks, then progressed into an IROM brace with it currently unlocked to 110 degrees with ambulation. She reports complaints of continued weakness, buckling, swelling in the L knee. She also reports onset of sharp L calf pain intermittently over the last 3 days that was not present before.     PLOF: nursing, unlimited with work activities  Limitations to PLOF: limited ambulation tolerance 2/2 knee pain onset and weakness  Mechanism of Injury: post op  Current symptoms/Complaints: intermittent sharp L calf pain, knee swelling, buckling and weakness, limited walking tolerance  Previous Treatment/Compliance: prior HHPT, revision/retinacular repair 2017, L TKA 2017  PMHx/Surgical Hx: R LIEN, L TKA 2017, retinacular repair 2017, arthritis, otherwise unremarkable per pt report  Work Hx: nurse  Living Situation:   Pt Goals: see intake  Barriers: []pain []financial []time []transportation []other  Motivation: appears well motivated  Substance use: []Alcohol []Tobacco []other:   FABQ Score: []low []elevate  Cognition: A & O x 4    Other:    OBJECTIVE/EXAMINATION  Domestic Life:   Activity/Recreational Limitations:   Mobility:   Self Care:     22 min [x]Eval                  []Re-Eval     Self Care: 11 minutes pt education regarding relevant anatomy, diagnosis, prognosis, plan of care, activity modification, edema control, referral for DVT screening          With   [] TE   [] TA   [] neuro   [] other: Patient Education: [x] Review HEP    [] Progressed/Changed HEP based on:   [] positioning   [] body mechanics   [] transfers   [] heat/ice application    [] other:      Other Objective/Functional Measures:     Physical Therapy Evaluation - Knee    Posture: [] Varus    [] Valgus    [] Recurvatum        [] Tibial Torsion    [] Foot Supination    [] Foot Pronation    Describe:    Gait:  [] Normal    [x] Abnormal    [x] Antalgic    [] NWB    Device:    Describe:    ROM / Strength  [] Unable to assess                  AROM                      PROM                   Strength (1-5)    Left Right Left Right Left Right   Hip Flexion     5 5    Extension          Abduction     4 4    Adduction         Knee Flexion 135 145   4 5    Extension -1 -2   4- 5   Ankle Plantarflexion     3 pain 5    Dorsiflexion     4 4       Flexibility: [] Unable to assess at this time  Hamstrings:    (L) Tightness= [x] WNL   [] Min   [] Mod   [] Severe    (R) Tightness= [x] WNL   [] Min   [] Mod   [] Severe  Quadriceps:    (L) Tightness= [] WNL   [] Min   [] Mod   [] Severe    (R) Tightness= [] WNL   [] Min   [] Mod   [] Severe  Gastroc:      (L) Tightness= [] WNL   [] Min   [x] Mod   [] Severe    (R) Tightness= [] WNL   [] Min   [] Mod   [] Severe  Other:    Palpation:   Neg/Pos  Neg/Pos  Neg/Pos   Joint Line  Quad tendon  Patellar ligament    Patella (+) Fibular head  Pes Anserinus    Tibial tubercle  Hamstring tendons  Infrapatellar fat pad (+)     TTP light pressure L gastroc/soleus    Optional Tests:  Patellar Positioning (Static)   []L []R Normal []L []R Lateral   []L []R [place] Gray      []L []R Medial   []L []R [place]    Patellar Tracking   []L []R Glide (Lat)   []L []R Tilt (Lat)     []L []R Glide (Med)  []L []R Tilt (Med)      []L []R Tile (Inf)     Patellar Mobility   [x]L []R Hypermobile []L []R Hypomobile         Girth Measurements:     Cm at  Cm above joint line   Cm at   Cm below joint line  Cm at joint line   Left     4cm > contralateral leg   Right           Lachmans  [] Neg    [] Pos Posterior Drawer [] Neg    [] Pos  Pivot Shift  [] Neg    [] Pos Posterior Sag  [] Neg    [] Pos  HALEIGH   [] Neg    [] Pos Brian's Test [] Neg    [] Pos  ALRI   [] Neg    [] Pos Squat   [] Neg    [] Pos  Valgus@ 0 Degrees [] Neg    [] Pos Geo-Star [] Neg    [] Pos  Valgus@ 30 Degrees [] Neg    [] Pos Patellar Apprehension [] Neg    [] Pos  Varus@ 0 Degrees [] Neg    [] Pos Watson's Compression [] Neg    [] Pos  Varus@ 30 Degrees [] Neg    [] Pos Ely's Test  [] Neg    [] Pos  Apley's Compression [] Neg    [] Pos Scarlett's Test  [] Neg    [] Pos  Apley's Distraction [] Neg    [] Pos Stroke Test  [] Neg    [] Pos   Anterior Drawer [] Neg    [] Pos Fluctuation Test [] Neg    [] Pos  Other:                  [] Neg    [] Pos                 Other tests/comments:  Sharp stabbing TTP with light pressure to lateral L gastroc/soleus  Diffuse grade 1 pitting edema in the LLE Below the knee  Reports onset of sharp pain in gastroc near knee beginning mostly 3 days ago  1-2 cm circumferential calf swelling in LLE ~10 cm below tibial tuberosity as compared contralaterally     Well Clinical DVT criteria 3 points: high risk for DVT    Fair quad set    SLR with lag and struggles to perform    Arrived with IROM brace donned    Pain Level (0-10 scale) post treatment: 8    ASSESSMENT/Changes in Function: PER POC. Patient will continue to benefit from skilled PT services to modify and progress therapeutic interventions, address functional mobility deficits, address ROM deficits, address strength deficits, analyze and address soft tissue restrictions, analyze and cue movement patterns, analyze and modify body mechanics/ergonomics and instruct in home and community integration to attain remaining goals. [x]  See Plan of Care  []  See progress note/recertification  []  See Discharge Summary         Progress towards goals / Updated goals:  Per POC. PLAN  []  Upgrade activities as tolerated     []  Continue plan of care  []  Update interventions per flow sheet       []  Discharge due to:_  [x]  Other:_Advised pt to f/u @ [facility] ER to screen for LE DVT. Pending clearance to return to PT, will progress with gentle progression of quad strengthening.     Jose De Oliveira, PT, DPT, ATC 1/29/2018  11:15 AM

## 2018-01-29 NOTE — DISCHARGE INSTRUCTIONS
Leg Pain: Care Instructions  Your Care Instructions  Many things can cause leg pain. Too much exercise or overuse can cause a muscle cramp (or charley horse). You can get leg cramps from not eating a balanced diet that has enough potassium, calcium, and other minerals. If you do not drink enough fluids or are taking certain medicines, you may develop leg cramps. Other causes of leg pain include injuries, blood flow problems, nerve damage, and twisted and enlarged veins (varicose veins). You can usually ease pain with self-care. Your doctor may recommend that you rest your leg and keep it elevated. Follow-up care is a key part of your treatment and safety. Be sure to make and go to all appointments, and call your doctor if you are having problems. It's also a good idea to know your test results and keep a list of the medicines you take. How can you care for yourself at home? · Take pain medicines exactly as directed. ¨ If the doctor gave you a prescription medicine for pain, take it as prescribed. ¨ If you are not taking a prescription pain medicine, ask your doctor if you can take an over-the-counter medicine. · Take any other medicines exactly as prescribed. Call your doctor if you think you are having a problem with your medicine. · Rest your leg while you have pain, and avoid standing for long periods of time. · Prop up your leg at or above the level of your heart when possible. · Make sure you are eating a balanced diet that is rich in calcium, potassium, and magnesium, especially if you are pregnant. · If directed by your doctor, put ice or a cold pack on the area for 10 to 20 minutes at a time. Put a thin cloth between the ice and your skin. · Your leg may be in a splint, a brace, or an elastic bandage, and you may have crutches to help you walk. Follow your doctor's directions about how long to wear supports and how to use the crutches. When should you call for help?   Call 911 anytime you think you may need emergency care. For example, call if:  ? · You have sudden chest pain and shortness of breath, or you cough up blood. ? · Your leg is cool or pale or changes color. ?Call your doctor now or seek immediate medical care if:  ? · You have increasing or severe pain. ? · Your leg suddenly feels weak and you cannot move it. ? · You have signs of a blood clot, such as:  ¨ Pain in your calf, back of the knee, thigh, or groin. ¨ Redness and swelling in your leg or groin. ? · You have signs of infection, such as:  ¨ Increased pain, swelling, warmth, or redness. ¨ Red streaks leading from the sore area. ¨ Pus draining from a place on your leg. ¨ A fever. ? · You cannot bear weight on your leg. ? Watch closely for changes in your health, and be sure to contact your doctor if:  ? · You do not get better as expected. Where can you learn more? Go to http://sarahi-musa.info/. Enter S863 in the search box to learn more about \"Leg Pain: Care Instructions. \"  Current as of: March 20, 2017  Content Version: 11.4  © 0350-8388 [employer]. Care instructions adapted under license by [employer] (which disclaims liability or warranty for this information). If you have questions about a medical condition or this instruction, always ask your healthcare professional. [place] [address] any warranty or liability for your use of this information.

## 2018-02-01 ENCOUNTER — HOSPITAL ENCOUNTER (OUTPATIENT)
Dept: PHYSICAL THERAPY | Age: 58
Discharge: HOME | End: 2018-02-01
Payer: COMMERCIAL

## 2018-02-01 PROCEDURE — 97110 THERAPEUTIC EXERCISES: CPT

## 2018-02-01 PROCEDURE — 97112 NEUROMUSCULAR REEDUCATION: CPT

## 2018-02-01 PROCEDURE — 97016 VASOPNEUMATIC DEVICE THERAPY: CPT

## 2018-02-01 NOTE — PROGRESS NOTES
PT DAILY TREATMENT NOTE 12    Patient Name: Nathaniel Benedict  Date:2018  : 1960  [x]  Patient  Verified  Payor: Tasha Ritchie / Plan: [employer] / Product Type: [employer] /    In time:9:00am  Out time:9:46am  Total Treatment Time (min): 55  Visit #: 2 of 4-8    Treatment Area: Presence of left artificial knee joint [Z96.652]    SUBJECTIVE  Pain Level (0-10 scale): 2-3  Any medication changes, allergies to medications, adverse drug reactions, diagnosis change, or new procedure performed?: [x] No    [] Yes (see summary sheet for update)  Subjective functional status/changes:   [] No changes reported  Pt confirms that she went to the ER and was (-) for DVT. She reports she has since stopped using her knee brace and her pain has significantly declined. She attributes her calf pain to brace use and the fit causing pressure to the back of her leg. She reports pain and swelling has improved and \"I'm walking a lot better than I was\". OBJECTIVE  26 min Therapeutic Exercise:  [x] See flow sheet :   Rationale: increase ROM and increase strength to improve the patients ability to improve ease of ADLs. 10 min Neuromuscular Re-education:  [x]  See flow sheet :   Rationale: increase strength and improve coordination  to improve the patients ability to improve quad activation to improve knee stability. Modality rationale: decrease edema, decrease inflammation and decrease pain to improve the patients ability to improve ease of ADLs.    Min Type Additional Details    [] Estim:  []Unatt       []IFC  []Premod                        []Other:  []w/ice   []w/heat  Position:  Location:    [] Estim: []Att    []TENS instruct  []NMES                    []Other:  []w/US   []w/ice   []w/heat  Position:  Location:    []  Traction: [] Cervical       []Lumbar                       [] Prone          []Supine                       []Intermittent   []Continuous Lbs:  [] before manual  [] after manual    [] Ultrasound: []Continuous   [] Pulsed                           []1MHz   []3MHz Location:  W/cm2:    []  Iontophoresis with dexamethasone         Location: [] Take home patch   [] In clinic    []  Ice     []  heat  []  Ice massage  []  Laser   []  Anodyne Position:  Location:    []  Laser with stim  []  Other: Position:  Location:   10 [x]  Vasopneumatic Device supine with elevation to knee Pressure:       [] lo [x] med [] hi   Temperature: [x] lo [] med [] hi   [] Skin assessment post-treatment:  [x]intact []redness- no adverse reaction    []redness  adverse reaction:           With   [] TE   [] TA   [] neuro   [] other: Patient Education: [x] Review HEP    [] Progressed/Changed HEP based on:   [] positioning   [] body mechanics   [] transfers   [] heat/ice application    [] other:      Other Objective/Functional Measures: minimal quad lag with SLR into flexion with 1# resistance    Good quad set    Good minisquat form void of c/o pain provocation     Pain Level (0-10 scale) post treatment: 2    ASSESSMENT/Changes in Function: Pt demonstrates improved activity tolerance and grossly improved quad strength, though minimal residual quad lag present with rapid fatigue onset. Continue gradual progression of strengthening as tolerated and progress into more closed chain activity next visit as tolerated with HEP update as pt primarily is looking to self manage with HEP with limited formal PT f/u. Patient will continue to benefit from skilled PT services to modify and progress therapeutic interventions, address functional mobility deficits, address strength deficits, analyze and address soft tissue restrictions, analyze and cue movement patterns, analyze and modify body mechanics/ergonomics and instruct in home and community integration to attain remaining goals.      []  See Plan of Care  []  See progress note/recertification  []  See Discharge Summary         Progress towards goals / Updated goals:  Short Term Goals: To be accomplished in 2 weeks:  1. Pt will be issued updated HEP upon clearance 2/2 DVT concerns to facilitate pt self management. Met 2/1/2018              2. Patient will report performance of HEP at least 2 times per day to facilitate improved outcomes and improved self management. 3. Pt will demonstrate L quad SLR void of lag to improve stability during ambulation. Long Term Goals: To be accomplished in 4 weeks:  1. Patient will report FOTO score of 68 or better to indicate significant improvement in functional status. 2. Pt will demonstrate normalized gait pattern void of instability 300 feet to improve ease of community ambulation upon return to work. 3. Pt will demonstrate ability to perform sit to stand transfer void of UE assist to improve ease of transfers. 4. Pt will demonstrate ability to negotiate 6 inch stairs void of compensation to improve ease of community mobility.     PLAN  [x]  Upgrade activities as tolerated     [x]  Continue plan of care  []  Update interventions per flow sheet       []  Discharge due to:_  []  Other:_      Angelina Dias, PT, DPT, ATC 2/1/2018  9:23 AM    Future Appointments  Date Time Provider Yang Sanchez   2/22/2018 3:00 PM KATHY Kim Be 68

## 2018-02-02 ENCOUNTER — OFFICE VISIT (OUTPATIENT)
Dept: ORTHOPEDIC SURGERY | Age: 58
End: 2018-02-02

## 2018-02-02 ENCOUNTER — HOSPITAL ENCOUNTER (OUTPATIENT)
Dept: LAB | Age: 58
Discharge: HOME | End: 2018-02-02

## 2018-02-02 VITALS
TEMPERATURE: 98 F | WEIGHT: 179 LBS | HEART RATE: 86 BPM | SYSTOLIC BLOOD PRESSURE: 122 MMHG | DIASTOLIC BLOOD PRESSURE: 70 MMHG | OXYGEN SATURATION: 98 % | BODY MASS INDEX: 28.09 KG/M2 | HEIGHT: 67 IN

## 2018-02-02 DIAGNOSIS — M25.462 EFFUSION OF KNEE JOINT, LEFT: ICD-10-CM

## 2018-02-02 DIAGNOSIS — M25.562 LEFT KNEE PAIN, UNSPECIFIED CHRONICITY: Primary | ICD-10-CM

## 2018-02-02 DIAGNOSIS — M00.9 PYOGENIC ARTHRITIS OF LEFT KNEE JOINT, DUE TO UNSPECIFIED ORGANISM (HCC): ICD-10-CM

## 2018-02-02 LAB — SENTARA SPECIMEN COL,SENBCF: NORMAL

## 2018-02-02 PROCEDURE — 99001 SPECIMEN HANDLING PT-LAB: CPT | Performed by: PHYSICIAN ASSISTANT

## 2018-02-02 NOTE — PROGRESS NOTES
[address] [place] [address]  [phone]           Patient: Priscilla Rhoades                MRN: [id number]       SSN: [id number]  YOB: 1960        AGE: 61 y.o. SEX: female  Body mass index is 28.04 kg/(m^2). PCP: Naina Marin MD  02/02/18      This office note has been dictated. REVIEW OF SYSTEMS:  Constitutional: Negative for fever, chills, weight loss and malaise/fatigue. HENT: Negative. Eyes: Negative. Respiratory: Negative. Cardiovascular: Negative. Gastrointestinal: No bowel incontinence or constipation. Genitourinary: No bladder incontinence or saddle anesthesia. Skin: Negative. Neurological: Negative. Endo/Heme/Allergies: Negative. Psychiatric/Behavioral: Negative. Musculoskeletal: As per HPI above. Past Medical History:   Diagnosis Date    Allergic rhinitis     Nausea & vomiting     Osteoarthritis of hip     Spinal stenosis          Current Outpatient Prescriptions:     gabapentin (NEURONTIN) 300 mg capsule, Take 1 Cap by mouth two (2) times a day., Disp: 60 Cap, Rfl: 0    HYDROcodone-acetaminophen (NORCO) 7.5-325 mg per tablet, Take 1 Tab by mouth every six (6) hours as needed for Pain. Max Daily Amount: 4 Tabs., Disp: 28 Tab, Rfl: 0    ALPRAZolam (XANAX) 0.25 mg tablet, Take 1 Tab by mouth three (3) times daily as needed. Max Daily Amount: 0.75 mg. PRN sleep, Disp: 20 Tab, Rfl: 0    venlafaxine-SR (EFFEXOR-XR) 75 mg capsule, Take 37.5 mg by mouth daily. , Disp: , Rfl:     estradiol (ESTRACE) 1 mg tablet, Take 1 mg by mouth every seven (7) days.  Indications: Patient takes half tab daily, Disp: , Rfl:     zolpidem (AMBIEN) 5 mg tablet, Take 1 Tab by mouth nightly as needed for Sleep., Disp: 25 Tab, Rfl: 0    albuterol (PROVENTIL HFA, VENTOLIN HFA) 90 mcg/actuation inhaler, Take 2 Puffs by inhalation every four (4) hours as needed for Wheezing., Disp: 1 Inhaler, Rfl: 0    Allergies   Allergen Reactions    Other Plant, Animal, Environmental Anaphylaxis     poison ivy       Social History     Social History    Marital status:      Spouse name: N/A    Number of children: N/A    Years of education: N/A     Occupational History    Not on file. Social History Main Topics    Smoking status: Never Smoker    Smokeless tobacco: Never Used    Alcohol use 0.0 oz/week     0 Standard drinks or equivalent per week      Comment: socially    Drug use: No    Sexual activity: Not on file      Comment: Hysterectomy     Other Topics Concern    Not on file     Social History Narrative       Past Surgical History:   Procedure Laterality Date    ENDOSCOPY, COLON, DIAGNOSTIC  03-18-11    normal colon to cecum    HX BREAST REDUCTION      HX HEENT      skin cancer between eyes    HX HYSTERECTOMY      HX KNEE REPLACEMENT      HX LAP CHOLECYSTECTOMY  11/2015    HX TONSIL AND ADENOIDECTOMY      HX TUBAL LIGATION             * Patient was identified by name and date of birth   * Agreement on procedure being performed was verified  * Risks and Benefits explained to the patient  * Procedure site verified and marked as necessary  * Patient was positioned for comfort  * Consent was signed and verified  3:45 PM    The patient was instructed on post injection care. We did see Ms. Uzma Yeh her left knee. She is status post left knee replacement. She was doing well with the knee replacement. Unfortunately, she developed a medial retinacular tear and was taken back to surgery for this. She is now about seven weeks status post revision. She had been doing well up until recently. She developed some increased swelling and discomfort in her knee, no fevers or chills and no redness to the knee. She did go to the emergency room a couple of days ago and had a Doppler ultrasound, which was negative for DVT.       She reports a lot of the pain is on the inside part of her knee.     PHYSICAL EXAMINATION:  In general, the patient is alert and oriented x 3 in no acute distress. The patient is well-developed, well-nourished, with a normal affect. The patient is afebrile. HEENT:  Head is normocephalic and atraumatic. Pupils are equally round and reactive to light and accommodation. Extraocular eye movements are intact. Neck is supple. Trachea is midline. No JVD is present. Breathing is nonlabored. Examination of the lower extremity reveals the skin to be intact. The surgical wound is healed nicely to the left knee. There is mild subcutaneous effusion noted. The skin is non-tense. There is no surrounding erythema. There may be some slight warmth. She has full range of motion. There is no defect noted to the extensor mechanism itself. She does have a little tenderness to palpation to the lateral release area. There is also some tenderness to palpation to the pes bursa and semimembranosis medially. She is ligamentously stable. The patella tracks nicely. RADIOGRAPHS:  Radiographs in the office today, including AP, lateral, and skyline of the left knee, shows total knee components are well-fixed. No evidence of loosening or fracture is noted. LABORATORY STUDIES:  Review of labs shows CBC white count of 7.9 on January 29, 2018. Again, Doppler ultrasound was negative. ASSESSMENT:      1. Left knee replacement. 2. Status post quadriceps tendon medial retinacular repair, left knee. 3. Effusion, left knee. PLAN:  At this point, we are going to move forward with an aspiration of the left knee. Under aseptic conditions, after informed and written consent, and appropriate time out performed, the left knee was prepped with Betadine and approximately 60 mL of normal-appearing, blood-tinged fluid was aspirated without complications. The patient tolerated the aspiration well. The knee was cleaned with alcohol and a Band-Aid was placed.   She was instructed to wear her knee brace. She is going to hold off on physical therapy. We will give her a note to keep her out of work for another week. We are going to send the fluid for a STAT cell count, Gram stain, and culture and sensitivity. We are also going to obtain CBC, ESR, CRP, and IL-6. I expect these will come back as negative. We will see her back in the office next week for reevaluation.                    JR Wolf KENNEDY, PA-C, ATC

## 2018-02-02 NOTE — LETTER
NOTIFICATION RETURN TO WORK / SCHOOL 
 
2/2/2018 3:55 PM 
 
Ms. Geraldine Barber [facility] To Whom It May Concern: 
 
Geraldine Barber is currently under the care of [address] [place] [address] Dam[place] Root. Please allow patient to remain out of work the week of 2/5/18. If there are questions or concerns please have the patient contact our office.  
 
 
 
Sincerely, 
 
 
Angelita Gomez PA-C

## 2018-02-05 ENCOUNTER — APPOINTMENT (OUTPATIENT)
Dept: PHYSICAL THERAPY | Age: 58
End: 2018-02-05
Payer: COMMERCIAL

## 2018-02-06 LAB
ABSOLUTE LYMPHOCYTE COUNT, 10803: 3 K/UL (ref 1–4.8)
BASOPHILS # BLD: 0 K/UL (ref 0–0.2)
BASOPHILS NFR BLD: 1 % (ref 0–2)
C-REACTIVE PROTEIN, QT, 006627: 0.9 MG/DL (ref 0–0.5)
EOSINOPHIL # BLD: 0.3 K/UL (ref 0–0.5)
EOSINOPHIL NFR BLD: 4 % (ref 0–6)
ERYTHROCYTE [DISTWIDTH] IN BLOOD BY AUTOMATED COUNT: 12.4 % (ref 10–16)
GRANULOCYTES,GRANS: 48 % (ref 40–75)
HCT VFR BLD AUTO: 39.4 % (ref 35.1–48)
HGB BLD-MCNC: 12.8 G/DL (ref 11.7–16)
IL-6, 10347: 4.7 PG/ML
IL-6, 10347: 5.1 PG/ML
LYMPHOCYTES, LYMLT: 41 % (ref 27–45)
MCH RBC QN AUTO: 29 PG (ref 26–34)
MCHC RBC AUTO-ENTMCNC: 33 G/DL (ref 32–36)
MCV RBC AUTO: 90 FL (ref 80–95)
MONOCYTES # BLD: 0.5 K/UL (ref 0.1–0.9)
MONOCYTES NFR BLD: 7 % (ref 3–9)
NEUTROPHILS # BLD AUTO: 3.6 K/UL (ref 1.8–7.7)
PLATELET # BLD AUTO: 319 K/UL (ref 140–440)
PMV BLD AUTO: 11.2 FL (ref 6–10.8)
RBC # BLD AUTO: 4.36 M/UL (ref 3.8–5.2)
SED RATE (ESR): 43 MM/HR (ref 0–30)
WBC # BLD AUTO: 7.5 K/UL (ref 4–11)

## 2018-02-08 ENCOUNTER — OFFICE VISIT (OUTPATIENT)
Dept: ORTHOPEDIC SURGERY | Age: 58
End: 2018-02-08

## 2018-02-08 VITALS
DIASTOLIC BLOOD PRESSURE: 79 MMHG | HEIGHT: 67 IN | HEART RATE: 86 BPM | BODY MASS INDEX: 29.03 KG/M2 | SYSTOLIC BLOOD PRESSURE: 139 MMHG | OXYGEN SATURATION: 93 % | WEIGHT: 185 LBS

## 2018-02-08 DIAGNOSIS — M25.462 EFFUSION OF KNEE JOINT, LEFT: ICD-10-CM

## 2018-02-08 DIAGNOSIS — Z96.652 STATUS POST TOTAL LEFT KNEE REPLACEMENT: Primary | ICD-10-CM

## 2018-02-08 NOTE — LETTER
NOTIFICATION RETURN TO WORK / SCHOOL 
 
2/9/2018 9:08 AM 
 
Ms. Luan Kussmaul [facility] To Whom It May Concern: 
 
Luan Kussmaul is currently under the care of [address] [place] [address] [place]. The above patient may return to duty dated 2/13/18, with the restriction of six hours per day, until further notice. She will be evaluated again on 2/22/18. If there are questions or concerns please have the patient contact our office.  
 
 
 
Sincerely, 
 
 
Ayaan Sarkar PA-C

## 2018-02-08 NOTE — PROGRESS NOTES
[address] [place] [address]  [phone]           Patient: Eric Peters                MRN: [id number]       SSN: [id number]  YOB: 1960        AGE: 61 y.o. SEX: female  Body mass index is 28.98 kg/(m^2). PCP: Shin Cueva MD  02/08/18      This office note has been dictated. REVIEW OF SYSTEMS:  Constitutional: Negative for fever, chills, weight loss and malaise/fatigue. HENT: Negative. Eyes: Negative. Respiratory: Negative. Cardiovascular: Negative. Gastrointestinal: No bowel incontinence or constipation. Genitourinary: No bladder incontinence or saddle anesthesia. Skin: Negative. Neurological: Negative. Endo/Heme/Allergies: Negative. Psychiatric/Behavioral: Negative. Musculoskeletal: As per HPI above. Past Medical History:   Diagnosis Date    Allergic rhinitis     Nausea & vomiting     Osteoarthritis of hip     Spinal stenosis          Current Outpatient Prescriptions:     gabapentin (NEURONTIN) 300 mg capsule, Take 1 Cap by mouth two (2) times a day., Disp: 60 Cap, Rfl: 0    HYDROcodone-acetaminophen (NORCO) 7.5-325 mg per tablet, Take 1 Tab by mouth every six (6) hours as needed for Pain. Max Daily Amount: 4 Tabs., Disp: 28 Tab, Rfl: 0    ALPRAZolam (XANAX) 0.25 mg tablet, Take 1 Tab by mouth three (3) times daily as needed. Max Daily Amount: 0.75 mg. PRN sleep, Disp: 20 Tab, Rfl: 0    venlafaxine-SR (EFFEXOR-XR) 75 mg capsule, Take 37.5 mg by mouth daily. , Disp: , Rfl:     estradiol (ESTRACE) 1 mg tablet, Take 1 mg by mouth every seven (7) days.  Indications: Patient takes half tab daily, Disp: , Rfl:     zolpidem (AMBIEN) 5 mg tablet, Take 1 Tab by mouth nightly as needed for Sleep., Disp: 25 Tab, Rfl: 0    albuterol (PROVENTIL HFA, VENTOLIN HFA) 90 mcg/actuation inhaler, Take 2 Puffs by inhalation every four (4) hours as needed for Wheezing., Disp: 1 Inhaler, Rfl: 0    Allergies   Allergen Reactions    Other Plant, Animal, Environmental Anaphylaxis     poison ivy       Social History     Social History    Marital status:      Spouse name: N/A    Number of children: N/A    Years of education: N/A     Occupational History    Not on file. Social History Main Topics    Smoking status: Never Smoker    Smokeless tobacco: Never Used    Alcohol use 0.0 oz/week     0 Standard drinks or equivalent per week      Comment: socially    Drug use: No    Sexual activity: Not on file      Comment: Hysterectomy     Other Topics Concern    Not on file     Social History Narrative       Past Surgical History:   Procedure Laterality Date    ENDOSCOPY, COLON, DIAGNOSTIC  03-18-11    normal colon to cecum    HX BREAST REDUCTION      HX HEENT      skin cancer between eyes    HX HYSTERECTOMY      HX KNEE REPLACEMENT      HX LAP CHOLECYSTECTOMY  11/2015    HX TONSIL AND ADENOIDECTOMY      HX TUBAL LIGATION             We did see Ms. Still Sender for followup with regards to her left knee replacement. The patient had a knee replacement and a subsequent medial retinacular repair. She had an effusion of her knee at her last visit and we aspirated her knee for further evaluation. She returns today for further reevaluation. The patient has some return of swelling to the knee. She says she has been taking it easy. She denies any fever or chills at home. She does have a dull ache to the knee. She has had no chest pain or shortness of breath. PHYSICAL EXAMINATION:  In general, the patient is alert and oriented x 3 in no acute distress. The patient is well-developed, well-nourished, with a normal affect. The patient is afebrile. Examination of the left knee reveals the skin is intact. The surgical wounds are healed nicely. There is no erythema, ecchymosis, and no warmth. She does have a mild effusion to the knee subcutaneous. There are no signs for infection. Range of motion is full. There is no extensor lag and no defect noted to the extensor mechanism. Stability is quite good. There is excellent flexion. LABORATORY STUDIES:   Review of labs reveals cell count showed 118 white cells, 65% polys. There are no crystals and no growth after 72 hours. Gram stain showed rare white blood cells. No organisms. Sedimentation rate was 43. IL-6 was 5.1. C-reactive protein was 0.9. ASSESSMENT:      1. Status post left total knee replacement. 2. Medial retinacular repair left knee. PLAN:  At this point, the patient will continue with ice and rest.  I have asked her to wear a neoprene sleeve or an ace wrap around the knee for some compression to help with the swelling. She denies the need for analgesics. She was given a note to return to work for no more than six hours per day, and we will reevaluate her in about two weeks time. She will call with any questions or concerns that shall arise.                    JR Wolf KENNEDY, PA-C, ATC

## 2018-02-08 NOTE — LETTER
NOTIFICATION RETURN TO WORK / SCHOOL 
 
2/8/2018 1:26 PM 
 
Ms. Dwight Knowles [facility] To Whom It May Concern: 
 
Dwight Knowles is currently under the care of [address] [place] [address] Damir Root. Please allow patient to work no longer than 6 hours a day until further notice. Patient will be re-evaluated in 2 weeks. If there are questions or concerns please have the patient contact our office.  
 
 
 
Sincerely, 
 
 
Channing Kathleen PA-C

## 2018-02-13 ENCOUNTER — APPOINTMENT (OUTPATIENT)
Dept: PHYSICAL THERAPY | Age: 58
End: 2018-02-13
Payer: COMMERCIAL

## 2018-02-20 ENCOUNTER — APPOINTMENT (OUTPATIENT)
Dept: PHYSICAL THERAPY | Age: 58
End: 2018-02-20
Payer: COMMERCIAL

## 2018-02-22 ENCOUNTER — OFFICE VISIT (OUTPATIENT)
Dept: ORTHOPEDIC SURGERY | Age: 58
End: 2018-02-22

## 2018-02-22 VITALS
RESPIRATION RATE: 14 BRPM | WEIGHT: 185 LBS | HEART RATE: 79 BPM | OXYGEN SATURATION: 95 % | BODY MASS INDEX: 29.03 KG/M2 | HEIGHT: 67 IN | DIASTOLIC BLOOD PRESSURE: 69 MMHG | SYSTOLIC BLOOD PRESSURE: 122 MMHG

## 2018-02-22 DIAGNOSIS — Z96.652 S/P REVISION OF TOTAL KNEE, LEFT: ICD-10-CM

## 2018-02-22 DIAGNOSIS — M25.462 SWELLING OF KNEE JOINT, LEFT: Primary | ICD-10-CM

## 2018-02-22 RX ORDER — FUROSEMIDE 20 MG/1
20 TABLET ORAL DAILY
Qty: 5 TAB | Refills: 0 | Status: SHIPPED | OUTPATIENT
Start: 2018-02-22 | End: 2018-04-20

## 2018-02-22 RX ORDER — HYDROCODONE BITARTRATE AND ACETAMINOPHEN 7.5; 325 MG/1; MG/1
1 TABLET ORAL
Qty: 28 TAB | Refills: 0 | Status: SHIPPED | OUTPATIENT
Start: 2018-02-22 | End: 2018-07-24

## 2018-02-22 NOTE — PROGRESS NOTES
[address] [place] [address]  [phone]           Patient: Priscilla Rhoades                MRN: [id number]       SSN: [id number]  YOB: 1960        AGE: 61 y.o. SEX: female  Body mass index is 28.98 kg/(m^2). PCP: Naina Marin MD  02/22/18      This office note has been dictated. REVIEW OF SYSTEMS:  Constitutional: Negative for fever, chills, weight loss and malaise/fatigue. HENT: Negative. Eyes: Negative. Respiratory: Negative. Cardiovascular: Negative. Gastrointestinal: No bowel incontinence or constipation. Genitourinary: No bladder incontinence or saddle anesthesia. Skin: Negative. Neurological: Negative. Endo/Heme/Allergies: Negative. Psychiatric/Behavioral: Negative. Musculoskeletal: As per HPI above. Past Medical History:   Diagnosis Date    Allergic rhinitis     Nausea & vomiting     Osteoarthritis of hip     Spinal stenosis          Current Outpatient Prescriptions:     gabapentin (NEURONTIN) 300 mg capsule, Take 1 Cap by mouth two (2) times a day., Disp: 60 Cap, Rfl: 0    HYDROcodone-acetaminophen (NORCO) 7.5-325 mg per tablet, Take 1 Tab by mouth every six (6) hours as needed for Pain. Max Daily Amount: 4 Tabs., Disp: 28 Tab, Rfl: 0    ALPRAZolam (XANAX) 0.25 mg tablet, Take 1 Tab by mouth three (3) times daily as needed. Max Daily Amount: 0.75 mg. PRN sleep, Disp: 20 Tab, Rfl: 0    venlafaxine-SR (EFFEXOR-XR) 75 mg capsule, Take 37.5 mg by mouth daily. , Disp: , Rfl:     estradiol (ESTRACE) 1 mg tablet, Take 1 mg by mouth every seven (7) days.  Indications: Patient takes half tab daily, Disp: , Rfl:     zolpidem (AMBIEN) 5 mg tablet, Take 1 Tab by mouth nightly as needed for Sleep., Disp: 25 Tab, Rfl: 0    albuterol (PROVENTIL HFA, VENTOLIN HFA) 90 mcg/actuation inhaler, Take 2 Puffs by inhalation every four (4) hours as needed for Wheezing., Disp: 1 Inhaler, Rfl: 0    Allergies   Allergen Reactions    Other Plant, Animal, Environmental Anaphylaxis     poison ivy       Social History     Social History    Marital status:      Spouse name: N/A    Number of children: N/A    Years of education: N/A     Occupational History    Not on file. Social History Main Topics    Smoking status: Never Smoker    Smokeless tobacco: Never Used    Alcohol use 0.0 oz/week     0 Standard drinks or equivalent per week      Comment: socially    Drug use: No    Sexual activity: Not on file      Comment: Hysterectomy     Other Topics Concern    Not on file     Social History Narrative       Past Surgical History:   Procedure Laterality Date    ENDOSCOPY, COLON, DIAGNOSTIC  03-18-11    normal colon to cecum    HX BREAST REDUCTION      HX HEENT      skin cancer between eyes    HX HYSTERECTOMY      HX KNEE REPLACEMENT      HX LAP CHOLECYSTECTOMY  11/2015    HX TONSIL AND ADENOIDECTOMY      HX TUBAL LIGATION             We did see Ms. Roberto Harris for followup with regards to her left knee replacement. The patient is now four months status post knee replacement surgery, and she is approaching three months status post quadriceps tendon medial retinacular repair. She has been worked up for infection with an aspiration. Fortunately, this has been negative. She does have some residual superficial swelling to the knee. This causes discomfort, especially at night after being on her feet all day. She has been wearing her brace. She has been using an Ace wrap on occasion. She has had no fevers, chills, systemic changes, or injuries to report. PHYSICAL EXAMINATION:  In general, the patient is alert and oriented x 3 in no acute distress. The patient is well-developed, well-nourished, with a normal affect. The patient is afebrile. HEENT:  Head is normocephalic and atraumatic. Pupils are equally round and reactive to light and accommodation.   Extraocular eye movements are intact. Neck is supple. Trachea is midline. No JVD is present. Breathing is nonlabored. Examination of the left knee reveals the skin is intact. The surgical wounds are healed nicely. There is no ecchymosis and no warmth. There are no signs for infection or cellulitis present. She does have a superficial effusion, which is mild. There is negative patellar ballottement. There are no defects to the extensor mechanism. There is full range of motion, very good stability, and the patella tracks nicely. No rubs or crepitus is noted. ASSESSMENT:      1. Status post left knee replacement. 2. Status post medial retinacular repair, left knee. 3. Mild superficial effusion, left knee. PLAN:  We are going to put her on Lasix 20 mg for five days to see if this helps. I have asked her to use a compression neoprene sleeve for the knee and continue with icing. We will plan on seeing her back in two weeks time for evaluation or sooner if necessary. She will call with any questions or concerns that shall arise.                      JR Wolf KENNEDY, PAShayyC, ATC

## 2018-02-22 NOTE — LETTER
NOTIFICATION RETURN TO WORK / SCHOOL 
 
2/22/2018 1:10 PM 
 
Ms. Thania Mcallister [facility] To Whom It May Concern: 
 
Thania Mcallister is currently under the care of [address] [place], [place] [address]. Please allow patient to remain working 6 hours per day until re-evaluation in 2 weeks. If there are questions or concerns please have the patient contact our office.  
 
 
 
Sincerely, 
 
 
Carly Torre PA-C

## 2018-03-08 ENCOUNTER — OFFICE VISIT (OUTPATIENT)
Dept: ORTHOPEDIC SURGERY | Age: 58
End: 2018-03-08

## 2018-03-08 VITALS
HEIGHT: 67 IN | TEMPERATURE: 97.4 F | DIASTOLIC BLOOD PRESSURE: 61 MMHG | WEIGHT: 180.6 LBS | BODY MASS INDEX: 28.35 KG/M2 | OXYGEN SATURATION: 93 % | SYSTOLIC BLOOD PRESSURE: 110 MMHG | HEART RATE: 82 BPM

## 2018-03-08 DIAGNOSIS — Z96.652 STATUS POST TOTAL LEFT KNEE REPLACEMENT: Primary | ICD-10-CM

## 2018-03-08 NOTE — PROGRESS NOTES
[address] [place] [address]  [phone]           Patient: Belem Nava                MRN: [id number]       SSN: [id number]  YOB: 1960        AGE: 61 y.o. SEX: female  Body mass index is 28.29 kg/(m^2). PCP: Cortney Hoffman MD  03/08/18      This office note has been dictated. REVIEW OF SYSTEMS:  Constitutional: Negative for fever, chills, weight loss and malaise/fatigue. HENT: Negative. Eyes: Negative. Respiratory: Negative. Cardiovascular: Negative. Gastrointestinal: No bowel incontinence or constipation. Genitourinary: No bladder incontinence or saddle anesthesia. Skin: Negative. Neurological: Negative. Endo/Heme/Allergies: Negative. Psychiatric/Behavioral: Negative. Musculoskeletal: As per HPI above. Past Medical History:   Diagnosis Date    Allergic rhinitis     Nausea & vomiting     Osteoarthritis of hip     Spinal stenosis          Current Outpatient Prescriptions:     HYDROcodone-acetaminophen (NORCO) 7.5-325 mg per tablet, Take 1 Tab by mouth every six (6) hours as needed for Pain. Max Daily Amount: 4 Tabs., Disp: 28 Tab, Rfl: 0    furosemide (LASIX) 20 mg tablet, Take 1 Tab by mouth daily. , Disp: 5 Tab, Rfl: 0    gabapentin (NEURONTIN) 300 mg capsule, Take 1 Cap by mouth two (2) times a day., Disp: 60 Cap, Rfl: 0    ALPRAZolam (XANAX) 0.25 mg tablet, Take 1 Tab by mouth three (3) times daily as needed. Max Daily Amount: 0.75 mg. PRN sleep, Disp: 20 Tab, Rfl: 0    venlafaxine-SR (EFFEXOR-XR) 75 mg capsule, Take 37.5 mg by mouth daily. , Disp: , Rfl:     estradiol (ESTRACE) 1 mg tablet, Take 1 mg by mouth every seven (7) days.  Indications: Patient takes half tab daily, Disp: , Rfl:     zolpidem (AMBIEN) 5 mg tablet, Take 1 Tab by mouth nightly as needed for Sleep., Disp: 25 Tab, Rfl: 0    albuterol (PROVENTIL HFA, VENTOLIN HFA) 90 mcg/actuation inhaler, Take 2 Puffs by inhalation every four (4) hours as needed for Wheezing., Disp: 1 Inhaler, Rfl: 0    Allergies   Allergen Reactions    Other Plant, Animal, Environmental Anaphylaxis     poison ivy       Social History     Social History    Marital status:      Spouse name: N/A    Number of children: N/A    Years of education: N/A     Occupational History    Not on file. Social History Main Topics    Smoking status: Never Smoker    Smokeless tobacco: Never Used    Alcohol use 0.0 oz/week     0 Standard drinks or equivalent per week      Comment: socially    Drug use: No    Sexual activity: Not on file      Comment: Hysterectomy     Other Topics Concern    Not on file     Social History Narrative       Past Surgical History:   Procedure Laterality Date    ENDOSCOPY, COLON, DIAGNOSTIC  03-18-11    normal colon to cecum    HX BREAST REDUCTION      HX HEENT      skin cancer between eyes    HX HYSTERECTOMY      HX KNEE REPLACEMENT      HX LAP CHOLECYSTECTOMY  11/2015    HX TONSIL AND ADENOIDECTOMY      HX TUBAL LIGATION               We did see Ms. Jayme Saeed for followup with regards to her left knee replacement. The patient had a knee replacement with subsequent medial retinacular tear, which was fixed, and she is approaching three months status post repair. She has been worked up for infection with blood work, as well as an aspiration, which was normal.  She does continue to have some swelling in her knee and some achiness associated to it. It is really unchanged, she states. She did over do it this past weekend with carrying her grandkids around. PHYSICAL EXAMINATION:  In general, the patient is alert and oriented x 3 in no acute distress. The patient is well-developed, well-nourished, with a normal affect. The patient is afebrile. HEENT:  Head is normocephalic and atraumatic. Pupils are equally round and reactive to light and accommodation. Extraocular eye movements are intact.   Neck is supple. Trachea is midline. No JVD is present. Breathing is nonlabored. Examination of the left knee reveals the skin is intact. The surgical wound itself is healed nicely. She does have some superficial effusion, which is mild. There is negative patellar ballottement. The patella tracks nicely. Stability is quite good. Range of motion of the knee is full. There is mild edema distally without calf tenderness. There are no signs of DVT present. ASSESSMENT:      1. Status post left knee replacement. 2. Status post medial retinacular repair left knee. 3. Mild effusion, left knee. PLAN:  At this point, I have asked the patient to take it easy. ___1:03 six hours of work. She will hold her Celebrex for a couple of days and see if it makes a difference in her leg swelling. If it does not, she is going to go back on it. We will plan on seeing her back in the office in about three months time for evaluation. She will continue with ice and rest.  We will see her back as scheduled.                  JR Wolf KENNEDY, PAShayyC, ATC

## 2018-03-08 NOTE — LETTER
NOTIFICATION RETURN TO WORK / SCHOOL 
 
3/8/2018 3:17 PM 
 
Ms. Favian Gregory [facility] To Whom It May Concern: 
 
Favian Gregory is currently under the care of [address] [place] [address] [place]. She is to continue working 6 hours per day x 4 weeks. She will be evaluated at that time. If there are questions or concerns please have the patient contact our office.  
 
 
 
Sincerely, 
 
 
Patricia Arenas PA-C

## 2018-03-08 NOTE — LETTER
NOTIFICATION RETURN TO WORK / SCHOOL 
 
3/8/2018 3:14 PM 
 
Ms. Thania Mcallister [facility] To Whom It May Concern: 
 
Thania Mcallister is currently under the care of [address] [place] [address] [place]. Patient will continue working 6 hours per day. If there are questions or concerns please have the patient contact our office.  
 
 
 
Sincerely, 
 
 
Carly Torre PA-C

## 2018-04-20 ENCOUNTER — OFFICE VISIT (OUTPATIENT)
Dept: ORTHOPEDIC SURGERY | Age: 58
End: 2018-04-20

## 2018-04-20 VITALS
WEIGHT: 180 LBS | TEMPERATURE: 96.8 F | RESPIRATION RATE: 16 BRPM | OXYGEN SATURATION: 92 % | HEIGHT: 67 IN | SYSTOLIC BLOOD PRESSURE: 125 MMHG | BODY MASS INDEX: 28.25 KG/M2 | HEART RATE: 80 BPM | DIASTOLIC BLOOD PRESSURE: 87 MMHG

## 2018-04-20 DIAGNOSIS — M70.61 TROCHANTERIC BURSITIS OF RIGHT HIP: Primary | ICD-10-CM

## 2018-04-20 RX ORDER — BETAMETHASONE SODIUM PHOSPHATE AND BETAMETHASONE ACETATE 3; 3 MG/ML; MG/ML
6 INJECTION, SUSPENSION INTRA-ARTICULAR; INTRALESIONAL; INTRAMUSCULAR; SOFT TISSUE ONCE
Qty: 1 ML | Refills: 0
Start: 2018-04-20 | End: 2018-04-20

## 2018-04-20 NOTE — PROGRESS NOTES
Gopal Wade  1960   Chief Complaint   Patient presents with    Hip Pain     R HIP PAIN         HISTORY OF PRESENT ILLNESS  Gopal Wade is a 61 y.o. female who presents today for reevaluation of right hip pain. She has had her right hip replaced in Oct/2016. She is having some pain on the lateral aspect of her right hip that wakes her up at night when she rolls over on it. She had had injections in the past which provided relief. She had some questions about her left knee pain as well. She had her left knee replaced in Nov/2017. She then tore her medial retinaculum and had that fixed in Dec/2017. She still has some swelling but that has improved considerably. She is having pain on the posterior aspect of her left knee. Patient denies any fever, chills, chest pain, shortness of breath or calf pain. There are no new illness or injuries to report since last seen in the office. No changes in medications, allergies, social or family history. PHYSICAL EXAM:   Visit Vitals    /87    Pulse 80    Temp 96.8 °F (36 °C) (Oral)    Resp 16    Ht 5' 7\" (1.702 m)    Wt 180 lb (81.6 kg)    SpO2 92%    BMI 28.19 kg/m2     The patient is a well-developed, well-nourished female   in no acute distress. The patient is alert and oriented times three. Mood and affect are normal.  LYMPHATIC: lymph nodes are not enlarged and are within normal limits  SKIN: normal in color and non tender to palpation. There are no bruises or abrasions noted. NEUROLOGICAL: Motor sensory exam is within normal limits. Reflexes are equal bilaterally.  There is normal sensation to pinprick and light touch  MUSCULOSKELETAL:    Right Hip  Inspection: Effusion not present,  Incision well healed  TTP: trochanteric bursa  Range of motion:  right hip rotates without pain  Stability: Stable   Strength: 5/5 abduction strength  2+ distal pulses    Left knee  Inspection: Effusion present,  Incision well healed  TTP: none  Range of motion:  0-120 flexion  Stability: Stable   Strength: 5/5   2+ distal pulses    PROCEDURE: After sterile prep, 1 cc celestone and 3 cc of lidocaine were injected into the right trochanteric bursa. [facility]  OFFICE PROCEDURE PROGRESS NOTE        Chart reviewed for the following:  Kip DE LA ROSA PA have reviewed the History, Physical and updated the Allergic reactions for [address] [place] [address] performed immediately prior to start of procedure:  Kip DE LA ROSA PA-C, have performed the following reviews on [facility] Client prior to the start of the procedure:            * Patient was identified by name and date of birth   * Agreement on procedure being performed was verified  * Risks and Benefits explained to the patient  * Procedure site verified and marked as necessary  * Patient was positioned for comfort  * Consent was signed and verified     Time: 2:07 PM    Date of procedure: 4/20/2018    Procedure performed by:  Naima Elkins PA-C  Provider assisted by: (see medication administration)    How tolerated by patient: tolerated the procedure well with no complications    Comments: none      IMPRESSION:      ICD-10-CM ICD-9-CM    1. Trochanteric bursitis of right hip M70.61 726.5 betamethasone (CELESTONE SOLUSPAN) 6 mg/mL injection      BETAMETHASONE ACETATE & SODIUM PHOSPHATE INJECTION 3 MG EA.      DRAIN/INJECT LARGE JOINT/BURSA      Patient seen and evaluated by Dr. Andres Agosto today who agrees with treatment plan      PLAN:   Will do an injection for the trochanteric bursitis today. She tolerated the injection well  Will see her back for left knee follow up and sooner if she is having any problems.     Follow-up Disposition: Not on [address], KATHY  [facility] and Spine Specialist

## 2018-04-20 NOTE — PROGRESS NOTES
[facility]  [address]  [place], [address]  [phone] fax    Physical Therapy Discharge Summary  Patient name: Adam Cool Start of Care: 2018   Referral source: Katia Del Toro MD : 1960   Medical/Treatment Diagnosis: Left knee pain [M25.562] Onset Date:DOS 2017, Revision 2017   Prior Hospitalization: see medical history Provider#: [id number]   Medications: Verified on Patient Summary List     Comorbidities: R LIEN, arthritis   Prior Level of Function: nursing, unlimited with work activities  Visits from Start of Care: 2    Missed Visits: 0  Reporting Period : 2018 to 2018    Summary of Care:  Progress towards goals / Updated goals:  Short Term Goals: To be accomplished in 2 weeks:  1. Pt will be issued updated HEP upon clearance 2/2 DVT concerns to facilitate pt self management.  Met 2018              2. Patient will report performance of HEP at least 2 times per day to facilitate improved outcomes and improved self management. 3. Pt will demonstrate L quad SLR void of lag to improve stability during ambulation.     Long Term Goals: To be accomplished in 4 weeks:  1. Patient will report FOTO score of 68 or better to indicate significant improvement in functional status. 2. Pt will demonstrate normalized gait pattern void of instability 300 feet to improve ease of community ambulation upon return to work. 3. Pt will demonstrate ability to perform sit to stand transfer void of UE assist to improve ease of transfers. 4. Pt will demonstrate ability to negotiate 6 inch stairs void of compensation to improve ease of community mobility. ASSESSMENT/RECOMMENDATIONS: Unable to further assess progress towards goals at this time due to non-compliance/lack of attendance. DC at this time with no further instructions to the patient.   Thank you for this referral.    [x]Discontinue therapy: []Patient has reached or is progressing toward set goals      [x]Patient is non-compliant or has abdicated      []Due to lack of appreciable progress towards set goals    Francheska Lee, PT, DPT, ATC 4/20/2018 11:00 AM

## 2018-05-01 NOTE — PROGRESS NOTES
I reviewed the patient's medical history, the physician assistant's findings on physical examination, the patient's diagnoses, and treatment plan as documented in the progress note. I concur with the treatment plan as documented. There are no additional recommendations at this time. Deep Sutures: 5-0 Vicryl

## 2018-07-23 ENCOUNTER — TELEPHONE (OUTPATIENT)
Dept: INTERNAL MEDICINE CLINIC | Age: 58
End: 2018-07-23

## 2018-07-23 NOTE — TELEPHONE ENCOUNTER
Nurses, please call and check on pt. Should be ok to wait unless severe or if any other concerning sxs.

## 2018-07-23 NOTE — TELEPHONE ENCOUNTER
I scheduled patient to see [facility] tomorrow afternoon. Stating she is wheezing really bad. Please advise if this can wait or needs to go to the ER.

## 2018-07-23 NOTE — TELEPHONE ENCOUNTER
Called and spoke to patient and verified he full name and date of birth. Patient denies distress and shortness of breath at this time and states that she isn't having any major problems with her breathing during the day. Patient states that when lies down on her side she has really bad wheezing that she can hear and her  can hear. She also states that when she coughs it may trigger the wheezing. Patient states it started in April and it is getting worse at night. Patient states she used her rescue inhaler before and it gave her the \"jitters\", so she stopped using it. I informed the patient that if it gets worse today or tonight or if she is in distress or having shortness of breath, to go straight to the emergency room as soon as possible. Patient verbalized understanding and stated that she will see us tomorrow for her appointment with ARTHUR Aceves.

## 2018-07-24 ENCOUNTER — TELEPHONE (OUTPATIENT)
Dept: INTERNAL MEDICINE CLINIC | Age: 58
End: 2018-07-24

## 2018-07-24 ENCOUNTER — OFFICE VISIT (OUTPATIENT)
Dept: INTERNAL MEDICINE CLINIC | Age: 58
End: 2018-07-24

## 2018-07-24 ENCOUNTER — HOSPITAL ENCOUNTER (OUTPATIENT)
Dept: GENERAL RADIOLOGY | Age: 58
Discharge: HOME | End: 2018-07-24
Payer: COMMERCIAL

## 2018-07-24 VITALS
BODY MASS INDEX: 27.94 KG/M2 | OXYGEN SATURATION: 96 % | HEIGHT: 67 IN | DIASTOLIC BLOOD PRESSURE: 84 MMHG | TEMPERATURE: 97.8 F | RESPIRATION RATE: 14 BRPM | HEART RATE: 68 BPM | WEIGHT: 178 LBS | SYSTOLIC BLOOD PRESSURE: 124 MMHG

## 2018-07-24 DIAGNOSIS — J98.01 BRONCHOSPASM: ICD-10-CM

## 2018-07-24 DIAGNOSIS — G47.00 INSOMNIA, UNSPECIFIED TYPE: Primary | ICD-10-CM

## 2018-07-24 DIAGNOSIS — R06.2 WHEEZING: Primary | ICD-10-CM

## 2018-07-24 DIAGNOSIS — R05.3 PERSISTENT COUGH: ICD-10-CM

## 2018-07-24 DIAGNOSIS — R06.2 WHEEZING: ICD-10-CM

## 2018-07-24 PROCEDURE — 71046 X-RAY EXAM CHEST 2 VIEWS: CPT

## 2018-07-24 RX ORDER — ZOLPIDEM TARTRATE 5 MG/1
5 TABLET ORAL
Qty: 25 TAB | Refills: 0 | Status: CANCELLED | OUTPATIENT
Start: 2018-07-24

## 2018-07-24 RX ORDER — LEVALBUTEROL TARTRATE 45 UG/1
1-2 AEROSOL, METERED ORAL
Qty: 1 INHALER | Refills: 1 | Status: SHIPPED | OUTPATIENT
Start: 2018-07-24 | End: 2019-09-11

## 2018-07-24 RX ORDER — BUDESONIDE AND FORMOTEROL FUMARATE DIHYDRATE 160; 4.5 UG/1; UG/1
2 AEROSOL RESPIRATORY (INHALATION) 2 TIMES DAILY
Qty: 1 INHALER | Refills: 1 | Status: SHIPPED | OUTPATIENT
Start: 2018-07-24 | End: 2021-06-24 | Stop reason: SDUPTHER

## 2018-07-24 RX ORDER — PREDNISONE 20 MG/1
TABLET ORAL
Qty: 18 TAB | Refills: 0 | Status: SHIPPED | OUTPATIENT
Start: 2018-07-24 | End: 2019-09-11 | Stop reason: ALTCHOICE

## 2018-07-24 NOTE — TELEPHONE ENCOUNTER
LAST OV: 7/24/18 with BM   Patient was a previous Dr. Vance Knowles patient, current PCP listed as Dr. Oskar Alcantar- patient has never seen Dr. Lizzy Banks ordered by  on 5/10/16

## 2018-07-24 NOTE — PROGRESS NOTES
1. Have you been to the ER, urgent care clinic or hospitalized since your last visit? NO.     2. Have you seen or consulted any other health care providers outside of the [address] [place] [address] since your last visit (Include any pap smears or colon screening)? NO      Do you have an Advanced Directive? NO    Would you like information on Advanced Directives?  NO

## 2018-07-24 NOTE — PROGRESS NOTES
HPI/History  Akshat Farrar is a 60 y.o.  female who presents for evaluation. Pt reports wheezing since April. Seems to be worsening more recently. Mostly noticed at night and also more noticeable when laying. Seems to be at end expiration per report. Mild/minimal paroxysmal cough. No other cardiopulmonary sxs. No hemoptysis, night sweats, weight changes/loss, fevers, malaise, URI/allergy sxs. Albuterol has seemed to help but causes to be jittery but no other sxs. Has been taking claritin since onset and also prn benadryl without noticeable difference.  reportedly can hear the wheeze but denies snoring or apneic events. Reflux reportedly without issue during this time. Reports at least one episode of reactive airway in the past requiring medical attention and breathing treatments and was thought possibly asthma from allergic trigger or inhalational exposure. Smoked rarely/experimentally in distant past but no smoking in 30yrs or more. No other sxs/complaints.     Patient Active Problem List   Diagnosis Code    Allergic rhinitis 80    Hyperlipidemia E78.5    Osteoarthritis of left knee M16.9    Family history of colonic polyps Z83.71    Vitamin D insufficiency E55.9    Reflux esophagitis, Status post Stricture K21.0    Family history of breast cancer Z80.3    Foot pain M79.673    Cellulitis L03.90    Hip arthritis M16.10    Menopausal syndrome (hot flashes) N95.1    Arthritis of knee M17.10    Quadriceps muscle rupture S76.119A     Past Medical History:   Diagnosis Date    Allergic rhinitis     Nausea & vomiting     Osteoarthritis of hip     Spinal stenosis      Past Surgical History:   Procedure Laterality Date    ENDOSCOPY, COLON, DIAGNOSTIC  03-18-11    normal colon to cecum    HX BREAST REDUCTION      HX HEENT      skin cancer between eyes    HX HYSTERECTOMY      HX KNEE REPLACEMENT      HX LAP CHOLECYSTECTOMY  11/2015    HX TONSIL AND ADENOIDECTOMY      HX TUBAL LIGATION       Social History     Social History    Marital status:      Spouse name: N/A    Number of children: N/A    Years of education: N/A     Occupational History    Not on file. Social History Main Topics    Smoking status: Never Smoker    Smokeless tobacco: Never Used    Alcohol use 0.0 oz/week     0 Standard drinks or equivalent per week      Comment: socially    Drug use: No    Sexual activity: Not on file      Comment: Hysterectomy     Other Topics Concern    Not on file     Social History Narrative     Family History   Problem Relation Age of Onset    Other Mother      atrial fibrillation    Colon Polyps Mother     Breast Cancer Mother     Colon Polyps Brother      half brother    Heart Disease Sister      half sister ASHD    Heart Attack Sister     Other Son      transposition of the great vessels and surgery for this    Other Son      congestive heart failure     Current Outpatient Prescriptions   Medication Sig    levalbuterol tartrate (XOPENEX) 45 mcg/actuation inhaler Take 1-2 Puffs by inhalation every four (4) hours as needed for Wheezing.  predniSONE (DELTASONE) 20 mg tablet Take 3 tabs by mouth daily x 3 days, then 2 tabs daily x 3 days, then 1 tab daily x 3 days.  budesonide-formoterol (SYMBICORT) 160-4.5 mcg/actuation HFAA Take 2 Puffs by inhalation two (2) times a day.  meloxicam (MOBIC) 15 mg tablet Take 1 tab by mouth daily with food.  venlafaxine-SR (EFFEXOR-XR) 75 mg capsule Take 37.5 mg by mouth daily.  estradiol (ESTRACE) 1 mg tablet Take 1 mg by mouth every seven (7) days. Indications: Patient takes half tab daily    zolpidem (AMBIEN) 5 mg tablet Take 1 Tab by mouth nightly as needed for Sleep. No current facility-administered medications for this visit.       Allergies   Allergen Reactions    Other Plant, Animal, Environmental Anaphylaxis     poison ivy    Albuterol Other (comments)     Able to use but causes to be slightly jittery (not severe and no other anaphylactoid sxs). Review of Systems  Aside from those included in HPI, remainder of complete ROS negative. Physical Examination  Visit Vitals    /84 (BP 1 Location: Right arm, BP Patient Position: Sitting)    Pulse 68    Temp 97.8 °F (36.6 °C) (Oral)    Resp 14    Ht 5' 7\" (1.702 m)    Wt 178 lb (80.7 kg)    SpO2 96%    BMI 27.88 kg/m2   Weight stable. General - Alert and in no acute distress. Pt appears well, comfortable, and in good spirits. Pleasant, engaging. Nontoxic. Not anxious, non-diaphoretic. Mental status - Appropriate mood, behavior, speech content, dress, and thought processes. Eyes - Pupils equal and reactive, extraocular movements intact. No erythema or discharge. Ears - Auditory canals and TMs unremarkable. Nose - No erythema. No rhinorrhea. Mouth - Mucous membranes moist. Pharynx without lesions, swelling, erythema, or exudate. Normal phonation. Neck - Supple without rigidity. Lymph - No periauricular, perimandibular, or ant/post cervical tenderness or swelling. Pulm - Paroxysmal dry cough usually with deep inspiration. Full, complete sentences. No tachypnea, retractions, or cyanosis. Good respiratory effort. Mild expiratory wheeze scattered bilat. No gross rhonchi. No rales. No other findings. Cardiovascular - Normal rate, regular rhythm. No appreciable murmurs or gallops. No peripheral edema. Assessment and Plan  1. Wheezing since April, mild paroxysmal cough - No other concerning sxs. Likely bronchospasm due to RAD/asthma/similar. Will check CXR given duration and hx. Will start steroid taper and trial of symbicort (coupon provided). Continue claritin. Prescribed xopenex for prn/rescue use given effects of albuterol. Will see how she fares with further planning as warranted and pending results. Consider pulm before ENT if needed. Pt happily agrees with plan.     More than 25 mins spent during visit with more than 50% discussing above issues, potential causes/contributing factors, eval/tx, plan, and questions. PLEASE NOTE:   This document has been produced using voice recognition software. Unrecognized errors in transcription may be present.     [employer] [facility]  [phone]  7/24/2018

## 2018-07-24 NOTE — MR AVS SNAPSHOT
[id number] [address] 
[phone] Patient: Monique French MRN: [id number] MYT:[birth year] Visit Information Date & Time Provider Department Dept. Phone Encounter #  
 7/24/2018  1:30 PM Negra [facility] [phone] [id number] Upcoming Health Maintenance Date Due  
 BREAST CANCER SCRN MAMMOGRAM 7/7/2018 Influenza Age 5 to Adult 8/1/2018 COLONOSCOPY 3/18/2021 DTaP/Tdap/Td series (2 - Td) 11/10/2024 Allergies as of 7/24/2018  Review Complete On: 7/24/2018 By: Alondra Bran LPN Severity Noted Reaction Type Reactions Other Plant, Animal, Environmental High 06/05/2014   Systemic Anaphylaxis  
 poison ivy Current Immunizations  Reviewed on 11/10/2014 Name Date Influenza Vaccine 10/12/2016 Tdap 11/10/2014 Not reviewed this visit You Were Diagnosed With   
  
 Codes Comments Wheezing    -  Primary ICD-10-CM: R06.2 ICD-9-CM: 786.07 Persistent cough     ICD-10-CM: R05 ICD-9-CM: 786.2 Bronchospasm     ICD-10-CM: J98.01 
ICD-9-CM: 519.11 Vitals BP Pulse Temp Resp Height(growth percentile) Weight(growth percentile) 124/84 (BP 1 Location: Right arm, BP Patient Position: Sitting) 68 97.8 °F (36.6 °C) (Oral) 14 5' 7\" (1.702 m) 178 lb (80.7 kg) SpO2 BMI OB Status Smoking Status 96% 27.88 kg/m2 Hysterectomy Never Smoker Vitals History BMI and BSA Data Body Mass Index Body Surface Area  
 27.88 kg/m 2 1.95 m 2 Preferred Pharmacy Pharmacy Name Phone [phone] [facility] [address]  [phone] Your Updated Medication List  
  
   
This list is accurate as of 7/24/18  2:04 PM.  Always use your most recent med list.  
  
  
  
  
 albuterol 90 mcg/actuation inhaler Commonly known as:  PROVENTIL HFA, VENTOLIN HFA, PROAIR HFA  
 Take 2 Puffs by inhalation every four (4) hours as needed for Wheezing. estradiol 1 mg tablet Commonly known as:  ESTRACE Take 1 mg by mouth every seven (7) days. Indications: Patient takes half tab daily HYDROcodone-acetaminophen 7.5-325 mg per tablet Commonly known as:  Darío Diez Take 1 Tab by mouth every six (6) hours as needed for Pain. Max Daily Amount: 4 Tabs. meloxicam 15 mg tablet Commonly known as:  MOBIC Take 1 tab by mouth daily with food. venlafaxine-SR 75 mg capsule Commonly known as:  EFFEXOR-XR Take 37.5 mg by mouth daily. zolpidem 5 mg tablet Commonly known as:  AMBIEN Take 1 Tab by mouth nightly as needed for Sleep. To-Do List   
 Around 07/24/2018 Imaging:  XR CHEST PA LAT [facility] [place] [facility] SERVICES! Dear Martin Land: Thank you for requesting a [employer] account. Our records indicate that you already have an active [employer] account. You can access your account anytime at [email or web address] Did you know that you can access your hospital and ER discharge instructions at any time in [employer]? You can also review all of your test results from your hospital stay or ER visit. Additional Information If you have questions, please visit the Frequently Asked Questions section of the [employer] website at [email or web address] Remember, [employer] is NOT to be used for urgent needs. For medical emergencies, dial 911. Now available from your iPhone and Android! Please provide this summary of care documentation to your next provider. Your primary care clinician is listed as Lester Gunter. If you have any questions after today's visit, please call [phone].

## 2018-07-24 NOTE — TELEPHONE ENCOUNTER
Lungs are clear and otherwise normal CXR aside from small hiatal hernia. No changes in our the plan that we discussed.

## 2018-07-25 RX ORDER — ZOLPIDEM TARTRATE 5 MG/1
5 TABLET ORAL
Qty: 25 TAB | Refills: 0 | OUTPATIENT
Start: 2018-07-25 | End: 2019-09-11 | Stop reason: SDUPTHER

## 2018-07-26 NOTE — TELEPHONE ENCOUNTER
Called Ambien into [facility] outpatient Pharmacy.        please call patient to schedule per Dr. Velásquez Leader below with her for Physical.

## 2018-07-26 NOTE — TELEPHONE ENCOUNTER
Reviewed report generated by the [facility]. Does not demonstrate aberrancies or inconsistencies with regard to the prescribing of controlled medications to this patient by other providers. Patient has seen ARTHUR Aceves for multiple visits. Will refill Ambien.      Please ask patient to schedule an appointment for physical.

## 2018-09-21 ENCOUNTER — OFFICE VISIT (OUTPATIENT)
Dept: ORTHOPEDIC SURGERY | Age: 58
End: 2018-09-21

## 2018-09-21 VITALS
HEART RATE: 73 BPM | BODY MASS INDEX: 28.25 KG/M2 | HEIGHT: 67 IN | TEMPERATURE: 0.8 F | OXYGEN SATURATION: 96 % | DIASTOLIC BLOOD PRESSURE: 76 MMHG | SYSTOLIC BLOOD PRESSURE: 116 MMHG | WEIGHT: 180 LBS | RESPIRATION RATE: 16 BRPM

## 2018-09-21 DIAGNOSIS — Z96.652 S/P TOTAL KNEE ARTHROPLASTY, LEFT: Primary | ICD-10-CM

## 2018-09-21 DIAGNOSIS — M70.52 PES ANSERINUS BURSITIS OF LEFT KNEE: ICD-10-CM

## 2018-09-21 DIAGNOSIS — M76.891 HIP TENDINITIS, RIGHT: ICD-10-CM

## 2018-09-21 RX ORDER — BETAMETHASONE SODIUM PHOSPHATE AND BETAMETHASONE ACETATE 3; 3 MG/ML; MG/ML
6 INJECTION, SUSPENSION INTRA-ARTICULAR; INTRALESIONAL; INTRAMUSCULAR; SOFT TISSUE ONCE
Qty: 1 ML | Refills: 0
Start: 2018-09-21 | End: 2018-09-21

## 2018-09-21 RX ORDER — HYDROCODONE BITARTRATE AND ACETAMINOPHEN 5; 325 MG/1; MG/1
1 TABLET ORAL
Qty: 21 TAB | Refills: 0 | Status: SHIPPED | OUTPATIENT
Start: 2018-09-21 | End: 2020-11-25

## 2018-11-29 NOTE — PROGRESS NOTES
Patient: Mila Cruz                MRN: [id number]       SSN: [id number] YOB: 1960        AGE: 60 y.o. SEX: female Body mass index is 28.19 kg/(m^2). PCP: Andrez Polo MD 
09/21/18 HISTORY: Katey Toribio is here in followup with complaints of some hip pain, complaints of some knee pain as well. She had an acetabular reconstruction at the time of her hip surgery. It does not hurt to roll over on it. It feels stable for her. Her left knee, she ended up having a quadriceps tendon tear, likely at therapy, and it was operatively repaired. She had a partial lateral release that was left partially open due to tensioning and coverage. The swelling has been decreasing. She has been well worked up for infection a couple of times and was negative for infection. PHYSICAL EXAMINATION:  On examination today, she is actually walking quite well. The hip rotates nicely. There is a well healed incision. Abductors are doing very well. She is nontender over the greater trochanter, and she has a touch of tenderness involving the hip flexor, i.e. hip flexor tendinitis. For the left knee, the swelling is actually much reduced. There is just a very small effusion. The lateral retinacular area has reduced significantly in terms of the swelling. She is a little bit tender over her previous scope hole, and she has a little bit of pes anserinus. There is no cyanosis, peripheral edema, or clubbing. There is no effusion infection or DVT. The calf is nontender. Fredy's sign is negative. RADIOGRAPHS:  X-rays confirm the knee replacement is anatomically located. The hip replacement looks great, and the bone grafts are healed uneventfully. PROCEDURE:  Under aseptic conditions and after informed, written consent with a time out, the left knee intraarticularly was injected with 1 cc of the Celestone preparation, i.e. 6 mg, which was well tolerated. PLAN:  The knees bother her when they touch at night, and I have asked her to give them a little bit of a rub with some Vitamin E cream or equivalent. At this point, I would recommend some anti-inflammatories, Mobic with the usual precautions. She has only taken 7.5 mg.  I think she should be taking 15 mg. We will see her back in about two to three months time for followup assessment. REVIEW OF SYSTEMS:   
 
CON: negative for weight loss, fever EYE: negative for double vision ENT: negative for hoarseness RS:   negative for Tb 
GI:    negative for blood in stool :  negative for blood in urine Other systems reviewed and noted below. Past Medical History:  
Diagnosis Date  Allergic rhinitis  Nausea & vomiting  Osteoarthritis of hip  Spinal stenosis Family History Problem Relation Age of Onset  Other Mother   
  atrial fibrillation  Colon Polyps Mother  Breast Cancer Mother  Colon Polyps Brother   
  half brother  Heart Disease Sister   
  half sister ASHD  Heart Attack Sister  Other Son   
  transposition of the great vessels and surgery for this  Other Son   
  congestive heart failure Current Outpatient Prescriptions Medication Sig Dispense Refill  betamethasone (CELESTONE SOLUSPAN) 6 mg/mL injection 1 mL by Intra artICUlar route once for 1 dose. 1 mL 0  
 HYDROcodone-acetaminophen (NORCO) 5-325 mg per tablet Take 1 Tab by mouth every eight (8) hours as needed for Pain. Max Daily Amount: 3 Tabs. 21 Tab 0  
 zolpidem (AMBIEN) 5 mg tablet Take 1 Tab by mouth nightly as needed for Sleep. 25 Tab 0  
 levalbuterol tartrate (XOPENEX) 45 mcg/actuation inhaler Take 1-2 Puffs by inhalation every four (4) hours as needed for Wheezing. 1 Inhaler 1  
 budesonide-formoterol (SYMBICORT) 160-4.5 mcg/actuation HFAA Take 2 Puffs by inhalation two (2) times a day.  1 Inhaler 1  
  meloxicam (MOBIC) 15 mg tablet Take 1 tab by mouth daily with food. 30 Tab 3  
 venlafaxine-SR (EFFEXOR-XR) 75 mg capsule Take 37.5 mg by mouth daily.  estradiol (ESTRACE) 1 mg tablet Take 1 mg by mouth every seven (7) days. Indications: Patient takes half tab daily  predniSONE (DELTASONE) 20 mg tablet Take 3 tabs by mouth daily x 3 days, then 2 tabs daily x 3 days, then 1 tab daily x 3 days. 18 Tab 0 Allergies Allergen Reactions  Other Plant, Animal, Environmental Anaphylaxis  
  poison ivy  Albuterol Other (comments) Able to use but causes to be slightly jittery (not severe and no other anaphylactoid sxs). Past Surgical History:  
Procedure Laterality Date  ENDOSCOPY, COLON, DIAGNOSTIC  03-18-11  
 normal colon to cecum  HX BREAST REDUCTION    
 HX HEENT    
 skin cancer between eyes  HX HYSTERECTOMY  HX KNEE REPLACEMENT    
 HX LAP CHOLECYSTECTOMY  11/2015  HX TONSIL AND ADENOIDECTOMY  HX TUBAL LIGATION Social History Social History  Marital status:  Spouse name: N/A  
 Number of children: N/A  
 Years of education: N/A Occupational History  Not on file. Social History Main Topics  Smoking status: Never Smoker  Smokeless tobacco: Never Used  Alcohol use 0.0 oz/week  
  0 Standard drinks or equivalent per week Comment: socially  Drug use: No  
 Sexual activity: Not on file Comment: Hysterectomy Other Topics Concern  Not on file Social History Narrative Visit Vitals  /76  Pulse 73  Temp (!) 0.8 °F (-17.3 °C) (Oral)  Resp 16  
 Ht 5' 7\" (1.702 m)  Wt 180 lb (81.6 kg)  SpO2 96%  BMI 28.19 kg/m2 PHYSICAL EXAMINATION: 
GENERAL: Alert and oriented x3, in no acute distress, well-developed, well-nourished, afebrile. HEART: No JVD. EYES: No scleral icterus NECK: No significant lymphadenopathy LUNGS: No respiratory compromise or indrawing ABDOMEN: Soft, non-tender, non-distended. Electronically signed by:  Nahun Ontiveros MD 
 
 vital signs/old records

## 2019-03-06 ENCOUNTER — OFFICE VISIT (OUTPATIENT)
Dept: ORTHOPEDIC SURGERY | Facility: CLINIC | Age: 59
End: 2019-03-06

## 2019-03-06 VITALS
BODY MASS INDEX: 28.22 KG/M2 | OXYGEN SATURATION: 93 % | RESPIRATION RATE: 18 BRPM | DIASTOLIC BLOOD PRESSURE: 79 MMHG | SYSTOLIC BLOOD PRESSURE: 126 MMHG | WEIGHT: 179.8 LBS | TEMPERATURE: 97.3 F | HEART RATE: 77 BPM | HEIGHT: 67 IN

## 2019-03-06 DIAGNOSIS — M79.671 RIGHT FOOT PAIN: ICD-10-CM

## 2019-03-06 DIAGNOSIS — G89.29 CHRONIC PAIN OF LEFT KNEE: ICD-10-CM

## 2019-03-06 DIAGNOSIS — M25.571 CHRONIC PAIN OF RIGHT ANKLE: ICD-10-CM

## 2019-03-06 DIAGNOSIS — F41.9 ANXIETY: ICD-10-CM

## 2019-03-06 DIAGNOSIS — G89.29 CHRONIC PAIN OF RIGHT ANKLE: ICD-10-CM

## 2019-03-06 DIAGNOSIS — M25.562 CHRONIC PAIN OF LEFT KNEE: ICD-10-CM

## 2019-03-06 DIAGNOSIS — M25.362 INSTABILITY OF LEFT KNEE JOINT: Primary | ICD-10-CM

## 2019-03-06 DIAGNOSIS — Z96.652 HISTORY OF LEFT KNEE REPLACEMENT: ICD-10-CM

## 2019-03-06 RX ORDER — HYDROCODONE BITARTRATE AND ACETAMINOPHEN 5; 325 MG/1; MG/1
1 TABLET ORAL
Qty: 21 TAB | Refills: 0 | Status: SHIPPED | OUTPATIENT
Start: 2019-03-06 | End: 2019-03-13

## 2019-03-06 RX ORDER — LORAZEPAM 2 MG/1
2 TABLET ORAL
Qty: 3 TAB | Refills: 0 | Status: SHIPPED | OUTPATIENT
Start: 2019-03-06 | End: 2019-09-11 | Stop reason: ALTCHOICE

## 2019-03-06 NOTE — LETTER
NOTIFICATION RETURN TO WORK  
 
3/6/2019 9:31 AM 
 
Ms. Samm Nicole [address] [facility] [address] [address] [place] [address] To Whom It May Concern: 
 
Samm Nicole is currently under the care of [address] [place] [address]. She is able to return to full-duty work starting April 01, 2019 If there are questions or concerns please have the patient contact our office. Sincerely, Williams Carrington MD

## 2019-03-06 NOTE — PROGRESS NOTES
Patient: Lg Reynoso                MRN: [id number]       SSN: [id number]  YOB: 1960        AGE: 60 y.o. SEX: female  Body mass index is 28.16 kg/m². PCP: Ramsey Zuleta MD  03/06/19    HISTORY:  Sharath Moraes is having some issues. For starters, she has noticed that she cannot get up on the tip toes, especially on the right side, and she actually has a borderline too many toes sign on that side. She is status post a knee replacement. It was complicated by a traumatic quadriceps tendon tear, and it got repaired. She reports a little bit of instability with the knee but not severely so and a little bit of swelling. She has been worked up for infection. The pain is moderate. She has been worked up for infection, which was negative. The pain is mild to moderate, and she finds that at the end of the day with a lot of walking, she has some discomfort. The first part of the day is much better. She has known severe arthritis also involving the left hip, which will eventually require a direct anterior hip replacement. She is not as symptomatic on that side with that. There is not too much in the way of complaints to the back. The ankle can be quite sore for her as well, especially on the right side. There are no fevers, chills, night sweats, or weight loss. She did have a nonclosable lateral release at the time of her quadriceps tendon tear, which is, I suspect, mostly closed with perhaps a small opening. PHYSICAL EXAMINATION:  On examination today, she actually gets up and walks well. There is no antalgic or Trendelenburg component to the gait. She does have evidence of hyperflexibility. She can recurvatum at both elbows. The right knee, the virgin knee, goes into a little recurvatum as well, and the left one does to just a mild degree as well. However, when she is walking, it does not go into recurvatum.   There is no lateral thrust.      The left knee itself examines quite benignly. There is slight swelling. It is not hot or red. It is not particularly tender, and she does have a touch of mid-flexion instability but certainly not severe. The quadriceps palpate with no deficit. Both feet are warm and well perfused. There is no cyanosis, peripheral edema, or clubbing. RADIOGRAPHS:  On review of the x-rays, the knee replacement is anatomically located. PLAN:  I would like her to do some strengthening first involving the knee before entertaining surgery. She might benefit from a bearing change up a size and scar tissue removal release closure laterally, although I suspect it is mostly closed currently, but I would like her to get strengthened first.      With regards to her right ankle, she has a posterior tibial tendon problem, and she cannot get up on her tip toes on that side. She does collapse into more planovalgus deformity especially with the right, but it is bilateral.  At this point, I would recommend an MRI for the posterior tibial tendon. It has certainly been worsening and uncomfortable for her. For the knee, she may require revision surgery, and I have explained that some knee replacement patients have trouble spending 8-10 hours on their feet a day. We are going to increase her hours to full starting April 1, 2019, after she has had a chance to do some more therapy and we are still investigating her posterior tibial tendon. We will see her back to review the results of the MRI for the right posterior tibial tendon. cc: Carmelo Russo M.D. REVIEW OF SYSTEMS:      CON: negative for weight loss, fever  EYE: negative for double vision  ENT: negative for hoarseness  RS:   negative for Tb  GI:    negative for blood in stool  :  negative for blood in urine  Other systems reviewed and noted below.           Past Medical History:   Diagnosis Date    Allergic rhinitis     Nausea & vomiting     Osteoarthritis of hip     Spinal stenosis        Family History   Problem Relation Age of Onset    Other Mother         atrial fibrillation    Colon Polyps Mother    [facility] Breast Cancer Mother     Colon Polyps Brother         half brother    Heart Disease Sister         half sister ASHD    Heart Attack Sister     Other Son         transposition of the great vessels and surgery for this    Other Son         congestive heart failure       Current Outpatient Medications   Medication Sig Dispense Refill    HYDROcodone-acetaminophen (NORCO) 5-325 mg per tablet Take 1 Tab by mouth every eight (8) hours as needed for Pain. Max Daily Amount: 3 Tabs. 21 Tab 0    zolpidem (AMBIEN) 5 mg tablet Take 1 Tab by mouth nightly as needed for Sleep. 25 Tab 0    budesonide-formoterol (SYMBICORT) 160-4.5 mcg/actuation HFAA Take 2 Puffs by inhalation two (2) times a day. 1 Inhaler 1    meloxicam (MOBIC) 15 mg tablet Take 1 tab by mouth daily with food. 30 Tab 3    venlafaxine-SR (EFFEXOR-XR) 75 mg capsule Take 37.5 mg by mouth daily.  estradiol (ESTRACE) 1 mg tablet Take 1 mg by mouth every seven (7) days. Indications: Patient takes half tab daily      levalbuterol tartrate (XOPENEX) 45 mcg/actuation inhaler Take 1-2 Puffs by inhalation every four (4) hours as needed for Wheezing. 1 Inhaler 1    predniSONE (DELTASONE) 20 mg tablet Take 3 tabs by mouth daily x 3 days, then 2 tabs daily x 3 days, then 1 tab daily x 3 days. 18 Tab 0       Allergies   Allergen Reactions    Other Plant, Animal, Environmental Anaphylaxis     poison ivy    Albuterol Other (comments)     Able to use but causes to be slightly jittery (not severe and no other anaphylactoid sxs).        Past Surgical History:   Procedure Laterality Date    ENDOSCOPY, COLON, DIAGNOSTIC  03-18-11    normal colon to cecum    HX BREAST REDUCTION      HX HEENT      skin cancer between eyes    HX HYSTERECTOMY      HX KNEE REPLACEMENT      HX LAP CHOLECYSTECTOMY  11/2015    HX TONSIL AND ADENOIDECTOMY      HX TUBAL LIGATION         Social History     Socioeconomic History    Marital status:      Spouse name: Not on file    Number of children: Not on file    Years of education: Not on file    Highest education level: Not on file   Social Needs    Financial resource strain: Not on file    Food insecurity - worry: Not on file    Food insecurity - inability: Not on file    Transportation needs - medical: Not on file   [employer] needs - non-medical: Not on file   Occupational History    Not on file   Tobacco Use    Smoking status: Never Smoker    Smokeless tobacco: Never Used   Substance and Sexual Activity    Alcohol use: Yes     Alcohol/week: 0.0 oz     Comment: socially    Drug use: No    Sexual activity: Not on file     Comment: Hysterectomy   Other Topics Concern    Not on file   Social History Narrative    Not on file       Visit Vitals  /79   Pulse 77   Temp 97.3 °F (36.3 °C) (Oral)   Resp 18   Ht 5' 7\" (1.702 m)   Wt 179 lb 12.8 oz (81.6 kg)   SpO2 93%   BMI 28.16 kg/m²         PHYSICAL EXAMINATION:  GENERAL: Alert and oriented x3, in no acute distress, well-developed, well-nourished, afebrile. HEART: No JVD. EYES: No scleral icterus   NECK: No significant lymphadenopathy   LUNGS: No respiratory compromise or indrawing  ABDOMEN: Soft, non-tender, non-distended. Electronically signed by:  Phyliss Habermann, MD

## 2019-03-14 ENCOUNTER — HOSPITAL ENCOUNTER (OUTPATIENT)
Age: 59
Discharge: HOME | End: 2019-03-14
Attending: ORTHOPAEDIC SURGERY
Payer: COMMERCIAL

## 2019-03-14 DIAGNOSIS — G89.29 CHRONIC PAIN OF RIGHT ANKLE: ICD-10-CM

## 2019-03-14 DIAGNOSIS — M25.571 CHRONIC PAIN OF RIGHT ANKLE: ICD-10-CM

## 2019-03-14 DIAGNOSIS — M79.671 RIGHT FOOT PAIN: ICD-10-CM

## 2019-03-14 PROCEDURE — 73721 MRI JNT OF LWR EXTRE W/O DYE: CPT

## 2019-04-03 ENCOUNTER — TELEPHONE (OUTPATIENT)
Dept: ORTHOPEDIC SURGERY | Facility: CLINIC | Age: 59
End: 2019-04-03

## 2019-04-03 RX ORDER — MELOXICAM 15 MG/1
15 TABLET ORAL DAILY
Qty: 30 TAB | Refills: 3 | Status: SHIPPED | OUTPATIENT
Start: 2019-04-03 | End: 2019-09-11 | Stop reason: SDUPTHER

## 2019-04-03 NOTE — TELEPHONE ENCOUNTER
Patient requesting a refill on Mobic. Pharmacy is [facility]. She may be reached at [phone].

## 2019-04-10 ENCOUNTER — OFFICE VISIT (OUTPATIENT)
Dept: ORTHOPEDIC SURGERY | Age: 59
End: 2019-04-10

## 2019-04-10 VITALS
HEIGHT: 67 IN | BODY MASS INDEX: 27.84 KG/M2 | SYSTOLIC BLOOD PRESSURE: 124 MMHG | DIASTOLIC BLOOD PRESSURE: 72 MMHG | WEIGHT: 177.4 LBS | OXYGEN SATURATION: 90 % | HEART RATE: 78 BPM | TEMPERATURE: 97.9 F

## 2019-04-10 DIAGNOSIS — M79.671 BILATERAL FOOT PAIN: ICD-10-CM

## 2019-04-10 DIAGNOSIS — M19.079 ARTHRITIS OF MIDFOOT: Primary | ICD-10-CM

## 2019-04-10 DIAGNOSIS — M79.672 BILATERAL FOOT PAIN: ICD-10-CM

## 2019-04-10 DIAGNOSIS — M25.572 BILATERAL ANKLE PAIN, UNSPECIFIED CHRONICITY: ICD-10-CM

## 2019-04-10 DIAGNOSIS — M25.571 BILATERAL ANKLE PAIN, UNSPECIFIED CHRONICITY: ICD-10-CM

## 2019-04-10 NOTE — PROGRESS NOTES
AMBULATORY PROGRESS NOTE Patient: Nancye Rubinstein             MRN: [id number]     SSN: [id number] Body mass index is 27.78 kg/m². YOB: 1960     AGE: 60 y.o. EX: female PCP: Susanne Alonso MD 
 
 IMPRESSION/DIAGNOSIS AND TREATMENT PLAN  
 
DIAGNOSES 1. Arthritis of midfoot 2. Bilateral ankle pain, unspecified chronicity 3. Bilateral foot pain Orders Placed This Encounter  [88318] Ankle Min 3V  [32841] Foot 2V  [94360] Ankle Min 3V  [40584] Foot 2V  MRI FOOT LT WO CONT Nancye Rubinstein understands her diagnoses and the proposed plan. My overall impression is a 58-year-old female who has known osteoarthritis of her hips and knees and who had a right total hip replacement and a left total knee replacement by Dr. Edelmira Grove. I am seeing her today for dorsal foot pain, left worse than right. My impression is she has some OA of her left midfoot, some OA of the left great toe first MTP, mild, to the great toe MTP lateral aspect on the left side, as well as some early OA to her midfoot. She has discomfort when she tries to perform a single stance rise. She has slightly more pronation on the left than on the right, but she still remains fully correctable and has excellent posterior tibial tendon strength. Recommendation is for an MRI of her left foot. I need to assess the midfoot joints. I did talk to her about the additional plans as listed below, but we want to get the MRI first.  
 
 
Plan: 
 
1) MRI without IV Contrast of the left foot; please assess the midfoot joints. 2) Look into [employer] rockerbottom shoes and MBT brand shoes at [employer] 3) Anticipate custom orthotic, possible SMO brace, after MRI. RTO - after MRI 
 
 HPI AND EXAMINATION Nancye Rubinstein IS A 60 y.o. female who presents to my outpatient office complaining of bilateral foot pain.  Ms. Livia Solano reports that she has been experiencing pain in her bilateral feet, left worse than right, for some time that has been progressively worsening. She states that the pain is located in her dorsolateral midfoot and that it radiates to her toes and lateral ankle. She believes that the pain in her dorsal foot starts first. She reports that this pain bothers her especially at night. On a typical day, she is able to walk after getting out of bed without too much difficulty, but her pain progresses throughout the day. She indicates that she pronates while walking, as well. She denies any pain in her medial foot. She states that she has had an MRI of her right foot only. MRI results have been reviewed with the patient. Of note, the patient has h/o hip and knee replacement. The patient is currently taking Mobic for her left knee pain. She has a possible tear in her knee which may require surgery. Additionally, she has a mild leg length discrepancy. 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 Visit Vitals /72 Pulse 78 Temp 97.9 °F (36.6 °C) (Oral) Ht 5' 7\" (1.702 m) Wt 177 lb 6.4 oz (80.5 kg) SpO2 90% BMI 27.78 kg/m² Appearance: Alert, well appearing and pleasant patient who is in no distress, oriented to person, place/time, and who follows commands. This patient is accompanied in the examination room by her self. Dementia: no dementia Psychiatric: Affect and mood are appropriate. Patient arrives to office via: without assistive device: HEENT: Head normocephalic & atraumatic. Both pupils are round, non icteric sclera Eye: EOM are intact and sclera are clear Neck: ROM WNL and JVD neck is not present Hearings Intact, does not require hearing aid device Respiratory: Breathing is unlabored without accessory chest muscle use Cardiovascular/Peripheral Vascular: Normal Pulses to each hand and foot ANKLE/FOOT left Gait: Normal 
 Tenderness: Mild tenderness with repetitive motion of the great toe. NT to stressing. There is distinct tenderness with repetitive double stance rising of the left midfoot. When she attempts to single stance rise on the left, it does cause her significant discomfort to her left midfoot. NT to the posterior tibial tendon. Cutaneous: Mild swelling to the dorsal midfoot. Joint Motion: Full ankle and hindfoot motion. Joint / Tendon Stability: No Ankle or Subtalar instability or joint laxity. No peroneal sublux ability or dislocation Alignment: Forefoot, Midfoot, Hindfoot WNL. Neuro: Motor/Sensory: NL/NL. Can single stance heel rise with pain. Vascular: NL foot/ankle pulses. Lymphatics: No extremity lymphedema, No calf swelling, no tenderness to calf muscles. ANKLE/FOOT right Tenderness: No tenderness to palpation at this time. Cutaneous:  WNL. Joint Motion: Full ROM. Joint / Tendon Stability: No Ankle or Subtalar instability or joint laxity. No peroneal sublux ability or dislocation Alignment: Forefoot, Midfoot, Hindfoot WNL. Neuro Motor/Sensory: NL/NL. Can single stance heel rise without difficulty. Vascular: NL foot/ankle pulses. Lymphatics: No extremity lymphedema, No calf swelling, no tenderness to calf muscles. CHART REVIEW Past Medical History:  
Diagnosis Date  Allergic rhinitis  Nausea & vomiting  Osteoarthritis of hip  Spinal stenosis Current Outpatient Medications Medication Sig  
 HYDROcodone-acetaminophen (NORCO) 5-325 mg per tablet Take 1 Tab by mouth every eight (8) hours as needed for Pain. Max Daily Amount: 3 Tabs.  zolpidem (AMBIEN) 5 mg tablet Take 1 Tab by mouth nightly as needed for Sleep.  levalbuterol tartrate (XOPENEX) 45 mcg/actuation inhaler Take 1-2 Puffs by inhalation every four (4) hours as needed for Wheezing.  budesonide-formoterol (SYMBICORT) 160-4.5 mcg/actuation HFAA Take 2 Puffs by inhalation two (2) times a day.  meloxicam (MOBIC) 15 mg tablet Take 1 tab by mouth daily with food.  venlafaxine-SR (EFFEXOR-XR) 75 mg capsule Take 37.5 mg by mouth daily.  estradiol (ESTRACE) 1 mg tablet Take 1 mg by mouth every seven (7) days. Indications: Patient takes half tab daily  meloxicam (MOBIC) 15 mg tablet Take 1 Tab by mouth daily.  LORazepam (ATIVAN) 2 mg tablet Take 1 Tab by mouth every six (6) hours as needed for Anxiety (1 pill night before exam, 1 pill morning of exam, 1 pill as needed). Max Daily Amount: 8 mg.  predniSONE (DELTASONE) 20 mg tablet Take 3 tabs by mouth daily x 3 days, then 2 tabs daily x 3 days, then 1 tab daily x 3 days. No current facility-administered medications for this visit. Allergies Allergen Reactions  Other Plant, Animal, Environmental Anaphylaxis  
  poison ivy  Albuterol Other (comments) Able to use but causes to be slightly jittery (not severe and no other anaphylactoid sxs). Past Surgical History:  
Procedure Laterality Date  ENDOSCOPY, COLON, DIAGNOSTIC  03-18-11  
 normal colon to cecum  HX BREAST REDUCTION    
 HX HEENT    
 skin cancer between eyes  HX HYSTERECTOMY  HX KNEE REPLACEMENT    
 HX LAP CHOLECYSTECTOMY  11/2015  HX TONSIL AND ADENOIDECTOMY  HX TUBAL LIGATION Social History Occupational History  Not on file Tobacco Use  Smoking status: Never Smoker  Smokeless tobacco: Never Used Substance and Sexual Activity  Alcohol use: Yes Alcohol/week: 0.0 oz  
  Comment: socially  Drug use: No  
 Sexual activity: Not on file Comment: Hysterectomy Family History Problem Relation Age of Onset  Other Mother   
     atrial fibrillation  Colon Polyps Mother  Breast Cancer Mother  Colon Polyps Brother   
     half brother  Heart Disease Sister half sister ASHD  Heart Attack Sister  Other Son   
     transposition of the great vessels and surgery for this  Other Son   
     congestive heart failure REVIEW OF SYSTEMS : 4/10/2019  ALL BELOW ARE Negative except : SEE HPI Constitutional: Negative for fever, chills and weight loss. Neg Weight Loss Cardiovascular: Negative for chest pain, claudication and leg swelling. SOB, WEI Gastrointestinal/Urological: Negative for  pain, N/V/D/C, Blood in stool or urine,dysuria                         Hematuria, Incontinence, pelvic pain Musculoskeletal: see HPI. Neurological: Negative for dizziness and weakness, headaches,Visual Changes             Confusion,  Or Seizures, Psychiatric/Behavioral: Negative for depression, memory loss and substance abuse. Extremities:  Negative for hair changes, rash or skin lesion changes. Hematologic: Negative for Bleeding problems, bruising, pallor or swollen lymph nodes. Peripheral Vascular: No calf pain, vascular vein tenderness to calf pain No calf throbbing, posterior knee throbbing pain DIAGNOSTIC IMAGING  
 
 DIAGNOSTIC STUDIES:  X-rays, three views of the right foot, weightbearing, AP, lateral, and oblique: There is some slight narrowing of the great toe lateral aspect first MTP. I see no acute fracture, subluxation, or dislocation. There is some joint space narrowing at the cuneiform TMT joint articulation best seen in this lateral radiographic x-ray on this right side. The ankle, subtalar, and transverse tarsal joints are well maintained. There is no significant spurring to the superior and/or inferior surface of the calcaneus. Three views of the right ankle show the ankle joint is well maintained. No fracture, subluxation, or dislocation. No osteolytic or osteoblastic lesions are seen. There are no osteochondral defects.   
 
Three views of the left foot, AP, lateral, and oblique, show some mild narrowing and OA to the lateral portion of the left great toe first MTP. She has bipartite type sesamoids on each of the sesamoids on this left forefoot. There is some mild generalized osteopenia, but no acute fracture, subluxation, or dislocation. The oblique film of the left foot shows a degenerative spur to the dorsomedial portion of the talonavicular joint with a chronic, avulsion-type fracture in this region. The midfoot joints appear to be fairly well-maintained. Lateral view of the left foot shows some osteoarthritic changes to the navicular cuneiform region, as there are dorsal bone spurs. It shows some spurring to the left great toe first MTP and a small amount of spurring to the dorsal part of the talonavicular joint. The ankle joint is fairly well-maintained. There is some slight narrowing at the middle facet of the subtalar joint on this left side. MRI Results (most recent): 
Results from Hospital Encounter encounter on 03/14/19 MRI ANKLE RT WO CONT Narrative Procedure: MRI of the right ankle without contrast. 
 
History/Indications: 60 years Female. R/O posterior tibial tendon dysfunction. Additional History: Difficulty standing on tip toes. Evaluate for posterior 
tibial tendon pathology. Technique: Multisequence multiplanar MR imaging of the right ankle without 
contrast 
 
Comparison: Right foot radiograph 2/23/2015 Findings: 
 
All of the coronal sequences are moderately degraded by motion artifact. OSSEOUS STRUCTURES/JOINTS: 
 
Mild midfoot arthrosis characterized by dorsal spurring. Mild to moderate-sized 
dorsal talonavicular osteophyte. Tiny subchondral cyst noted at the anterior 
facet of the calcaneus. The middle and posterior subtalar facets are intact. Subchondral edema noted in the posterior aspect of the tibial plafond with mild 
underlying chondral thinning. Mild marginal osteophytes noted at the tibial 
plafond. No high-grade chondral defect is evident. No evidence diffuse marrow infiltrative process, osteonecrosis, or fracture. Trace posterior tibiotalar and subtalar effusions mild synovitis noted in the 
posterior tibiotalar joint. TENDONS: 
Medial flexors: Mild intermediate signal adjacent to the attachment to the 
navicular bone compatible with mild tendinosis. No evidence of high-grade tear. The flexor digitorum and flexor hallucis longus tendons are intact. Peroneal tendons: Peroneus longus and brevis tendons are normal in signal and 
morphology. Anterior extensor tendons: Intact. Achilles: The Achilles tendon is normal in signal and morphology. LIGAMENTS: 
Lateral ligaments: The anterior and posterior talofibular, anterior and 
posterior tibiofibular, and the calcaneofibular ligaments are intact. Deltoid ligamentous complex: The superficial and deep deltoid in the spring 
ligaments are intact. PLANTAR FASCIA: 
Mild thickening of the central cord of the plantar fascia with adjacent heel pad 
edema compatible with mild acute on chronic plantar fasciitis. MUSCULATURE: 
Nonspecific edema within the partially visualized distal soleus muscle. This 
edema extends superior to the field of view and is incompletely assessed. Consider imaging more proximal to further assess. The remainder of the 
musculature about the ankle is normal in signal and morphology. OTHER: 
The Lisfranc ligamentous complex is suboptimally evaluated due to the exam 
protocol however it appears grossly intact. Sinus tarsi is clear. No accessory 
musculature is evident. Neurovascular structures are unremarkable. Impression IMPRESSION[de-identified] 
1.  Mild posterior tibialis insertional tendinosis without evidence of 
high-grade tearing. 2.  Nonspecific mild to moderate diffuse intramuscular edema within the soleus 
muscle, only partially included in the field-of-view, possibly reflecting 
low-grade muscle strain or denervation change. 3. Trace tibiotalar and posterior subtalar effusions with synovitis. 4.  Mild acute on chronic plantar fasciitis involving the central cord. 5.  Mild to moderate tibiotalar and midfoot arthrosis as described. Thank you for this referral.  
  
I have personally reviewed the results of the above study. The interpretation of this study is my professional opinion. Written by Leo Rhodes, as dictated by Dr. Emily Chairez. I, Dr. Emily Chairez, confirm that all documentation is accurate.

## 2019-04-10 NOTE — PATIENT INSTRUCTIONS
Look into [employer] rockerbottom shoes and MBT brand shoes. Osteoarthritis: Care Instructions Your Care Instructions Arthritis is a common health problem in which the joints are inflamed. There are several kinds of arthritis. Osteoarthritis is caused by a breakdown of cartilage, the hard, thick tissue that cushions the joints. It causes pain, stiffness, and swelling, often in the spine, fingers, hips, and knees. Osteoarthritis can happen at any age, but it is most common in older people. Osteoarthritis never goes away completely, but it can be controlled. Medicine and home treatment can reduce the pain and prevent the arthritis from getting worse. Follow-up care is a key part of your treatment and safety. Be sure to make and go to all appointments, and call your doctor if you are having problems. It's also a good idea to know your test results and keep a list of the medicines you take. How can you care for yourself at home? · Take a warm shower or bath in the morning to relieve stiffness. Avoid sitting still afterwards. · If the joint is not swollen, use moist heat, like a warm, damp towel, for 20 to 30 minutes, 2 or 3 times a day. Do not use heat on a swollen joint. · If the joint is swollen, use ice or cold packs for 10 to 20 minutes, once an hour. Cold will help relieve pain and reduce inflammation. Put a thin cloth between the ice and your skin. · To prevent stiffness, gently move the joint through its full range of motion several times a day. · If the joint hurts, avoid activities that put a strain on it for a few days. Take rest breaks throughout the day. · Get regular exercise. Walking, swimming, yoga, biking, tom chi, and water aerobics are good exercises that are gentle on the joints. · Reach and stay at a healthy weight. If you need to lose or maintain weight, regular exercise and a healthy diet will help.  Extra weight can strain the joints, especially the knees and hips, and make the pain worse. Losing even a few pounds may help. · Take pain medicines exactly as directed. ? If the doctor gave you a prescription medicine for pain, take it as prescribed. ? If you are not taking a prescription pain medicine, ask your doctor if you can take an over-the-counter medicine. When should you call for help? Call your doctor now or seek immediate medical care if: 
  · The pain is so bad that you cannot use the joint.  
  · You have sudden back pain with weakness in your legs or loss of bowel or bladder control.  
  · Your stools are black and tarlike or have streaks of blood.  
  · You have severe pain and swelling in more than one joint.  
 Watch closely for changes in your health, and be sure to contact your doctor if: 
  · You have side effects from the medicines, like belly pain, ongoing heartburn, or nausea.  
  · Joint pain continues for more than 6 weeks, and home treatment is not helping. Where can you learn more? Go to http://sarahi-musa.info/. Enter C080 in the search box to learn more about \"Osteoarthritis: Care Instructions. \" Current as of: Rosi 10, 2018 Content Version: 11.9 © 1179-7286 [employer]. Care instructions adapted under license by [employer] (which disclaims liability or warranty for this information). If you have questions about a medical condition or this instruction, always ask your healthcare professional. [place] [address] any warranty or liability for your use of this information.

## 2019-07-30 ENCOUNTER — TELEPHONE (OUTPATIENT)
Dept: INTERNAL MEDICINE CLINIC | Age: 59
End: 2019-07-30

## 2019-07-30 DIAGNOSIS — Z00.00 LABORATORY EXAMINATION ORDERED AS PART OF A COMPLETE PHYSICAL EXAMINATION: Primary | ICD-10-CM

## 2019-07-30 NOTE — TELEPHONE ENCOUNTER
Patient is scheduled for a physical on 9/11/19. Please enter lab orders and she will have drawn at [facility].

## 2019-08-29 ENCOUNTER — HOSPITAL ENCOUNTER (OUTPATIENT)
Dept: MAMMOGRAPHY | Age: 59
Discharge: HOME | End: 2019-08-29
Attending: OBSTETRICS & GYNECOLOGY
Payer: COMMERCIAL

## 2019-08-29 DIAGNOSIS — Z12.39 SCREENING FOR BREAST CANCER: ICD-10-CM

## 2019-08-29 PROCEDURE — 77063 BREAST TOMOSYNTHESIS BI: CPT

## 2019-09-04 ENCOUNTER — TELEPHONE (OUTPATIENT)
Dept: INTERNAL MEDICINE CLINIC | Age: 59
End: 2019-09-04

## 2019-09-05 ENCOUNTER — HOSPITAL ENCOUNTER (OUTPATIENT)
Dept: LAB | Age: 59
Discharge: HOME | End: 2019-09-05

## 2019-09-05 LAB
25(OH)D3 SERPL-MCNC: 30.7 NG/ML (ref 32–100)
A-G RATIO,AGRAT: 1.6 RATIO (ref 1.1–2.6)
ABSOLUTE LYMPHOCYTE COUNT, 10803: 2.3 K/UL (ref 1–4.8)
ALBUMIN SERPL-MCNC: 4.4 G/DL (ref 3.5–5)
ALP SERPL-CCNC: 78 U/L (ref 40–120)
ALT SERPL-CCNC: 25 U/L (ref 5–40)
ANION GAP SERPL CALC-SCNC: 13 MMOL/L
AST SERPL W P-5'-P-CCNC: 25 U/L (ref 10–37)
BASOPHILS # BLD: 0.1 K/UL (ref 0–0.2)
BASOPHILS NFR BLD: 1 % (ref 0–2)
BILIRUB SERPL-MCNC: 0.3 MG/DL (ref 0.2–1.2)
BILIRUB UR QL: NEGATIVE
BUN SERPL-MCNC: 17 MG/DL (ref 6–22)
CALCIUM SERPL-MCNC: 9.6 MG/DL (ref 8.4–10.5)
CHLORIDE SERPL-SCNC: 105 MMOL/L (ref 98–110)
CHOLEST SERPL-MCNC: 229 MG/DL (ref 110–200)
CO2 SERPL-SCNC: 23 MMOL/L (ref 20–32)
CREAT SERPL-MCNC: 0.6 MG/DL (ref 0.8–1.4)
EOSINOPHIL # BLD: 0.5 K/UL (ref 0–0.5)
EOSINOPHIL NFR BLD: 9 % (ref 0–6)
EPITHELIAL,EPSU: ABNORMAL /HPF (ref 0–2)
ERYTHROCYTE [DISTWIDTH] IN BLOOD BY AUTOMATED COUNT: 12.5 % (ref 10–15.5)
GFRAA, 66117: >60
GFRNA, 66118: >60
GLOBULIN,GLOB: 2.8 G/DL (ref 2–4)
GLUCOSE SERPL-MCNC: 96 MG/DL (ref 70–99)
GLUCOSE UR QL: NEGATIVE MG/DL
GRANULOCYTES,GRANS: 44 % (ref 40–75)
HCT VFR BLD AUTO: 41.7 % (ref 35.1–48)
HDLC SERPL-MCNC: 4.2 MG/DL (ref 0–5)
HDLC SERPL-MCNC: 54 MG/DL (ref 40–59)
HGB BLD-MCNC: 13.2 G/DL (ref 11.7–16)
HGB UR QL STRIP: ABNORMAL
HYLINE CAST, 6014: ABNORMAL /LPF
KETONES UR QL STRIP.AUTO: NEGATIVE MG/DL
LDLC SERPL CALC-MCNC: 127 MG/DL (ref 50–99)
LEUKOCYTE ESTERASE: NEGATIVE
LYMPHOCYTES, LYMLT: 41 % (ref 20–45)
MCH RBC QN AUTO: 29 PG (ref 26–34)
MCHC RBC AUTO-ENTMCNC: 32 G/DL (ref 31–36)
MCV RBC AUTO: 92 FL (ref 80–95)
MONOCYTES # BLD: 0.3 K/UL (ref 0.1–1)
MONOCYTES NFR BLD: 5 % (ref 3–12)
NEUTROPHILS # BLD AUTO: 2.5 K/UL (ref 1.8–7.7)
NITRITE UR QL STRIP.AUTO: NEGATIVE
PH UR STRIP: 5 PH (ref 5–8)
PLATELET # BLD AUTO: 282 K/UL (ref 140–440)
PMV BLD AUTO: 10.6 FL (ref 9–13)
POTASSIUM SERPL-SCNC: 4.4 MMOL/L (ref 3.5–5.5)
PROT SERPL-MCNC: 7.2 G/DL (ref 6.2–8.1)
PROT UR QL STRIP: NEGATIVE MG/DL
RBC # BLD AUTO: 4.52 M/UL (ref 3.8–5.2)
RBC #/AREA URNS HPF: ABNORMAL /HPF
SENTARA SPECIMEN COL,SENBCF: NORMAL
SODIUM SERPL-SCNC: 141 MMOL/L (ref 133–145)
SP GR UR: 1.02 (ref 1–1.03)
TRIGL SERPL-MCNC: 243 MG/DL (ref 40–149)
TSH SERPL DL<=0.005 MIU/L-ACNC: 2.9 MCU/ML (ref 0.27–4.2)
UROBILINOGEN UR STRIP-MCNC: <2 MG/DL
VLDLC SERPL CALC-MCNC: 49 MG/DL (ref 8–30)
WBC # BLD AUTO: 5.6 K/UL (ref 4–11)
WBC URNS QL MICRO: ABNORMAL /HPF (ref 0–2)

## 2019-09-05 PROCEDURE — 99001 SPECIMEN HANDLING PT-LAB: CPT

## 2019-09-06 ENCOUNTER — TELEPHONE (OUTPATIENT)
Dept: ORTHOPEDIC SURGERY | Age: 59
End: 2019-09-06

## 2019-09-06 RX ORDER — MELOXICAM 15 MG/1
15 TABLET ORAL DAILY
Qty: 30 TAB | Refills: 3 | Status: SHIPPED | OUTPATIENT
Start: 2019-09-06 | End: 2019-12-16

## 2019-09-06 NOTE — TELEPHONE ENCOUNTER
Left message to return call. If patient calls back please inform her that rx was sent to her pharmacy.

## 2019-09-06 NOTE — TELEPHONE ENCOUNTER
Patient request refill on Mobic 15mg. Can be called into  [facility]. Pharmacy number is [phone]. Please call patient when done.

## 2019-09-11 ENCOUNTER — OFFICE VISIT (OUTPATIENT)
Dept: INTERNAL MEDICINE CLINIC | Age: 59
End: 2019-09-11

## 2019-09-11 VITALS
BODY MASS INDEX: 27.62 KG/M2 | SYSTOLIC BLOOD PRESSURE: 114 MMHG | WEIGHT: 176 LBS | HEART RATE: 80 BPM | HEIGHT: 67 IN | DIASTOLIC BLOOD PRESSURE: 60 MMHG | RESPIRATION RATE: 16 BRPM | TEMPERATURE: 98.4 F | OXYGEN SATURATION: 96 %

## 2019-09-11 DIAGNOSIS — Z00.01 ENCOUNTER FOR ROUTINE ADULT PHYSICAL EXAM WITH ABNORMAL FINDINGS: Primary | ICD-10-CM

## 2019-09-11 DIAGNOSIS — K44.9 HIATAL HERNIA: ICD-10-CM

## 2019-09-11 DIAGNOSIS — M15.9 PRIMARY OSTEOARTHRITIS INVOLVING MULTIPLE JOINTS: ICD-10-CM

## 2019-09-11 DIAGNOSIS — N95.1 MENOPAUSAL SYNDROME (HOT FLASHES): ICD-10-CM

## 2019-09-11 DIAGNOSIS — F41.9 ANXIETY: ICD-10-CM

## 2019-09-11 DIAGNOSIS — G47.00 INSOMNIA, UNSPECIFIED TYPE: ICD-10-CM

## 2019-09-11 DIAGNOSIS — K21.00 GASTROESOPHAGEAL REFLUX DISEASE WITH ESOPHAGITIS: ICD-10-CM

## 2019-09-11 DIAGNOSIS — Z80.3 FAMILY HISTORY OF BREAST CANCER: ICD-10-CM

## 2019-09-11 DIAGNOSIS — E55.9 VITAMIN D DEFICIENCY: ICD-10-CM

## 2019-09-11 DIAGNOSIS — E78.5 HYPERLIPIDEMIA, UNSPECIFIED HYPERLIPIDEMIA TYPE: ICD-10-CM

## 2019-09-11 DIAGNOSIS — J45.20 MILD INTERMITTENT ASTHMA WITHOUT COMPLICATION: ICD-10-CM

## 2019-09-11 DIAGNOSIS — K76.0 HEPATIC STEATOSIS: ICD-10-CM

## 2019-09-11 DIAGNOSIS — S76.112S QUADRICEPS TENDON RUPTURE, LEFT, SEQUELA: ICD-10-CM

## 2019-09-11 DIAGNOSIS — M51.36 DEGENERATIVE DISC DISEASE, LUMBAR: ICD-10-CM

## 2019-09-11 RX ORDER — VENLAFAXINE HYDROCHLORIDE 75 MG/1
75 CAPSULE, EXTENDED RELEASE ORAL DAILY
Qty: 90 CAP | Refills: 2 | Status: SHIPPED | OUTPATIENT
Start: 2019-09-11 | End: 2020-08-06 | Stop reason: SDUPTHER

## 2019-09-11 RX ORDER — ZOLPIDEM TARTRATE 5 MG/1
5 TABLET ORAL
Qty: 25 TAB | Refills: 0 | Status: SHIPPED | OUTPATIENT
Start: 2019-09-11 | End: 2019-11-25

## 2019-09-11 NOTE — PROGRESS NOTES
Chief Complaint   Patient presents with   [facility] Patient present to establish care with a new PCP, Dr. Montserrat Prieto.  Complete Physical     Yearly Physical with lab results. Health Maintenance Due   Topic Date Due    Shingrix Vaccine Age 49> (1 of 2) 07/22/2009    Influenza Age 5 to Adult  08/01/2019     1. Have you been to the ER, urgent care clinic or hospitalized since your last visit? NO.     2. Have you seen or consulted any other health care providers outside of the [address] [place] [address] since your last visit (Include any pap smears or colon screening)? NO          Learning Assessment 9/11/2019   PRIMARY LEARNER Patient   HIGHEST LEVEL OF EDUCATION - PRIMARY LEARNER  -   BARRIERS PRIMARY LEARNER NONE   CO-LEARNER CAREGIVER No   PRIMARY LANGUAGE ENGLISH   LEARNER PREFERENCE PRIMARY DEMONSTRATION     -   ANSWERED BY patient   RELATIONSHIP SELF     Abuse Screening Questionnaire 9/11/2019   Do you ever feel afraid of your partner? N   Are you in a relationship with someone who physically or mentally threatens you? N   Is it safe for you to go home? Y     3 most recent PHQ Screens 9/11/2019   PHQ Not Done -   Little interest or pleasure in doing things Not at all   Feeling down, depressed, irritable, or hopeless Several days   Total Score PHQ 2 1     Fall Risk Assessment, last 12 mths 9/11/2019   Able to walk? Yes   Fall in past 12 months?  No

## 2019-09-11 NOTE — PATIENT INSTRUCTIONS

## 2019-09-13 LAB
AMPHETAMINE, 737686: NEGATIVE NG/ML
BARBITURATES: NEGATIVE NG/ML
BENZODIAZEPINES, R9BE1T: NEGATIVE NG/ML
BUPRENORPHINE, URINE: NEGATIVE NG/ML
CANNABINOID, DR86L: NEGATIVE NG/ML
COCAINE/METABOLITES, MDS2T: NEGATIVE NG/ML
CREATININE URINE,3297223: 193.4 MG/DL (ref 20–300)
METHADONE, 791633: NEGATIVE NG/ML
OPIATE, DR88L: NEGATIVE NG/ML
OXYCODONE/OXYMORPHONE-PM: NEGATIVE NG/ML
PH UR STRIP: 5.3 [PH] (ref 4.5–8.9)
PHENCYCLIDINE: NEGATIVE NG/ML
PLEASE NOTE, 62788: NORMAL
PROPOXYPHENE, 791635: NEGATIVE NG/ML

## 2019-09-16 PROBLEM — M51.36 DEGENERATIVE DISC DISEASE, LUMBAR: Status: ACTIVE | Noted: 2019-09-16

## 2019-09-16 PROBLEM — F41.9 ANXIETY: Status: ACTIVE | Noted: 2019-09-16

## 2019-09-16 PROBLEM — M17.10 ARTHRITIS OF KNEE: Status: RESOLVED | Noted: 2017-11-13 | Resolved: 2019-09-16

## 2019-09-16 PROBLEM — G47.00 INSOMNIA: Status: ACTIVE | Noted: 2019-09-16

## 2019-09-16 PROBLEM — H90.3 SENSORINEURAL HEARING LOSS (SNHL) OF BOTH EARS: Status: ACTIVE | Noted: 2019-09-16

## 2019-09-16 PROBLEM — M48.061 LUMBAR STENOSIS: Status: ACTIVE | Noted: 2019-09-16

## 2019-09-16 PROBLEM — J45.20 MILD INTERMITTENT ASTHMA WITHOUT COMPLICATION: Status: ACTIVE | Noted: 2019-09-16

## 2019-09-16 PROBLEM — K76.0 HEPATIC STEATOSIS: Status: ACTIVE | Noted: 2019-09-16

## 2019-09-16 PROBLEM — M51.26 HERNIATION OF INTERVERTEBRAL DISC BETWEEN L4 AND L5: Status: ACTIVE | Noted: 2019-09-16

## 2019-09-16 PROBLEM — S76.112S QUADRICEPS TENDON RUPTURE, LEFT, SEQUELA: Status: ACTIVE | Noted: 2017-12-11

## 2019-09-16 PROBLEM — K44.9 HIATAL HERNIA: Status: ACTIVE | Noted: 2019-09-16

## 2019-09-16 PROBLEM — M47.816 FACET ARTHROPATHY, LUMBAR: Status: ACTIVE | Noted: 2019-09-16

## 2019-09-16 PROBLEM — M15.9 PRIMARY OSTEOARTHRITIS INVOLVING MULTIPLE JOINTS: Status: ACTIVE | Noted: 2019-09-16

## 2019-09-17 NOTE — PROGRESS NOTES
HPI:   Froilan Roger is a 60y.o. year old female who presents today for a physical exam and to establish care. She was a previous patient of Dr. Vicky Pool. She has a history of hyperlipidemia, mild intermittent asthma, GERD, hepatic steatosis, osteoarthritis, lumbar degenerative disease, insomnia, and anxiety. She states that she is doing relatively well and is currently without complaints. She has a history of hyperlipidemia, not previously treated with medication. She underwent a CT heart in 2/2014 with calcium score of 4 due to minimal calcium noted in the wall of the LAD. She remains active, and denies any chest pain, shortness of breath at rest or with exertion, palpitations, lightheadedness, or edema. She has a history of mild intermittent asthma, and will require use of albuterol on occasion. She has also been prescribed Symbicort, but uses this rarely. She has no prior history of smoking. She has a history of GERD with esophagitis, and required esophageal dilatation x 2 in the past by Dr. Acosta Hernandez. She states that she occasionally will have difficulty swallowing with food \"getting stuck\", but states that it is not a frequent problem. She was previously treated by Nexium, but in 2003 developed acute sensorineural hearing loss and there was question at that time that it may be related to treatment with her PPI. She will use famotidine on occasion, but is no longer having significant symptoms. She also has a history of a moderate hiatal hernia, noted on the cardiac CT scan in 2014, and marked hepatic steatosis was noted on an abdominal ultrasound in 11/2015. She had a screening colonoscopy in 3/2011 by Dr. Jacob Dixon which was normal. Follow-up recommended for 10 years. She denies any abdominal pain, nausea, vomiting, melena, hematochezia, or change in bowel movements. She has a history of osteoarthritis, and underwent right hip replacement (10/2016) and left knee replacement (11/2017) by Dr. Umberto Valenzuela. In 12/2017, she presented with acute worsening of left knee pain after her surgery, and was found to have an acute tear of the medial retinacular of her quadriceps tendon which required surgical repair. She also has a history of low back pain, and in 8/2015 underwent a lumbar MRI showing multilevel degenerative changes which was most severe at L4-5 where there was significant canal stenosis due to a moderate posterior disc bulge; mild bilateral foraminal narrowing also seen throughout the lumbar spine. She continues to take meloxicam 2-3 days per week for joint pain, and will take an occasional Norco if severe. She denies any fevers, chills, weight change, saddle paresthesia, neurogenic bowel or bladder symptoms, or recent trauma. In 1/2015, while [address] in [place], she sustained a laceration on the dorsal portion of right heel. She states that her  closed the wound with super glue, and she subsequently developed an infection and painful ulcer. She was seen at urgent care in early 2/2015 and given a dose of ceftriaxone and started on Levaquin. She was subsequently seen by YAMINI Braswell on 2/6/2015 and right foot xray (2/6/2015) showed significant thickening and induration of plantar soft tissue at the right heel, due to acute edematous infiltration, but no soft tissue gas or foreign body noted, and no signs of osteomyelitis. Wound culture (2/6/2015) revealed Staph aureus, sensitive to Bactrim. She also had an arterial duplex ultrasound (2/27/2015) which was negative for PAD. After multiple courses of antibiotics and local wound care, the ulcer healed. She has a history of vasomotor instability, and is being treated with venlafaxine and Estrace as prescribed by Dr. Verner Few. She also has a history of chronic insomnia, treated with Ambien and melatonin.         Past Medical History:   Diagnosis Date    Allergic rhinitis     Anxiety 9/16/2019    Degenerative disc disease, lumbar 9/16/2019    Facet arthropathy, lumbar 9/16/2019    Gastroesophageal reflux disease with esophagitis 11/10/2014    Hepatic steatosis 9/16/2019    Herniation of intervertebral disc between L4 and L5 9/16/2019    Hiatal hernia 9/16/2019    Hyperlipidemia     Insomnia 9/16/2019    Lumbar stenosis 9/16/2019    Mild intermittent asthma without complication 1/44/9072    Primary osteoarthritis involving multiple joints 9/16/2019    Quadriceps tendon rupture, left, sequela 12/11/2017    Medial retinacular tear following left total knee replacement; s/p left quadricep tendon repair 12/11/2017. Dr. Yury Lala.  S/P hip replacement, right 3/3/2016    Vitamin D deficiency 11/10/2014     Past Surgical History:   Procedure Laterality Date    ENDOSCOPY, COLON, DIAGNOSTIC  03-18-11    normal colon to cecum    HX BREAST REDUCTION      HX HEENT      skin cancer between eyes    HX HIP REPLACEMENT Right 10/2016    HX HYSTERECTOMY      HX KNEE REPLACEMENT      HX KNEE REPLACEMENT Left 11/2017    HX LAP CHOLECYSTECTOMY  11/2015    HX TONSIL AND ADENOIDECTOMY      HX TUBAL LIGATION       Current Outpatient Medications   Medication Sig    zolpidem (AMBIEN) 5 mg tablet Take 1 Tab by mouth nightly as needed for Sleep.  venlafaxine-SR (EFFEXOR-XR) 75 mg capsule Take 1 Cap by mouth daily.  meloxicam (MOBIC) 15 mg tablet Take 1 Tab by mouth daily.  budesonide-formoterol (SYMBICORT) 160-4.5 mcg/actuation HFAA Take 2 Puffs by inhalation two (2) times a day.  estradiol (ESTRACE) 1 mg tablet Take 1 mg by mouth every seven (7) days. Indications: Patient takes half tab daily    HYDROcodone-acetaminophen (NORCO) 5-325 mg per tablet Take 1 Tab by mouth every eight (8) hours as needed for Pain. Max Daily Amount: 3 Tabs. No current facility-administered medications for this visit. Allergies and Intolerances:    Allergies   Allergen Reactions    Other Plant, Animal, Environmental Anaphylaxis     poison ivy    Albuterol Other (comments)     Able to use but causes to be slightly jittery (not severe and no other anaphylactoid sxs). Family History: Mother is Shruthi Iglesias with history of a.fib and breast cancer. Son is Zarina Acevedo with congenital heart disease (transposition of the great vessels). Nicholas Ledesma is her stepfather. Family History   Problem Relation Age of Onset    Other Mother         atrial fibrillation    Colon Polyps Mother    [address] Breast Cancer Mother     Colon Polyps Brother         half brother    Heart Disease Brother         a-fib    Heart Disease Sister         half sister ASHD    Heart Attack Sister     Other Son         transposition of the great vessels and surgery for this    Other Son         congestive heart failure     Social History:   She  reports that she has quit smoking. She has a 0.13 pack-year smoking history. She has never used smokeless tobacco. She is  with two children. 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 She drinks a glass of wine 3-4 times a month. Social History     Substance and Sexual Activity   Alcohol Use Yes    Alcohol/week: 1.0 standard drinks    Types: 1 Glasses of wine per week     Immunization History:  Immunization History   Administered Date(s) Administered    Influenza Vaccine 10/12/2016    Tdap 11/10/2014       Review of Systems:   As above included in HPI. Otherwise 11 point review of systems negative including constitutional, skin, HENT, eyes, respiratory, cardiovascular, gastrointestinal, genitourinary, musculoskeletal, endocrine, hematologic, allergy, and neurologic. Physical:   Vitals:   BP: 114/60  HR: 80  WT: 176 lb (79.8 kg)  BMI:  27.57    Exam:   Patient appears in no apparent distress. Affect is appropriate.   HEENT --Anicteric sclerae, tympanic membranes normal,  ear canals normal.  PERRL, EOMI, conjunctiva and lids normal.   Sinuses were nontender, turbinates normal, hearing normal.  Oropharynx without  erythema, normal tongue, oral mucosa and tonsils. No cervical lymphadenopathy. No thyromegaly, JVD, or bruits. Carotid pulses 2+ with normal upstroke. Lungs --Clear to auscultation. No wheezing or rales. Heart --Regular rate and rhythm, no murmurs, rubs, gallops, or clicks. Chest wall --Nontender to palpation. PMI normal.  Abdomen -- Soft and nontender, no hepatosplenomegaly or masses. Extremities -- Without cyanosis, clubbing, edema. 2+ pulses equally and bilaterally. Normal looking digits, ROM intact  Neuro -- CN 2-12 intact, strength 5/5 with intact soft touch in all extremities  Derm  no obvious abnormalities noted, no rash      Review of Data:  Labs:  Hospital Outpatient Visit on 09/05/2019   Component Date Value Ref Range Status    [facility] COL 09/05/2019 Specimens collected/sent to [facility]    Final   Telephone on 09/04/2019   Component Date Value Ref Range Status    Triglyceride 09/05/2019 243* 40 - 149 mg/dL Final    HDL Cholesterol 09/05/2019 54  40 - 59 mg/dL Final    Cholesterol, total 09/05/2019 229* 110 - 200 mg/dL Final    CHOLESTEROL/HDL 09/05/2019 4.2  0.0 - 5.0 Final    LDL, calculated 09/05/2019 127* 50 - 99 mg/dL Final    VLDL, calculated 09/05/2019 49* 8 - 30 mg/dL Final    Glucose 09/05/2019 96  70 - 99 mg/dL Final    BUN 09/05/2019 17  6 - 22 mg/dL Final    Creatinine 09/05/2019 0.6* 0.8 - 1.4 mg/dL Final    Sodium 09/05/2019 141  133 - 145 mmol/L Final    Potassium 09/05/2019 4.4  3.5 - 5.5 mmol/L Final    Chloride 09/05/2019 105  98 - 110 mmol/L Final    CO2 09/05/2019 23  20 - 32 mmol/L Final    AST (SGOT) 09/05/2019 25  10 - 37 U/L Final    ALT (SGPT) 09/05/2019 25  5 - 40 U/L Final    Alk.  phosphatase 09/05/2019 78  40 - 120 U/L Final    Bilirubin, total 09/05/2019 0.3  0.2 - 1.2 mg/dL Final    Calcium 09/05/2019 9.6  8.4 - 10.5 mg/dL Final    Protein, total 09/05/2019 7.2  6.2 - 8.1 g/dL Final    Albumin 09/05/2019 4.4  3.5 - 5.0 g/dL Final    A-G Ratio 09/05/2019 1. 6  1.1 - 2.6 ratio Final    Globulin 09/05/2019 2.8  2.0 - 4.0 g/dL Final    Anion gap 09/05/2019 13.0  mmol/L Final    GFRAA 09/05/2019 >60.0  >60.0 Final    GFRNA 09/05/2019 >60.0  >60.0 Final    VITAMIN D, 25-HYDROXY 09/05/2019 30.7* 32.0 - 100.0 ng/mL Final    TSH 09/05/2019 2.90  0.27 - 4.20 mcU/mL Final    WBC 09/05/2019 5.6  4.0 - 11.0 K/uL Final    RBC 09/05/2019 4.52  3.80 - 5.20 M/uL Final    HGB 09/05/2019 13.2  11.7 - 16.0 g/dL Final    HCT 09/05/2019 41.7  35.1 - 48.0 % Final    MCV 09/05/2019 92  80 - 95 fL Final    MCH 09/05/2019 29  26 - 34 pg Final    MCHC 09/05/2019 32  31 - 36 g/dL Final    RDW 09/05/2019 12.5  10.0 - 15.5 % Final    PLATELET 09/33/3050 561  140 - 440 K/uL Final    MPV 09/05/2019 10.6  9.0 - 13.0 fL Final    NEUTROPHILS 09/05/2019 44  40 - 75 % Final    Lymphocytes 09/05/2019 41  20 - 45 % Final    MONOCYTES 09/05/2019 5  3 - 12 % Final    EOSINOPHILS 09/05/2019 9* 0 - 6 % Final    BASOPHILS 09/05/2019 1  0 - 2 % Final    ABS. NEUTROPHILS 09/05/2019 2.5  1.8 - 7.7 K/uL Final    ABSOLUTE LYMPHOCYTE COUNT 09/05/2019 2.3  1.0 - 4.8 K/uL Final    ABS. MONOCYTES 09/05/2019 0.3  0.1 - 1.0 K/uL Final    ABS. EOSINOPHILS 09/05/2019 0.5  0.0 - 0.5 K/uL Final    ABS.  BASOPHILS 09/05/2019 0.1  0.0 - 0.2 K/uL Final    Specific Gravity 09/05/2019 1.016  1.005 - 1.03 Final    pH (UA) 09/05/2019 5.0  5.0 - 8.0 pH Final    Protein 09/05/2019 Negative  Negative, mg/dL Final    Glucose 09/05/2019 Negative  Negative mg/dL Final    Ketone 09/05/2019 Negative  Negative, mg/dL Final    Bilirubin 09/05/2019 Negative  Negative Final    Blood 09/05/2019 Small* Negative Final    Nitrites 09/05/2019 Negative  Negative Final    Leukocyte Esterase 09/05/2019 Negative  Negative Final    Urobilinogen 09/05/2019 <2.0  <2.0 mg/dL Final    WBC 09/05/2019 0-2  0 - 2 /hpf Final    RBC 09/05/2019 0-2  Negative, /hpf Final    HYLINE CAST 09/05/2019 10-15* Negative /lpf Final  Epithelial cells 09/05/2019 0-2  0 - 2 /hpf Final         Calculated 10 year ASCVD risk score: 3 %    Health Maintenance:  Screening:    Mammogram: negative (8/2019)   PAP smear: well women exams with Dr. Angelita Singer (s/p [facility])   Colorectal: colonoscopy (3/2011) normal. Dr. Jose R Gutierrez. Due 2021. Depression: none   DM (HbA1c/FPG): FPG 96 (9/2019)   Hepatitis C: negative (2/2013)   Falls: none   DEXA: unknown   Glaucoma: regular eye exams with Dr. Joy Mcdaniels    Smoking: none   Vitamin D: 30.7 (9/2019)   Medicare Wellness: N/A        Impression:  Patient Active Problem List   Diagnosis Code    Allergic rhinitis 80    Hyperlipidemia E78.5    S/P total knee arthroplasty, left Y49.306    Family history of colonic polyps Z83.71    Vitamin D deficiency E55.9    Gastroesophageal reflux disease with esophagitis K21.0    Family history of breast cancer Z80.3    S/P hip replacement, right Z96.641    Menopausal syndrome (hot flashes) N95.1    Quadriceps tendon rupture, left, sequela S76.112S    Sensorineural hearing loss (SNHL) of both ears H90.3    Mild intermittent asthma without complication K01.34    Hiatal hernia K44.9    Insomnia G47.00    Anxiety F41.9    Primary osteoarthritis involving multiple joints M15.0    Degenerative disc disease, lumbar M51.36    Facet arthropathy, lumbar M47.816    Herniation of intervertebral disc between L4 and L5 M51.26    Lumbar stenosis M48.061    Hepatic steatosis K76.0       Plan:  1. Hyperlipidemia. Calculated 10 year ASCVD risk is 3 %, which does not place her in one of the four statin benefit groups as per new AHA/ACC guidelines. She did have a CT heart for calcium in 2014 that showed a score of 4 with minimal calcification seen in the LAD. Given calculated risk of < 5%, would not recommend treatment with statin at this time. Emphasized importance of lifestyle modifications, including diet, exercise, and weight loss. Continue to follow. 2. Osteoarthritis. Significant difficulty over the years involving her bilateral knees, hips, and lumbar spine. On meloxicam 2-3 days per week joint pains, and will occasionally take Norco for severe pain. Reports generally stable. Being followed by Dr. Dontae Lopes and has seen Dr. Tae Garza in the past as well. 3. Mild intermittent asthma. Patient reports intermittent wheezing, although can not confirm pattern. Has used inhalers in association with [facility]RTI in the past. Prescribed Symbicort and albuterol and will use occasionally for increased symptoms. 4. GERD/ hiatal hernia. Difficulty in the past and required dilatation of esophagus by Dr. Jaron Rosales x 2. Continuing to have occasional difficulty with swallowing, but states that no need currently for repeat. Using Pepcid occasionally, as wary of PPI's given episode of acute sensorineural loss in 2003 which she believes may have been due to treatment with Nexium. 5. Hepatic steatosis. Emphasized importance of lifestyle modifications, including diet, exercise, and weight loss. Monitor liver function tests. Advised to avoid alcohol. 6. Vasomotor instability. On venlafaxine and Estrace. Mother with history of breast cancer, so at increased risk due to cancer in first degree relative. Advised to discontinue Estrace given increasing risk of hypercoagulable effects and cancer with increasing age. Will increase venlafaxine dose to 75 mg daily as Estrace weaned. 7. Insomnia. Long term issue. Using melatonin and Ambien as needed.  reviewed and lase filled Ambien 7/26/2018. Refill of 25 tabs provided. 8. Health maintenance. Already received Tdap. Will advise obtaining Pneumovax 23 at next visit given history of mild asthma. Given script for Shingrix vaccine. Mammogram up to date. NO further pap smears given s/p THIEN. Colonoscopy due 2021. Continue regular eye exams with Dr. Aloysius Cabot. Vitamin D level low normal. Continue supplement.  Will discuss obtaining baseline bone density scan at next visit. Patient understands recommendations and agrees with plan. Follow-up in 12 months.

## 2019-09-20 ENCOUNTER — APPOINTMENT (OUTPATIENT)
Dept: VASCULAR SURGERY | Age: 59
End: 2019-09-20
Attending: EMERGENCY MEDICINE
Payer: COMMERCIAL

## 2019-09-20 ENCOUNTER — HOSPITAL ENCOUNTER (EMERGENCY)
Age: 59
Discharge: HOME | End: 2019-09-20
Attending: EMERGENCY MEDICINE
Payer: COMMERCIAL

## 2019-09-20 ENCOUNTER — APPOINTMENT (OUTPATIENT)
Dept: CT IMAGING | Age: 59
End: 2019-09-20
Attending: EMERGENCY MEDICINE
Payer: COMMERCIAL

## 2019-09-20 ENCOUNTER — APPOINTMENT (OUTPATIENT)
Dept: GENERAL RADIOLOGY | Age: 59
End: 2019-09-20
Attending: EMERGENCY MEDICINE
Payer: COMMERCIAL

## 2019-09-20 VITALS
RESPIRATION RATE: 15 BRPM | OXYGEN SATURATION: 93 % | SYSTOLIC BLOOD PRESSURE: 113 MMHG | HEART RATE: 81 BPM | DIASTOLIC BLOOD PRESSURE: 61 MMHG

## 2019-09-20 DIAGNOSIS — R29.898 RIGHT ARM WEAKNESS: ICD-10-CM

## 2019-09-20 DIAGNOSIS — M25.511 PAIN IN JOINT OF RIGHT SHOULDER: Primary | ICD-10-CM

## 2019-09-20 LAB
ALBUMIN SERPL-MCNC: 4 G/DL (ref 3.4–5)
ALBUMIN/GLOB SERPL: 1 {RATIO} (ref 0.8–1.7)
ALP SERPL-CCNC: 87 U/L (ref 45–117)
ALT SERPL-CCNC: 36 U/L (ref 13–56)
ANION GAP SERPL CALC-SCNC: 6 MMOL/L (ref 3–18)
AST SERPL-CCNC: 28 U/L (ref 10–38)
BASOPHILS # BLD: 0.1 K/UL (ref 0–0.1)
BASOPHILS NFR BLD: 1 % (ref 0–2)
BILIRUB SERPL-MCNC: 0.3 MG/DL (ref 0.2–1)
BUN SERPL-MCNC: 17 MG/DL (ref 7–18)
BUN/CREAT SERPL: 25 (ref 12–20)
CALCIUM SERPL-MCNC: 9.1 MG/DL (ref 8.5–10.1)
CHLORIDE SERPL-SCNC: 106 MMOL/L (ref 100–111)
CO2 SERPL-SCNC: 26 MMOL/L (ref 21–32)
CREAT SERPL-MCNC: 0.68 MG/DL (ref 0.6–1.3)
DIFFERENTIAL METHOD BLD: ABNORMAL
EOSINOPHIL # BLD: 0.6 K/UL (ref 0–0.4)
EOSINOPHIL NFR BLD: 9 % (ref 0–5)
ERYTHROCYTE [DISTWIDTH] IN BLOOD BY AUTOMATED COUNT: 13.2 % (ref 11.6–14.5)
GLOBULIN SER CALC-MCNC: 3.9 G/DL (ref 2–4)
GLUCOSE SERPL-MCNC: 89 MG/DL (ref 74–99)
HCT VFR BLD AUTO: 41.5 % (ref 35–45)
HGB BLD-MCNC: 13.7 G/DL (ref 12–16)
INR PPP: 0.9 (ref 0.8–1.2)
LYMPHOCYTES # BLD: 3.1 K/UL (ref 0.9–3.6)
LYMPHOCYTES NFR BLD: 48 % (ref 21–52)
MCH RBC QN AUTO: 30.2 PG (ref 24–34)
MCHC RBC AUTO-ENTMCNC: 33 G/DL (ref 31–37)
MCV RBC AUTO: 91.6 FL (ref 74–97)
MONOCYTES # BLD: 0.4 K/UL (ref 0.05–1.2)
MONOCYTES NFR BLD: 6 % (ref 3–10)
NEUTS SEG # BLD: 2.4 K/UL (ref 1.8–8)
NEUTS SEG NFR BLD: 36 % (ref 40–73)
PLATELET # BLD AUTO: 266 K/UL (ref 135–420)
PMV BLD AUTO: 10 FL (ref 9.2–11.8)
POTASSIUM SERPL-SCNC: 4.1 MMOL/L (ref 3.5–5.5)
PROT SERPL-MCNC: 7.9 G/DL (ref 6.4–8.2)
PROTHROMBIN TIME: 11.8 SEC (ref 11.5–15.2)
RBC # BLD AUTO: 4.53 M/UL (ref 4.2–5.3)
SODIUM SERPL-SCNC: 138 MMOL/L (ref 136–145)
WBC # BLD AUTO: 6.5 K/UL (ref 4.6–13.2)

## 2019-09-20 PROCEDURE — 96374 THER/PROPH/DIAG INJ IV PUSH: CPT

## 2019-09-20 PROCEDURE — 85025 COMPLETE CBC W/AUTO DIFF WBC: CPT

## 2019-09-20 PROCEDURE — 99285 EMERGENCY DEPT VISIT HI MDM: CPT

## 2019-09-20 PROCEDURE — 74011636320 HC RX REV CODE- 636/320: Performed by: EMERGENCY MEDICINE

## 2019-09-20 PROCEDURE — 74011250636 HC RX REV CODE- 250/636

## 2019-09-20 PROCEDURE — 93005 ELECTROCARDIOGRAM TRACING: CPT

## 2019-09-20 PROCEDURE — 71260 CT THORAX DX C+: CPT

## 2019-09-20 PROCEDURE — 93971 EXTREMITY STUDY: CPT

## 2019-09-20 PROCEDURE — 70450 CT HEAD/BRAIN W/O DYE: CPT

## 2019-09-20 PROCEDURE — 96375 TX/PRO/DX INJ NEW DRUG ADDON: CPT

## 2019-09-20 PROCEDURE — 85610 PROTHROMBIN TIME: CPT

## 2019-09-20 PROCEDURE — 73010 X-RAY EXAM OF SHOULDER BLADE: CPT

## 2019-09-20 PROCEDURE — 80053 COMPREHEN METABOLIC PANEL: CPT

## 2019-09-20 PROCEDURE — 74011250636 HC RX REV CODE- 250/636: Performed by: EMERGENCY MEDICINE

## 2019-09-20 RX ORDER — ONDANSETRON 2 MG/ML
4 INJECTION INTRAMUSCULAR; INTRAVENOUS
Status: COMPLETED | OUTPATIENT
Start: 2019-09-20 | End: 2019-09-20

## 2019-09-20 RX ORDER — KETOROLAC TROMETHAMINE 15 MG/ML
15 INJECTION, SOLUTION INTRAMUSCULAR; INTRAVENOUS
Status: COMPLETED | OUTPATIENT
Start: 2019-09-20 | End: 2019-09-20

## 2019-09-20 RX ORDER — KETOROLAC TROMETHAMINE 15 MG/ML
INJECTION, SOLUTION INTRAMUSCULAR; INTRAVENOUS
Status: COMPLETED
Start: 2019-09-20 | End: 2019-09-20

## 2019-09-20 RX ORDER — MORPHINE SULFATE 2 MG/ML
2 INJECTION, SOLUTION INTRAMUSCULAR; INTRAVENOUS
Status: COMPLETED | OUTPATIENT
Start: 2019-09-20 | End: 2019-09-20

## 2019-09-20 RX ORDER — OXYCODONE AND ACETAMINOPHEN 5; 325 MG/1; MG/1
1 TABLET ORAL
Qty: 20 TAB | Refills: 0 | Status: SHIPPED | OUTPATIENT
Start: 2019-09-20 | End: 2019-09-27

## 2019-09-20 RX ADMIN — KETOROLAC TROMETHAMINE 15 MG: 15 INJECTION, SOLUTION INTRAMUSCULAR; INTRAVENOUS at 13:50

## 2019-09-20 RX ADMIN — MORPHINE SULFATE 2 MG: 2 INJECTION, SOLUTION INTRAMUSCULAR; INTRAVENOUS at 12:00

## 2019-09-20 RX ADMIN — IOPAMIDOL 100 ML: 612 INJECTION, SOLUTION INTRAVENOUS at 14:49

## 2019-09-20 RX ADMIN — ONDANSETRON 4 MG: 2 INJECTION INTRAMUSCULAR; INTRAVENOUS at 12:00

## 2019-09-20 NOTE — PROGRESS NOTES
visited with the family of Corona Hyatt, who is a 60 y.o.,female. The  provided the following Interventions:  Initiated a relationship of care and support. Offered prayer and assurance of continued prayers on patients behalf.  met family member at ED entrance, escorted family member to Patient room. Plan:  Chaplains will continue to follow and will provide pastoral care on an as needed/requested basis.  recommends bedside caregivers page  on duty if patient shows signs of acute spiritual or emotional distress.     [id number]   [phone]

## 2019-09-20 NOTE — PROGRESS NOTES
responded to Code S for  Kimberly Dalton, who is a 60 y.o.,female,    responded to Rapid Response for  Kimberly Dalton, who is a 60 y.o.,female,     The  provided the following Interventions:  Provided crisis pastoral care and pastoral support. Offered prayers on behalf for the patient. Chart reviewed. The following outcomes were achieved:   met with Patient, provided silent  Prayer;  is waiting for family member to arrive (). Assessment:  There are no spiritual or Adventism issues which require intervention at this time. Plan:  Chaplains will continue to follow and will provide pastoral care on an as needed/requested basis.  recommends bedside caregivers page  on duty if patient shows signs of acute spiritual or emotional distress. [id number]   [phone]       The  provided the following Interventions:  Provided crisis spiritual care and support. Offered prayers on behalf for the patient. Chart reviewed. The following outcomes were achieved:      Assessment:  There are no further spiritual or Adventism issues which require intervention at this time. Plan:  Chaplains will continue to follow and will provide spiritual care as needed.  recommends bedside caregivers page  on duty if patient or family shows signs of acute spiritual or emotional distress.

## 2019-09-20 NOTE — ED TRIAGE NOTES
Pt was a code green, with c/o right arm weakness, pain, and numbness with onset at 1000 today. Pt reports that her back and shoulder have been bothering her for months. No heavy lifting or injury.

## 2019-09-20 NOTE — ED PROVIDER NOTES
EMERGENCY DEPARTMENT HISTORY AND PHYSICAL EXAM  This was created with voice recognition software and transcription errors may be present. 11:00 AM  Date: 9/20/2019  Patient Name: Maria De Jesus Conde    History of Presenting Illness     Chief Complaint:    History Provided By:     HPI: Maria De Jesus Conde is a 60 y.o. female past medical history of allergic rhinitis anxiety degenerative disc disease facet arthropathy gastric reflux reflux herniation hiatal hernia hyperlipidemia lumbar stenosis who presents with right arm numbness and tingling. Patient states that she had some neck pain on the right side since this morning around 7 but her right arm became numb and tingling her about 1030 this morning. No involvement of the face or leg. No history of similar    PCP: Patria Santos MD      Past History     Past Medical History:  Past Medical History:   Diagnosis Date    Allergic rhinitis     Anxiety 9/16/2019    Degenerative disc disease, lumbar 9/16/2019    Facet arthropathy, lumbar 9/16/2019    Gastroesophageal reflux disease with esophagitis 11/10/2014    Hepatic steatosis 9/16/2019    Herniation of intervertebral disc between L4 and L5 9/16/2019    Hiatal hernia 9/16/2019    Hyperlipidemia     Insomnia 9/16/2019    Lumbar stenosis 9/16/2019    Mild intermittent asthma without complication 8/16/1704    Primary osteoarthritis involving multiple joints 9/16/2019    Quadriceps tendon rupture, left, sequela 12/11/2017    Medial retinacular tear following left total knee replacement; s/p left quadricep tendon repair 12/11/2017. Dr. Dahiana Hough.     S/P hip replacement, right 3/3/2016    Vitamin D deficiency 11/10/2014       Past Surgical History:  Past Surgical History:   Procedure Laterality Date    ENDOSCOPY, COLON, DIAGNOSTIC  03-18-11    normal colon to cecum    HX BREAST REDUCTION      HX HEENT      skin cancer between eyes    HX HIP REPLACEMENT Right 10/2016    HX HYSTERECTOMY      HX KNEE REPLACEMENT      HX KNEE REPLACEMENT Left 11/2017    HX LAP CHOLECYSTECTOMY  11/2015    HX TONSIL AND ADENOIDECTOMY      HX TUBAL LIGATION         Family History:  Family History   Problem Relation Age of Onset    Other Mother         atrial fibrillation    Colon Polyps Mother    [place] Breast Cancer Mother     Colon Polyps Brother         half brother    Heart Disease Brother         a-fib    Heart Disease Sister         half sister ASHD    Heart Attack Sister     Other Son         transposition of the great vessels and surgery for this    Other Son         congestive heart failure       Social History:  Social History     Tobacco Use    Smoking status: Former Smoker     Packs/day: 0.25     Years: 0.50     Pack years: 0.12    Smokeless tobacco: Never Used   Substance Use Topics    Alcohol use: Yes     Alcohol/week: 1.0 standard drinks     Types: 1 Glasses of wine per week    Drug use: No       Allergies: Allergies   Allergen Reactions    Other Plant, Animal, Environmental Anaphylaxis     poison ivy    Albuterol Other (comments)     Able to use but causes to be slightly jittery (not severe and no other anaphylactoid sxs). Review of Systems     Review of Systems   Constitutional: Negative for chills. All other systems reviewed and are negative. 10 point review of systems otherwise negative unless noted in HPI. Physical Exam       Physical Exam   Constitutional: She is oriented to person, place, and time. She appears well-developed. HENT:   Head: Normocephalic and atraumatic. Eyes: Pupils are equal, round, and reactive to light. EOM are normal.   Neck: Normal range of motion. Neck supple. Cardiovascular: Normal rate, regular rhythm and normal heart sounds. Exam reveals no friction rub. No murmur heard. Pulmonary/Chest: Effort normal and breath sounds normal. No respiratory distress. She has no wheezes. Abdominal: Soft. She exhibits no distension. There is no tenderness. There is no rebound and no guarding. Musculoskeletal: Normal range of motion. Right arm weakness only able to lift the deltoid to about 90 degrees laterally secondary to weakness. Neurological: She is alert and oriented to person, place, and time. Right arm weakness and tingling with good radial pulse strength 4 out of 5   Skin: Skin is warm and dry. Psychiatric: She has a normal mood and affect. Her behavior is normal. Thought content normal.       Diagnostic Study Results     Vital Signs  EKG: EKG shows sinus at 81 with a normal axis normal intervals there is no ST elevation or depression and no hypertrophy  Labs: CBC is normal chemistry is normal INR is normal  Imagin. No acute fracture or subluxation. 2.  Glenohumeral joint slight osteoarthrosis. 3.  Mild degenerative hypertrophy at the acromioclavicular joint. 4.  Right sided cervical facet arthropathy. Medical Decision Making     ED Course: Progress Notes, Reevaluation, and Consults:      Provider Notes (Medical Decision Making): 78-year-old female with neck pain and right arm weakness suspect more of a peripheral than central etiology activated code stroke pending neurology evaluation. X-rays negative. Patient does have some swelling to her right axilla as well as right trap. Discussed this with her. She has been having pain to that side  said for months. She denies any injury. We discussed that the MRI is probably the best imaging to get however unable to obtain that emergently. So we will obtain a CT of her chest as well as a duplex to make sure there is no clot on that side. Discussed with radiology  to confirm ordering. CT chest was reviewed. No evidence of lymphadenopathy or mass. Duplex was normal.  Will refer patient to Ortho prescribe some pain meds extensive discussion with the patient and her family. Discussed that this does not rule out cancer but makes it less likely.          Diagnosis Clinical Impression: No diagnosis found. Disposition:    Patient's Medications   Start Taking    No medications on file   Continue Taking    BUDESONIDE-FORMOTEROL (SYMBICORT) 160-4.5 MCG/ACTUATION HFAA    Take 2 Puffs by inhalation two (2) times a day. ESTRADIOL (ESTRACE) 1 MG TABLET    Take 1 mg by mouth every seven (7) days. Indications: Patient takes half tab daily    HYDROCODONE-ACETAMINOPHEN (NORCO) 5-325 MG PER TABLET    Take 1 Tab by mouth every eight (8) hours as needed for Pain. Max Daily Amount: 3 Tabs. MELOXICAM (MOBIC) 15 MG TABLET    Take 1 Tab by mouth daily. VENLAFAXINE-SR (EFFEXOR-XR) 75 MG CAPSULE    Take 1 Cap by mouth daily. ZOLPIDEM (AMBIEN) 5 MG TABLET    Take 1 Tab by mouth nightly as needed for Sleep.    These Medications have changed    No medications on file   Stop Taking    No medications on file

## 2019-09-21 LAB
ATRIAL RATE: 81 BPM
CALCULATED P AXIS, ECG09: 42 DEGREES
CALCULATED R AXIS, ECG10: -8 DEGREES
CALCULATED T AXIS, ECG11: 20 DEGREES
DIAGNOSIS, 93000: NORMAL
P-R INTERVAL, ECG05: 176 MS
Q-T INTERVAL, ECG07: 392 MS
QRS DURATION, ECG06: 100 MS
QTC CALCULATION (BEZET), ECG08: 455 MS
VENTRICULAR RATE, ECG03: 81 BPM

## 2019-09-24 ENCOUNTER — OFFICE VISIT (OUTPATIENT)
Dept: ORTHOPEDIC SURGERY | Facility: CLINIC | Age: 59
End: 2019-09-24

## 2019-09-24 VITALS
WEIGHT: 177.4 LBS | BODY MASS INDEX: 27.84 KG/M2 | DIASTOLIC BLOOD PRESSURE: 75 MMHG | HEART RATE: 89 BPM | OXYGEN SATURATION: 96 % | SYSTOLIC BLOOD PRESSURE: 119 MMHG | HEIGHT: 67 IN | RESPIRATION RATE: 18 BRPM | TEMPERATURE: 97.7 F

## 2019-09-24 DIAGNOSIS — M79.601 RIGHT ARM PAIN: ICD-10-CM

## 2019-09-24 DIAGNOSIS — M75.101 ROTATOR CUFF SYNDROME, RIGHT: ICD-10-CM

## 2019-09-24 DIAGNOSIS — M47.812 CERVICAL ARTHRITIS: Primary | ICD-10-CM

## 2019-09-24 RX ORDER — METHYLPREDNISOLONE 4 MG/1
TABLET ORAL
Qty: 1 DOSE PACK | Refills: 0 | Status: SHIPPED | OUTPATIENT
Start: 2019-09-24 | End: 2019-11-25

## 2019-09-24 NOTE — PROGRESS NOTES
Patient: Danny Duncan                MRN: [id number]       SSN: [id number]  YOB: 1960        AGE: 60 y.o. SEX: female  Body mass index is 27.78 kg/m². PCP: Luna Stephens MD  09/24/19    Chief Complaint: Neck and right arm pain    HISTORY OF PRESENT ILLNESS:  Candace is a 60year-old female who comes in the office today with neck pain, right shoulder pain and weakness and numbness and tingling that radiates down her arm. She has been having these symptoms for the past few months. She denies any injury or trauma. She has difficulty at work and notices the pain when she is doing work with her arms extended out in front of her body and numbness and tingling that radiates down to her hand. Past Medical History:   Diagnosis Date    Allergic rhinitis     Anxiety 9/16/2019    Degenerative disc disease, lumbar 9/16/2019    Facet arthropathy, lumbar 9/16/2019    Gastroesophageal reflux disease with esophagitis 11/10/2014    Hepatic steatosis 9/16/2019    Herniation of intervertebral disc between L4 and L5 9/16/2019    Hiatal hernia 9/16/2019    Hyperlipidemia     Insomnia 9/16/2019    Lumbar stenosis 9/16/2019    Mild intermittent asthma without complication 7/04/5703    Primary osteoarthritis involving multiple joints 9/16/2019    Quadriceps tendon rupture, left, sequela 12/11/2017    Medial retinacular tear following left total knee replacement; s/p left quadricep tendon repair 12/11/2017. Dr. Delta Hernandez.     S/P hip replacement, right 3/3/2016    Vitamin D deficiency 11/10/2014       Family History   Problem Relation Age of Onset    Other Mother         atrial fibrillation    Colon Polyps Mother     Breast Cancer Mother     Colon Polyps Brother         half brother    Heart Disease Brother         a-fib    Heart Disease Sister         half sister ASHD    Heart Attack Sister     Other Son         transposition of the great vessels and surgery for this  Other Son         congestive heart failure       Current Outpatient Medications   Medication Sig Dispense Refill    oxyCODONE-acetaminophen (PERCOCET) 5-325 mg per tablet Take 1 Tab by mouth every four (4) hours as needed for Pain for up to 7 days. Max Daily Amount: 6 Tabs. 20 Tab 0    venlafaxine-SR (EFFEXOR-XR) 75 mg capsule Take 1 Cap by mouth daily. 90 Cap 2    meloxicam (MOBIC) 15 mg tablet Take 1 Tab by mouth daily. 30 Tab 3    budesonide-formoterol (SYMBICORT) 160-4.5 mcg/actuation HFAA Take 2 Puffs by inhalation two (2) times a day. 1 Inhaler 1    zolpidem (AMBIEN) 5 mg tablet Take 1 Tab by mouth nightly as needed for Sleep. 25 Tab 0    HYDROcodone-acetaminophen (NORCO) 5-325 mg per tablet Take 1 Tab by mouth every eight (8) hours as needed for Pain. Max Daily Amount: 3 Tabs. 21 Tab 0    estradiol (ESTRACE) 1 mg tablet Take 1 mg by mouth every seven (7) days. Indications: Patient takes half tab daily         Allergies   Allergen Reactions    Other Plant, Animal, Environmental Anaphylaxis     poison ivy    Albuterol Other (comments)     Able to use but causes to be slightly jittery (not severe and no other anaphylactoid sxs).        Past Surgical History:   Procedure Laterality Date    ENDOSCOPY, COLON, DIAGNOSTIC  03-18-11    normal colon to cecum    HX BREAST REDUCTION      HX HEENT      skin cancer between eyes    HX HIP REPLACEMENT Right 10/2016    HX HYSTERECTOMY      HX KNEE REPLACEMENT      HX KNEE REPLACEMENT Left 11/2017    HX LAP CHOLECYSTECTOMY  11/2015    HX TONSIL AND ADENOIDECTOMY      HX TUBAL LIGATION         Social History     Socioeconomic History    Marital status:      Spouse name: Not on file    Number of children: Not on file    Years of education: Not on file    Highest education level: Not on file   Occupational History    Not on file   Social Needs    Financial resource strain: Not on file    Food insecurity:     Worry: Not on file     Inability: Not on file    Transportation needs:     Medical: Not on file     Non-medical: Not on file   Tobacco Use    Smoking status: Former Smoker     Packs/day: 0.25     Years: 0.50     Pack years: 0.12    Smokeless tobacco: Never Used   Substance and Sexual Activity    Alcohol use: Yes     Alcohol/week: 1.0 standard drinks     Types: 1 Glasses of wine per week    Drug use: No    Sexual activity: Yes     Partners: Male     Birth control/protection: None     Comment: Hysterectomy   Lifestyle    Physical activity:     Days per week: Not on file     Minutes per session: Not on file    Stress: Not on file   Relationships    Social connections:     Talks on phone: Not on file     Gets together: Not on file     Attends Sabianist service: Not on file     Active member of club or organization: Not on file     Attends meetings of clubs or organizations: Not on file     Relationship status: Not on file    Intimate partner violence:     Fear of current or ex partner: Not on file     Emotionally abused: Not on file     Physically abused: Not on file     Forced sexual activity: Not on file   Other Topics Concern    Not on file   Social History Narrative    Not on file       REVIEW OF SYSTEMS:      CON: negative for recent weight loss/gain, fever, or chills  EYE: negative for double or blurry vision  ENT: negative for hoarseness  RS:   negative for cough, URI, SOB  CV:  negative for chest pain, palpitations  GI:    negative for blood in stool, nausea/vomiting  :  negative for blood in urine  MS: As per HPI  Other systems reviewed and noted below. PHYSICAL EXAMINATION:  Visit Vitals  /75   Pulse 89   Temp 97.7 °F (36.5 °C) (Oral)   Resp 18   Ht 5' 7\" (1.702 m)   Wt 177 lb 6.4 oz (80.5 kg)   SpO2 96%   BMI 27.78 kg/m²     Body mass index is 27.78 kg/m². GENERAL: Alert and oriented x3, in no acute distress, well-developed, well-nourished. HEENT: Normocephalic, atraumatic.     RESP: Non labored breathing with equal chest rise on inspiration. CV: Well perfused extremities. No cyanosis or clubbing noted. ABDOMEN: Soft, non-tender, non-distended. PHYSICAL EXAM:  Physical exam of the cervical spine with relatively well preserved motion in terms of flexion and extension. She does have some decreased lateral rotation. Negative Spurling's bilaterally. No focal neurological deficits, but she does have some weakness about the right shoulder with deltoid testing, as well as rotator cuff strength testing, as well as pain. She is otherwise neurovascularly intact distally. IMAGING:  X-rays of the C-spine were reviewed in the office today. These show multilevel cervical degeneration with significant spurring at C2-C3, C3-C4, C4-C5, C5-C6 and C6-C7. She has normal cervical lordosis, but significant degeneration in her neck. X-rays of her right shoulder were also reviewed, which do not show any significant bony abnormalities. ASSESSMENT AND PLAN:   Mook Talavera is a 60year-old female with neck pain and right shoulder pain and weakness, as well as some radicular symptoms. I have recommended an MRI of the cervical spine and a referral to spine. I have also recommended an MRI of the right shoulder to evaluate her rotator cuff. Her weakness is either related to a possible rotator cuff tear or nerve issues, which the MRI will decide. I will see her back after the MRI is completed.               Electronically signed by: Si Kussmaul, MD

## 2019-09-25 ENCOUNTER — TELEPHONE (OUTPATIENT)
Dept: INTERNAL MEDICINE CLINIC | Age: 59
End: 2019-09-25

## 2019-09-25 NOTE — TELEPHONE ENCOUNTER
CT Results (most recent): 
Results from Hospital Encounter encounter on 09/20/19 CT CHEST W CONT Narrative CT CHEST WITH ENHANCEMENT INDICATION: Right arm pain and numbness, \"cramping\", unable to raise extremity, 
right neck pain since this morning. TECHNIQUE: Axial images obtained from the thoracic inlet to the level of the 
diaphragm following uneventful administration of 100 cc Isovue-300 nonionic 
intravenous contrast. Coronal and sagittal reformatted images were obtained. All CT scans at this facility are performed using dose optimization technique as 
appropriate to a performed exam, to include automated exposure control, 
adjustment of the mA and/or kV according to patient size (including appropriate 
matching first site-specific examinations), or use of iterative reconstruction 
technique. COMPARISON: 2/4/2014; 5/13/2010. CHEST FINDINGS:  
 
Thyroid: Unremarkable in its visualized aspects. Pericardium/ Heart: No significant effusion. Aorta/ Vessels: No aneurysm or dissection. SVC and left brachiocephalic and 
subclavian veins are patent. Right subclavian artery is patent. There is no 
contrast in the right brachiocephalic/subclavian vein but the vessel is not 
enlarged and there is no adjacent edema to suggest obstruction. Lymph Nodes: Unremarkable. Chuyita Daxa Lungs: Unremarkable. Pleura: No effusion. Upper Abdomen: Moderate to severe hepatic steatosis. There is a large sliding 
hiatal hernia. The majority of the gastric body is in the chest and is 
completely decompressed. The gastric wall appears thickened and nodular. Small 
right Bochdalek hernia. Bones/soft tissues: Degenerative disc disease. Impression IMPRESSION: 
1. SVC and left brachiocephalic/subclavian veins are patent. Right upper 
extremity veins are not well opacified but they are not enlarged and there is no 
adjacent edema to suggest obstruction. 2.  Large sliding hiatal hernia with majority of gastric body in the chest. The 
wall appears thickened and nodular but decompression limits assessment. Recommend endoscopy and/or upper GI on a nonemergent outpatient basis given 
apparent lack of patient symptoms related to finding. 3.  Moderate to severe hepatic steatosis. Called and discussed chest CT scan that was performed in the ED. Discussed findings regarding large hiatal hernia and thickening/ nodularity of gastric wall. Discussed need for further evaluation by GI. Patient states that she sees Dr. Sonu Hays and will call for an appointment.  She expressed that she did not wish for me to place a referral.

## 2019-09-27 ENCOUNTER — HOSPITAL ENCOUNTER (OUTPATIENT)
Age: 59
Discharge: HOME | End: 2019-09-27
Attending: ORTHOPAEDIC SURGERY
Payer: COMMERCIAL

## 2019-09-27 DIAGNOSIS — M47.812 CERVICAL ARTHRITIS: ICD-10-CM

## 2019-09-27 DIAGNOSIS — M75.101 ROTATOR CUFF SYNDROME, RIGHT: ICD-10-CM

## 2019-09-27 DIAGNOSIS — M79.601 RIGHT ARM PAIN: ICD-10-CM

## 2019-09-27 PROCEDURE — 73221 MRI JOINT UPR EXTREM W/O DYE: CPT

## 2019-09-27 PROCEDURE — 72141 MRI NECK SPINE W/O DYE: CPT

## 2019-09-30 ENCOUNTER — TELEPHONE (OUTPATIENT)
Dept: ORTHOPEDIC SURGERY | Facility: CLINIC | Age: 59
End: 2019-09-30

## 2019-09-30 NOTE — TELEPHONE ENCOUNTER
Patient would like to know whether Dr. Rissa Washington knows the results of her right shoulder MRI without contrast and her cervical spine MRI without contrast which were both completed on 09/27/19. She states he told her he was going to call her with the results of both of these images. Please advise patient back at [phone].

## 2019-10-14 ENCOUNTER — HOSPITAL ENCOUNTER (OUTPATIENT)
Dept: GENERAL RADIOLOGY | Age: 59
Discharge: HOME | End: 2019-10-14
Payer: COMMERCIAL

## 2019-10-14 DIAGNOSIS — R06.00 DYSPNEA: ICD-10-CM

## 2019-10-14 DIAGNOSIS — R11.0 NAUSEA: ICD-10-CM

## 2019-10-14 DIAGNOSIS — R10.13 ABDOMINAL PAIN, EPIGASTRIC: ICD-10-CM

## 2019-10-14 PROCEDURE — 71046 X-RAY EXAM CHEST 2 VIEWS: CPT

## 2019-10-21 ENCOUNTER — HOSPITAL ENCOUNTER (OUTPATIENT)
Dept: GENERAL RADIOLOGY | Age: 59
Discharge: HOME | End: 2019-10-21
Attending: INTERNAL MEDICINE
Payer: COMMERCIAL

## 2019-10-21 DIAGNOSIS — K76.0 FATTY LIVER: ICD-10-CM

## 2019-10-21 DIAGNOSIS — R11.0 NAUSEA: ICD-10-CM

## 2019-10-21 DIAGNOSIS — K44.9 HIATAL HERNIA: ICD-10-CM

## 2019-10-21 DIAGNOSIS — R93.3 ABNORMAL UGI SERIES: ICD-10-CM

## 2019-10-21 DIAGNOSIS — R10.13 ABDOMINAL PAIN, EPIGASTRIC: ICD-10-CM

## 2019-10-21 DIAGNOSIS — R06.00 DYSPNEA: ICD-10-CM

## 2019-10-21 PROCEDURE — 74011000255 HC RX REV CODE- 255: Performed by: INTERNAL MEDICINE

## 2019-10-21 PROCEDURE — 74247 XR UPPER GI W KUB AIR CONT: CPT

## 2019-10-21 PROCEDURE — 74011000250 HC RX REV CODE- 250: Performed by: INTERNAL MEDICINE

## 2019-10-21 RX ADMIN — BARIUM SULFATE 135 ML: 980 POWDER, FOR SUSPENSION ORAL at 08:00

## 2019-10-21 RX ADMIN — BARIUM SULFATE 700 MG: 700 TABLET ORAL at 08:00

## 2019-10-21 RX ADMIN — ANTACID/ANTIFLATULENT 4 G: 380; 550; 10; 10 GRANULE, EFFERVESCENT ORAL at 08:00

## 2019-10-21 RX ADMIN — BARIUM SULFATE 30 G: 960 POWDER, FOR SUSPENSION ORAL at 08:00

## 2019-10-29 ENCOUNTER — TELEPHONE (OUTPATIENT)
Dept: INTERNAL MEDICINE CLINIC | Age: 59
End: 2019-10-29

## 2019-10-29 NOTE — TELEPHONE ENCOUNTER
Pt is asking her opinion about her taking CBD OIL ,will it cause issues with her liver ? Please advise

## 2019-10-31 NOTE — TELEPHONE ENCOUNTER
Please let the patient know: At normal doses, there has not been reports of liver problems with CBD oil. However, since it is not under the supervision of the FDA, there is no way to confirm that difficulties would not come from additives to the product that is being used. Would be careful when purchasing to attempt to obtain from a reputable source.

## 2019-10-31 NOTE — TELEPHONE ENCOUNTER
Called patient and verified full name and date of birth. Informed patient of Dr. Albina Bowman' message and patient verbalized understanding and questions if she needs to have her liver checked within the next few months or a year. Dr. Albina Bowman, please advise.

## 2019-11-01 NOTE — TELEPHONE ENCOUNTER
Her liver function tests were normal at her visit and in the ED on 9/20/2019. Would be fine to wait until her next physical unless new issues arise.

## 2019-11-01 NOTE — TELEPHONE ENCOUNTER
Called patient and verified full name and date of birth. Informed patient of Dr. Kristin Martinez' message and patient verbalized understanding.

## 2019-11-04 ENCOUNTER — APPOINTMENT (OUTPATIENT)
Dept: GENERAL RADIOLOGY | Age: 59
End: 2019-11-04
Attending: EMERGENCY MEDICINE
Payer: COMMERCIAL

## 2019-11-04 ENCOUNTER — HOSPITAL ENCOUNTER (EMERGENCY)
Age: 59
Discharge: HOME | End: 2019-11-04
Attending: EMERGENCY MEDICINE
Payer: COMMERCIAL

## 2019-11-04 ENCOUNTER — HOSPITAL ENCOUNTER (OUTPATIENT)
Dept: CT IMAGING | Age: 59
Discharge: HOME | End: 2019-11-04
Attending: EMERGENCY MEDICINE
Payer: COMMERCIAL

## 2019-11-04 VITALS
OXYGEN SATURATION: 95 % | BODY MASS INDEX: 26.68 KG/M2 | DIASTOLIC BLOOD PRESSURE: 62 MMHG | RESPIRATION RATE: 19 BRPM | TEMPERATURE: 97.9 F | HEART RATE: 72 BPM | HEIGHT: 67 IN | WEIGHT: 170 LBS | SYSTOLIC BLOOD PRESSURE: 140 MMHG

## 2019-11-04 DIAGNOSIS — R07.9 CHEST PAIN, UNSPECIFIED TYPE: Primary | ICD-10-CM

## 2019-11-04 LAB
ANION GAP SERPL CALC-SCNC: 4 MMOL/L (ref 3–18)
ATRIAL RATE: 77 BPM
BASOPHILS # BLD: 0.1 K/UL (ref 0–0.1)
BASOPHILS NFR BLD: 1 % (ref 0–2)
BUN SERPL-MCNC: 13 MG/DL (ref 7–18)
BUN/CREAT SERPL: 16 (ref 12–20)
CALCIUM SERPL-MCNC: 9.4 MG/DL (ref 8.5–10.1)
CALCULATED P AXIS, ECG09: 15 DEGREES
CALCULATED R AXIS, ECG10: -6 DEGREES
CALCULATED T AXIS, ECG11: 26 DEGREES
CHLORIDE SERPL-SCNC: 110 MMOL/L (ref 100–111)
CK MB CFR SERPL CALC: NORMAL % (ref 0–4)
CK MB SERPL-MCNC: <1 NG/ML (ref 5–25)
CK SERPL-CCNC: 74 U/L (ref 26–192)
CO2 SERPL-SCNC: 29 MMOL/L (ref 21–32)
CREAT SERPL-MCNC: 0.82 MG/DL (ref 0.6–1.3)
DIAGNOSIS, 93000: NORMAL
DIFFERENTIAL METHOD BLD: ABNORMAL
EOSINOPHIL # BLD: 0.5 K/UL (ref 0–0.4)
EOSINOPHIL NFR BLD: 6 % (ref 0–5)
ERYTHROCYTE [DISTWIDTH] IN BLOOD BY AUTOMATED COUNT: 13.2 % (ref 11.6–14.5)
GLUCOSE SERPL-MCNC: 99 MG/DL (ref 74–99)
HCT VFR BLD AUTO: 43 % (ref 35–45)
HGB BLD-MCNC: 14 G/DL (ref 12–16)
LACTATE BLD-SCNC: 0.94 MMOL/L (ref 0.4–2)
LIPASE SERPL-CCNC: 188 U/L (ref 73–393)
LYMPHOCYTES # BLD: 3.4 K/UL (ref 0.9–3.6)
LYMPHOCYTES NFR BLD: 45 % (ref 21–52)
MCH RBC QN AUTO: 30.2 PG (ref 24–34)
MCHC RBC AUTO-ENTMCNC: 32.6 G/DL (ref 31–37)
MCV RBC AUTO: 92.9 FL (ref 74–97)
MONOCYTES # BLD: 0.5 K/UL (ref 0.05–1.2)
MONOCYTES NFR BLD: 7 % (ref 3–10)
NEUTS SEG # BLD: 3.1 K/UL (ref 1.8–8)
NEUTS SEG NFR BLD: 41 % (ref 40–73)
P-R INTERVAL, ECG05: 164 MS
PLATELET # BLD AUTO: 278 K/UL (ref 135–420)
PMV BLD AUTO: 9.9 FL (ref 9.2–11.8)
POTASSIUM SERPL-SCNC: 4.5 MMOL/L (ref 3.5–5.5)
Q-T INTERVAL, ECG07: 388 MS
QRS DURATION, ECG06: 98 MS
QTC CALCULATION (BEZET), ECG08: 439 MS
RBC # BLD AUTO: 4.63 M/UL (ref 4.2–5.3)
SODIUM SERPL-SCNC: 143 MMOL/L (ref 136–145)
TROPONIN I SERPL-MCNC: <0.02 NG/ML (ref 0–0.04)
TROPONIN I SERPL-MCNC: <0.02 NG/ML (ref 0–0.04)
VENTRICULAR RATE, ECG03: 77 BPM
WBC # BLD AUTO: 7.6 K/UL (ref 4.6–13.2)

## 2019-11-04 PROCEDURE — 93005 ELECTROCARDIOGRAM TRACING: CPT

## 2019-11-04 PROCEDURE — 99285 EMERGENCY DEPT VISIT HI MDM: CPT

## 2019-11-04 PROCEDURE — 96374 THER/PROPH/DIAG INJ IV PUSH: CPT

## 2019-11-04 PROCEDURE — 74011250637 HC RX REV CODE- 250/637: Performed by: EMERGENCY MEDICINE

## 2019-11-04 PROCEDURE — 83605 ASSAY OF LACTIC ACID: CPT

## 2019-11-04 PROCEDURE — C9113 INJ PANTOPRAZOLE SODIUM, VIA: HCPCS | Performed by: EMERGENCY MEDICINE

## 2019-11-04 PROCEDURE — 74011000250 HC RX REV CODE- 250: Performed by: EMERGENCY MEDICINE

## 2019-11-04 PROCEDURE — 85025 COMPLETE CBC W/AUTO DIFF WBC: CPT

## 2019-11-04 PROCEDURE — 83690 ASSAY OF LIPASE: CPT

## 2019-11-04 PROCEDURE — 74011636320 HC RX REV CODE- 636/320: Performed by: EMERGENCY MEDICINE

## 2019-11-04 PROCEDURE — 71045 X-RAY EXAM CHEST 1 VIEW: CPT

## 2019-11-04 PROCEDURE — 80048 BASIC METABOLIC PNL TOTAL CA: CPT

## 2019-11-04 PROCEDURE — 82550 ASSAY OF CK (CPK): CPT

## 2019-11-04 PROCEDURE — 74177 CT ABD & PELVIS W/CONTRAST: CPT

## 2019-11-04 PROCEDURE — 74011250636 HC RX REV CODE- 250/636: Performed by: EMERGENCY MEDICINE

## 2019-11-04 RX ORDER — PANTOPRAZOLE SODIUM 40 MG/10ML
40 INJECTION, POWDER, LYOPHILIZED, FOR SOLUTION INTRAVENOUS
Status: DISCONTINUED | OUTPATIENT
Start: 2019-11-04 | End: 2019-11-04

## 2019-11-04 RX ADMIN — IOPAMIDOL 100 ML: 612 INJECTION, SOLUTION INTRAVENOUS at 10:41

## 2019-11-04 RX ADMIN — ALUMINUM HYDROXIDE AND MAGNESIUM HYDROXIDE 30 ML: 200; 200 SUSPENSION ORAL at 12:38

## 2019-11-04 RX ADMIN — PANTOPRAZOLE SODIUM 40 MG: 40 INJECTION, POWDER, FOR SOLUTION INTRAVENOUS at 12:38

## 2019-11-05 NOTE — ED PROVIDER NOTES
EMERGENCY DEPARTMENT HISTORY AND PHYSICAL EXAM    7:15 PM  Date: 11/4/2019  Patient Name: Randa Simmons    History of Presenting Illness     Chief Complaint   Patient presents with    Chest Pain        History Provided By: Patient    HPI: Randa Simmons is a 60 y.o. female with multiple h medical problems as below. Including hiatal hernia. Patient was sent by her gastroenterologist Dr. Caty Duran for epigastric pain radiating to her chest that feels pressure-like has been there since 6 AM.  Patient denies shortness of breath or cough. No history of nausea or vomiting but she feels like she needs to belch. No risk factors for PE. Patient reports that she ate chili last night and since she woke up she has been having the symptoms. She takes Prilosec at home which has not helped. Location:  Severity:  Timing/course: Onset/Duration:     PCP: Kalyn Horn MD    Past History     Past Medical History:  Past Medical History:   Diagnosis Date    Allergic rhinitis     Anxiety 9/16/2019    Claustrophobia     Degenerative disc disease, lumbar 9/16/2019    Facet arthropathy, lumbar 9/16/2019    Gastroesophageal reflux disease with esophagitis 11/10/2014    Hepatic steatosis 9/16/2019    Herniation of intervertebral disc between L4 and L5 9/16/2019    Hiatal hernia 9/16/2019    Hyperlipidemia     Insomnia 9/16/2019    Lumbar stenosis 9/16/2019    Mild intermittent asthma without complication 2/11/7307    Primary osteoarthritis involving multiple joints 9/16/2019    Quadriceps tendon rupture, left, sequela 12/11/2017    Medial retinacular tear following left total knee replacement; s/p left quadricep tendon repair 12/11/2017. Dr. Tona Lyle.     S/P hip replacement, right 3/3/2016    Vitamin D deficiency 11/10/2014       Past Surgical History:  Past Surgical History:   Procedure Laterality Date    ENDOSCOPY, COLON, DIAGNOSTIC  03-18-11    normal colon to cecum    HX BREAST REDUCTION      HX HEENT      skin cancer between eyes    HX HIP REPLACEMENT Right 10/2016    HX HYSTERECTOMY      HX KNEE REPLACEMENT      HX KNEE REPLACEMENT Left 11/2017    HX LAP CHOLECYSTECTOMY  11/2015    HX TONSIL AND ADENOIDECTOMY      HX TUBAL LIGATION         Family History:  Family History   Problem Relation Age of Onset    Other Mother         atrial fibrillation    Colon Polyps Mother    Estefania Lopez Breast Cancer Mother     Colon Polyps Brother         half brother    Heart Disease Brother         a-fib    Heart Disease Sister         half sister ASHD    Heart Attack Sister     Other Son         transposition of the great vessels and surgery for this    Other Son         congestive heart failure       Social History:  Social History     Tobacco Use    Smoking status: Former Smoker     Packs/day: 0.25     Years: 0.50     Pack years: 0.12    Smokeless tobacco: Never Used   Substance Use Topics    Alcohol use: Yes     Alcohol/week: 1.0 standard drinks     Types: 1 Glasses of wine per week    Drug use: No       Allergies: Allergies   Allergen Reactions    Other Plant, Animal, Environmental Anaphylaxis     poison ivy    Albuterol Other (comments)     Able to use but causes to be slightly jittery (not severe and no other anaphylactoid sxs). Review of Systems   Review of Systems   Cardiovascular: Positive for chest pain. Gastrointestinal: Positive for abdominal pain. All other systems reviewed and are negative. Physical Exam     Patient Vitals for the past 12 hrs:   Temp Pulse Resp BP SpO2   11/04/19 1300  72 19 140/62 95 %   11/04/19 1245  67 18 125/77 96 %   11/04/19 1230  76 23 124/82 99 %   11/04/19 1215  68 18 112/78 97 %   11/04/19 1200  69 21 124/87 99 %   11/04/19 1150     96 %   11/04/19 0911 97.9 °F (36.6 °C) 84 14 (!) 162/92 96 %       Physical Exam   Constitutional: She is oriented to person, place, and time. She appears well-developed and well-nourished.    HENT:   Head: Normocephalic and atraumatic. Eyes: Conjunctivae and EOM are normal.   Neck: Normal range of motion. Neck supple. Cardiovascular: Normal rate. Pulmonary/Chest: Effort normal. No respiratory distress. Abdominal: Soft. She exhibits no distension. There is tenderness in the epigastric area. Musculoskeletal: Normal range of motion. She exhibits no deformity. Neurological: She is alert and oriented to person, place, and time. Skin: Skin is warm and dry. Psychiatric: She has a normal mood and affect.  Her behavior is normal.       Diagnostic Study Results     Labs -  Recent Results (from the past 12 hour(s))   EKG, 12 LEAD, INITIAL    Collection Time: 11/04/19  9:14 AM   Result Value Ref Range    Ventricular Rate 77 BPM    Atrial Rate 77 BPM    P-R Interval 164 ms    QRS Duration 98 ms    Q-T Interval 388 ms    QTC Calculation (Bezet) 439 ms    Calculated P Axis 15 degrees    Calculated R Axis -6 degrees    Calculated T Axis 26 degrees    Diagnosis       Normal sinus rhythm  Incomplete right bundle branch block  Borderline ECG  When compared with ECG of 20-SEP-2019 11:09,  No significant change was found  Confirmed by Andrew Combs ([id number]) on 11/4/2019 4:56:91 PM     METABOLIC PANEL, BASIC    Collection Time: 11/04/19  9:20 AM   Result Value Ref Range    Sodium 143 136 - 145 mmol/L    Potassium 4.5 3.5 - 5.5 mmol/L    Chloride 110 100 - 111 mmol/L    CO2 29 21 - 32 mmol/L    Anion gap 4 3.0 - 18 mmol/L    Glucose 99 74 - 99 mg/dL    BUN 13 7.0 - 18 MG/DL    Creatinine 0.82 0.6 - 1.3 MG/DL    BUN/Creatinine ratio 16 12 - 20      GFR est AA >60 >60 ml/min/1.73m2    GFR est non-AA >60 >60 ml/min/1.73m2    Calcium 9.4 8.5 - 10.1 MG/DL   CBC WITH AUTOMATED DIFF    Collection Time: 11/04/19  9:20 AM   Result Value Ref Range    WBC 7.6 4.6 - 13.2 K/uL    RBC 4.63 4.20 - 5.30 M/uL    HGB 14.0 12.0 - 16.0 g/dL    HCT 43.0 35.0 - 45.0 %    MCV 92.9 74.0 - 97.0 FL    MCH 30.2 24.0 - 34.0 PG    MCHC 32.6 31.0 - 37.0 g/dL    RDW 13.2 11.6 - 14.5 %    PLATELET 860 417 - 678 K/uL    MPV 9.9 9.2 - 11.8 FL    NEUTROPHILS 41 40 - 73 %    LYMPHOCYTES 45 21 - 52 %    MONOCYTES 7 3 - 10 %    EOSINOPHILS 6 (H) 0 - 5 %    BASOPHILS 1 0 - 2 %    ABS. NEUTROPHILS 3.1 1.8 - 8.0 K/UL    ABS. LYMPHOCYTES 3.4 0.9 - 3.6 K/UL    ABS. MONOCYTES 0.5 0.05 - 1.2 K/UL    ABS. EOSINOPHILS 0.5 (H) 0.0 - 0.4 K/UL    ABS. BASOPHILS 0.1 0.0 - 0.1 K/UL    DF AUTOMATED     CARDIAC PANEL,(CK, CKMB & TROPONIN)    Collection Time: 11/04/19  9:20 AM   Result Value Ref Range    CK 74 26 - 192 U/L    CK - MB <1.0 <3.6 ng/ml    CK-MB Index  0.0 - 4.0 %     CALCULATION NOT PERFORMED WHEN RESULT IS BELOW LINEAR LIMIT    Troponin-I, QT <0.02 0.0 - 0.045 NG/ML   LIPASE    Collection Time: 11/04/19  9:20 AM   Result Value Ref Range    Lipase 188 73 - 393 U/L   POC LACTIC ACID    Collection Time: 11/04/19 11:54 AM   Result Value Ref Range    Lactic Acid (POC) 0.94 0.40 - 2.00 mmol/L   TROPONIN I    Collection Time: 11/04/19 12:41 PM   Result Value Ref Range    Troponin-I, QT <0.02 0.0 - 0.045 NG/ML       Radiologic Studies -   Xr Chest Sngl V    Result Date: 11/4/2019  IMPRESSION: No active cardiopulmonary disease    Ct Abd Pelv W Cont    Result Date: 11/4/2019  IMPRESSION: No acute abnormality is identified in the abdomen or pelvis All CT scans at this facility are performed using dose optimization technique as appropriate to the performed exam, to include automated exposure control, adjustment of the mA and/or kV according to patient's size (Including appropriate matching for site-specific examinations), or use of iterative reconstruction technique. Medical Decision Making     ED Course: Progress Notes, Reevaluation, and Consults:    7:15 PM Initial assessment performed. The patients presenting problems have been discussed, and they/their family are in agreement with the care plan formulated and outlined with them.   I have encouraged them to ask questions as they arise throughout their visit. Provider Notes (Medical Decision Making): 54-year-old female with history of hiatal hernia presenting with epigastric pain radiating to her chest feels pressure-like since exam.  Well-appearing on exam with stable vital signs. She was sent to be evaluated for gastric volvulus. Will get screening labs including cardiac work-up and abdominal CT to evaluate for volvulus. This could possibly be GERD given that she ate chili last night and then symptoms started this morning and to her chest.  Will treat symptomatically meanwhile. Case was discussed with the patient and her gastroenterologist and he will try to schedule her procedure earlier given her symptoms. Procedures:     Critical Care Time:     Vital Signs-Reviewed the patient's vital signs. Reviewed pt's pulse ox reading. EKG: Interpreted by the EP. Time Interpreted:    Rate:    Rhythm:    Interpretation:   Comparison:     Records Reviewed: Nursing Notes (Time of Review: 7:15 PM)  -I am the first provider for this patient.  -I reviewed the vital signs, available nursing notes, past medical history, past surgical history, family history and social history. Current Outpatient Medications   Medication Sig Dispense Refill    methylPREDNISolone (MEDROL DOSEPACK) 4 mg tablet Per dose pack instructions 1 Dose Pack 0    zolpidem (AMBIEN) 5 mg tablet Take 1 Tab by mouth nightly as needed for Sleep. 25 Tab 0    venlafaxine-SR (EFFEXOR-XR) 75 mg capsule Take 1 Cap by mouth daily. 90 Cap 2    meloxicam (MOBIC) 15 mg tablet Take 1 Tab by mouth daily. 30 Tab 3    HYDROcodone-acetaminophen (NORCO) 5-325 mg per tablet Take 1 Tab by mouth every eight (8) hours as needed for Pain. Max Daily Amount: 3 Tabs. 21 Tab 0    budesonide-formoterol (SYMBICORT) 160-4.5 mcg/actuation HFAA Take 2 Puffs by inhalation two (2) times a day. 1 Inhaler 1    estradiol (ESTRACE) 1 mg tablet Take 1 mg by mouth every seven (7) days. Indications: Patient takes half tab daily          Clinical Impression     Clinical Impression:   1. Chest pain, unspecified type        Disposition: DC      DISCHARGE NOTE:     Pt has been reexamined. Patient has no new complaints, changes, or physical findings. Care plan outlined and precautions discussed. Results of labs and imaging were reviewed with the patient. All medications were reviewed with the patient; will d/c home with return precautions. All of pt's questions and concerns were addressed. Patient was instructed and agrees to follow up with PCP, as well as to return to the ED upon further deterioration. Patient is ready to go home. This note was dictated utilizing voice recognition software which may lead to typographical errors. I apologize in advance if the situation occurs. If questions arise please do not hesitate to contact me or call our department.     Albaro Johnston MD  7:15 PM

## 2019-11-06 ENCOUNTER — HOSPITAL ENCOUNTER (OUTPATIENT)
Dept: MRI IMAGING | Age: 59
Discharge: HOME | End: 2019-11-06
Attending: RADIOLOGY | Admitting: RADIOLOGY
Payer: COMMERCIAL

## 2019-11-06 ENCOUNTER — ANESTHESIA EVENT (OUTPATIENT)
Dept: MRI IMAGING | Age: 59
End: 2019-11-06
Payer: COMMERCIAL

## 2019-11-06 ENCOUNTER — ANESTHESIA (OUTPATIENT)
Dept: MRI IMAGING | Age: 59
End: 2019-11-06
Payer: COMMERCIAL

## 2019-11-06 VITALS
HEIGHT: 67 IN | WEIGHT: 166 LBS | BODY MASS INDEX: 26.06 KG/M2 | DIASTOLIC BLOOD PRESSURE: 70 MMHG | HEART RATE: 67 BPM | OXYGEN SATURATION: 95 % | SYSTOLIC BLOOD PRESSURE: 107 MMHG | RESPIRATION RATE: 16 BRPM | TEMPERATURE: 96.8 F

## 2019-11-06 DIAGNOSIS — M47.812 CERVICAL ARTHRITIS: ICD-10-CM

## 2019-11-06 DIAGNOSIS — M79.601 RIGHT ARM PAIN: ICD-10-CM

## 2019-11-06 PROCEDURE — 76060000033 HC ANESTHESIA 1 TO 1.5 HR

## 2019-11-06 PROCEDURE — 74011250636 HC RX REV CODE- 250/636: Performed by: NURSE ANESTHETIST, CERTIFIED REGISTERED

## 2019-11-06 PROCEDURE — 74011250636 HC RX REV CODE- 250/636: Performed by: ANESTHESIOLOGY

## 2019-11-06 PROCEDURE — 74011250637 HC RX REV CODE- 250/637: Performed by: ANESTHESIOLOGY

## 2019-11-06 PROCEDURE — 74011000250 HC RX REV CODE- 250: Performed by: NURSE ANESTHETIST, CERTIFIED REGISTERED

## 2019-11-06 PROCEDURE — 76210000020 HC REC RM PH II FIRST 0.5 HR

## 2019-11-06 PROCEDURE — 72141 MRI NECK SPINE W/O DYE: CPT

## 2019-11-06 RX ORDER — LIDOCAINE HYDROCHLORIDE 20 MG/ML
INJECTION, SOLUTION EPIDURAL; INFILTRATION; INTRACAUDAL; PERINEURAL AS NEEDED
Status: DISCONTINUED | OUTPATIENT
Start: 2019-11-06 | End: 2019-11-06 | Stop reason: HOSPADM

## 2019-11-06 RX ORDER — ONDANSETRON 2 MG/ML
4 INJECTION INTRAMUSCULAR; INTRAVENOUS ONCE
Status: DISCONTINUED | OUTPATIENT
Start: 2019-11-06 | End: 2019-11-06 | Stop reason: HOSPADM

## 2019-11-06 RX ORDER — SODIUM CHLORIDE 9 MG/ML
100 INJECTION, SOLUTION INTRAVENOUS ONCE
Status: COMPLETED | OUTPATIENT
Start: 2019-11-06 | End: 2019-11-06

## 2019-11-06 RX ORDER — SODIUM CHLORIDE 0.9 % (FLUSH) 0.9 %
5-40 SYRINGE (ML) INJECTION AS NEEDED
Status: DISCONTINUED | OUTPATIENT
Start: 2019-11-06 | End: 2019-11-06 | Stop reason: HOSPADM

## 2019-11-06 RX ORDER — DIPHENHYDRAMINE HYDROCHLORIDE 50 MG/ML
12.5 INJECTION, SOLUTION INTRAMUSCULAR; INTRAVENOUS
Status: DISCONTINUED | OUTPATIENT
Start: 2019-11-06 | End: 2019-11-06 | Stop reason: HOSPADM

## 2019-11-06 RX ORDER — FAMOTIDINE 20 MG/1
20 TABLET, FILM COATED ORAL ONCE
Status: COMPLETED | OUTPATIENT
Start: 2019-11-06 | End: 2019-11-06

## 2019-11-06 RX ORDER — PROPOFOL 10 MG/ML
VIAL (ML) INTRAVENOUS
Status: DISCONTINUED | OUTPATIENT
Start: 2019-11-06 | End: 2019-11-06 | Stop reason: HOSPADM

## 2019-11-06 RX ORDER — SODIUM CHLORIDE 0.9 % (FLUSH) 0.9 %
5-40 SYRINGE (ML) INJECTION EVERY 8 HOURS
Status: DISCONTINUED | OUTPATIENT
Start: 2019-11-06 | End: 2019-11-06 | Stop reason: HOSPADM

## 2019-11-06 RX ORDER — SODIUM CHLORIDE, SODIUM LACTATE, POTASSIUM CHLORIDE, CALCIUM CHLORIDE 600; 310; 30; 20 MG/100ML; MG/100ML; MG/100ML; MG/100ML
75 INJECTION, SOLUTION INTRAVENOUS CONTINUOUS
Status: DISCONTINUED | OUTPATIENT
Start: 2019-11-06 | End: 2019-11-06 | Stop reason: HOSPADM

## 2019-11-06 RX ORDER — NALBUPHINE HYDROCHLORIDE 10 MG/ML
5 INJECTION, SOLUTION INTRAMUSCULAR; INTRAVENOUS; SUBCUTANEOUS
Status: DISCONTINUED | OUTPATIENT
Start: 2019-11-06 | End: 2019-11-06 | Stop reason: HOSPADM

## 2019-11-06 RX ORDER — FENTANYL CITRATE 50 UG/ML
25 INJECTION, SOLUTION INTRAMUSCULAR; INTRAVENOUS AS NEEDED
Status: DISCONTINUED | OUTPATIENT
Start: 2019-11-06 | End: 2019-11-06 | Stop reason: HOSPADM

## 2019-11-06 RX ADMIN — PROPOFOL 100 MCG/KG/MIN: 10 INJECTION, EMULSION INTRAVENOUS at 08:46

## 2019-11-06 RX ADMIN — FAMOTIDINE 20 MG: 20 TABLET, FILM COATED ORAL at 07:18

## 2019-11-06 RX ADMIN — SODIUM CHLORIDE 100 ML/HR: 900 INJECTION, SOLUTION INTRAVENOUS at 07:00

## 2019-11-06 RX ADMIN — LIDOCAINE HYDROCHLORIDE 20 MG: 20 INJECTION, SOLUTION EPIDURAL; INFILTRATION; INTRACAUDAL; PERINEURAL at 08:46

## 2019-11-06 NOTE — ANESTHESIA PREPROCEDURE EVALUATION
Relevant Problems   No relevant active problems       Anesthetic History   No history of anesthetic complications            Review of Systems / Medical History  Patient summary reviewed and pertinent labs reviewed    Pulmonary            Asthma : well controlled       Neuro/Psych   Within defined limits           Cardiovascular  Within defined limits                Exercise tolerance: >4 METS     GI/Hepatic/Renal     GERD: poorly controlled      Liver disease     Endo/Other        Arthritis     Other Findings              Physical Exam    Airway  Mallampati: II  TM Distance: 4 - 6 cm  Neck ROM: normal range of motion   Mouth opening: Normal     Cardiovascular    Rhythm: regular  Rate: normal         Dental    Dentition: Caps/crowns     Pulmonary  Breath sounds clear to auscultation               Abdominal  GI exam deferred       Other Findings            Anesthetic Plan    ASA: 2  Anesthesia type: MAC            Anesthetic plan and risks discussed with: Patient

## 2019-11-06 NOTE — ANESTHESIA POSTPROCEDURE EVALUATION
* No procedures listed *.     MAC    Anesthesia Post Evaluation      Multimodal analgesia: multimodal analgesia used between 6 hours prior to anesthesia start to PACU discharge  Patient location during evaluation: bedside  Patient participation: complete - patient participated  Level of consciousness: awake  Pain management: adequate  Airway patency: patent  Anesthetic complications: no  Cardiovascular status: stable  Respiratory status: acceptable  Hydration status: acceptable  Post anesthesia nausea and vomiting:  controlled      Vitals Value Taken Time   /70 11/6/2019 10:51 AM   Temp 36 °C (96.8 °F) 11/6/2019 10:51 AM   Pulse 67 11/6/2019 10:51 AM   Resp 16 11/6/2019 10:51 AM   SpO2 95 % 11/6/2019 10:51 AM

## 2019-11-06 NOTE — DISCHARGE INSTRUCTIONS
MRI of the Cervical Spine: About This Test  What is it? MRI (magnetic resonance imaging) is a test that uses a magnetic field and pulses of radio wave energy to make pictures of the organs and structures inside the body. An MRI can give your doctor information about the spine in your neck (the cervical spine). This can include the spine, the space around the spinal cord, and vertebrae in your neck. When you have an MRI, you lie on a table and the table moves into the MRI machine. Why is this test done? An MRI of the cervical spine can help find problems such as infection and tumors. It also can help diagnose narrowing of the spinal canal (spinal stenosis) and a herniated disc in the cervical spine. How can you prepare for the test?  Talk to your doctor about all your health conditions before the test. For example, tell your doctor if:  · You are allergic to any medicines. · You are or might be pregnant. · You have a pacemaker, an artificial limb, any metal pins or metal parts in your body, metal heart valves, metal clips in your brain, metal implants in your ears, or any other implanted or prosthetic medical device. · You have an intrauterine device (IUD) in place. · You get nervous in confined spaces. You may need medicine to help you relax. · You wear a patch that contains medicine. · You have kidney disease. What happens before the test?  · You will remove all metal objects, such as hearing aids, dentures, jewelry, watches, and hairpins. · You will take off all or most of your clothes and change into a gown. If you do leave some clothes on, make sure you take everything out of your pockets. · You may have contrast material (dye) put into your arm through a tube called an IV. Contrast material helps doctors see specific organs, blood vessels, and most tumors. What happens during the test?  · You will lie on your back on a table that is part of the MRI scanner.  Your head, chest, and arms may be held with straps to help you remain still. · The table will slide into the space that contains the magnet. A device called a coil may be placed over or wrapped around your neck. · Inside the scanner you will hear a fan and feel air moving. You may hear tapping, thumping, or snapping noises. You may be given earplugs or headphones to reduce the noise. · You will be asked to hold still during the scan. You may be asked to hold your breath for short periods. · You may be alone in the scanning room, but a technologist will be watching you through a window and talking with you during the test.  What else should you know about the test?  · An MRI does not hurt. · You may feel warmth in the area being examined. This is normal.  · A dye (contrast material) that contains gadolinium may be used in this test. Be sure to tell your doctor if:  ? You are pregnant or think you may be pregnant. ? You have kidney problems. ? You've had more than one test that used gadolinium. · The U[place] Food and Drug Administration (FDA) has safety warnings about gadolinium. But for most people, the benefit of its use in this test outweighs the risk. · If a dye is used, you may feel a quick sting or pinch and some coolness when the IV is started. The dye may give you a metallic taste in your mouth. Some people feel sick to their stomach or get a headache. · If you breastfeed and are concerned about whether the dye used in this test is safe, talk to your doctor. Most experts believe that very little dye passes into breast milk and even less is passed on to the baby. But if you prefer, you can store some of your breast milk ahead of time and use it for a day or two after the test.  How long does the test take? · The test usually takes 30 to 60 minutes but can take as long as 2 hours.   What happens after the test?  · You will probably be able to go home right away, depending on the reason for the test.  · You can go back to your usual activities right away. Follow-up care is a key part of your treatment and safety. Be sure to make and go to all appointments, and call your doctor if you are having problems. It's also a good idea to keep a list of the medicines you take. Ask your doctor when you can expect to have your test results. Where can you learn more? Go to http://sarahi-musa.info/. Enter F223 in the search box to learn more about \"MRI of the Cervical Spine: About This Test.\"  Current as of: March 28, 2019  Content Version: 12.2  © 3482-0304 [employer]. Care instructions adapted under license by [employer] (which disclaims liability or warranty for this information). If you have questions about a medical condition or this instruction, always ask your healthcare professional. [address] any warranty or liability for your use of this information. DISCHARGE SUMMARY from Nurse    PATIENT INSTRUCTIONS:    After general anesthesia or intravenous sedation, for 24 hours or while taking prescription Narcotics:  · Limit your activities  · Do not drive and operate hazardous machinery  · Do not make important personal or business decisions  · Do  not drink alcoholic beverages  · If you have not urinated within 8 hours after discharge, please contact your surgeon on call. Report the following to your surgeon:  · Excessive pain, swelling, redness or odor of or around the surgical area  · Temperature over 100.5  · Nausea and vomiting lasting longer than 4 hours or if unable to take medications  · Any signs of decreased circulation or nerve impairment to extremity: change in color, persistent  numbness, tingling, coldness or increase pain  · Any questions    What to do at Home:  Recommended activity: Activity as tolerated and no driving for today. *  Please give a list of your current medications to your Primary Care Provider.     *  Please update this list whenever your medications are discontinued, doses are      changed, or new medications (including over-the-counter products) are added. *  Please carry medication information at all times in case of emergency situations. These are general instructions for a healthy lifestyle:    No smoking/ No tobacco products/ Avoid exposure to second hand smoke  Surgeon General's Warning:  Quitting smoking now greatly reduces serious risk to your health. Obesity, smoking, and sedentary lifestyle greatly increases your risk for illness    A healthy diet, regular physical exercise & weight monitoring are important for maintaining a healthy lifestyle    You may be retaining fluid if you have a history of heart failure or if you experience any of the following symptoms:  Weight gain of 3 pounds or more overnight or 5 pounds in a week, increased swelling in our hands or feet or shortness of breath while lying flat in bed. Please call your doctor as soon as you notice any of these symptoms; do not wait until your next office visit. The discharge information has been reviewed with the patient and spouse. The patient and spouse verbalized understanding. Discharge medications reviewed with the patient and spouse and appropriate educational materials and side effects teaching were provided.   ___________________________________________________________________________________________________________________________________

## 2019-11-25 ENCOUNTER — OFFICE VISIT (OUTPATIENT)
Dept: ORTHOPEDIC SURGERY | Age: 59
End: 2019-11-25

## 2019-11-25 VITALS
RESPIRATION RATE: 16 BRPM | SYSTOLIC BLOOD PRESSURE: 125 MMHG | OXYGEN SATURATION: 93 % | BODY MASS INDEX: 26.84 KG/M2 | WEIGHT: 171 LBS | HEIGHT: 67 IN | TEMPERATURE: 97.5 F | HEART RATE: 79 BPM | DIASTOLIC BLOOD PRESSURE: 85 MMHG

## 2019-11-25 DIAGNOSIS — M54.12 CERVICAL RADICULOPATHY: Primary | ICD-10-CM

## 2019-11-25 DIAGNOSIS — M50.30 DDD (DEGENERATIVE DISC DISEASE), CERVICAL: ICD-10-CM

## 2019-11-25 DIAGNOSIS — M47.812 CERVICAL SPONDYLOSIS: ICD-10-CM

## 2019-11-25 RX ORDER — CYCLOBENZAPRINE HCL 10 MG
TABLET ORAL
COMMUNITY
End: 2019-12-16 | Stop reason: SDUPTHER

## 2019-11-25 RX ORDER — TOPIRAMATE 25 MG/1
TABLET ORAL
Qty: 60 TAB | Refills: 0 | Status: SHIPPED | OUTPATIENT
Start: 2019-11-25 | End: 2019-12-16

## 2019-11-25 NOTE — PATIENT INSTRUCTIONS
Electromyogram (EMG) and Nerve Conduction Studies: About These Tests What are they? An electromyogram (EMG) measures the electrical activity of your muscles when you are not using them (at rest) and when you tighten them (muscle contraction). Nerve conduction studies (NCS) measure how well and how fast the nerves can send electrical signals. EMG and nerve conduction studies are often done together. If they are done together, the nerve conduction studies are done before the EMG. Why are they done? You may need an EMG to find diseases that damage your muscles or nerves or to find why you cannot move your muscles (paralysis), why they feel weak, or why they twitch. You may need nerve conduction studies to find damage to the nerves that lead from the brain and spinal cord to the rest of the body (peripheral nervous system). Nerve conduction studies are often used to help find nerve disorders, such as carpal tunnel syndrome. How can you prepare for these tests? · Tell your doctors ALL the medicines, vitamins, supplements, and herbal remedies you take. Some medicines can affect the test results. You may need to stop taking some medicines before you have this test.  
  · If you take aspirin or some other blood thinner, be sure to talk to your doctor. He or she will tell you if you should stop taking it before your test. Make sure that you understand exactly what your doctor wants you to do.  
  · Wear loose-fitting clothing. You may be given a hospital gown to wear.  
  · The electrodes for the test are attached to your skin. Your skin needs to be clean and free of sprays, oils, creams, and lotions. What happens during the tests? You lie on a table or bed or sit in a reclining chair so your muscles are relaxed. For an EMG: 
· Your doctor will insert a needle electrode into a muscle.  This will record the electrical activity while the muscle is at rest. You may feel a quick, sharp pain when the needle electrode is put into a muscle. · Your doctor will ask you to tighten the same muscle slowly and steadily while the electrical activity is recorded. · Your doctor may move the electrode to a different area of the muscle or a different muscle. For nerve conduction studies: 
· Your doctor will attach two types of electrodes to your skin. ? One type of electrode is placed over a nerve and will give the nerve an electrical pulse. ? The other type of electrode is placed over the muscle that the nerve controls. It will record how long it takes the muscle to react to the electrical pulse. · You will be able to feel the electrical pulses. They are small shocks and are safe. What else should you know about these tests? · After an EMG, you may be sore and have a tingling feeling in your muscles for up to 2 days. You may have small bruises or swelling at the needle site. · For an EMG, you may be asked to sign a consent form. Talk to your doctor about any concerns you have about the need for the test, its risks, how it will be done, or what the results will mean. How long do they take? · An EMG may take 30 to 60 minutes. · Nerve conduction tests may take from 15 minutes to 1 hour or more. It depends on how many nerves and muscles your doctor tests. What happens after these tests? · If any of the test areas are sore: 
? Put ice or a cold pack on the area for 10 to 20 minutes at a time. Put a thin cloth between the ice and your skin. ? Take an over-the-counter pain medicine, such as acetaminophen (Tylenol), ibuprofen (Advil, Motrin), or naproxen (Aleve). Be safe with medicines. Read and follow all instructions on the label. · You will probably be able to go home right away. · You can go back to your usual activities right away. When should you call for help? Watch closely for changes in your health, and be sure to contact your doctor if: · Muscle pain from an EMG test gets worse or you have swelling, tenderness, or pus at any of the needle sites. · You have any problems that you think may be from the test. 
· You have any questions about the test or have not received your results. Follow-up care is a key part of your treatment and safety. Be sure to make and go to all appointments, and call your doctor if you are having problems. It's also a good idea to keep a list of the medicines you take. Ask your doctor when you can expect to have your test results. Where can you learn more? Go to http://sarahi-musa.info/. Enter W689 in the search box to learn more about \"Electromyogram (EMG) and Nerve Conduction Studies: About These Tests. \" Current as of: March 28, 2019 Content Version: 12.2 © 5746-6036 [employer]. Care instructions adapted under license by [employer] (which disclaims liability or warranty for this information). If you have questions about a medical condition or this instruction, always ask your healthcare professional. [address] any warranty or liability for your use of this information.

## 2019-11-25 NOTE — PROGRESS NOTES
[address], [address]  [place], [address] [place] [address] Street  Phone: [phone]  Fax: [phone]        Abby Olson  : 1960  PCP: Wolfgang Rodrigues MD      NEW PATIENT      ASSESSMENT AND PLAN     Diagnoses and all orders for this visit:    1. Cervical radiculopathy  -     topiramate (TOPAMAX) 25 mg tablet; 1 po x 1 week then 2 po qhs.  -     EMG ONE EXTREMITY UPPER RT; Future    2. Cervical spondylosis, global, mod-severe    3. DDD (degenerative disc disease), cervical       1. 58yo RHD lady w/chronic multilevel cervical disc-osteophyte complexes w/several months of R shoulder/UE pain, weakness, and paresthesias. No HNP. Advised to stay active as tolerated. 2. No indications for surgery or spinal injections at this time. 3. Trial of Topamax  4. EMG RUE - 6 months numbness, tingling, weakness RUE  5. Avoid overhead lifting or reaching. 6. Given information on EMG    F/U after EMG      CHIEF COMPLAINT  Kim Vizcaino is seen today in consultation at the request of Dr. Louisa Quiroz for complaints of neck and RUE pain. HISTORY OF PRESENT ILLNESS  Kim Vizcaino is a 60 y.o. female. RHD. Today pt c/o neck and RUE pain of 6 month duration. Pt denies any specific incident or injury that caused their pain. Pt c/o pain from her neck to under her R arm to her fingers and both shoulder blades. Pt states she has tingling periodically. She admits to pain with laying on R and she has tingling when she wakes in the morning. She notes some weakness in RUE - she is hesitant to hold a pan only with RUE. Denies similar arm issues in previous years. Denies hx of CTS. She states overall her pain has eased up since 2019, but is still noticeably present. Pt states her pain was initially aggravated after doing yardwork.      Location of pain: neck, shoulder blades  Does pain radiate into extremities: RUE pain and tingling  Does patient have weakness: RUE   Pt denies saddle paresthesias. Medications pt is on: Flexeril 10mg PRN QHS with sleep benefit. TENS with some benefit. Mobic 15mg qD, no benefit for arm. MDP no benefit. Norco PRN. Denies persistent fevers, chills, weight changes, neurogenic bowel or bladder symptoms. Denies hx of kidney stones. Treatments patient has tried:  Physical therapy:No  Doing HEP: Unknown  Non-opioid medications: Yes  Spinal injections: No  Spinal surgery- No.   Last C MRI 2019: multilevel disc osteophyte complexes  Last L MRI 2015: mild to severe stenosis L4-5     reviewed. PMHx of hiatal hernia, [place], high cholesterol, asthma, GERD, esophageal dilation, R hip replacement, L knee replacement. 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 ED 11/4/19 for chest pain. ED 9/20/19 for R shoulder pain and RUE weakness. Pain Assessment  11/25/2019   Location of Pain Neck   Location Modifiers (No Data)   Severity of Pain 6   Quality of Pain Throbbing; Mennie Luster; Aching;Burning; Other (Comment)   Quality of Pain Comment numbness, tingling,  & weakness   Duration of Pain Persistent   Frequency of Pain Constant   Aggravating Factors Other (Comment)   Aggravating Factors Comment pt c/o of difficulty laying on right side   Limiting Behavior No   Relieving Factors Other (Comment)   Relieving Factors Comment Tens Unit   Result of Injury No       MRI Results (most recent):  Results from Hospital Encounter encounter on 11/06/19   MRI CERV SPINE WO CONT    Narrative MRI of cervical spine without contrast    HISTORY: Neck pain and right arm numbness. COMPARISON: MRI 9/27/2019    TECHNIQUE: T1 weighted, T2 FSE, FSE inversion recovery sagittal images are  supplemented by T2 weighted and GRE/medic axial images. FINDINGS: Stable alignment which consists of multilevel trace anterolistheses. Mild chronic degenerative loss of C5-C7 vertebral body heights, stable.  There is  persistent T1 hypointense and STIR hyperintense degenerative related marrow  signal abnormality in the C6-T2 vertebral bodies. No cord signal abnormalities. No cerebellar tonsillar ectopia. Paraspinal soft tissues are unremarkable. Detail on axial imaging is limited by motion artifact. C2-3: Minimal disc bulge with small uncovertebral spurs. Bilateral facet  hypertrophy and mild thickening of ligamentum flavum. No central canal stenosis. Mild left foraminal narrowing is stable. C3-4: Calcified disc. Marked facet hypertrophy with autofusion of the right  facet. No central canal stenosis. Grossly similar degree of at least mild  bilateral foraminal stenoses. C4-5: Disc bulge and osteophyte complex. Left greater than right facet  hypertrophy with mild thickening of ligamentum flavum. No central canal  stenosis. Very poor visualization of the neural foramen. C5-6: Disc bulge and osteophyte complex. At least moderate bilateral facet  hypertrophy. No central canal stenosis. Neural foramen are poorly visualized. The left may be adequately patent however there is suggestion of significant  right foraminal stenosis, similar to previous. C6-7: Disc bulge and osteophyte complex. Moderate facet hypertrophy. No central  canal stenosis. Similar degree of at least moderate bilateral foraminal  stenoses. C7-T1: Small disc osteophyte complex with left greater than right facet  hypertrophy. No central canal stenosis. Adequately patent right neural foramen  however similar degree of significant left foraminal stenosis. T1-2: Disc osteophyte complex and facet hypertrophy. No central canal stenosis. Adequately patent left neural foramen however severe right foraminal stenosis,  stable. Impression IMPRESSION:    Detail is again limited by motion artifact however there is no significant  change when compared to 9/27/2019. Multilevel disc osteophyte disease and facet arthropathy without evidence of  significant central spinal canal stenosis.  Multilevel degenerative foraminal  stenoses are similar. PAST MEDICAL HISTORY   Past Medical History:   Diagnosis Date    Allergic rhinitis     Anxiety 9/16/2019    Claustrophobia     Degenerative disc disease, lumbar 9/16/2019    Facet arthropathy, lumbar 9/16/2019    Gastroesophageal reflux disease with esophagitis 11/10/2014    Hepatic steatosis 9/16/2019    Herniation of intervertebral disc between L4 and L5 9/16/2019    Hiatal hernia 9/16/2019    Hyperlipidemia     Insomnia 9/16/2019    Lumbar stenosis 9/16/2019    Mild intermittent asthma without complication 8/63/2699    Primary osteoarthritis involving multiple joints 9/16/2019    Quadriceps tendon rupture, left, sequela 12/11/2017    Medial retinacular tear following left total knee replacement; s/p left quadricep tendon repair 12/11/2017. Dr. Bozena Balderas.  S/P hip replacement, right 3/3/2016    Vitamin D deficiency 11/10/2014       Past Surgical History:   Procedure Laterality Date    ENDOSCOPY, COLON, DIAGNOSTIC  03-18-11    normal colon to cecum    HX BREAST REDUCTION      HX HEENT      skin cancer between eyes    HX HIP REPLACEMENT Right 10/2016    HX HYSTERECTOMY      HX KNEE REPLACEMENT      HX KNEE REPLACEMENT Left 11/2017    HX LAP CHOLECYSTECTOMY  11/2015    HX TONSIL AND ADENOIDECTOMY      HX TUBAL LIGATION         MEDICATIONS      Current Outpatient Medications   Medication Sig Dispense Refill    Calcium-Cholecalciferol, D3, (CALCIUM 600 WITH VITAMIN D3) 600 mg(1,500mg) -400 unit chew Take  by mouth.  cyclobenzaprine (FLEXERIL) 10 mg tablet Take  by mouth three (3) times daily as needed for Muscle Spasm(s).  topiramate (TOPAMAX) 25 mg tablet 1 po x 1 week then 2 po qhs. 60 Tab 0    venlafaxine-SR (EFFEXOR-XR) 75 mg capsule Take 1 Cap by mouth daily. 90 Cap 2    meloxicam (MOBIC) 15 mg tablet Take 1 Tab by mouth daily.  30 Tab 3    HYDROcodone-acetaminophen (NORCO) 5-325 mg per tablet Take 1 Tab by mouth every eight (8) hours as needed for Pain. Max Daily Amount: 3 Tabs. 21 Tab 0    budesonide-formoterol (SYMBICORT) 160-4.5 mcg/actuation HFAA Take 2 Puffs by inhalation two (2) times a day. 1 Inhaler 1       ALLERGIES    Allergies   Allergen Reactions    Other Plant, Animal, Environmental Anaphylaxis     poison ivy    Albuterol Other (comments)     Able to use but causes to be slightly jittery (not severe and no other anaphylactoid sxs). SOCIAL HISTORY    Social History     Socioeconomic History    Marital status:      Spouse name: Not on file    Number of children: Not on file    Years of education: Not on file    Highest education level: Not on file   Occupational History    Not on file   Social Needs    Financial resource strain: Not on file    Food insecurity:     Worry: Not on file     Inability: Not on file    Transportation needs:     Medical: Not on file     Non-medical: Not on file   Tobacco Use    Smoking status: Former Smoker     Packs/day: 0.25     Years: 0.50     Pack years: 0.12    Smokeless tobacco: Never Used   Substance and Sexual Activity    Alcohol use:  Yes     Alcohol/week: 1.0 standard drinks     Types: 1 Glasses of wine per week    Drug use: No    Sexual activity: Yes     Partners: Male     Birth control/protection: None     Comment: Hysterectomy   Lifestyle    Physical activity:     Days per week: Not on file     Minutes per session: Not on file    Stress: Not on file   Relationships    Social connections:     Talks on phone: Not on file     Gets together: Not on file     Attends Sikh service: Not on file     Active member of club or organization: Not on file     Attends meetings of clubs or organizations: Not on file     Relationship status: Not on file    Intimate partner violence:     Fear of current or ex partner: Not on file     Emotionally abused: Not on file     Physically abused: Not on file     Forced sexual activity: Not on file   Other Topics Concern    Not on file   Social History Narrative    Not on file     Socioeconomic History    Marital status:      Spouse name: Not on file    Number of children: Not on file    Years of education: Not on file    Highest education level: Not on file   Occupational History    Not on file   Social Needs    Financial resource strain: Not on file    Food insecurity:     Worry: Not on file     Inability: Not on file    Transportation needs:     Medical: Not on file     Non-medical: Not on file   Tobacco Use    Smoking status: Former Smoker     Packs/day: 0.25     Years: 0.50     Pack years: 0.12    Smokeless tobacco: Never Used   Substance and Sexual Activity    Alcohol use:  Yes     Alcohol/week: 1.0 standard drinks     Types: 1 Glasses of wine per week    Drug use: No    Sexual activity: Yes     Partners: Male     Birth control/protection: None     Comment: Hysterectomy   Lifestyle    Physical activity:     Days per week: Not on file     Minutes per session: Not on file    Stress: Not on file   Relationships    Social connections:     Talks on phone: Not on file     Gets together: Not on file     Attends [employer] service: Not on file     Active member of club or organization: Not on file     Attends meetings of clubs or organizations: Not on file     Relationship status: Not on file    Intimate partner violence:     Fear of current or ex partner: Not on file     Emotionally abused: Not on file     Physically abused: Not on file     Forced sexual activity: Not on file   Other Topics Concern    Not on file   Social History Narrative    Not on file      Problem Relation Age of Onset    Other Mother         atrial fibrillation    Colon Polyps Mother     Breast Cancer Mother     Colon Polyps Brother         half brother    Heart Disease Brother         a-fib    Heart Disease Sister         half sister ASHD    Heart Attack Sister     Other Son         transposition of the great vessels and surgery for this    Other Son         congestive heart failure         REVIEW OF SYSTEMS  Review of Systems   Constitutional: Negative for chills, fever and weight loss. Respiratory: Negative for shortness of breath. Cardiovascular: Negative for chest pain. Gastrointestinal: Negative for constipation. Negative for fecal incontinence   Genitourinary: Negative for dysuria. Negative for urinary incontinence   Musculoskeletal:        Per HPI   Skin: Negative for rash. Neurological: Positive for tingling and focal weakness. Negative for dizziness, tremors and headaches. Endo/Heme/Allergies: Does not bruise/bleed easily. Psychiatric/Behavioral: The patient does not have insomnia. PHYSICAL EXAMINATION  Visit Vitals  /85 (BP 1 Location: Left arm, BP Patient Position: Sitting)   Pulse 79   Temp 97.5 °F (36.4 °C) (Oral)   Resp 16   Ht 5' 7\" (1.702 m)   Wt 171 lb (77.6 kg)   SpO2 93%   BMI 26.78 kg/m²          Accompanied by self. Constitutional:  Well developed, well nourished, in no acute distress. Psychiatric: Affect and mood are appropriate. Integumentary: No rashes or abrasions noted on exposed areas. Cardiovascular/Peripheral Vascular: No peripheral edema is noted BLE. SPINE/MUSCULOSKELETAL EXAM    Cervical spine:  Neck is midline. Normal muscle tone. No focal atrophy is noted. Pain with cervical extension. Tenderness to palpation C7, R upper trapezius. Negative Spurling's sign. Negative Tinel's sign. Negative Youssef's sign. MOTOR:      Biceps  Triceps Deltoids Wrist Ext Wrist Flex Hand Intrin   Right +4/5 +4/5 4/5 *pain +4/5 +4/5 4/5   Left +4/5 +4/5 +4/5 +4/5 +4/5 +4/5     DTRs are 2+ biceps, triceps, brachioradialis, patella, and Achilles. Ambulation without assistive device. FWB. Written by Raegan Farfan, as dictated by Sarah Sal MD.    I, Dr. Francine MD, confirm that all documentation is accurate.       Ms. Lidia Williamson may have a reminder for a \"due or due soon\" health maintenance. I have asked that she contact her primary care provider for follow-up on this health maintenance.

## 2019-12-11 ENCOUNTER — OFFICE VISIT (OUTPATIENT)
Dept: ORTHOPEDIC SURGERY | Age: 59
End: 2019-12-11

## 2019-12-11 VITALS
TEMPERATURE: 98.1 F | DIASTOLIC BLOOD PRESSURE: 90 MMHG | BODY MASS INDEX: 27.15 KG/M2 | HEIGHT: 67 IN | SYSTOLIC BLOOD PRESSURE: 128 MMHG | RESPIRATION RATE: 18 BRPM | HEART RATE: 70 BPM | WEIGHT: 173 LBS

## 2019-12-11 DIAGNOSIS — R94.131 ABNORMAL EMG: ICD-10-CM

## 2019-12-11 DIAGNOSIS — R20.2 NUMBNESS AND TINGLING OF RIGHT UPPER EXTREMITY: Primary | ICD-10-CM

## 2019-12-11 DIAGNOSIS — R20.0 NUMBNESS AND TINGLING OF RIGHT UPPER EXTREMITY: Primary | ICD-10-CM

## 2019-12-11 NOTE — PROGRESS NOTES
[address]  [address]  [place], [address] [place] [address] Street  Phone: [phone]  Fax: [phone]        Sandra Barragan  : 1960  PCP: Marck Turpin MD  2019    ELECTROMYOGRAPHY AND NERVE CONDUCTION STUDIES    Joya Villagran was referred by Dr. Steve Breen for electrodiagnostic evaluation of RUE pain and paraesthesia. NCV & EMG Findings:  Evaluation of the right median motor nerve showed reduced amplitude (4.3 mV). All remaining nerves (as indicated in the following tables) were within normal limits. All examined muscles (as indicated in the following table) showed no evidence of electrical instability. INTERPRETATION  This is a slightly abnormal electrodiagnostic examination. There are two electrodiagnostic abnormalities on this examination. The significance of these findings is unclear. This includes a CRD detected in the upper cervical paraspinals, which would not be expected to be related to dermatomes in the arm. The other abnormality is with a slightly low amplitude in the abductor pollicis brevis. CLINICAL INTERPRETATION  With neck extension and rightward tilt, this reproduced pain in the right shoulder blade - this could represent a sclerotomal referral pattern. Otherwise, her electrodiagnostic findings do not appear to be related to her presenting complaints. HISTORY OF PRESENT ILLNESS  Joya Villagran is a 60 y.o. female. Pt presents today for RUE EMG evaluation of neck pain radiating into the RUE with pain, weakness, and paraesthesia x 6 months. Pt reports pain with shoulder abduction. She notes that she was evaluated by Dr. Mesfin Sosa and cleared from a shoulder pathology. She reports difficultly lifting pots and pans while cooking. Pt notes that she was at work one day, when she suddenly experienced RUE numbness.  She has seen some improvement of paraesthesia from Topamax, but notes her pain has not improved any. She did not find benefit from massage. Cervical MRI 11/6/19: IMPRESSION:     Detail is again limited by motion artifact however there is no significant  change when compared to 9/27/2019.     Multilevel disc osteophyte disease and facet arthropathy without evidence of  significant central spinal canal stenosis. Multilevel degenerative foraminal  stenoses are similar. PAST MEDICAL HISTORY   Past Medical History:   Diagnosis Date    Allergic rhinitis     Anxiety 9/16/2019    Claustrophobia     Degenerative disc disease, lumbar 9/16/2019    Facet arthropathy, lumbar 9/16/2019    Gastroesophageal reflux disease with esophagitis 11/10/2014    Hepatic steatosis 9/16/2019    Herniation of intervertebral disc between L4 and L5 9/16/2019    Hiatal hernia 9/16/2019    Hyperlipidemia     Insomnia 9/16/2019    Lumbar stenosis 9/16/2019    Mild intermittent asthma without complication 6/06/0305    Primary osteoarthritis involving multiple joints 9/16/2019    Quadriceps tendon rupture, left, sequela 12/11/2017    Medial retinacular tear following left total knee replacement; s/p left quadricep tendon repair 12/11/2017. Dr. Sheryl Campuzano.  S/P hip replacement, right 3/3/2016    Vitamin D deficiency 11/10/2014       Past Surgical History:   Procedure Laterality Date    ENDOSCOPY, COLON, DIAGNOSTIC  03-18-11    normal colon to cecum    HX BREAST REDUCTION      HX HEENT      skin cancer between eyes    HX HIP REPLACEMENT Right 10/2016    HX HYSTERECTOMY      HX KNEE REPLACEMENT      HX KNEE REPLACEMENT Left 11/2017    HX LAP CHOLECYSTECTOMY  11/2015    HX TONSIL AND ADENOIDECTOMY      HX TUBAL LIGATION     . MEDICATIONS    Current Outpatient Medications   Medication Sig Dispense Refill    Calcium-Cholecalciferol, D3, (CALCIUM 600 WITH VITAMIN D3) 600 mg(1,500mg) -400 unit chew Take  by mouth.       cyclobenzaprine (FLEXERIL) 10 mg tablet Take  by mouth three (3) times daily as needed for Muscle Spasm(s).  topiramate (TOPAMAX) 25 mg tablet 1 po x 1 week then 2 po qhs. 60 Tab 0    venlafaxine-SR (EFFEXOR-XR) 75 mg capsule Take 1 Cap by mouth daily. 90 Cap 2    meloxicam (MOBIC) 15 mg tablet Take 1 Tab by mouth daily. 30 Tab 3    HYDROcodone-acetaminophen (NORCO) 5-325 mg per tablet Take 1 Tab by mouth every eight (8) hours as needed for Pain. Max Daily Amount: 3 Tabs. 21 Tab 0    budesonide-formoterol (SYMBICORT) 160-4.5 mcg/actuation HFAA Take 2 Puffs by inhalation two (2) times a day. 1 Inhaler 1        ALLERGIES  Allergies   Allergen Reactions    Other Plant, Animal, Environmental Anaphylaxis     poison ivy    Albuterol Other (comments)     Able to use but causes to be slightly jittery (not severe and no other anaphylactoid sxs). SOCIAL HISTORY    Social History     Socioeconomic History    Marital status:      Spouse name: Not on file    Number of children: Not on file    Years of education: Not on file    Highest education level: Not on file   Tobacco Use    Smoking status: Former Smoker     Packs/day: 0.25     Years: 0.50     Pack years: 0.12    Smokeless tobacco: Never Used   Substance and Sexual Activity    Alcohol use: Yes     Alcohol/week: 1.0 standard drinks     Types: 1 Glasses of wine per week    Drug use: No    Sexual activity: Yes     Partners: Male     Birth control/protection: None     Comment: Hysterectomy       FAMILY HISTORY  Family History   Problem Relation Age of Onset    Other Mother         atrial fibrillation    Colon Polyps Mother     Breast Cancer Mother     Colon Polyps Brother         half brother    Heart Disease Brother         a-fib    Heart Disease Sister         half sister ASHD    Heart Attack Sister     Other Son         transposition of the great vessels and surgery for this    Other Son         congestive heart failure         PHYSICAL EXAMINATION  There were no vitals taken for this visit.     Pain Assessment 11/25/2019   Location of Pain Neck   Location Modifiers (No Data)   Severity of Pain 6   Quality of Pain Throbbing; Miachel Proper; Aching;Burning; Other (Comment)   Quality of Pain Comment numbness, tingling,  & weakness   Duration of Pain Persistent   Frequency of Pain Constant   Aggravating Factors Other (Comment)   Aggravating Factors Comment pt c/o of difficulty laying on right side   Limiting Behavior No   Relieving Factors Other (Comment)   Relieving Factors Comment Tens Unit   Result of Injury No           Constitutional:  Well developed, well nourished, in no acute distress. Psychiatric: Affect and mood are appropriate. Integumentary: No rashes or abrasions noted on exposed areas. SPINE/MUSCULOSKELETAL EXAM    On brief examination: Weakness with biceps on the right. Negative Reeves sign on the right. Negative Spurling's sign. Right shoulder blade pain reproduced with cervical extension and tilt to the right.     MOTOR:      Biceps  Triceps Deltoids Wrist Ext Wrist Flex Hand Intrin   Right +4/5 5/5 5/5 5/5 5/5 5/5   Left 5/5 5/5 5/5 5/5 5/5 5/5             Hip Flex  Quads Hamstrings Ankle DF EHL Ankle PF   Right 5/5 5/5 5/5 5/5 5/5 5/5   Left 5/5 5/5 5/5 5/5 5/5 5/5     NCV & EMG Findings:  Nerve Conduction Studies  Anti Sensory Summary Table     Stim Site NR Peak (ms) Norm Peak (ms) O-P Amp (µV) Norm O-P Amp Site1 Site2 Delta-P (ms) Dist (cm) Brett (m/s) Norm Brett (m/s)   Right Median Anti Sensory (2nd Digit)   Wrist    3.1 <3.6 32.8 >10 Wrist 2nd Digit 3.1 14.0 45 >39   Right Radial Anti Sensory (Base 1st Digit)   Wrist    2.1 <3.1 40.2  Wrist Base 1st Digit 2.1 0.0     Right Ulnar Anti Sensory (5th Digit)   Wrist    3.2 <3.7 17.4 >15.0 Wrist 5th Digit 3.2 14.0 44 >38     Motor Summary Table     Stim Site NR Onset (ms) Norm Onset (ms) O-P Amp (mV) Norm O-P Amp Site1 Site2 Delta-0 (ms) Dist (cm) Brett (m/s) Norm Brett (m/s)   Right Median Motor (Abd Poll Brev)   Wrist    3.6 <4.2 4.3 >5 Elbow Wrist 4.2 23.0 55 >50 Elbow    7.8  3.3  Axilla Elbow 4.0 0.0     Axilla    3.8  3.0  Erbs Axilla 3.9 0.0     Erbs    7.7  2.5          Right Ulnar Motor (Abd Dig Min)   Wrist    2.7 <4.2 6.9 >3 B Elbow Wrist 3.0 18.5 62 >53   B Elbow    5.7  7.0  A Elbow B Elbow 1.9 10.0 53 >53   A Elbow    7.6  6.4            EMG     Side Muscle Nerve Root Ins Act Fibs Psw Amp Dur Poly Recrt Int Samella Luke Comment   Right Deltoid Axillary C5-6 Nml Nml Nml Nml Nml 0 Nml Nml    Right Biceps Musculocut C5-6 Nml Nml Nml Nml Nml 0 Nml Nml    Right Triceps Radial C6-7-8 Nml Nml Nml Nml Nml 0 Nml Nml    Right Abd Poll Brev Median C8-T1 Nml Nml Nml Nml Nml 0 Nml Nml    Right 1stDorInt Ulnar C8-T1 Nml Nml Nml Nml Nml 0 Nml Nml    Right Cervical Parasp Up Rami C1-3 Nml Nml Nml      CRD   Right Cervical Parasp Mid Rami C4-6 Nml Nml Nml         Right Cervical Parasp Low Rami C7-8 Nml Nml Nml             Nerve Conduction Studies  Anti Sensory Left/Right Comparison     Stim Site L Lat (ms) R Lat (ms) L-R Lat (ms) L Amp (µV) R Amp (µV) L-R Amp (%) Site1 Site2 L Brett (m/s) R Brett (m/s) L-R Brett (m/s)   Median Anti Sensory (2nd Digit)   Wrist  3.1   32.8  Wrist 2nd Digit  45    Radial Anti Sensory (Base 1st Digit)   Wrist  2.1   40.2  Wrist Base 1st Digit      Ulnar Anti Sensory (5th Digit)   Wrist  3.2   17.4  Wrist 5th Digit  44      Motor Left/Right Comparison     Stim Site L Lat (ms) R Lat (ms) L-R Lat (ms) L Amp (mV) R Amp (mV) L-R Amp (%) Site1 Site2 L Brett (m/s) R Brett (m/s) L-R Brett (m/s)   Median Motor (Abd Poll Brev)   Wrist  3.6   4.3  Elbow Wrist  55    Elbow  7.8   3.3  Axilla Elbow      Axilla  3.8   3.0  Erbs Axilla      Erbs  7.7   2.5         Ulnar Motor (Abd Dig Min)   Wrist  2.7   6.9  B Elbow Wrist  62    B Elbow  5.7   7.0  A Elbow B Elbow  53    A Elbow  7.6   6.4               Waveforms:                     [facility]  OFFICE PROCEDURE PROGRESS NOTE        Chart reviewed for the following:   IAnish, have reviewed the History, Physical and updated the Allergic reactions for [address] [place] [address] performed immediately prior to start of procedure:   Jasen Onawa, have performed the following reviews on [facility] prior to the start of the procedure:            * Patient was identified by name and date of birth   * Agreement on procedure being performed was verified  * Risks and Benefits explained to the patient  * Procedure site verified and marked as necessary  * Patient was positioned for comfort  * Consent was signed and verified     Time: 11:58 AM      Date of procedure: 12/11/2019    Procedure performed by:  Lashell Hernandez MD    Provider accompanied by: Saroj. Patient accompanied by: None.     How tolerated by patient: tolerated the procedure well with no complications    Post Procedural Pain Scale: 0 - No Hurt    Comments: none    Written by Sujey Keane, [address] as dictated by Kesha Mari MD

## 2019-12-16 ENCOUNTER — OFFICE VISIT (OUTPATIENT)
Dept: ORTHOPEDIC SURGERY | Age: 59
End: 2019-12-16

## 2019-12-16 VITALS
HEIGHT: 67 IN | TEMPERATURE: 97.8 F | BODY MASS INDEX: 27.31 KG/M2 | DIASTOLIC BLOOD PRESSURE: 86 MMHG | OXYGEN SATURATION: 93 % | HEART RATE: 81 BPM | SYSTOLIC BLOOD PRESSURE: 125 MMHG | RESPIRATION RATE: 16 BRPM | WEIGHT: 174 LBS

## 2019-12-16 DIAGNOSIS — M67.911 TENDINOPATHY OF RIGHT SHOULDER: Primary | ICD-10-CM

## 2019-12-16 DIAGNOSIS — M54.12 BRACHIAL (CERVICAL) NEURITIS: ICD-10-CM

## 2019-12-16 RX ORDER — LIDOCAINE HYDROCHLORIDE 20 MG/ML
2 INJECTION, SOLUTION EPIDURAL; INFILTRATION; INTRACAUDAL; PERINEURAL ONCE
Qty: 2 ML | Refills: 0
Start: 2019-12-16 | End: 2019-12-16

## 2019-12-16 RX ORDER — CYCLOBENZAPRINE HCL 10 MG
10 TABLET ORAL
Qty: 30 TAB | Refills: 2 | Status: SHIPPED | OUTPATIENT
Start: 2019-12-16

## 2019-12-16 RX ORDER — BUPIVACAINE HYDROCHLORIDE 2.5 MG/ML
2 INJECTION, SOLUTION INFILTRATION; PERINEURAL ONCE
Qty: 2 ML | Refills: 0
Start: 2019-12-16 | End: 2019-12-16

## 2019-12-16 RX ORDER — BETAMETHASONE SODIUM PHOSPHATE AND BETAMETHASONE ACETATE 3; 3 MG/ML; MG/ML
12 INJECTION, SUSPENSION INTRA-ARTICULAR; INTRALESIONAL; INTRAMUSCULAR; SOFT TISSUE ONCE
Qty: 2 ML | Refills: 0
Start: 2019-12-16 | End: 2019-12-16

## 2019-12-16 RX ORDER — TOPIRAMATE 25 MG/1
TABLET ORAL
Qty: 60 TAB | Refills: 2 | Status: SHIPPED | OUTPATIENT
Start: 2019-12-16 | End: 2020-06-23 | Stop reason: SDUPTHER

## 2019-12-16 NOTE — PROGRESS NOTES
[address]  [address], [address]  [place] [address]  Phone: [phone]  Fax: [phone]        Chilo Myles  : 1960  PCP: Heather Luz MD    PROGRESS NOTE      ASSESSMENT AND PLAN    Diagnoses and all orders for this visit:    1. Tendinopathy of right shoulder  -     bupivacaine (MARCAINE) 0.25 % (2.5 mg/mL) soln injection; 2 mL by IntraBURSal route once for 1 dose. -     INJECTION, BUPIVICAINE HYDRO  -     LIDOCAINE INJECTION  -     lidocaine, PF, (XYLOCAINE) 20 mg/mL (2 %) injection; 2 mL by Other route once for 1 dose. -     betamethasone (CELESTONE SOLUSPAN) 6 mg/mL injection; 2 mL by IntraBURSal route once for 1 dose.  -     BETAMETHASONE ACETATE & SODIUM PHOSPHATE INJECTION 3 MG EA.  -     MS DRAIN/INJECT LARGE JOINT/BURSA    2. Brachial (cervical) neuritis  -     cyclobenzaprine (FLEXERIL) 10 mg tablet; Take 1 Tab by mouth nightly as needed for Muscle Spasm(s). -     topiramate (TOPAMAX) 25 mg tablet; 2 po qhs.       1. [address] [place] [address] employee w/ ongoing RUE pain. Extensive imaging done-neck, shoulder, chest. EMG-benign. Coud this be referred pain from her massive hernia? Advised to continue HEP. Reassured pt that she does not need neck sx. 2. Continue Flexeril  3. Trial R shoulder subdeltoid bursa injection  4. Decrease Topamax to 25mg QHS. Eval mouth tingling. 5. Given information on joint injections    F/U PRN      HISTORY OF PRESENT ILLNESS  Oliva Stanley is a 60 y.o. female. RHD. Pt presents to the office for a f/u visit for cervical radiculopathy. Last OV sent for EMG RUE and given trial of Topamax. She affirms Topamax has diminished tingling in her RUE. She states moving her neck or arm aggravates her pain. She c/o trouble with moving her R shoulder above her head or behind her back. She notes she has experienced some improvement in ROM since she last saw Dr. Shaylee Pang. She reports swelling under her R arm.  Denies coughing much, except last night. Pt.states she wakes due to pain in RUE, worse with laying on R side. She notes wheezing and some trouble breathing, she thinks due to the hiatal hernia pressing on her lungs. She states she wakes herself up at night due to loud wheezing that even her  can hear. She has an upcoming surgery for the hernia, Neissen procedure 1/16/19. Location of pain: neck, shoulder blades  Does pain radiate into extremities: RUE pain and tingling into 5th digit  Does patient have weakness: RUE   Pt denies saddle paresthesias. Medications pt is on: Topamax 50mg QHS with some benefit and intermittent mouth tingling. Flexeril 10mg PRN QHS with sleep benefit. TENS with some benefit. MDP no benefit. Norco PRN. Denies persistent fevers, chills, weight changes, neurogenic bowel or bladder symptoms. Denies hx of kidney stones. Pt denies any recent fevers, chills, antibiotics, recent cortisone injections, or infections. Affirms benefit with cortisone injections previously in other joints. Treatments patient has tried:  Physical therapy:No  Doing HEP: Unknown  Non-opioid medications: Yes Failed Mobic - stomach upset. Spinal injections: No  Spinal surgery- No.   Last C MRI 2019: multilevel disc osteophyte complexes  Last L MRI 2015: mild to severe stenosis L4-5  Last EMG RUE 2019: no radiculopathy or neuropathy  Shoulder MRI labral thinning/patchy tear  CT chest hernia-most of gastric fundus in chest. Steatosis-hepatic     reviewed. PMHx of hiatal hernia, [place], high cholesterol, asthma, GERD, esophageal dilation, R hip replacement, L knee replacement, gall bladder removal. 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      Pain Assessment  12/16/2019   Location of Pain Neck;Arm   Location Modifiers Right   Severity of Pain 4   Quality of Pain Other (Comment)   Quality of Pain Comment numbness, tingling, & weakness   Duration of Pain Persistent   Frequency of Pain Constant   Aggravating Factors Bending; Other (Comment)   Aggravating Factors Comment reaching, lifting, moving, & when laying on side/sleeping. Neck: ear to shoudler & bending neck downward   Limiting Behavior Yes   Relieving Factors Nothing   Relieving Factors Comment -   Result of Injury No       MRI R shoulder 9/27/2019  IMPRESSION:     Supraspinatus tendinopathy from the acromion to the insertion with probable  low-grade articular surface tearing.  -Mild acromioclavicular degenerative change. Acromion type II morphology with  minimal lateral downsloping.  -Small amount of subacromial subdeltoid bursal fluid.     Patchy labral degenerative tearing.  -Biceps intra-articular tendinopathy.  -Probable grade 2 patchy glenohumeral cartilage thinning. PAST MEDICAL HISTORY   Past Medical History:   Diagnosis Date    Allergic rhinitis     Anxiety 9/16/2019    Claustrophobia     Degenerative disc disease, lumbar 9/16/2019    Facet arthropathy, lumbar 9/16/2019    Gastroesophageal reflux disease with esophagitis 11/10/2014    Hepatic steatosis 9/16/2019    Herniation of intervertebral disc between L4 and L5 9/16/2019    Hiatal hernia 9/16/2019    History of EMG/NCV UEs 12/2019    no radiculopathy, compressive mononeuropathy    Hyperlipidemia     Insomnia 9/16/2019    Lumbar stenosis 9/16/2019    Mild intermittent asthma without complication 2/71/2252    Primary osteoarthritis involving multiple joints 9/16/2019    Quadriceps tendon rupture, left, sequela 12/11/2017    Medial retinacular tear following left total knee replacement; s/p left quadricep tendon repair 12/11/2017. Dr. Magui Garrett.     S/P hip replacement, right 3/3/2016    Vitamin D deficiency 11/10/2014       Past Surgical History:   Procedure Laterality Date    ENDOSCOPY, COLON, DIAGNOSTIC  03-18-11    normal colon to cecum    HX BREAST REDUCTION      HX HEENT      skin cancer between eyes    HX HIP REPLACEMENT Right 10/2016    HX HYSTERECTOMY      HX KNEE REPLACEMENT      HX KNEE REPLACEMENT Left 11/2017    HX LAP CHOLECYSTECTOMY  11/2015    HX TONSIL AND ADENOIDECTOMY      HX TUBAL LIGATION     . MEDICATIONS      Current Outpatient Medications   Medication Sig Dispense Refill    cyclobenzaprine (FLEXERIL) 10 mg tablet Take 1 Tab by mouth nightly as needed for Muscle Spasm(s). 30 Tab 2    topiramate (TOPAMAX) 25 mg tablet 2 po qhs. 60 Tab 2    Calcium-Cholecalciferol, D3, (CALCIUM 600 WITH VITAMIN D3) 600 mg(1,500mg) -400 unit chew Take  by mouth.  venlafaxine-SR (EFFEXOR-XR) 75 mg capsule Take 1 Cap by mouth daily. 90 Cap 2    budesonide-formoterol (SYMBICORT) 160-4.5 mcg/actuation HFAA Take 2 Puffs by inhalation two (2) times a day. 1 Inhaler 1    HYDROcodone-acetaminophen (NORCO) 5-325 mg per tablet Take 1 Tab by mouth every eight (8) hours as needed for Pain. Max Daily Amount: 3 Tabs. 21 Tab 0        ALLERGIES    Allergies   Allergen Reactions    Other Plant, Animal, Environmental Anaphylaxis     poison ivy    Albuterol Other (comments)     Able to use but causes to be slightly jittery (not severe and no other anaphylactoid sxs). SOCIAL HISTORY    Social History     Socioeconomic History    Marital status:      Spouse name: Not on file    Number of children: Not on file    Years of education: Not on file    Highest education level: Not on file   Occupational History    Not on file   Social Needs    Financial resource strain: Not on file    Food insecurity:     Worry: Not on file     Inability: Not on file    Transportation needs:     Medical: Not on file     Non-medical: Not on file   Tobacco Use    Smoking status: Former Smoker     Packs/day: 0.25     Years: 0.50     Pack years: 0.12    Smokeless tobacco: Never Used   Substance and Sexual Activity    Alcohol use:  Yes     Alcohol/week: 1.0 standard drinks     Types: 1 Glasses of wine per week    Drug use: No    Sexual activity: Yes Partners: Male     Birth control/protection: None     Comment: Hysterectomy   Lifestyle    Physical activity:     Days per week: Not on file     Minutes per session: Not on file    Stress: Not on file   Relationships    Social connections:     Talks on phone: Not on file     Gets together: Not on file     Attends Nondenominational service: Not on file     Active member of club or organization: Not on file     Attends meetings of clubs or organizations: Not on file     Relationship status: Not on file    Intimate partner violence:     Fear of current or ex partner: Not on file     Emotionally abused: Not on file     Physically abused: Not on file     Forced sexual activity: Not on file   Other Topics Concern    Not on file   Social History Narrative    Not on file       FAMILY HISTORY    Family History   Problem Relation Age of Onset    Other Mother         atrial fibrillation    Colon Polyps Mother     Breast Cancer Mother     Colon Polyps Brother         half brother    Heart Disease Brother         a-fib    Heart Disease Sister         half sister ASHD    Heart Attack Sister     Other Son         transposition of the great vessels and surgery for this    Other Son         congestive heart failure       REVIEW OF SYSTEMS  Review of Systems   Constitutional: Negative for chills, fever and weight loss. Respiratory: Positive for wheezing. Negative for shortness of breath. Cardiovascular: Negative for chest pain. Gastrointestinal: Negative for constipation. Negative for fecal incontinence   Genitourinary: Negative for dysuria. Negative for urinary incontinence   Musculoskeletal: Positive for joint pain. Per HPI   Skin: Negative for rash. Neurological: Positive for tingling. Negative for dizziness, tremors, focal weakness and headaches. Endo/Heme/Allergies: Does not bruise/bleed easily. Psychiatric/Behavioral: The patient does not have insomnia.         PHYSICAL EXAMINATION  Visit Vitals  /86 (BP 1 Location: Left arm, BP Patient Position: Sitting)   Pulse 81   Temp 97.8 °F (36.6 °C) (Oral)   Resp 16   Ht 5' 7\" (1.702 m)   Wt 174 lb (78.9 kg)   SpO2 93%   BMI 27.25 kg/m²         Accompanied by self. Constitutional:  Well developed, well nourished, in no acute distress. Psychiatric: Affect and mood are appropriate. Integumentary: No rashes or abrasions noted on exposed areas. Cardiovascular/Peripheral Vascular: No peripheral edema is noted BLE. Intact distal symmetric pulses. SPINE/MUSCULOSKELETAL EXAM    Cervical spine:  Neck is midline. Normal muscle tone. No focal atrophy is noted. R shoulder pain with IR and end abduction. Tenderness to palpation R axilla. Negative Spurling's sign. Negative Tinel's sign. Negative Youssef's sign. Negative drop arm. MOTOR:      Biceps  Triceps Deltoids Wrist Ext Wrist Flex Hand Intrin   Right +4/5 +4/5 +4/5 +4/5 +4/5 +4/5   Left +4/5 +4/5 +4/5 +4/5 +4/5 +4/5     Ambulation without assistive device. FWB. [facility]  OFFICE PROCEDURE PROGRESS NOTE      PROCEDURE: In the office today after informed consent using aseptic technique, the patient was injected with a total of 2 cc of each of Celestone, Lidocaine 2% and Marcaine 0.25% into her right shoulder subdeltoid bursa. Chart reviewed for the following:   IDr. Abeba, have reviewed the History, Physical and updated the Allergic reactions for Jazmyne Mccallum.       TIME OUT performed immediately prior to start of procedure:   IDr. Abeba, have performed the following reviews on Jazmyne Mccallum prior to the start of the procedure:            * Patient was identified by name and date of birth   * Agreement on procedure being performed was verified  * Risks and Benefits explained to the patient  * Procedure site verified and marked as necessary  * Patient was positioned for comfort  * Consent was signed and verified     Time: 3:41 PM       Date of procedure: 12/20/2019     Procedure performed by:  Maritza Figueroa MD    Provider assisted by: None    Patient assisted by: Self    How tolerated by patient: Pt tolerated the procedure well with no complications. Written by Brady Haynes, as dictated by Jose Carroll MD.    I, Dr. Jose Carroll MD, confirm that all documentation is accurate. Ms. Dex Marrero may have a reminder for a \"due or due soon\" health maintenance. I have asked that she contact her primary care provider for follow-up on this health maintenance.

## 2019-12-16 NOTE — PATIENT INSTRUCTIONS
Learning About Joint Injections What are joint injections? Joint injections are shots into a joint, such as the knee or shoulder. They are used to put in medicines, such as pain relievers and steroid medicines. Steroids can be injected directly into a swollen and painful joint to reduce inflammation. A steroid shot can sometimes help with short-term pain relief when other treatments haven't worked. If steroid shots help, pain may improve for weeks or months. How are they done? First, the area over the joint will be cleaned. Your doctor may then use a tiny needle to numb the skin in the area where you will get the joint injection. If a tiny needle is used to numb the area, your doctor will use another needle to inject the medicine. Your doctor may use a pain reliever, a steroid, or both. You may feel some pressure or discomfort. The procedure takes 10 to 30 minutes. But the injection itself usually takes only a few minutes. Your doctor may put ice on the area before you go home. You will probably go home soon after your shot. What can you expect after a joint injection? You may have numbness around the joint for a few hours. If your shot included both a pain reliever and a steroid, then the pain will probably go away right away. But it might come back after a few hours. This might happen if the pain reliever wears off and the steroid hasn't started to work yet. Steroids don't always work. But when they do, the pain relief can last for several days to a few months or longer. Your doctor may tell you to use ice on the area. You can also use ice if the pain comes back. Put ice or a cold pack on your joint for 10 to 20 minutes at a time. Put a thin cloth between the ice and your skin. Follow your doctor's instructions carefully. Follow-up care is a key part of your treatment and safety. Be sure to make and go to all appointments, and call your doctor if you are having problems.  It's also a good idea to know your test results and keep a list of the medicines you take. Where can you learn more? Go to http://sarahi-musa.info/. Enter W724 in the search box to learn more about \"Learning About Joint Injections. \" Current as of: June 26, 2019 Content Version: 12.2 © 6207-1821 [employer]. Care instructions adapted under license by [employer] (which disclaims liability or warranty for this information). If you have questions about a medical condition or this instruction, always ask your healthcare professional. [address] any warranty or liability for your use of this information.

## 2020-01-24 ENCOUNTER — HOME HEALTH ADMISSION (OUTPATIENT)
Dept: HOME HEALTH SERVICES | Facility: HOME HEALTH | Age: 60
End: 2020-01-24

## 2020-01-30 ENCOUNTER — PATIENT OUTREACH (OUTPATIENT)
Dept: OTHER | Age: 60
End: 2020-01-30

## 2020-01-30 NOTE — PROGRESS NOTES
Transition Of Care Note    Patient discharged from  admitted on  and discharged on  for hiatal hernia repair. Medical History:     Past Medical History:   Diagnosis Date    Allergic rhinitis     Anxiety 2019    Claustrophobia     Degenerative disc disease, lumbar 2019    Facet arthropathy, lumbar 2019    Gastroesophageal reflux disease with esophagitis 11/10/2014    Hepatic steatosis 2019    Herniation of intervertebral disc between L4 and L5 2019    Hiatal hernia 2019    History of EMG/NCV UEs 2019    no radiculopathy, compressive mononeuropathy    Hyperlipidemia     Insomnia 2019    Lumbar stenosis 2019    Mild intermittent asthma without complication 3/49/1338    Primary osteoarthritis involving multiple joints 2019    Quadriceps tendon rupture, left, sequela 2017    Medial retinacular tear following left total knee replacement; s/p left quadricep tendon repair 2017. Dr. Ranulfo Abebe.  S/P hip replacement, right 3/3/2016    Vitamin D deficiency 11/10/2014       Care Manager contacted the patient by telephone to perform post hospital discharge assessment. Verified  and zip code with patient as identifiers. Provided introduction to self, and explanation of the Nurse Care Manager role. Patient's primary care provider relationship reviewed with patient and modified, as applicable. Red Flags:  Call 911 anytime you think you may need emergency care. For example, call if:    · You passed out (lost consciousness). · You are short of breath. Call your doctor now or seek immediate medical care if:    · You are sick to your stomach and cannot drink fluids. · You cannot pass stools or gas. · You have pain that does not get better when you take your pain medicine. · You have signs of infection, such as:  ? Increased pain, swelling, warmth, or redness. ? Red streaks leading from the wound.   ? Pus draining from the wound. ? A fever. · You have loose stitches, or your incision comes open. · Bright red blood has soaked through the bandage over your incision. · You have signs of a blood clot in your leg (called a deep vein thrombosis), such as:  ? Pain in your calf, back of knee, thigh, or groin. ? Redness and swelling in your leg or groin. Patient reports the following:   Surgical/Wound Condition Focused Assessment    Skin- any open wounds or incisions? yes  Description and location of wound- six abdomin, from chest to pelvis - one is painful - suture to abdominal wall   In the last 24 hour have you experienced; Fever no    Low body temperature no    Chills or shaking no    Sweating yes hot/cold flashes   Fast heart rate no    Fast breathing no    Dizziness/lightheadedness yes - up walking - occasionally   Confusion or unusual change in mental status no    Diarrhea no    Nausea no    Vomiting no    Shortness of breath or difficulty breathing yes    Less urine output no    Cold, clammy, and pale skin no     Skin rash or skin color changes no  New or worsening pain? no  If yes, pain rated 0-10: n/a Location/pain characteristics: n/a   New or worsening numbness or tingling? no  If yes, location of numbness and tingling: n/a    Activity level- moving several times a day, or as recommended? yes  Abnormal activity level reported: n/a   Bathing and showering instructions? yes  Nutrition- prescribed diet? Full liquids  Tolerating food? yes  Hydration- (how much in ounces per day) 64 ounces/day -smoothies  Medications- new antibiotic? no             Pain medication? Yes - roxicodone  Does patient understand how and when to take their medications? yes  If no, instructed patient on proper medication use? n/a  Does patient have incentive spirometer? yes  If yes, how frequently is patient using incentive spirometer? Not using like she should. She is trying. Encouraged her to use.   States it makes her cough. Medication:   New Medications at Discharge: Roxicodone, valium  Changed Medications at Discharge: none  Discontinued Medications at Discharge: none  Current Outpatient Medications   Medication Sig    cyclobenzaprine (FLEXERIL) 10 mg tablet Take 1 Tab by mouth nightly as needed for Muscle Spasm(s).  topiramate (TOPAMAX) 25 mg tablet 2 po qhs.  Calcium-Cholecalciferol, D3, (CALCIUM 600 WITH VITAMIN D3) 600 mg(1,500mg) -400 unit chew Take  by mouth.  venlafaxine-SR (EFFEXOR-XR) 75 mg capsule Take 1 Cap by mouth daily.  HYDROcodone-acetaminophen (NORCO) 5-325 mg per tablet Take 1 Tab by mouth every eight (8) hours as needed for Pain. Max Daily Amount: 3 Tabs.  budesonide-formoterol (SYMBICORT) 160-4.5 mcg/actuation HFAA Take 2 Puffs by inhalation two (2) times a day. No current facility-administered medications for this visit. There are no discontinued medications. Performed medication reconciliation with patient, and patient verbalizes understanding of administration of home medications. There were no barriers to obtaining medications identified at this time. Inpatient RRAT score: another facility  Was this a readmission? no   Patient stated reason for the readmission: n/a    Barriers/Support system:  patient and daughter and  and family    Barriers/Challenges to Care: []  Decline in memory    []  Language barrier   []  Emotional          []  Limited mobility  []  Lack of motivation     [] Vision, hearing or cognitive impairment []  Knowledge [] Financial Barriers []  Lack of support  []  Pain []  Other [x]  None    CM Identified  Problems   (contributing problems for risk for readmission)  1. Impaired Mobility  2. Impaired Skin integrity  3.  Knowledge Deficit    Goals      Completes all follow-up appointments within two weeks post discharge       Educate and encourage importance of FU for prevention of complications or disease;   Assess the patients relationship with a PCP and next FU visit scheduled;   Patient states she has a follow up visit with her surgeon on 2/5;   Discuss importance of adherence to treatment plan and follow up visits;   Identify any barriers in transportation or access to FU appointments. · Assist patient with making FU appointments as needed       Demonstrates appropriate activity level       Follow the discharge instructions for postop activity level and when to progress;   Encourage patient to increase activity a little every day until returns to baseline;  · Assess for any safety barriers in the home, outside the home, or entering the car       Demonstrates no post op red flags      · Assess patient knowledge of how to contact the provider for questions if needed;  · Educate patient on importance of monitoring for any red flags;  · Review importance of reporting any changes, red flags to the provider and/or PCP;  · Assess patients knowledge of discharge instructions and any restrictions;  · Educate patient when to call 911            Discharge Instructions :  Reviewed discharge instructions with patient. Patient verbalizes understanding of discharge instructions and follow-up care. Advance Care Planning:   Patient was offered the opportunity to discuss advance care planning:  no     Does patient have an Advance Directive:  no   If no, did you provide information on Caring Connections?  no     PCP/Specialist follow up: Patient scheduled to follow up with her surgeon, Dr. Bandar Ortiz, on 2/5. Future Appointments   Date Time Provider Yang Sanchez   9/16/2020 11:00 AM Taylor Edouard MD [address]      Reviewed red flags with patient, and patient verbalizes understanding. Patient given an opportunity to ask questions. No other clinical/social/functional needs noted. The patient agrees to contact the PCP office for questions related to their healthcare.   The patient expressed thanks, offered no additional questions and ended the call. Will call back in one week, 2/6.

## 2020-02-06 ENCOUNTER — PATIENT OUTREACH (OUTPATIENT)
Dept: OTHER | Age: 60
End: 2020-02-06

## 2020-02-06 NOTE — PROGRESS NOTES
Follow up phone call to patient, two pt identifiers verified. Discussed patient's goals:   Goals      Completes all follow-up appointments within two weeks post discharge       Educate and encourage importance of FU for prevention of complications or disease;   Assess the patients relationship with a PCP and next FU visit scheduled;   Patient states she has a follow up visit with her surgeon on 2/5;   Discuss importance of adherence to treatment plan and follow up visits;   Identify any barriers in transportation or access to FU appointments. · Assist patient with making FU appointments as needed    2/6: Pre op visit yesterday, surgeon thinks she may have another blockage. Having a lot of spasms. Will have another scope.  Demonstrates appropriate activity level       Follow the discharge instructions for postop activity level and when to progress;   Encourage patient to increase activity a little every day until returns to baseline;  · Assess for any safety barriers in the home, outside the home, or entering the car    2/6: Patient is moving around okay. She states she ate a big lunch yesterday and did well. Was told good to eat cottage cheese, jello, yogurt - no steak, can eat eggs, broiled fish, chicken salad and trying different things to eat. She is trying to drink Ensure to keep protein high. Will send her coupons for Ensure and a Dial A Dietitian brochure.  Demonstrates no post op red flags      · Assess patient knowledge of how to contact the provider for questions if needed;  · Educate patient on importance of monitoring for any red flags;  · Review importance of reporting any changes, red flags to the provider and/or PCP;  · Assess patients knowledge of discharge instructions and any restrictions;  · Educate patient when to call 911    2/6: Patient states still having pain, felt pressure, no red flags.              Patient's primary care provider relationship reviewed with patient and modified, as applicable. Readiness to Change: []  Pre-contemplative    []  Contemplative  []  Preparation               [x]  Action                  []  Maintenance    Barriers/Challenges to Care: []  Decline in memory    []  Language barrier     []  Emotional                  []  Limited mobility  []  Lack of motivation     [] Vision, hearing or cognitive impairment []  Knowledge [] Financial Barriers []  Lack of support  []  Pain []  Other [x]  None    Key pt activities to achieve better health:   []  Weight loss  []  Improved diabetic control  []  Decreased cholesterol levels  []  Decreased blood pressure  [x]  Keep protein up  []    Upcoming appointments:   Future Appointments   Date Time Provider Yang Sanchez   9/16/2020 11:00  Harriett Nathan MD [address] [place] [address] for next call: Will call in one week, 2/13.

## 2020-02-13 ENCOUNTER — PATIENT OUTREACH (OUTPATIENT)
Dept: OTHER | Age: 60
End: 2020-02-13

## 2020-02-13 NOTE — PROGRESS NOTES
Attempt to reach patient for follow up. Discreet VM left with contact information. Will try back in a week, 2/20, if I don't hear back from her.

## 2020-02-27 ENCOUNTER — PATIENT OUTREACH (OUTPATIENT)
Dept: OTHER | Age: 60
End: 2020-02-27

## 2020-02-27 RX ORDER — DIAZEPAM 5 MG/1
5 TABLET ORAL 2 TIMES DAILY
COMMUNITY

## 2020-02-27 NOTE — PROGRESS NOTES
Follow up phone call to patient, two pt identifiers verified. Discussed patient's goals:   Goals      Completes all follow-up appointments within two weeks post discharge       Educate and encourage importance of FU for prevention of complications or disease;   Assess the patients relationship with a PCP and next FU visit scheduled;   Patient states she has a follow up visit with her surgeon on 2/5;   Discuss importance of adherence to treatment plan and follow up visits;   Identify any barriers in transportation or access to FU appointments. · Assist patient with making FU appointments as needed    2/6: Pre op visit yesterday, surgeon thinks she may have another blockage. Having a lot of spasms. Will have another scope. 2/27: Stricture was dilated, went down through hernia. Was done in OR. Wrapped and open. Follow up was yesterday. Wants to do another procedure because something is not right. Barium swallow is set for 3/16.  Demonstrates appropriate activity level       Follow the discharge instructions for postop activity level and when to progress;   Encourage patient to increase activity a little every day until returns to baseline;  · Assess for any safety barriers in the home, outside the home, or entering the car    2/6: Patient is moving around okay. She states she ate a big lunch yesterday and did well. Was told good to eat cottage cheese, jello, yogurt - no steak, can eat eggs, broiled fish, chicken salad and trying different things to eat. She is trying to drink Ensure to keep protein high. Will send her coupons for Ensure and a Dial A Dietitian brochure. 2/27: She is drinking protein shakes. Takes her a long time to drink. She states she has no appetite, makes herself eat. Trying to eat small pieces of food. Weight is 162. Walking around the block.         Demonstrates no post op red flags      · Assess patient knowledge of how to contact the provider for questions if needed;  · Educate patient on importance of monitoring for any red flags;  · Review importance of reporting any changes, red flags to the provider and/or PCP;  · Assess patients knowledge of discharge instructions and any restrictions;  · Educate patient when to call 911    2/6: Patient states still having pain, felt pressure, no red flags. 2/27: Patient still having spasms. Patient's primary care provider relationship reviewed with patient and modified, as applicable. Readiness to Change: []  Pre-contemplative    []  Contemplative  []  Preparation               [x]  Action                  []  Maintenance    Barriers/Challenges to Care: []  Decline in memory    []  Language barrier     []  Emotional                  []  Limited mobility  []  Lack of motivation     [] Vision, hearing or cognitive impairment []  Knowledge [] Financial Barriers []  Lack of support  []  Pain []  Other [x]  None    Key pt activities to achieve better health:   []  Weight loss  []  Improved diabetic control  []  Decreased cholesterol levels  []  Decreased blood pressure  []    []    Upcoming appointments: March 16th barium swallow then follow up with surgeon  Future Appointments   Date Time Provider Yang Sanchez   9/16/2020 11:00 AM Kinga Kline MD [address] [place] [address] for next call: Call back on 3/19. Barium swallow?

## 2020-03-19 ENCOUNTER — PATIENT OUTREACH (OUTPATIENT)
Dept: OTHER | Age: 60
End: 2020-03-19

## 2020-03-19 NOTE — PROGRESS NOTES
Attempt to reach patient for follow up. Discreet VM left with contact information. Will try back in two weeks, 4/2.

## 2020-04-02 ENCOUNTER — PATIENT OUTREACH (OUTPATIENT)
Dept: OTHER | Age: 60
End: 2020-04-02

## 2020-04-02 NOTE — LETTER
4/2/2020 2:56 PM 
 
Ms. Vinh Crain [address] [address] [address] [address] Dear Ms. Charlie Moser,  
 
I have been trying to reach you for a follow up call for services with our Associate Care Management Program. Your wellbeing is very important to us. With continued partnership in the [facility] program, we hope to work with you to optimize your health and increase your quality of life. I look forward to continuing to work with you. Please contact me at your convenience and we can schedule a time that works best for you. Sincerely, GARLAND SpauldingN  Associate Care Manager [address] [place] [address] W [phone]  F [phone]  [email or web address] email: [email or web address]

## 2020-04-02 NOTE — PROGRESS NOTES
Attempt to reach patient for follow up. Discreet VM left with contact information. To send lost to follow up letter. Will try back in two weeks, 4/16.

## 2020-04-14 ENCOUNTER — TELEPHONE (OUTPATIENT)
Dept: INTERNAL MEDICINE CLINIC | Age: 60
End: 2020-04-14

## 2020-04-14 RX ORDER — METHYLPREDNISOLONE 4 MG/1
TABLET ORAL
Qty: 1 DOSE PACK | Refills: 0 | Status: SHIPPED | OUTPATIENT
Start: 2020-04-14 | End: 2020-11-25 | Stop reason: ALTCHOICE

## 2020-04-14 NOTE — TELEPHONE ENCOUNTER
Patient has been prescribed Medrol and Prednisone in the past. Please advise and pend new Rx if appropriate. Last visit:  9/11/19 with MD Kathie Nicolas Next appt:  9/16/20 with MD Kathie Nicolas

## 2020-04-14 NOTE — TELEPHONE ENCOUNTER
Procedure Date: 08/14/2018      PREOPERATIVE DIAGNOSIS:  Tubal obstruction on hysterosalpingogram.      POSTOPERATIVE DIAGNOSIS:  Tubal obstruction on hysterosalpingogram with normal findings.      PROCEDURE:  Laparoscopic tubal dye studies.      SURGEON:  Charlie Butt MD      ASSISTANT:  Kristina Barnett LPN, CST, CFA      OPERATIVE PROCEDURE:  The patient was prepped and draped in the lithotomy position under general anesthetic.  Examination at this time revealed the uterus to be anteverted.  A single-toothed tenaculum was applied to the anterior lip of the cervix in a transverse manner and a cervical cannula inserted into the cervix.  A small incision was made in the infraumbilical fold.  Veress needle was inserted into the peritoneal cavity.  Peritoneal cavity was insufflated with 3 liters of carbon dioxide.  The Veress needle was then removed.  The 5 mm sheath was inserted and the scope inserted.  The upper abdomen was visualized and found to be normal.  There was no evidence of Hdra-Qxxi-Crkwfx syndrome.  Examination of the lower pelvis was then performed with the patient in Trendelenburg and with the aid of a 5 mm suprapubic accessory trocar site.  The vesicouterine fold of peritoneum was normal.  The uterus itself was normal.  The cul-de-sac was normal.  Uterosacral ligaments were normal.        Both junctions of the tubes were normal and tubes were normal to their pristine fimbriated ends.  The ovaries were visualized on their medial and lateral aspects and found to be normal and the ovarian fossae bilaterally were negative as well.  The ovaries did have glistening white cortex typical of polycystic ovarian syndrome.  The tubal dye studies were then gently carried out showing excellent and immediate efflux of dye through the left tube first and then through the right tube for a good result.  The suction  was inserted and the cul-de-sac cleansed and aspirated.  Peritoneal cavity was then emptied  Pt asking for steroid that she gets yearly for allergies. Says it is a dose pack. of its gas.  The incisions were closed initially with 2-0 Vicryl stitch in a subcutaneous manner and then Steri-Strips and Band-Aids over top.  The cervical cannula and single-tooth tenaculum was then removed.  Total estimated blood loss for the procedure was approximately 10 mL.  The patient left the operating room in good condition.         JUAN ANTONIO PERRY MD             D: 2018   T: 2018   MT: CECILIA      Name:     TOR MYRICK   MRN:      [id number]        Account:        [id number]   :      1989           Procedure Date: 2018      Document: [id number]

## 2020-04-16 ENCOUNTER — DOCUMENTATION ONLY (OUTPATIENT)
Dept: OTHER | Age: 60
End: 2020-04-16

## 2020-06-23 ENCOUNTER — VIRTUAL VISIT (OUTPATIENT)
Dept: ORTHOPEDIC SURGERY | Age: 60
End: 2020-06-23

## 2020-06-23 DIAGNOSIS — M54.12 BRACHIAL (CERVICAL) NEURITIS: ICD-10-CM

## 2020-06-23 DIAGNOSIS — M47.812 CERVICAL SPONDYLOSIS: Primary | ICD-10-CM

## 2020-06-23 RX ORDER — NIFEDIPINE 10 MG/1
CAPSULE ORAL
COMMUNITY
Start: 2020-05-18 | End: 2020-11-25

## 2020-06-23 RX ORDER — TOPIRAMATE 25 MG/1
TABLET ORAL
Qty: 270 TAB | Refills: 1 | Status: SHIPPED | OUTPATIENT
Start: 2020-06-23 | End: 2021-03-09 | Stop reason: SDUPTHER

## 2020-06-23 RX ORDER — TOPIRAMATE 25 MG/1
TABLET ORAL
Qty: 180 TAB | Refills: 1 | Status: SHIPPED | OUTPATIENT
Start: 2020-06-23 | End: 2020-06-23 | Stop reason: DRUGHIGH

## 2020-06-23 NOTE — PROGRESS NOTES
Naima Whitley presents today for   Chief Complaint   Patient presents with    Neck Pain     Virtual Visit    Shoulder Pain       Is someone accompanying this pt? Virtual Visit    Is the patient using any DME equipment during OV? NA    Depression Screening:  3 most recent PHQ Screens 9/24/2019   PHQ Not Done Patient Decline   Little interest or pleasure in doing things -   Feeling down, depressed, irritable, or hopeless -   Total Score PHQ 2 -       Learning Assessment:  Learning Assessment 9/11/2019   PRIMARY LEARNER Patient   HIGHEST LEVEL OF EDUCATION - PRIMARY LEARNER  -   BARRIERS PRIMARY LEARNER NONE   CO-LEARNER CAREGIVER No   PRIMARY LANGUAGE ENGLISH   LEARNER PREFERENCE PRIMARY DEMONSTRATION     -   ANSWERED BY patient   RELATIONSHIP SELF       Abuse Screening:  Abuse Screening Questionnaire 9/11/2019   Do you ever feel afraid of your partner? N   Are you in a relationship with someone who physically or mentally threatens you? N   Is it safe for you to go home? Y       Fall Risk  Fall Risk Assessment, last 12 mths 9/11/2019   Able to walk? Yes   Fall in past 12 months? No         Coordination of Care:  1. Have you been to the ER, urgent care clinic since your last visit? NO  Hospitalized since your last visit? N    2. Have you seen or consulted any other health care providers outside of the [address] [place] [address] since your last visit? NO Include any pap smears or colon screening.  NO    Last  Checked 6/23/2020

## 2020-06-23 NOTE — PATIENT INSTRUCTIONS
Topiramate (By mouth) Topiramate (toe-PIR-a-mate) Treats and prevents seizures, and helps prevent migraine headaches. Brand Name(s): Qudexy XR, Topamax, Trokendi XR There may be other brand names for this medicine. When This Medicine Should Not Be Used: This medicine is not right for everyone. Do not use it if you had an allergic reaction to topiramate, or if you are pregnant. How to Use This Medicine:  
Capsule, Long Acting Capsule, Tablet · Take your medicine as directed. Your dose may need to be changed several times to find what works best for you. · Tablet: Swallow whole. Do not break, crush, or chew the tablet. It has a very bitter taste. · Capsule or extended-release capsule: Do not crush or chew the capsule. Swallow whole or open the capsule and pour the medicine into a small amount (1 teaspoon) of soft food, such as applesauce. Swallow the mixture right away without chewing. Do not store the mixture for use at a later time. · Drink extra fluids so you will urinate more often and help prevent kidney problems. · This medicine should come with a Medication Guide. Ask your pharmacist for a copy if you do not have one. · Missed dose: Take a dose as soon as you remember. If it is almost time for your next dose, wait until then and take a regular dose. Do not take extra medicine to make up for a missed dose. If you miss a dose or forget to use your medicine, use it as soon as you can. If your next regular dose of Topamax® is less than 6 hours away, wait until then to use the medicine and skip the missed dose. If you miss more than 1 dose of Topamax®, call your doctor for instructions. · Store the medicine in a closed container at room temperature, away from heat, moisture, and direct light. Drugs and Foods to Avoid: Ask your doctor or pharmacist before using any other medicine, including over-the-counter medicines, vitamins, and herbal products.  
· Do not drink alcohol with Qudexy XR or Topamax®. Do not drink alcohol for 6 hours before and 6 hours after you take the Trokendi XR capsule. · Some medicines can affect how topiramate works. Tell your doctor if you are using acetazolamide, dichlorphenamide, dichloralphenazone, digoxin, lithium, metformin, zonisamide, other medicine for seizures (such as carbamazepine, phenytoin, valproic acid), or birth control pills. · Tell your doctor if you are using any medicine that makes you sleepy, such as allergy medicine or narcotic pain medicine. Warnings While Using This Medicine: · It is not safe to take this medicine during pregnancy. It could harm an unborn baby. Tell your doctor right away if you become pregnant. · Tell your doctor if you are breastfeeding, or if you have kidney disease, liver disease, glaucoma, lung or breathing problems, osteoporosis, or a history of depression or mood disorders. Tell your doctor if you are on a ketogenic diet (high in fat and low in carbohydrates). · This medicine may cause the following problems: ¨ Eye pain or vision changes, including glaucoma ¨ Changes in body temperature ¨ Metabolic acidosis (too much acid in the blood) ¨ Kidney stones · This medicine may increase depression or thoughts of suicide. Tell your doctor right away if you start to feel more depressed or think about hurting yourself. · This medicine may make you dizzy, drowsy, or tired. Do not drive or do anything else that could be dangerous until you know how this medicine affects you. · Do not stop using this medicine suddenly. Your doctor will need to slowly decrease your dose before you stop it completely. · Your doctor will do lab tests at regular visits to check on the effects of this medicine. Keep all appointments. · Keep all medicine out of the reach of children. Never share your medicine with anyone. Possible Side Effects While Using This Medicine:  
Call your doctor right away if you notice any of these side effects: · Allergic reaction: Itching or hives, swelling in your face or hands, swelling or tingling in your mouth or throat, chest tightness, trouble breathing · Bloody or cloudy urine, painful urination, sudden lower back or stomach pain · Changes in vision, eye pain · Confusion, problems with walking, clumsiness, dizziness, or trouble talking, concentrating, or remembering · Feeling agitated, depressed, nervous, or irritable, thoughts of hurting yourself or others, unusual mood or behavior · Fever, decreased sweating · Numbness, tingling, or burning pain in your hands, arms, legs, or feet · Rapid, deep breathing, loss of appetite, fast or uneven heartbeat · Vomiting, unusual drowsiness, tiredness, or weakness If you notice these less serious side effects, talk with your doctor: · Change in taste · Nausea, diarrhea · Stuffy or runny nose · Weight loss If you notice other side effects that you think are caused by this medicine, tell your doctor. Call your doctor for medical advice about side effects. You may report side effects to FDA at 7-646-FDA-7773 © 2017 [address] [employer] Information is for End User's use only and may not be sold, redistributed or otherwise used for commercial purposes. The above information is an  only. It is not intended as medical advice for individual conditions or treatments. Talk to your doctor, nurse or pharmacist before following any medical regimen to see if it is safe and effective for you.

## 2020-06-23 NOTE — PROGRESS NOTES
HPI  Ya Cruz is a 60 y.o. female who was seen by synchronous (real-time) audio-video technology, patient's location home, provider's location [facility] with the patient's verbal consent-with the understanding that charges maybe billed for the visit. This visit was conducted using the [employer] platform on 6/23/2020 for     Chief Complaint   Patient presents with    Neck Pain     Virtual Visit    Shoulder Pain       Additional Participants during visit: none    Ms. Sofia Jaime is a 60 yr old female w/ a hx of chronic neck and RUE pain and radiation of pain, R shoulder and R arm going all the way down and into the arm. She also has low back pain w/ seldom BLE sciatica pain. Prior history of back problems: Last C MRI 2019: multilevel disc osteophyte complexes w/ advanced facet arthropathy and auto-fusion, Last L MRI 2015: mild to severe stenosis L4-5. Last EMG RUE 2019: no radiculopathy or neuropathy. R Shoulder MR 9/2019: I labral thinning/patchy tear. At last OV 6mo ago she saw Dr. Koby Paez and got a refill of her Topamax. Today, she reports that her neck and back pain remain unchanged she is stable on her Topamax and feels like her pain is manageable. She had hiatal hernia repair in [place] this yr and hasn't returned to work since due to decreased GI motility and lots of abdominal pain and GERD and difficulty eating. Her neck and back pain is aching, burning and numbing, pain is worse with looking up, looking down, manual/sedentary work, walking, lifting, twisting and affects sleep, work and recreational activities. Pain is better with relaxation, pain medication and lying down. Medications: Topamax 50mg QHS with some benefit and intermittent mouth tingling. Flexeril 10mg PRN QHS with sleep benefit. TENS with some benefit. MDP no benefit. Norco PRN.      PMHx: hiatal hernia, [place], high cholesterol, asthma, GERD, esophageal dilation, R hip replacement, L knee replacement, gall bladder removal. 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 CT chest hernia-most of gastric fundus in chest. Steatosis-hepatic      Assessment & Plan:   Diagnoses and all orders for this visit:    1. Cervical spondylosis, global, mod-severe    2. Brachial (cervical) neuritis    Other orders  -     topiramate (Topamax) 25 mg tablet; Take 1 tab in am and 2 tab in pm        This is a pt with advanced cervical and lumbar arthritis w/ multiple medical problems. She would like to avoid surgery. She is stable on her Topamax    > Pt was given information on Topamax   > Increase Topamax   > Discussed stretching and exercises  > Pt will call for follow-up in 6mo  > Ms. Tanisha Villarreal has a reminder for a \"due or due soon\" health maintenance. I have asked that she contact her primary care provider, Kenia Rankin MD, for follow-up on this health maintenance.  > We have informed patient to notify us for immediate appointment if he has any worsening neurogical symptoms or if an emergency situation presents, then call 911  >  has been reviewed and is appropriate        CPT Codes 30345-95341 for Established Patients may apply to this Telehealth Visit  Time-based coding, delete if not needed: I spent at least 15 minutes with this established patient, and >50% of the time was spent counseling and/or coordinating care regarding neck and back pain  Start Time: 0910  End Time: 0935    Subjective:   Juan Carlos James was seen for Neck Pain (Virtual Visit) and Shoulder Pain      Prior to Admission medications    Medication Sig Start Date End Date Taking? Authorizing Provider   NIFEdipine (PROCARDIA) 10 mg capsule  5/18/20  Yes Provider, Historical   topiramate (Topamax) 25 mg tablet Take 1 tab in am and 2 tab in pm 6/23/20  Yes Bev Weber NP   diazePAM (VALIUM) 5 mg tablet Take 5 mg by mouth every six (6) hours as needed (esophageal spasms).    Yes Provider, Historical   Calcium-Cholecalciferol, D3, (CALCIUM 600 WITH VITAMIN D3) 600 mg(1,500mg) -400 unit chew Take  by mouth. Yes Provider, Historical   venlafaxine-SR (EFFEXOR-XR) 75 mg capsule Take 1 Cap by mouth daily. 9/11/19  Yes Linda Butler MD   budesonide-formoterol [facility]) 160-4.5 mcg/actuation HFAA Take 2 Puffs by inhalation two (2) times a day. 7/24/18  Yes Farhat Aceves PA   topiramate (TOPAMAX) 25 mg tablet 2 po qhs. 6/23/20 6/23/20  Geri Romberg, NP   methylPREDNISolone (MEDROL DOSEPACK) 4 mg tablet Take as directed. 4/14/20   Linda Butler MD   cyclobenzaprine (FLEXERIL) 10 mg tablet Take 1 Tab by mouth nightly as needed for Muscle Spasm(s). 12/16/19   Steffi Smith MD   topiramate (TOPAMAX) 25 mg tablet 2 po qhs. 12/16/19 6/23/20  Steffi Smith MD   HYDROcodone-acetaminophen [facility]) 5-325 mg per tablet Take 1 Tab by mouth every eight (8) hours as needed for Pain. Max Daily Amount: 3 Tabs. 9/21/18   Mahad Mcdowell MD     Allergies   Allergen Reactions    Other Plant, Animal, Environmental Anaphylaxis     poison ivy    Albuterol Other (comments)     Able to use but causes to be slightly jittery (not severe and no other anaphylactoid sxs).          Patient Active Problem List   Diagnosis Code    Allergic rhinitis 80    Hyperlipidemia E78.5    S/P total knee arthroplasty, left M07.434    Family history of colonic polyps Z83.71    Vitamin D deficiency E55.9    Gastroesophageal reflux disease with esophagitis K21.0    Family history of breast cancer Z80.3    S/P hip replacement, right Z96.641    Menopausal syndrome (hot flashes) N95.1    Quadriceps tendon rupture, left, sequela S76.112S    Sensorineural hearing loss (SNHL) of both ears H90.3    Mild intermittent asthma without complication Q46.03    Hiatal hernia K44.9    Insomnia G47.00    Anxiety F41.9    Primary osteoarthritis involving multiple joints M89.49    Degenerative disc disease, lumbar M51.36    Facet arthropathy, lumbar M47.816    Herniation of intervertebral disc between L4 and L5 M51.26    Lumbar stenosis M48.061    Hepatic steatosis K76.0     Patient Active Problem List    Diagnosis Date Noted    Sensorineural hearing loss (SNHL) of both ears 09/16/2019    Mild intermittent asthma without complication 47/75/0802    Hiatal hernia 09/16/2019    Insomnia 09/16/2019    Anxiety 09/16/2019    Primary osteoarthritis involving multiple joints 09/16/2019    Degenerative disc disease, lumbar 09/16/2019    Facet arthropathy, lumbar 09/16/2019    Herniation of intervertebral disc between L4 and L5 09/16/2019    Lumbar stenosis 09/16/2019    Hepatic steatosis 09/16/2019    Quadriceps tendon rupture, left, sequela 12/11/2017    Menopausal syndrome (hot flashes) 05/10/2016    S/P hip replacement, right 03/03/2016    Vitamin D deficiency 11/10/2014    Gastroesophageal reflux disease with esophagitis 11/10/2014    Family history of breast cancer 11/10/2014    Allergic rhinitis     Hyperlipidemia     S/P total knee arthroplasty, left     Family history of colonic polyps      Current Outpatient Medications   Medication Sig Dispense Refill    NIFEdipine (PROCARDIA) 10 mg capsule       topiramate (Topamax) 25 mg tablet Take 1 tab in am and 2 tab in pm 270 Tab 1    diazePAM (VALIUM) 5 mg tablet Take 5 mg by mouth every six (6) hours as needed (esophageal spasms).  Calcium-Cholecalciferol, D3, (CALCIUM 600 WITH VITAMIN D3) 600 mg(1,500mg) -400 unit chew Take  by mouth.  venlafaxine-SR (EFFEXOR-XR) 75 mg capsule Take 1 Cap by mouth daily. 90 Cap 2    budesonide-formoterol (SYMBICORT) 160-4.5 mcg/actuation HFAA Take 2 Puffs by inhalation two (2) times a day. 1 Inhaler 1    methylPREDNISolone (MEDROL DOSEPACK) 4 mg tablet Take as directed. 1 Dose Pack 0    cyclobenzaprine (FLEXERIL) 10 mg tablet Take 1 Tab by mouth nightly as needed for Muscle Spasm(s).  30 Tab 2    HYDROcodone-acetaminophen (NORCO) 5-325 mg per tablet Take 1 Tab by mouth every eight (8) hours as needed for Pain. Max Daily Amount: 3 Tabs. 21 Tab 0     Allergies   Allergen Reactions    Other Plant, Animal, Environmental Anaphylaxis     poison ivy    Albuterol Other (comments)     Able to use but causes to be slightly jittery (not severe and no other anaphylactoid sxs). Past Medical History:   Diagnosis Date    Allergic rhinitis     Anxiety 9/16/2019    Claustrophobia     Degenerative disc disease, lumbar 9/16/2019    Facet arthropathy, lumbar 9/16/2019    Gastroesophageal reflux disease with esophagitis 11/10/2014    Hepatic steatosis 9/16/2019    Herniation of intervertebral disc between L4 and L5 9/16/2019    Hiatal hernia 9/16/2019    History of EMG/NCV UEs 12/2019    no radiculopathy, compressive mononeuropathy    Hyperlipidemia     Insomnia 9/16/2019    Lumbar stenosis 9/16/2019    Mild intermittent asthma without complication 9/38/7034    Primary osteoarthritis involving multiple joints 9/16/2019    Quadriceps tendon rupture, left, sequela 12/11/2017    Medial retinacular tear following left total knee replacement; s/p left quadricep tendon repair 12/11/2017. Dr. Duy Oconnor.  S/P hip replacement, right 3/3/2016    Vitamin D deficiency 11/10/2014     Past Surgical History:   Procedure Laterality Date    ENDOSCOPY, COLON, DIAGNOSTIC  03-18-11    normal colon to cecum    HX BREAST REDUCTION      HX HEENT      skin cancer between eyes    HX HIP REPLACEMENT Right 10/2016    HX HYSTERECTOMY      HX KNEE REPLACEMENT      HX KNEE REPLACEMENT Left 11/2017    HX LAP CHOLECYSTECTOMY  11/2015    HX TONSIL AND ADENOIDECTOMY      HX TUBAL LIGATION            ROS  REVIEW OF SYSTEMS  Constitutional: Negative for fever, chills, or weight change. Respiratory: Negative for cough or shortness of breath. Cardiovascular: Negative for chest pain or palpitations. Gastrointestinal: Negative for incontinence, acid reflux, change in bowel habits, or constipation.   Genitourinary: Negative for incontinence, dysuria and flank pain. Musculoskeletal: Positive for neck, RUE, back pain. See HPI. Skin: Negative for rash. Neurological:RUE  radiculopathy. See HPI. Endo/Heme/Allergies: Negative. Psychiatric/Behavioral: Negative. Objective:     General: alert, cooperative, no distress   Mental  status: mental status: alert, oriented to person, place, and time, normal mood, behavior, speech, dress, motor activity, and thought processes   Resp: resp: normal effort and no respiratory distress   Neuro: neuro: no gross deficits   Skin: skin: no discoloration or lesions of concern on visible areas     Due to this being a TeleHealth evaluation, many elements of the physical examination are unable to be assessed. We discussed the expected course, resolution and complications of the diagnosis(es) in detail. Medication risks, benefits, costs, interactions, and alternatives were discussed as indicated. I advised her to contact the office if her condition worsens, changes or fails to improve as anticipated. She expressed understanding with the diagnosis(es) and plan. Pursuant to the emergency declaration under the [facility], [facility] waiver authority and the [employer] General Act, this Virtual  Visit was conducted, with patient's consent, to reduce the patient's risk of exposure to COVID-19 and provide continuity of care for an established patient. Services were provided through a video synchronous discussion virtually to substitute for in-person clinic visit.     Floresita Del Angel NP

## 2020-07-10 ENCOUNTER — TELEPHONE (OUTPATIENT)
Dept: INTERNAL MEDICINE CLINIC | Age: 60
End: 2020-07-10

## 2020-07-10 NOTE — TELEPHONE ENCOUNTER
Patient called requesting an appt today. Stating she thinks she has cellulitis in her right great toe. Stating there are red streaks going up her foot. Please advise.

## 2020-07-10 NOTE — TELEPHONE ENCOUNTER
Patient is aware she will need to be seen today, however there are no appointments in office today. Patient was referred to the Urgent Care. Patient stated she would wait until Monday. Patient was advised if she does have cellulitis this needs to be treated right away. Patient verbalizes understanding and states she will go to the urgent care.

## 2020-08-06 RX ORDER — VENLAFAXINE HYDROCHLORIDE 75 MG/1
75 CAPSULE, EXTENDED RELEASE ORAL DAILY
Qty: 90 CAP | Refills: 2 | Status: SHIPPED | OUTPATIENT
Start: 2020-08-06 | End: 2020-09-09

## 2020-08-06 NOTE — TELEPHONE ENCOUNTER
Last Visit: 9/11/19 with MD Meg Murray Next Appointment: 9/16/20 with MD Meg Murray Previous Refill Encounter(s): 9/11/19 #90 with 2 refills Requested Prescriptions Pending Prescriptions Disp Refills  venlafaxine-SR (EFFEXOR-XR) 75 mg capsule 90 Cap 2 Sig: Take 1 Cap by mouth daily.

## 2020-08-11 ENCOUNTER — TELEPHONE (OUTPATIENT)
Dept: INTERNAL MEDICINE CLINIC | Age: 60
End: 2020-08-11

## 2020-09-09 ENCOUNTER — TELEPHONE (OUTPATIENT)
Dept: INTERNAL MEDICINE CLINIC | Age: 60
End: 2020-09-09

## 2020-09-09 DIAGNOSIS — K76.0 HEPATIC STEATOSIS: ICD-10-CM

## 2020-09-09 DIAGNOSIS — E55.9 VITAMIN D DEFICIENCY: ICD-10-CM

## 2020-09-09 DIAGNOSIS — F41.9 ANXIETY: ICD-10-CM

## 2020-09-09 DIAGNOSIS — Z80.3 FAMILY HISTORY OF BREAST CANCER: ICD-10-CM

## 2020-09-09 DIAGNOSIS — K21.00 GASTROESOPHAGEAL REFLUX DISEASE WITH ESOPHAGITIS: ICD-10-CM

## 2020-09-09 DIAGNOSIS — J45.20 MILD INTERMITTENT ASTHMA WITHOUT COMPLICATION: ICD-10-CM

## 2020-09-09 DIAGNOSIS — N95.1 MENOPAUSAL SYNDROME (HOT FLASHES): ICD-10-CM

## 2020-09-09 DIAGNOSIS — K44.9 HIATAL HERNIA: ICD-10-CM

## 2020-09-09 DIAGNOSIS — M51.36 DEGENERATIVE DISC DISEASE, LUMBAR: ICD-10-CM

## 2020-09-09 DIAGNOSIS — G47.00 INSOMNIA, UNSPECIFIED TYPE: ICD-10-CM

## 2020-09-09 DIAGNOSIS — M15.9 PRIMARY OSTEOARTHRITIS INVOLVING MULTIPLE JOINTS: ICD-10-CM

## 2020-09-09 DIAGNOSIS — E78.5 HYPERLIPIDEMIA, UNSPECIFIED HYPERLIPIDEMIA TYPE: ICD-10-CM

## 2020-09-09 DIAGNOSIS — Z00.01 ENCOUNTER FOR ROUTINE ADULT PHYSICAL EXAM WITH ABNORMAL FINDINGS: ICD-10-CM

## 2020-09-09 DIAGNOSIS — S76.112S QUADRICEPS TENDON RUPTURE, LEFT, SEQUELA: ICD-10-CM

## 2020-09-09 RX ORDER — VENLAFAXINE HYDROCHLORIDE 150 MG/1
150 CAPSULE, EXTENDED RELEASE ORAL DAILY
Qty: 90 CAP | Refills: 2 | Status: SHIPPED | OUTPATIENT
Start: 2020-09-09 | End: 2021-03-09 | Stop reason: SDUPTHER

## 2020-09-09 NOTE — TELEPHONE ENCOUNTER
Pt calling asking if she could increase her effexor? Says she doesn't feel like it is working. She can't eat, and is anxious.

## 2020-09-09 NOTE — TELEPHONE ENCOUNTER
Yes, may increase to 150 mg daily. She can take two (2) of the 75 mg tablets until gone. Please see if she would like me to send the 150 mg daily dose to her pharmacy. Please also advise that it will probably take 3-4 weeks until she sees the effect of the increase in dose. Thanks.

## 2020-09-09 NOTE — TELEPHONE ENCOUNTER
Pt aware of message below and verbalized understanding. Okay to send RX for 150mg tabs into the pharmacy. No further questions or concerns from pt at this time.

## 2020-09-11 ENCOUNTER — TELEPHONE (OUTPATIENT)
Dept: INTERNAL MEDICINE CLINIC | Age: 60
End: 2020-09-11

## 2020-09-11 NOTE — TELEPHONE ENCOUNTER
Called and spoke with patient. Discussed current issues and difficulty with eating and chest pain. Reviewed chart and discussed evaluation and recommendations from [facility], and advised that would be acceptable to proceed with recommendation for a trial of nortriptyline to see if will help with possible esophageal hypersensitivity. On venlafaxine XR and advised to decrease dose to 75 mg daily to decrease risk of potential interaction with nortriptyline and risk of serotonin syndrome. Answered all questions and advised to call back with concerns.

## 2020-09-11 NOTE — TELEPHONE ENCOUNTER
Patient said she was seen at [facility] yesterday, 09/10/20- they would like to add a medication patient said they will be sending office notes to you from appt patient asking if Dr Griffin Dixon will call her back , she has a few questions

## 2020-09-14 ENCOUNTER — TELEPHONE (OUTPATIENT)
Dept: INTERNAL MEDICINE CLINIC | Age: 60
End: 2020-09-14

## 2020-09-14 NOTE — TELEPHONE ENCOUNTER
She has not had lab work in a year. If she would prefer to postpone a few months, that would be fine. Please make sure she obtains labs prior. Thanks.

## 2020-09-14 NOTE — TELEPHONE ENCOUNTER
Patient is scheduled for her physical on 9/16/20. Stating she just spoke with you recently. She wants to know if she should keep that appt or postpone due to all the other things she has going on right now.

## 2020-09-14 NOTE — TELEPHONE ENCOUNTER
Pt cancelled. She will call back and reschedule when she feels more comfortable. Didn't want to schedule today. Advised her she will need labs before.

## 2020-11-18 ENCOUNTER — APPOINTMENT (OUTPATIENT)
Dept: INTERNAL MEDICINE CLINIC | Age: 60
End: 2020-11-18

## 2020-11-19 LAB
25(OH)D3+25(OH)D2 SERPL-MCNC: 28.9 NG/ML (ref 30–100)
ALBUMIN SERPL-MCNC: 4.4 G/DL (ref 3.8–4.8)
ALBUMIN/GLOB SERPL: 1.5 {RATIO} (ref 1.2–2.2)
ALP SERPL-CCNC: 102 IU/L (ref 39–117)
ALT SERPL-CCNC: 31 IU/L (ref 0–32)
APPEARANCE UR: CLEAR
AST SERPL-CCNC: 33 IU/L (ref 0–40)
BASOPHILS # BLD AUTO: 0.1 X10E3/UL (ref 0–0.2)
BASOPHILS NFR BLD AUTO: 1 %
BILIRUB SERPL-MCNC: 0.3 MG/DL (ref 0–1.2)
BILIRUB UR QL STRIP: NEGATIVE
BUN SERPL-MCNC: 13 MG/DL (ref 8–27)
BUN/CREAT SERPL: 16 (ref 12–28)
CALCIUM SERPL-MCNC: 9.5 MG/DL (ref 8.7–10.3)
CHLORIDE SERPL-SCNC: 105 MMOL/L (ref 96–106)
CHOLEST SERPL-MCNC: 260 MG/DL (ref 100–199)
CO2 SERPL-SCNC: 24 MMOL/L (ref 20–29)
COLOR UR: YELLOW
CREAT SERPL-MCNC: 0.8 MG/DL (ref 0.57–1)
EOSINOPHIL # BLD AUTO: 0.6 X10E3/UL (ref 0–0.4)
EOSINOPHIL NFR BLD AUTO: 11 %
ERYTHROCYTE [DISTWIDTH] IN BLOOD BY AUTOMATED COUNT: 12.7 % (ref 11.7–15.4)
GLOBULIN SER CALC-MCNC: 3 G/DL (ref 1.5–4.5)
GLUCOSE SERPL-MCNC: 94 MG/DL (ref 65–99)
GLUCOSE UR QL: NEGATIVE
HCT VFR BLD AUTO: 40.2 % (ref 34–46.6)
HDLC SERPL-MCNC: 67 MG/DL
HGB BLD-MCNC: 13.3 G/DL (ref 11.1–15.9)
HGB UR QL STRIP: NEGATIVE
IMM GRANULOCYTES # BLD AUTO: 0 X10E3/UL (ref 0–0.1)
IMM GRANULOCYTES NFR BLD AUTO: 0 %
INTERPRETATION, 910389: NORMAL
KETONES UR QL STRIP: NEGATIVE
LDLC SERPL CALC-MCNC: 162 MG/DL (ref 0–99)
LEUKOCYTE ESTERASE UR QL STRIP: NEGATIVE
LYMPHOCYTES # BLD AUTO: 2.6 X10E3/UL (ref 0.7–3.1)
LYMPHOCYTES NFR BLD AUTO: 51 %
MCH RBC QN AUTO: 31 PG (ref 26.6–33)
MCHC RBC AUTO-ENTMCNC: 33.1 G/DL (ref 31.5–35.7)
MCV RBC AUTO: 94 FL (ref 79–97)
MICRO URNS: ABNORMAL
MONOCYTES # BLD AUTO: 0.3 X10E3/UL (ref 0.1–0.9)
MONOCYTES NFR BLD AUTO: 6 %
NEUTROPHILS # BLD AUTO: 1.6 X10E3/UL (ref 1.4–7)
NEUTROPHILS NFR BLD AUTO: 31 %
NITRITE UR QL STRIP: NEGATIVE
PH UR STRIP: 8 [PH] (ref 5–7.5)
PLATELET # BLD AUTO: 271 X10E3/UL (ref 150–450)
POTASSIUM SERPL-SCNC: 4.7 MMOL/L (ref 3.5–5.2)
PROT SERPL-MCNC: 7.4 G/DL (ref 6–8.5)
PROT UR QL STRIP: NEGATIVE
RBC # BLD AUTO: 4.29 X10E6/UL (ref 3.77–5.28)
SODIUM SERPL-SCNC: 142 MMOL/L (ref 134–144)
SP GR UR: 1.02 (ref 1–1.03)
SPECIMEN STATUS REPORT, ROLRST: NORMAL
TRIGL SERPL-MCNC: 173 MG/DL (ref 0–149)
TSH SERPL DL<=0.005 MIU/L-ACNC: 3.68 UIU/ML (ref 0.45–4.5)
UROBILINOGEN UR STRIP-MCNC: 0.2 MG/DL (ref 0.2–1)
VLDLC SERPL CALC-MCNC: 31 MG/DL (ref 5–40)
WBC # BLD AUTO: 5.1 X10E3/UL (ref 3.4–10.8)

## 2020-11-25 ENCOUNTER — OFFICE VISIT (OUTPATIENT)
Dept: INTERNAL MEDICINE CLINIC | Age: 60
End: 2020-11-25
Payer: COMMERCIAL

## 2020-11-25 ENCOUNTER — HOSPITAL ENCOUNTER (OUTPATIENT)
Dept: GENERAL RADIOLOGY | Age: 60
Discharge: HOME | End: 2020-11-25
Payer: COMMERCIAL

## 2020-11-25 VITALS
WEIGHT: 155.6 LBS | RESPIRATION RATE: 16 BRPM | BODY MASS INDEX: 24.42 KG/M2 | HEART RATE: 68 BPM | DIASTOLIC BLOOD PRESSURE: 80 MMHG | HEIGHT: 67 IN | TEMPERATURE: 97.3 F | OXYGEN SATURATION: 98 % | SYSTOLIC BLOOD PRESSURE: 127 MMHG

## 2020-11-25 DIAGNOSIS — F32.9 REACTIVE DEPRESSION: ICD-10-CM

## 2020-11-25 DIAGNOSIS — Z12.31 SCREENING MAMMOGRAM, ENCOUNTER FOR: ICD-10-CM

## 2020-11-25 DIAGNOSIS — E78.5 HYPERLIPIDEMIA, UNSPECIFIED HYPERLIPIDEMIA TYPE: ICD-10-CM

## 2020-11-25 DIAGNOSIS — M79.674 PAIN AND SWELLING OF TOE OF RIGHT FOOT: ICD-10-CM

## 2020-11-25 DIAGNOSIS — R13.19 ESOPHAGEAL DYSPHAGIA: ICD-10-CM

## 2020-11-25 DIAGNOSIS — M79.89 PAIN AND SWELLING OF TOE OF RIGHT FOOT: ICD-10-CM

## 2020-11-25 DIAGNOSIS — J45.20 MILD INTERMITTENT ASTHMA WITHOUT COMPLICATION: ICD-10-CM

## 2020-11-25 DIAGNOSIS — Z00.01 ENCOUNTER FOR ROUTINE ADULT PHYSICAL EXAM WITH ABNORMAL FINDINGS: Primary | ICD-10-CM

## 2020-11-25 DIAGNOSIS — K21.00 GASTROESOPHAGEAL REFLUX DISEASE WITH ESOPHAGITIS WITHOUT HEMORRHAGE: ICD-10-CM

## 2020-11-25 DIAGNOSIS — K76.0 HEPATIC STEATOSIS: ICD-10-CM

## 2020-11-25 DIAGNOSIS — R63.4 WEIGHT LOSS, UNINTENTIONAL: ICD-10-CM

## 2020-11-25 DIAGNOSIS — M47.812 CERVICAL SPONDYLOSIS: ICD-10-CM

## 2020-11-25 DIAGNOSIS — Z23 NEEDS FLU SHOT: ICD-10-CM

## 2020-11-25 DIAGNOSIS — Z23 ENCOUNTER FOR IMMUNIZATION: ICD-10-CM

## 2020-11-25 DIAGNOSIS — Z98.890 S/P LAPAROSCOPIC FUNDOPLICATION: ICD-10-CM

## 2020-11-25 DIAGNOSIS — F41.9 ANXIETY: ICD-10-CM

## 2020-11-25 DIAGNOSIS — K44.9 PARAESOPHAGEAL HIATAL HERNIA: ICD-10-CM

## 2020-11-25 PROCEDURE — 73660 X-RAY EXAM OF TOE(S): CPT

## 2020-11-25 PROCEDURE — 90686 IIV4 VACC NO PRSV 0.5 ML IM: CPT | Performed by: INTERNAL MEDICINE

## 2020-11-25 PROCEDURE — 90471 IMMUNIZATION ADMIN: CPT | Performed by: INTERNAL MEDICINE

## 2020-11-25 PROCEDURE — 90732 PPSV23 VACC 2 YRS+ SUBQ/IM: CPT | Performed by: INTERNAL MEDICINE

## 2020-11-25 PROCEDURE — 90472 IMMUNIZATION ADMIN EACH ADD: CPT | Performed by: INTERNAL MEDICINE

## 2020-11-25 PROCEDURE — 99396 PREV VISIT EST AGE 40-64: CPT | Performed by: INTERNAL MEDICINE

## 2020-11-25 RX ORDER — ALBUTEROL SULFATE 90 UG/1
1 AEROSOL, METERED RESPIRATORY (INHALATION)
Qty: 1 INHALER | Refills: 3 | Status: SHIPPED | OUTPATIENT
Start: 2020-11-25 | End: 2021-06-24 | Stop reason: SDUPTHER

## 2020-11-25 RX ORDER — ERGOCALCIFEROL 1.25 MG/1
50000 CAPSULE ORAL
Qty: 12 CAP | Refills: 0 | Status: SHIPPED | OUTPATIENT
Start: 2020-11-25 | End: 2021-06-24 | Stop reason: ALTCHOICE

## 2020-11-25 RX ORDER — CLINDAMYCIN HYDROCHLORIDE 300 MG/1
300 CAPSULE ORAL 3 TIMES DAILY
Qty: 30 CAP | Refills: 0 | Status: SHIPPED | OUTPATIENT
Start: 2020-11-25 | End: 2020-12-05

## 2020-11-25 NOTE — PATIENT INSTRUCTIONS
Heart-Healthy Diet: Care Instructions Your Care Instructions A heart-healthy diet has lots of vegetables, fruits, nuts, beans, and whole grains, and is low in salt. It limits foods that are high in saturated fat, such as meats, cheeses, and fried foods. It may be hard to change your diet, but even small changes can lower your risk of heart attack and heart disease. Follow-up care is a key part of your treatment and safety. Be sure to make and go to all appointments, and call your doctor if you are having problems. It's also a good idea to know your test results and keep a list of the medicines you take. How can you care for yourself at home? Watch your portions · Learn what a serving is. A \"serving\" and a \"portion\" are not always the same thing. Make sure that you are not eating larger portions than are recommended. For example, a serving of pasta is ½ cup. A serving size of meat is 2 to 3 ounces. A 3-ounce serving is about the size of a deck of cards. Measure serving sizes until you are good at [place]" them. Keep in mind that restaurants often serve portions that are 2 or 3 times the size of one serving. · To keep your energy level up and keep you from feeling hungry, eat often but in smaller portions. · Eat only the number of calories you need to stay at a healthy weight. If you need to lose weight, eat fewer calories than your body burns (through exercise and other physical activity). Eat more fruits and vegetables · Eat a variety of fruit and vegetables every day. Dark green, deep orange, red, or yellow fruits and vegetables are especially good for you. Examples include spinach, carrots, peaches, and berries. · Keep carrots, celery, and other veggies handy for snacks. Buy fruit that is in season and store it where you can see it so that you will be tempted to eat it. · Cook dishes that have a lot of veggies in them, such as stir-fries and soups. Limit saturated and trans fat · Read food labels, and try to avoid saturated and trans fats. They increase your risk of heart disease. · Use olive or canola oil when you cook. · Bake, broil, grill, or steam foods instead of frying them. · Choose lean meats instead of high-fat meats such as hot dogs and sausages. Cut off all visible fat when you prepare meat. · Eat fish, skinless poultry, and meat alternatives such as soy products instead of high-fat meats. Soy products, such as tofu, may be especially good for your heart. · Choose low-fat or fat-free milk and dairy products. Eat foods high in fiber · Eat a variety of grain products every day. Include whole-grain foods that have lots of fiber and nutrients. Examples of whole-grain foods include oats, whole wheat bread, and brown rice. · Buy whole-grain breads and cereals, instead of white bread or pastries. Limit salt and sodium · Limit how much salt and sodium you eat to help lower your blood pressure. · Taste food before you salt it. Add only a little salt when you think you need it. With time, your taste buds will adjust to less salt. · Eat fewer snack items, fast foods, and other high-salt, processed foods. Check food labels for the amount of sodium in packaged foods. · Choose low-sodium versions of canned goods (such as soups, vegetables, and beans). Limit sugar · Limit drinks and foods with added sugar. These include candy, desserts, and soda pop. Limit alcohol · Limit alcohol to no more than 2 drinks a day for men and 1 drink a day for women. Too much alcohol can cause health problems. When should you call for help? Watch closely for changes in your health, and be sure to contact your doctor if: 
  · You would like help planning heart-healthy meals. Where can you learn more? Go to http://www.[employer].com/ Enter V137 in the search box to learn more about \"Heart-Healthy Diet: Care Instructions. \" 
 Current as of: August 22, 2019               Content Version: 12.6 © 9081-3363 [employer], [employer]. Care instructions adapted under license by [employer] (which disclaims liability or warranty for this information). If you have questions about a medical condition or this instruction, always ask your healthcare professional. [address] any warranty or liability for your use of this information. High Cholesterol: Care Instructions Your Care Instructions Cholesterol is a type of fat in your blood. It is needed for many body functions, such as making new cells. Cholesterol is made by your body. It also comes from food you eat. High cholesterol means that you have too much of the fat in your blood. This raises your risk of a heart attack and stroke. LDL and HDL are part of your total cholesterol. LDL is the \"bad\" cholesterol. High LDL can raise your risk for heart disease, heart attack, and stroke. HDL is the \"good\" cholesterol. It helps clear bad cholesterol from the body. High HDL is linked with a lower risk of heart disease, heart attack, and stroke. Your cholesterol levels help your doctor find out your risk for having a heart attack or stroke. You and your doctor can talk about whether you need to lower your risk and what treatment is best for you. A heart-healthy lifestyle along with medicines can help lower your cholesterol and your risk. The way you choose to lower your risk will depend on how high your risk is for heart attack and stroke. It will also depend on how you feel about taking medicines. Follow-up care is a key part of your treatment and safety. Be sure to make and go to all appointments, and call your doctor if you are having problems. It's also a good idea to know your test results and keep a list of the medicines you take. How can you care for yourself at home? · Eat a variety of foods every day.  Good choices include fruits, vegetables, whole grains (like oatmeal), dried beans and peas, nuts and seeds, soy products (like tofu), and fat-free or low-fat dairy products. · Replace butter, margarine, and hydrogenated or partially hydrogenated oils with olive and canola oils. (Canola oil margarine without trans fat is fine.) · Replace red meat with fish, poultry, and soy protein (like tofu). · Limit processed and packaged foods like chips, crackers, and cookies. · Bake, broil, or steam foods. Don't iqbal them. · Be physically active. Get at least 30 minutes of exercise on most days of the week. Walking is a good choice. You also may want to do other activities, such as running, swimming, cycling, or playing tennis or team sports. · Stay at a healthy weight or lose weight by making the changes in eating and physical activity listed above. Losing just a small amount of weight, even 5 to 10 pounds, can reduce your risk for having a heart attack or stroke. · Do not smoke. When should you call for help? Watch closely for changes in your health, and be sure to contact your doctor if: 
  · You need help making lifestyle changes.  
  · You have questions about your medicine. Where can you learn more? Go to http://www.gray.com/ Enter S711 in the search box to learn more about \"High Cholesterol: Care Instructions. \" Current as of: December 16, 2019               Content Version: 12.6 © 3624-4256 [employer], [place]. Care instructions adapted under license by [employer] (which disclaims liability or warranty for this information). If you have questions about a medical condition or this instruction, always ask your healthcare professional. [place] [address] any warranty or liability for your use of this information.

## 2020-11-25 NOTE — PROGRESS NOTES
Called and discussed result with patient. Discussed findings of diffuse soft tissue swelling without gas or underlying osteomyelitis. Given that pain and redness started several months ago following pedicure, worrisome for possible infection. Will treat with clindamycin 300 mg tid for 10 days. Patient will call if no improvement, and will refer to podiatry if needed.

## 2020-11-28 PROBLEM — Z87.19 HISTORY OF REPAIR OF HIATAL HERNIA: Status: ACTIVE | Noted: 2020-11-28

## 2020-11-28 PROBLEM — R63.4 WEIGHT LOSS, UNINTENTIONAL: Status: ACTIVE | Noted: 2020-11-28

## 2020-11-28 PROBLEM — M51.26 HERNIATION OF INTERVERTEBRAL DISC BETWEEN L4 AND L5: Status: RESOLVED | Noted: 2019-09-16 | Resolved: 2020-11-28

## 2020-11-28 PROBLEM — M48.061 LUMBAR STENOSIS: Status: RESOLVED | Noted: 2019-09-16 | Resolved: 2020-11-28

## 2020-11-28 PROBLEM — Z98.890 HISTORY OF REPAIR OF HIATAL HERNIA: Status: ACTIVE | Noted: 2020-11-28

## 2020-11-28 PROBLEM — M47.812 CERVICAL SPONDYLOSIS: Status: ACTIVE | Noted: 2020-11-28

## 2020-11-28 PROBLEM — F32.9 REACTIVE DEPRESSION: Status: ACTIVE | Noted: 2020-11-28

## 2020-11-28 PROBLEM — G47.00 INSOMNIA: Status: RESOLVED | Noted: 2019-09-16 | Resolved: 2020-11-28

## 2020-11-28 PROBLEM — R13.19 ESOPHAGEAL DYSPHAGIA: Status: ACTIVE | Noted: 2020-11-28

## 2020-11-28 NOTE — PROGRESS NOTES
HPI:   Evaline Merlin is a 63y.o. year old female who presents today for a physical exam. She has a history of hyperlipidemia, mild intermittent asthma, GERD, hiatal hernia, hepatic steatosis, osteoarthritis, lumbar degenerative disease, insomnia, and anxiety. On 9/25/2019, she underwent a chest CT scan which showed that her hiatal hernia had enlarged with the majority of the gastric body in the chest with thickened and nodular walls. She was referred to Dr. Fiordaliza Correa for evaluation, and underwent upper endoscopy (10/17/2019) by Dr. Fiordaliza Correa which showed a large [facility] with patchy linear ulceration at the [facility] hiatus in the fundus compatible with Devonte's lesions. She was started on omeprazole 20 mg daily, and an upper GI series was obtained (10/21/2019) showing moderate to large sized mixed type hiatal hernia with suspected ulceration within the supradiaphragmatic portion of the stomach; delayed transit of barium tablet and esophageal dysmotility. She had a repeat upper endoscopy on 11/7/2019 which showed ulcer healing, and she had dilation of a benign intrinsic stricture at the GE junction. Biopsies of the esophagus were suggestive but not diagnostic for eosinophilic esophagitis. She was referred to Dr. Prema Corbin and it was recommended that she undergo surgical repair. On 1/16/2020, she underwent laparoscopic HH repair with Toupet fundoplication. Post-operatively, she had difficulty tolerating po due to frequent belching and nausea. UGI showed prolonged passage of contrast into the stomach. She was managed non-operatively initially with the hope that this was post-operative edema, but she continued to fail to progress and eventually underwent an EGD with dilation on 1/24/20. Post-procedure, she felt much better, and her diet was slowly advanced to normal, and she was discharged on 1/27/2020.  However, she continued to have difficulty with severe substernal chest pain with swallowing, and underwent repeat upper endoscopy with balloon dilation on 2/13/2020 without improvement. A barium swallow was obtained (3/16/2020) showing mild esophageal dysmotility but without significant obstruction and no evidence of contrast extravasation. On 5/1/2020, she underwent an esophageal motility study by Dr. Latrice Clemons, showing ineffective esophageal motility due to impaired DCI in peristalsis with resultant severe liquid and viscous bolus transit delays; and short esophageal body. She underwent [place] by [place]. Milvia Fish [place] [facility] [place]18[place], and it was felt that her symptoms were likely secondary to a tight fundoplication. She was treated with calcium channel blocker and  valium without improvement. On 7/31/2020, she underwent upper endoscopy by Dr. Milvia Fish, showing EGJ widely patent, paraesophageal hernia with intact wrap, and Esoflip dilation to 27 mm performed with therapeutic mucosal tear. However, she did not improve, and most recently was given a trial of nortriptyline without benefit. She was also treated with omeprazole 20 mg daily. She reports today that final recommendation by Dr. Milvia Fish would be for consideration of repeat surgery with Todean barcenas. However, she states today that she is hesitant about having any repeat surgery. She continues to have difficulty with chest pain and inability to belch. She has problems with swallowing both solids and liquids, and will only have one protein drink which she drinks slowly throughout the day. She is continuing to eat little, and her weight has decreased 20 pounds since the surgery. She takes two Valium 5 mg tablets during the day with some relief. She is also taking Linzess for constipation. She also complains of right great toe swelling and pain which developed after a pedicure in 7/2020. She states that she is otherwise without complaints. She has a history of hyperlipidemia, not previously treated with medication.  She underwent a CT heart in 2/2014 with calcium score of 4 due to minimal calcium noted in the wall of the LAD. She remains active, and denies any chest pain, shortness of breath at rest or with exertion, palpitations, lightheadedness, or edema. She has a history of mild intermittent asthma, and will require use of albuterol on occasion. She has also been prescribed Symbicort, but uses this rarely. She has no prior history of smoking. She has a history of GERD with esophagitis, and required esophageal dilatation x 2 in the past by Dr. Nicho Krishnan. She states that she occasionally will have difficulty swallowing with food \"getting stuck\", but states that it is not a frequent problem. She was previously treated by Nexium, but in 2003 developed acute sensorineural hearing loss and there was question at that time that it may be related to treatment with her PPI. She also has a history of a moderate hiatal hernia, noted on the cardiac CT scan in 2014, and marked hepatic steatosis was noted on an abdominal ultrasound in 11/2015. She had a screening colonoscopy in 3/2011 by Dr. Ameya Radford which was normal. Follow-up recommended for 10 years. She has a history of osteoarthritis, and underwent right hip replacement (10/2016) and left knee replacement (11/2017) by Dr. Mert Arce. In 12/2017, she presented with acute worsening of left knee pain after her surgery, and was found to have an acute tear of the medial retinacular of her quadriceps tendon which required surgical repair. She also has a history of low back pain, and in 8/2015 underwent a lumbar MRI showing multilevel degenerative changes which was most severe at L4-5 where there was significant canal stenosis due to a moderate posterior disc bulge; mild bilateral foraminal narrowing also seen throughout the lumbar spine. In 9/2019, she complained of right neck and shoulder pain with radiation to her scapula and right fingers.  She was evaluated initially by Dr. Anival Bowles, and a right shoulder MRI (11/6/2019) showed only mild degenerative changes and tendinopathy involving the supraspinatus and biceps tendons. She also had a cervical RMI (11/6/2019) showing multilevel disc osteophyte complex and facet arthropathy but without evidence of significant central canal stenosis; multilevel foraminal stenoses. She was referred to Dr. Trenton Hernandez, and underwent a right EMG/NCS (12/2019) which did not show findings to explain her complaints. She was treated with Topamax, Flexeril and received a right shoulder bursal injection. In 1/2015, while [address] in [place], she sustained a laceration on the dorsal portion of right heel. She states that her  closed the wound with super glue, and she subsequently developed an infection and painful ulcer. She was seen at urgent care in early 2/2015 and given a dose of ceftriaxone and started on Levaquin. She was subsequently seen by YAMINI Huynh on 2/6/2015 and right foot xray (2/6/2015) showed significant thickening and induration of plantar soft tissue at the right heel, due to acute edematous infiltration, but no soft tissue gas or foreign body noted, and no signs of osteomyelitis. Wound culture (2/6/2015) revealed Staph aureus, sensitive to Bactrim. She also had an arterial duplex ultrasound (2/27/2015) which was negative for PAD. After multiple courses of antibiotics and local wound care, the ulcer healed. She has a history of vasomotor instability, and was being treated with venlafaxine and Estrace as prescribed by Dr. Luis Saravia. She also has a history of chronic insomnia, treated previously with Ambien and melatonin.         Past Medical History:   Diagnosis Date    Allergic rhinitis     Anxiety 9/16/2019    Claustrophobia     Degenerative disc disease, lumbar 9/16/2019    Facet arthropathy, lumbar 9/16/2019    Gastroesophageal reflux disease with esophagitis 11/10/2014    Hepatic steatosis 9/16/2019    Herniation of intervertebral disc between L4 and L5 9/16/2019    Hiatal hernia 9/16/2019    History of EMG/NCV UEs 12/2019    no radiculopathy, compressive mononeuropathy    Hyperlipidemia     Insomnia 9/16/2019    Lumbar stenosis 9/16/2019    Mild intermittent asthma without complication 6/29/5997    Primary osteoarthritis involving multiple joints 9/16/2019    Quadriceps tendon rupture, left, sequela 12/11/2017    Medial retinacular tear following left total knee replacement; s/p left quadricep tendon repair 12/11/2017. Dr. Rafita Montoya.  S/P hip replacement, right 3/3/2016    Vitamin D deficiency 11/10/2014     Past Surgical History:   Procedure Laterality Date    ENDOSCOPY, COLON, DIAGNOSTIC  03-18-11    normal colon to cecum    HX BREAST REDUCTION      HX HEENT      skin cancer between eyes    HX HIP REPLACEMENT Right 10/2016    HX HYSTERECTOMY      HX KNEE REPLACEMENT      HX KNEE REPLACEMENT Left 11/2017    HX LAP CHOLECYSTECTOMY  11/2015    HX TONSIL AND ADENOIDECTOMY      HX TUBAL LIGATION       Current Outpatient Medications   Medication Sig    PNV YG.21/SGDRGZC fum/folic ac (PRENATAL PO) Take  by mouth.  ergocalciferol (ERGOCALCIFEROL) 1,250 mcg (50,000 unit) capsule Take 1 Cap by mouth every seven (7) days.  albuterol (PROVENTIL HFA, VENTOLIN HFA, PROAIR HFA) 90 mcg/actuation inhaler Take 1 Puff by inhalation every four (4) hours as needed for Wheezing.  clindamycin (CLEOCIN) 300 mg capsule Take 1 Cap by mouth three (3) times daily for 10 days.  venlafaxine-SR (EFFEXOR-XR) 150 mg capsule Take 1 Cap by mouth daily. (Patient taking differently: Take 75 mg by mouth daily.)    topiramate (Topamax) 25 mg tablet Take 1 tab in am and 2 tab in pm    diazePAM (VALIUM) 5 mg tablet Take 5 mg by mouth two (2) times a day.  cyclobenzaprine (FLEXERIL) 10 mg tablet Take 1 Tab by mouth nightly as needed for Muscle Spasm(s).  Calcium-Cholecalciferol, D3, (CALCIUM 600 WITH VITAMIN D3) 600 mg(1,500mg) -400 unit chew Take  by mouth.     budesonide-formoterol (SYMBICORT) 160-4.5 mcg/actuation HFAA Take 2 Puffs by inhalation two (2) times a day. No current facility-administered medications for this visit. Allergies and Intolerances: Allergies   Allergen Reactions    Other Plant, Animal, Environmental Anaphylaxis     poison ivy    Albuterol Other (comments)     Able to use but causes to be slightly jittery (not severe and no other anaphylactoid sxs). Family History: Mother is Keturah Abreu with history of a.fib and breast cancer. Son is Alona Krabbe with congenital heart disease (transposition of the great vessels). Suzi Naranjo is her stepfather. Family History   Problem Relation Age of Onset    Other Mother         atrial fibrillation    Colon Polyps Mother    Yocasta. Breast Cancer Mother     Colon Polyps Brother         half brother    Heart Disease Brother         a-fib    Heart Disease Sister         half sister ASHD    Heart Attack Sister     Other Son         transposition of the great vessels and surgery for this    Other Son         congestive heart failure     Social History:   She  reports that she has quit smoking. She has a 0.13 pack-year smoking history. She has never used smokeless tobacco. She is  with two children. 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 She drinks a glass of wine 3-4 times a month. Social History     Substance and Sexual Activity   Alcohol Use Yes    Alcohol/week: 1.0 standard drinks    Types: 1 Glasses of wine per week     Immunization History:  Immunization History   Administered Date(s) Administered    Influenza Vaccine 10/12/2016, 10/21/2019    Influenza Vaccine (Quad) PF (>6 Mo Flulaval, Fluarix, and >3 Yrs Afluria, Fluzone [id number]) 11/25/2020    Pneumococcal Polysaccharide (PPSV-23) 11/25/2020    Tdap 11/10/2014       Review of Systems:   As above included in HPI.   Otherwise 11 point review of systems negative including constitutional, skin, HENT, eyes, respiratory, cardiovascular, gastrointestinal, genitourinary, musculoskeletal, endocrine, hematologic, allergy, and neurologic. Physical:   Vitals:   BP: 127/80  HR: 68  WT: 155 lb 9.6 oz (70.6 kg)  BMI:  24.37 kg/m2    Exam:   Patient appears in no apparent distress. Affect is appropriate. HEENT --Anicteric sclerae, tympanic membranes normal,  ear canals normal.  PERRL, EOMI, conjunctiva and lids normal.   Sinuses were nontender, turbinates normal, hearing normal. No cervical lymphadenopathy. No thyromegaly, JVD, or bruits. Carotid pulses 2+ with normal upstroke. Lungs --Clear to auscultation. No wheezing or rales. Heart --Regular rate and rhythm, no murmurs, rubs, gallops, or clicks. Breasts -- no masses, skin dimpling, asymmetry, nipple discharge, nodules, axillary lymphadenopathy. Chest wall --Nontender to palpation. PMI normal.  Abdomen -- Soft and nontender, no hepatosplenomegaly or masses. Extremities -- Without cyanosis, clubbing, edema. 2+ pulses equally and bilaterally. Right great toe with diffuse swelling and faint erythema. No open lesions and nail appears normal.  Neuro -- CN 2-12 intact, strength 5/5 with intact soft touch in all extremities  Derm  no obvious abnormalities noted, no rash      Review of Data:  Labs:  No visits with results within 1 Month(s) from this visit.    Latest known visit with results is:   Orders Only on 09/09/2020   Component Date Value Ref Range Status    Cholesterol, total 11/18/2020 260* 100 - 199 mg/dL Final    Triglyceride 11/18/2020 173* 0 - 149 mg/dL Final    HDL Cholesterol 11/18/2020 67  >39 mg/dL Final    VLDL Cholesterol Stephen 11/18/2020 31  5 - 40 mg/dL Final    LDL Chol Calc ([facility]) 11/18/2020 162* 0 - 99 mg/dL Final    Glucose 11/18/2020 94  65 - 99 mg/dL Final    BUN 11/18/2020 13  8 - 27 mg/dL Final    Creatinine 11/18/2020 0.80  0.57 - 1.00 mg/dL Final    GFR est non-AA 11/18/2020 80  >59 mL/min/1.73 Final    GFR est AA 11/18/2020 92 >59 mL/min/1.73 Final    BUN/Creatinine ratio 11/18/2020 16  12 - 28 Final    Sodium 11/18/2020 142  134 - 144 mmol/L Final    Potassium 11/18/2020 4.7  3.5 - 5.2 mmol/L Final    Chloride 11/18/2020 105  96 - 106 mmol/L Final    CO2 11/18/2020 24  20 - 29 mmol/L Final    Calcium 11/18/2020 9.5  8.7 - 10.3 mg/dL Final    Protein, total 11/18/2020 7.4  6.0 - 8.5 g/dL Final    Albumin 11/18/2020 4.4  3.8 - 4.8 g/dL Final    GLOBULIN, TOTAL 11/18/2020 3.0  1.5 - 4.5 g/dL Final    A-G Ratio 11/18/2020 1.5  1.2 - 2.2 Final    Bilirubin, total 11/18/2020 0.3  0.0 - 1.2 mg/dL Final    Alk. phosphatase 11/18/2020 102  39 - 117 IU/L Final    AST (SGOT) 11/18/2020 33  0 - 40 IU/L Final    ALT (SGPT) 11/18/2020 31  0 - 32 IU/L Final    TSH 11/18/2020 3.680  0.450 - 4.500 uIU/mL Final    VITAMIN D, 25-HYDROXY 11/18/2020 28.9* 30.0 - 100.0 ng/mL Final    WBC 11/18/2020 5.1  3.4 - 10.8 x10E3/uL Final    RBC 11/18/2020 4.29  3.77 - 5.28 x10E6/uL Final    HGB 11/18/2020 13.3  11.1 - 15.9 g/dL Final    HCT 11/18/2020 40.2  34.0 - 46.6 % Final    MCV 11/18/2020 94  79 - 97 fL Final    MCH 11/18/2020 31.0  26.6 - 33.0 pg Final    MCHC 11/18/2020 33.1  31.5 - 35.7 g/dL Final    RDW 11/18/2020 12.7  11.7 - 15.4 % Final    PLATELET 99/94/1078 698  150 - 450 x10E3/uL Final    NEUTROPHILS 11/18/2020 31  Not Estab. % Final    Lymphocytes 11/18/2020 51  Not Estab. % Final    MONOCYTES 11/18/2020 6  Not Estab. % Final    EOSINOPHILS 11/18/2020 11  Not Estab. % Final    BASOPHILS 11/18/2020 1  Not Estab. % Final    ABS. NEUTROPHILS 11/18/2020 1.6  1.4 - 7.0 x10E3/uL Final    Abs Lymphocytes 11/18/2020 2.6  0.7 - 3.1 x10E3/uL Final    ABS. MONOCYTES 11/18/2020 0.3  0.1 - 0.9 x10E3/uL Final    ABS. EOSINOPHILS 11/18/2020 0.6* 0.0 - 0.4 x10E3/uL Final    ABS. BASOPHILS 11/18/2020 0.1  0.0 - 0.2 x10E3/uL Final    IMMATURE GRANULOCYTES 11/18/2020 0  Not Estab. % Final    ABS. IMM. GRANS.  11/18/2020 0.0  0.0 - 0.1 x10E3/uL Final    Specific Gravity 11/18/2020 1.019  1.005 - 1.030 Final    pH (UA) 11/18/2020 8.0* 5.0 - 7.5 Final    Color 11/18/2020 Yellow  Yellow Final    Appearance 11/18/2020 Clear  Clear Final    Leukocyte Esterase 11/18/2020 Negative  Negative Final    Protein 11/18/2020 Negative  Negative/Trace Final    Glucose 11/18/2020 Negative  Negative Final    Ketone 11/18/2020 Negative  Negative Final    Blood 11/18/2020 Negative  Negative Final    Bilirubin 11/18/2020 Negative  Negative Final    Urobilinogen 11/18/2020 0.2  0.2 - 1.0 mg/dL Final    Nitrites 11/18/2020 Negative  Negative Final    Microscopic Examination 11/18/2020 Comment   Final    INTERPRETATION 11/18/2020 Note   Final    SPECIMEN STATUS REPORT 11/18/2020 COMMENT   Final         Calculated 10 year ASCVD risk score: 3.9 %    Health Maintenance:  Screening:    Mammogram: negative (8/2019) Due   PAP smear: well women exams with Dr. Zandra Herrera (s/p [facility])   Colorectal: colonoscopy (3/2011) normal. Dr. Janette Roth. Due 3/2021.    Depression: none   DM (HbA1c/FPG): FPG 94 (11/2020   Hepatitis C: negative (2/2013)   Falls: none   DEXA: unknown   Glaucoma: regular eye exams with Dr. Zoey Santos    Smoking: none   Vitamin D: 28.9 (11/2020)   Medicare Wellness: N/A        Impression:  Patient Active Problem List   Diagnosis Code    Allergic rhinitis 80    Hyperlipidemia E78.5    S/P total knee arthroplasty, left U82.111    Family history of colonic polyps Z83.71    Vitamin D deficiency E55.9    Gastroesophageal reflux disease with esophagitis K21.00    Family history of breast cancer Z80.3    S/P hip replacement, right Z96.641    Quadriceps tendon rupture, left, sequela S76.112S    Sensorineural hearing loss (SNHL) of both ears H90.3    Mild intermittent asthma without complication H73.94    Paraesophageal hiatal hernia K44.9    Anxiety F41.9    Primary osteoarthritis involving multiple joints M89.49    Degenerative disc disease, lumbar M51.36    Facet arthropathy, lumbar M47.816    Hepatic steatosis K76.0    Esophageal dysphagia R13.10    S/P laparoscopic fundoplication Q00.383    Weight loss, unintentional R63.4    Cervical spondylosis M47.812    Reactive depression F32.9       Plan:  1. Large hiatal hernia, s/p Toupet fundoplication repair. Difficulty in the past with esophageal dysmotility and required dilatation of esophagus by Dr. Nicho Krishnan. Found to have large [facility] as incidental finding on chest CT scan in 9/2019 and gastric body appeared thickened and nodular. Underwent upper endoscopy by Dr. Nicho Krishnan and noted to have Makeda Duet lesions at hiatus of hernia. Treated with omeprazole. Upper GI series (10/21/2019) showing moderate to large sized mixed type hiatal hernia with suspected ulceration within the supradiaphragmatic portion of the stomach; delayed transit of barium tablet and esophageal dysmotility. Repeat upper endoscopy (11/7/2019) showed ulcer healing, and she had dilation of a benign intrinsic stricture at the GE junction. Biopsies of the esophagus were suggestive but not diagnostic for eosinophilic esophagitis. She was referred to Dr. Dipti Byrd and it was recommended that she undergo surgical repair of her large HH due to risk of gastric volvulus. On 1/16/2020, she underwent laparoscopic HH repair with Toupet fundoplication. Since surgery, she has had debilitating difficulty with chest pain and inability to belch, and problems with swallowing both solids and liquids. She underwent multiple upper endoscopies with dilation without improvement. Esophageal motility study by Dr. Alcides Werner in 5/2020, showed a short esophageal body and ineffective esophageal motility due to impaired DCI in peristalsis with resultant severe liquid and viscous bolus transit delays. Trials of valium, calcium channel blocker, and nortriptyline have been ineffective.  She underwent evaluation by Dr. Cielo Alva at [facility] [place], and it was his opinion that the fundoplication is likely too tight. Repeat upper endoscopy with esoFLIP dilation was also ineffective. Recommendation is to proceed with reversal of Toupet with surgical takedown. However, patient not wishing to proceed. Using Valium to help with chest pain and spasms, and consuming protein drinks for caloric support. Following with Dr. Tita Ortega and Dr. Jayce Lopez.   2. Hyperlipidemia. Calculated 10 year ASCVD risk is 3.9 %, which does not place her in one of the four statin benefit groups as per new AHA/ACC guidelines. She did have a CT heart for calcium in 2014 that showed a score of 4 with minimal calcification seen in the LAD. Given calculated risk of < 5%, would not recommend treatment with statin at this time. However, concerning increase in  and HDL 67 despite poor po intake. Emphasized importance of lifestyle modifications, including diet and exercise. Will reassess next visit. 3. Cervical spondylosis and right brachial neuritis. Being treated with Topamax 25 mg qAM and 50 mg qHS with good control. Using cyclobenzaprine only as needed. Being followed by YAMINI Montejo and Dr. Philip Hernandez. 4. Mild intermittent asthma. Patient reports intermittent wheezing, although can not confirm pattern. Has used inhalers in association with URTI in the past. Prescribed Symbicort and albuterol and will use occasionally for increased symptoms. 5. Hepatic steatosis, moderate to severe. Emphasized importance of lifestyle modifications, including diet and exercise. Monitor liver function tests. Advised to avoid alcohol. 6. Reactive depression. Appears to be related to current medical issues. On venlafaxine and will increase dose to 150 mg daily. 7. Osteoarthritis. Significant difficulty over the years involving her bilateral knees, hips, and lumbar spine. Being followed by Dr. Rick Solano. Complaining of right great toe pain and swelling. Will obtain right toe x-ray. 8. Insomnia. Long term issue.  Used melatonin and Ambien in past. No longer using and seems to be improved. 9. Health maintenance. Will give influenza vaccine and Pneumovax 23 today. Already received Tdap. Given script for Shingrix vaccine. Mammogram overdue and will place order. Estrace discontinued given her mother's history of breast cancer. No further pap smears given s/p THIEN. Colonoscopy due 3/2021. Will readdress next visit. Continue regular eye exams with Dr. Jeffrey Restrepo. Vitamin D level low. Will supplement with ergocalciferol for 12 weeks. Will discuss obtaining baseline bone density scan at next visit. Patient understands recommendations and agrees with plan. Follow-up in 3 months. Addendum    XR Results (most recent):  Results from Hospital Encounter encounter on 11/25/20   XR GREAT TOE RT MIN 2 V    Narrative Right first toe    HISTORY: Right great toe pain and swelling after getting pedicure in July  worsening recently with increasing redness and swelling. COMPARISON: Right foot 2/23/2015. FINDINGS:     3 views of the right first toe obtained. Diffuse soft tissue swelling without  soft tissue gas of the right first toe. No acute fracture or dislocation. Joint spaces preserved. No periosteal reaction  or bony destruction. No radiopaque foreign body. Impression IMPRESSION:     Diffuse soft tissue swelling of the right great toe without soft tissue gas. No acute fracture or dislocation. No plain film evidence of osteomyelitis. Called and discussed result with patient. Discussed findings of diffuse soft tissue swelling without gas or underlying osteomyelitis. Given that pain and redness started several months ago following pedicure, worrisome for possible chronic infection. Will treat with clindamycin 300 mg tid for 10 days. Patient will call if no improvement, and will refer to podiatry if needed.

## 2020-12-14 ENCOUNTER — TELEPHONE (OUTPATIENT)
Dept: INTERNAL MEDICINE CLINIC | Age: 60
End: 2020-12-14

## 2020-12-14 DIAGNOSIS — R93.89 ABNORMAL X-RAY: ICD-10-CM

## 2020-12-14 DIAGNOSIS — M79.89 PAIN AND SWELLING OF TOE OF RIGHT FOOT: Primary | ICD-10-CM

## 2020-12-14 DIAGNOSIS — M79.674 PAIN AND SWELLING OF TOE OF RIGHT FOOT: Primary | ICD-10-CM

## 2020-12-14 NOTE — TELEPHONE ENCOUNTER
Pt called said she was seen aznd given an antibiotic 2 weeks ago for her ttoe, she said she finished it but it hasn't gotten any better actually pain is worse she said it is now going to her foot please Colleen Mittal

## 2020-12-14 NOTE — TELEPHONE ENCOUNTER
Called and spoke with patient she reports the pain is worse in her toe. The antibiotics did not help. She does not think it is infected since the clindamycin did not help at all. She says the pain is radiating further thru the toe- at first it was the first joint now the second but has not reached her foot yet. Toe is hurting even at rest previously it was only hurting when she walked. She said it was discussed at her visit that it may not be infected and the pain could be something else all together and she may need to see Ortho (Dr. Trisha Thompson) or Podiatry [address]). She is wanting to see a specialist if that is still an option. Please advise.

## 2020-12-14 NOTE — TELEPHONE ENCOUNTER
Please recommend evaluation in urgent care or ED. Should be seen today given concern for possible worsening infection. Kirti Kohler

## 2020-12-15 ENCOUNTER — OFFICE VISIT (OUTPATIENT)
Dept: ORTHOPEDIC SURGERY | Age: 60
End: 2020-12-15
Payer: COMMERCIAL

## 2020-12-15 VITALS
RESPIRATION RATE: 16 BRPM | OXYGEN SATURATION: 99 % | DIASTOLIC BLOOD PRESSURE: 75 MMHG | SYSTOLIC BLOOD PRESSURE: 115 MMHG | BODY MASS INDEX: 24.39 KG/M2 | WEIGHT: 155.4 LBS | HEART RATE: 64 BPM | HEIGHT: 67 IN | TEMPERATURE: 96.8 F

## 2020-12-15 DIAGNOSIS — M19.071 PRIMARY OSTEOARTHRITIS OF RIGHT FOOT: Primary | ICD-10-CM

## 2020-12-15 DIAGNOSIS — M79.671 RIGHT FOOT PAIN: ICD-10-CM

## 2020-12-15 PROCEDURE — 99203 OFFICE O/P NEW LOW 30 MIN: CPT | Performed by: ORTHOPAEDIC SURGERY

## 2020-12-15 RX ORDER — MELOXICAM 7.5 MG/1
15 TABLET ORAL
COMMUNITY
End: 2021-02-16 | Stop reason: SDUPTHER

## 2020-12-15 RX ORDER — METOCLOPRAMIDE 5 MG/1
TABLET ORAL
COMMUNITY
Start: 2020-12-11 | End: 2021-06-24 | Stop reason: ALTCHOICE

## 2020-12-15 NOTE — PROGRESS NOTES
AMBULATORY PROGRESS NOTE      Patient: Josemanuel Ridley             MRN: [id number]     SSN: [id number] Body mass index is 24.34 kg/m². YOB: 1960     AGE: 63 y.o. EX: female    PCP: Fidencio Iqbal MD       IMPRESSION //  DIAGNOSIS AND TREATMENT PLAN      DIAGNOSES  1. Primary osteoarthritis of right foot    2. Right foot pain        Orders Placed This Encounter    MRI FOOT RT WO CONT     Standing Status:   Future     Standing Expiration Date:   6/15/2021     Order Specific Question:   Region of foot     Answer: Fore     Order Specific Question:   Reason for Exam     Answer:   to assess for signs of residual infection in the great toe along IP joint    meloxicam (MOBIC) 7.5 mg tablet     Sig: Take 15 mg by mouth.  metoclopramide HCl (REGLAN) 5 mg tablet        Impression, that she has some early OA of the right great toe first IP joint. She x-rays on November 25, 2020, in the [place] score system. I reviewed these x-rays. I see some swelling, of the great toe, mild, she is some joint space narrowing, the great toe IP joint, and also to the MTP joint, lateral most aspect, on these 3 views nonweightbearing films. There is not severe or moderate excessive swelling the soft tissues and looking at the AP film of this right great toe. Clinically, patient has discoloration of her right forefoot toes, and intermittently her left forefoot toes, so suspect she may have Raynaud's phenomenon for which she may require rheumatology referral.  As relates to her great toe pain, IP joint pain, and worsening pain progressive pain, recommendations MRI of the right forefoot to assess the condition of the IP joints specifically, and to make sure there is no evidence of indolent infection.   Because she had pain that she developed, shortly after her pedicure in July and because she was treated with an antibiotic, clindamycin by her PCP, for possible infection or cellulitis, it is well to make sure on the MRI signal that I see no signs of an indolent occult infection. PLAN:    1. MRI of right forefoot to assess for signs of residual infection in the great toe along IP joint  2. Apply Voltaren gel to affected area  3. Anticipate referral to Rheumatology for assessment of Raynaud's. RTO-after MRI      HPI //  OBJECTIVE EXAMINATION      Evaline Merlin IS A 63 y.o. female who presents to my outpatient office for evaluation of: spontaneous right great toe pain since July. Patient reports that she had a pedicure in July for normal cleaning and clippings and noticed some swelling and redness in her great toe following the pedicure. However, patient is not sure if it was really from the pedicure, but she has not had a pedicure since then. Patient reports some mild redness but no significant warmth in her great toe since July. The patient describes having pain along the plantar medial portion of her great toe and along the IP joint of her great toe. On November 25th, 2020, pt reports that she was placed on an antibiotic, clindamycin, by her PCP. She is currently taking Mobic intermittently which has not given her much relief. She also mentions a condition with her esophagus which gives her difficulty with swallowing and taking oral medications. The patient denies any fever, shakes, or chills. The patient denies any hx of rheumatoid arthritis but endorses it in her grandmother. She denies lupus, gout, or psoriasis. She denies hx of Raynaud's syndrome. Hx of left knee and right hip replacement. Family history of rheumatoid arthritis is present.     Visit Vitals  /75 (BP 1 Location: Right arm, BP Patient Position: Sitting)   Pulse 64   Temp 96.8 °F (36 °C) (Temporal)   Resp 16   Ht 5' 7\" (1.702 m)   Wt 155 lb 6.4 oz (70.5 kg)   SpO2 99%   BMI 24.34 kg/m²       ANKLE/FOOT right    Psychiatry: Alert, oriented x 3 (name,place,time of day); speech normal in context and clarity, memory intact grossly, no involuntary movements - tremors, no dementia  Gait: normal  Tenderness: mild base of nail plate IP joint; pain along plantar medial great toe. Distinct tenderness to IP with stressing of the toe. Cutaneous:  mild swelling circumferentially and mild swelling to dorsal part of DIP joint. Slight purplish hue to plantar great toe and tips of lesser digits. Joint Motion: WNL normal ankle and hindfoot motion  Joint / Tendon Stability: No Ankle or Subtalar instability or joint laxity. No peroneal sublux ability or dislocation  Alignment: neutral Hindfoot, none Metatarsus Adductus Metatarsus. Neuro Motor/Sensory: NL/NL, Pes cavus (bilateral)  Vascular: NL foot/ankle pulses,   Lymphatics: No extremity lymphedema, No calf swelling, no tenderness to calf muscles. Left foot: Pes cavus alignment fully flexible. There are some slight discoloration to the forefoot of her toes, she may have Raynaud's phenomena.      CHART REVIEW     Patient Active Problem List   Diagnosis Code    Allergic rhinitis 80    Hyperlipidemia E78.5    S/P total knee arthroplasty, left Q48.627    Family history of colonic polyps Z83.71    Vitamin D deficiency E55.9    Gastroesophageal reflux disease with esophagitis K21.00    Family history of breast cancer Z80.3    S/P hip replacement, right Z96.641    Quadriceps tendon rupture, left, sequela S76.112S    Sensorineural hearing loss (SNHL) of both ears H90.3    Mild intermittent asthma without complication W94.19    Paraesophageal hiatal hernia K44.9    Anxiety F41.9    Primary osteoarthritis involving multiple joints M89.49    Degenerative disc disease, lumbar M51.36    Facet arthropathy, lumbar M47.816    Hepatic steatosis K76.0    Esophageal dysphagia R13.10    S/P laparoscopic fundoplication J45.729    Weight loss, unintentional R63.4    Cervical spondylosis M47.812    Reactive depression F32.9        Orval Heritage has been experiencing pain and discomfort confirmed as outlined in the pain assessment outlined below. Pain Assessment  12/15/2020   Location of Pain Toe   Location Modifiers Right   Severity of Pain 5   Quality of Pain Aching   Quality of Pain Comment swelling   Duration of Pain Persistent   Frequency of Pain -   Aggravating Factors Walking;Standing   Aggravating Factors Comment driving   Limiting Behavior -   Relieving Factors Nothing   Relieving Factors Comment -   Result of Injury No Shelvia Severin  has a past medical history of Allergic rhinitis, Anxiety (9/16/2019), Claustrophobia, Degenerative disc disease, lumbar (9/16/2019), Facet arthropathy, lumbar (9/16/2019), Gastroesophageal reflux disease with esophagitis (11/10/2014), Hepatic steatosis (9/16/2019), Herniation of intervertebral disc between L4 and L5 (9/16/2019), Hiatal hernia (9/16/2019), History of EMG/NCV UEs (12/2019), Hyperlipidemia, Insomnia (9/16/2019), Lumbar stenosis (9/16/2019), Mild intermittent asthma without complication (9/89/0004), Primary osteoarthritis involving multiple joints (9/16/2019), Quadriceps tendon rupture, left, sequela (12/11/2017), S/P hip replacement, right (3/3/2016), and Vitamin D deficiency (11/10/2014).      Patients is employed at:         Past Medical History:   Diagnosis Date    Allergic rhinitis     Anxiety 9/16/2019    Claustrophobia     Degenerative disc disease, lumbar 9/16/2019    Facet arthropathy, lumbar 9/16/2019    Gastroesophageal reflux disease with esophagitis 11/10/2014    Hepatic steatosis 9/16/2019    Herniation of intervertebral disc between L4 and L5 9/16/2019    Hiatal hernia 9/16/2019    History of EMG/NCV UEs 12/2019    no radiculopathy, compressive mononeuropathy    Hyperlipidemia     Insomnia 9/16/2019    Lumbar stenosis 9/16/2019    Mild intermittent asthma without complication 9/90/4607    Primary osteoarthritis involving multiple joints 9/16/2019    Quadriceps tendon rupture, left, sequela 12/11/2017    Medial retinacular tear following left total knee replacement; s/p left quadricep tendon repair 12/11/2017. Dr. Jorge Wilhelm.  S/P hip replacement, right 3/3/2016    Vitamin D deficiency 11/10/2014     Past Surgical History:   Procedure Laterality Date    ENDOSCOPY, COLON, DIAGNOSTIC  03-18-11    normal colon to cecum    HX BREAST REDUCTION      HX HEENT      skin cancer between eyes    HX HIP REPLACEMENT Right 10/2016    HX HYSTERECTOMY      HX KNEE REPLACEMENT      HX KNEE REPLACEMENT Left 11/2017    HX LAP CHOLECYSTECTOMY  11/2015    HX TONSIL AND ADENOIDECTOMY      HX TUBAL LIGATION       Current Outpatient Medications   Medication Sig    meloxicam (MOBIC) 7.5 mg tablet Take 15 mg by mouth.  metoclopramide HCl (REGLAN) 5 mg tablet     PNV ND.68/MIOWELO fum/folic ac (PRENATAL PO) Take  by mouth.  ergocalciferol (ERGOCALCIFEROL) 1,250 mcg (50,000 unit) capsule Take 1 Cap by mouth every seven (7) days.  albuterol (PROVENTIL HFA, VENTOLIN HFA, PROAIR HFA) 90 mcg/actuation inhaler Take 1 Puff by inhalation every four (4) hours as needed for Wheezing.  venlafaxine-SR (EFFEXOR-XR) 150 mg capsule Take 1 Cap by mouth daily. (Patient taking differently: Take 75 mg by mouth daily.)    topiramate (Topamax) 25 mg tablet Take 1 tab in am and 2 tab in pm    diazePAM (VALIUM) 5 mg tablet Take 5 mg by mouth two (2) times a day.  cyclobenzaprine (FLEXERIL) 10 mg tablet Take 1 Tab by mouth nightly as needed for Muscle Spasm(s).  Calcium-Cholecalciferol, D3, (CALCIUM 600 WITH VITAMIN D3) 600 mg(1,500mg) -400 unit chew Take  by mouth.  budesonide-formoterol (SYMBICORT) 160-4.5 mcg/actuation HFAA Take 2 Puffs by inhalation two (2) times a day. No current facility-administered medications for this visit.       Allergies   Allergen Reactions    Other Plant, Animal, Environmental Anaphylaxis     poison ivy    Morphine Other (comments)           Albuterol Other (comments) Able to use but causes to be slightly jittery (not severe and no other anaphylactoid sxs). Social History     Occupational History    Not on file   Tobacco Use    Smoking status: Former Smoker     Packs/day: 0.25     Years: 0.50     Pack years: 0.12    Smokeless tobacco: Never Used   Substance and Sexual Activity    Alcohol use: Yes     Alcohol/week: 1.0 standard drinks     Types: 1 Glasses of wine per week    Drug use: No    Sexual activity: Yes     Partners: Male     Birth control/protection: None     Comment: Hysterectomy     Family History   Problem Relation Age of Onset    Other Mother         atrial fibrillation    Colon Polyps Mother     Breast Cancer Mother     Colon Polyps Brother         half brother    Heart Disease Brother         a-fib    Heart Disease Sister         half sister ASHD    Heart Attack Sister     Other Son         transposition of the great vessels and surgery for this    Other Son         congestive heart failure       THE  FOR Jazmin Orozco MD 12/15/2020 . DIAGNOSTIC IMAGING  LAB DATA      Lab Results   Component Value Date/Time    Hemoglobin A1c 5.2 10/06/2016 06:41 AM    //   Lab Results   Component Value Date/Time    Glucose 94 11/18/2020 12:00 AM        No results found for: [id number], [id number]      Lab Results   Component Value Date/Time    VITAMIN D, 25-HYDROXY 28.9 (L) 11/18/2020 12:00 AM         REVIEW OF SYSTEMS : 12/15/2020  ALL BELOW ARE Negative except : SEE HPI     CONSTITUTIONAL: No weight loss  PSYCHOLOGICAL : No Feelings of anxiety, depression, agitation  EYES: No blurred vision and no eye discharge. NO eye pain, double vision  ENT: No nasal discharge. No ear pain. CARDIOVASCULAR: No chest pain and no diaphoresis. RESPIRATORY: No cough, no hemoptysis. GI: No vomiting, no diarrhea   : No urinary frequency and no dysuria. MUSCULOSKELETAL: see HPI  SKIN: No rashes.    NEURO:  No dizziness,weakness, headaches// No visual changes or confusion, or seizures,   ENDOCRINE: No polyphagia and no polydipsia. HEMATOLOGY: No bleeding tendencies. DIAGNOSTIC IMAGING      XR Results (most recent):  Results from Hospital Encounter encounter on 11/25/20   XR GREAT TOE RT MIN 2 V    Narrative Right first toe    HISTORY: Right great toe pain and swelling after getting pedicure in July  worsening recently with increasing redness and swelling. COMPARISON: Right foot 2/23/2015. FINDINGS:     3 views of the right first toe obtained. Diffuse soft tissue swelling without  soft tissue gas of the right first toe. No acute fracture or dislocation. Joint spaces preserved. No periosteal reaction  or bony destruction. No radiopaque foreign body. Impression IMPRESSION:     Diffuse soft tissue swelling of the right great toe without soft tissue gas. No acute fracture or dislocation. No plain film evidence of osteomyelitis. I reviewed the study and images. DIAGNOSTIC IMAGING       Please see above section of this report. I have personally reviewed the results of the above study. The interpretation of this study is my professional opinion.       Annel Lares MD  12/15/2020  8:22 AM

## 2020-12-16 DIAGNOSIS — M79.671 RIGHT FOOT PAIN: ICD-10-CM

## 2021-02-05 ENCOUNTER — HOSPITAL ENCOUNTER (OUTPATIENT)
Age: 61
Discharge: HOME | End: 2021-02-05
Attending: ORTHOPAEDIC SURGERY
Payer: COMMERCIAL

## 2021-02-05 ENCOUNTER — HOSPITAL ENCOUNTER (OUTPATIENT)
Dept: MAMMOGRAPHY | Age: 61
Discharge: HOME | End: 2021-02-05
Attending: INTERNAL MEDICINE
Payer: COMMERCIAL

## 2021-02-05 DIAGNOSIS — Z12.31 SCREENING MAMMOGRAM, ENCOUNTER FOR: ICD-10-CM

## 2021-02-05 PROCEDURE — 77063 BREAST TOMOSYNTHESIS BI: CPT

## 2021-02-05 PROCEDURE — 73718 MRI LOWER EXTREMITY W/O DYE: CPT

## 2021-02-16 ENCOUNTER — DOCUMENTATION ONLY (OUTPATIENT)
Dept: ORTHOPEDIC SURGERY | Age: 61
End: 2021-02-16

## 2021-02-16 DIAGNOSIS — M19.071 PRIMARY OSTEOARTHRITIS OF RIGHT FOOT: Primary | ICD-10-CM

## 2021-02-16 RX ORDER — FAMOTIDINE 40 MG/1
40 TABLET, FILM COATED ORAL DAILY
Qty: 30 TAB | Refills: 1 | Status: SHIPPED | OUTPATIENT
Start: 2021-02-16 | End: 2021-06-24 | Stop reason: ALTCHOICE

## 2021-02-16 RX ORDER — MELOXICAM 7.5 MG/1
15 TABLET ORAL DAILY
Qty: 30 TAB | Refills: 1 | Status: SHIPPED | OUTPATIENT
Start: 2021-02-16 | End: 2021-06-24 | Stop reason: ALTCHOICE

## 2021-02-16 NOTE — PROGRESS NOTES
MRI Results (most recent):  Results from Orders Only encounter on 12/16/20   MRI FOOT RT WO CONT    Narrative Multisequence multiplanar MR images of the right foot were obtained. HISTORY: Pain and swelling in the right first toe. Reference: Radiographs November 25, 2020    Moderate soft tissue edema/swelling in the first toe with no discrete mass  lesions or abscess suspected. Although evaluation limited without IV contrast.  No plantar soft tissue mass or neuroma. No intermetatarsal bursitis. Much less  severe edema in the second toe. Marrow signal is preserved. No marrow edema. No cortical erosion or destruction. Moderate degenerative joint disease at first toe interphalangeal joint. Subchondral cysts /edema at the distal end of the proximal phalanx. Osteophytes  seen. No definite joint effusion. Tendons grossly intact. Moderate degenerative  joint disease at first MTP joint, with mild cortical irregularity in the  sesamoids but no edema or fracture. Lisfranc ligament is intact. No acute findings in other toes. Impression Edema/swelling in the first toe, with degenerative joint disease at  the MTP and IP joints. No suggestion of osteomyelitis. No fluid collection or  soft tissue mass. No neuroma or intermetatarsal bursitis. I spoke with her to discuss her MRI. Her MRI per predominately show some early arthritis in the great toe I see no signs of infection in the soft tissues or have any evidence of osteomyelitis.   I recommended restarting the Mobic anti-inflammatory agent, for which she did take this, for her knee and hip osteoarthritis having had surgery by Dr. Kevon Pinzon in the past.    I will to see her in 1 month's time    Jeff Mai MD  2/16/2021  12:07 PM

## 2021-03-09 RX ORDER — VENLAFAXINE HYDROCHLORIDE 150 MG/1
150 CAPSULE, EXTENDED RELEASE ORAL DAILY
Qty: 90 CAP | Refills: 0 | Status: SHIPPED | OUTPATIENT
Start: 2021-03-09 | End: 2021-05-18 | Stop reason: SDUPTHER

## 2021-03-09 RX ORDER — TOPIRAMATE 25 MG/1
TABLET ORAL
Qty: 270 TAB | Refills: 0 | Status: SHIPPED | OUTPATIENT
Start: 2021-03-09 | End: 2021-05-18 | Stop reason: SDUPTHER

## 2021-03-09 NOTE — TELEPHONE ENCOUNTER
Previous Rx's were sent to [facility] 
 
Last Visit: 11/25/20 with MD Micaela Gilbert Next Appointment: none Requested Prescriptions Pending Prescriptions Disp Refills  topiramate (Topamax) 25 mg tablet 270 Tab 1 Sig: Take 1 tab in am and 2 tab in pm  
 venlafaxine-SR (EFFEXOR-XR) 150 mg capsule 90 Cap 2 Sig: Take 1 Cap by mouth daily.

## 2021-05-17 ENCOUNTER — TELEPHONE (OUTPATIENT)
Dept: INTERNAL MEDICINE CLINIC | Age: 61
End: 2021-05-17

## 2021-05-17 DIAGNOSIS — Z78.0 POSTMENOPAUSAL: ICD-10-CM

## 2021-05-17 DIAGNOSIS — Z78.0 POSTMENOPAUSAL: Primary | ICD-10-CM

## 2021-05-17 NOTE — TELEPHONE ENCOUNTER
Patient wants to know if she needs to have a bone density test done. Stating it was discussed in the past but nothing ever transpired

## 2021-05-17 NOTE — TELEPHONE ENCOUNTER
Yes, would recommend a baseline bone density study since she has completed menopause. Order placed. Please let her know.

## 2021-05-17 NOTE — TELEPHONE ENCOUNTER
Pt aware of message below and verbalized understanding. Phone number provided to schedule patient will call. No further questions or concerns from pt at this time.

## 2021-05-18 RX ORDER — TOPIRAMATE 25 MG/1
TABLET ORAL
Qty: 270 TAB | Refills: 3 | Status: SHIPPED | OUTPATIENT
Start: 2021-05-18 | End: 2021-06-24 | Stop reason: SDUPTHER

## 2021-05-18 RX ORDER — VENLAFAXINE HYDROCHLORIDE 150 MG/1
CAPSULE, EXTENDED RELEASE ORAL
Qty: 90 CAP | Refills: 3 | OUTPATIENT
Start: 2021-05-18

## 2021-05-18 RX ORDER — VENLAFAXINE HYDROCHLORIDE 150 MG/1
150 CAPSULE, EXTENDED RELEASE ORAL DAILY
Qty: 90 CAP | Refills: 3 | Status: SHIPPED | OUTPATIENT
Start: 2021-05-18 | End: 2021-06-24 | Stop reason: SDUPTHER

## 2021-05-18 RX ORDER — TOPIRAMATE 25 MG/1
TABLET ORAL
Qty: 270 TAB | Refills: 3 | OUTPATIENT
Start: 2021-05-18

## 2021-06-10 ENCOUNTER — HOSPITAL ENCOUNTER (OUTPATIENT)
Dept: BONE DENSITY | Age: 61
Discharge: HOME | End: 2021-06-10
Attending: INTERNAL MEDICINE
Payer: COMMERCIAL

## 2021-06-10 ENCOUNTER — TELEPHONE (OUTPATIENT)
Dept: INTERNAL MEDICINE CLINIC | Age: 61
End: 2021-06-10

## 2021-06-10 PROCEDURE — 77080 DXA BONE DENSITY AXIAL: CPT

## 2021-06-10 NOTE — TELEPHONE ENCOUNTER
DEXA Results (most recent):  Results from Orders Only encounter on 05/17/21    DEXA BONE DENSITY STUDY AXIAL    Narrative  DEXA BONE DENSITOMETRY, CENTRAL    CLINICAL INDICATION/HISTORY: Post menopausal. 78-year-old female for baseline  study. Right hip replacement. TECHNIQUE: Using GE LUNAR Prodigy densitometer, bone density measurement was  performed in the lumbar spine, the proximal left femur  and the left forearm. T  Score refers to standard deviations above or below average compared to a young  adult of the same sex. Z Score refers to standard deviations above or below  average compared to a patient of the same sex, age, race and weight. COMPARISON: None available. FINDINGS:    Lumbar Spine Levels: L1-L4  Mean Bone Mineral Density (BMD):  1.259 g/cm2  T Score: 0.7  Z Score: 2.0    Left distal 1/3 Radius BMD:  0.815 g/cm2  T Score: -0.7  Z Score: 0.4    Left Total Proximal Femur BMD: 1.044 g/cm2  T Score:  0.3  Z Score:  1.3    Left Femoral Neck BMD:  1.217 g/cm2  T Score: 1.3  Z Score: 2.6    Impression  BMD measures within normal limits. Based upon current ISCD guidelines, the patient's overall diagnostic category,  selected using WHO criteria in postmenopausal women and males aged 48 and above,  is selected based upon the lowest T Score from among the lumbar spine, total  femur, femoral neck, (or distal third radius if measured). WHO Definition of Osteoporosis and Osteopenia on DXA (specified for post  menopausal  females):    Normal:                     T Score at or above -1 SD  Osteopenia:              T Score between -1 and -2.5 SD  Osteoporosis:          T Score at or below -2.5 SD    The risk of fracture approximately doubles for each 1 SD decrease in T Score. It is important to consider other factors in assessing a patient's risk of  fracture, including age, risk of falling/injury, history of fragility fracture,  family history of osteoporosis, smoking, low weight.     Various fracture risk tools have been developed for adult patients and are  available online. For example, the FRAX tool developed by [facility] is widely used. Reference www.iscd.org. It is also important to note that DXA measures bone density but does not  distinguish among causes of decreased bone density, which include primary versus  secondary osteoporosis (such as metabolic bone disorders or possible effects of  medications) and also other conditions (such as osteomalacia). Clinical  considerations should determine what additional evaluation may be warranted to  exclude secondary conditions in a patient with low bone density. Please note that reliable, valid comparisons can not be made between studies  which have been performed on different densitometers. If clinically warranted,  follow up study performed at this site would best permit assessment of trend for  possible change in bone mineral density over time in comparison to this study. Thank you for this referral.      Please let the patient know that her bone density study was within normal limits.

## 2021-06-16 ENCOUNTER — HOSPITAL ENCOUNTER (OUTPATIENT)
Dept: LAB | Age: 61
Discharge: HOME | End: 2021-06-16

## 2021-06-16 LAB — XX-LABCORP SPECIMEN COL,LCBCF: NORMAL

## 2021-06-16 PROCEDURE — 99001 SPECIMEN HANDLING PT-LAB: CPT

## 2021-06-17 LAB
25(OH)D3+25(OH)D2 SERPL-MCNC: 26.7 NG/ML (ref 30–100)
ALBUMIN SERPL-MCNC: 4.6 G/DL (ref 3.8–4.8)
ALBUMIN/GLOB SERPL: 1.9 {RATIO} (ref 1.2–2.2)
ALP SERPL-CCNC: 93 IU/L (ref 48–121)
ALT SERPL-CCNC: 23 IU/L (ref 0–32)
AST SERPL-CCNC: 25 IU/L (ref 0–40)
BASOPHILS # BLD AUTO: 0.1 X10E3/UL (ref 0–0.2)
BASOPHILS NFR BLD AUTO: 1 %
BILIRUB SERPL-MCNC: <0.2 MG/DL (ref 0–1.2)
BUN SERPL-MCNC: 13 MG/DL (ref 8–27)
BUN/CREAT SERPL: 17 (ref 12–28)
CALCIUM SERPL-MCNC: 9.2 MG/DL (ref 8.7–10.3)
CHLORIDE SERPL-SCNC: 109 MMOL/L (ref 96–106)
CHOLEST SERPL-MCNC: 253 MG/DL (ref 100–199)
CO2 SERPL-SCNC: 19 MMOL/L (ref 20–29)
CREAT SERPL-MCNC: 0.76 MG/DL (ref 0.57–1)
EOSINOPHIL # BLD AUTO: 0.7 X10E3/UL (ref 0–0.4)
EOSINOPHIL NFR BLD AUTO: 13 %
ERYTHROCYTE [DISTWIDTH] IN BLOOD BY AUTOMATED COUNT: 12.5 % (ref 11.7–15.4)
GLOBULIN SER CALC-MCNC: 2.4 G/DL (ref 1.5–4.5)
GLUCOSE SERPL-MCNC: 96 MG/DL (ref 65–99)
HCT VFR BLD AUTO: 38.2 % (ref 34–46.6)
HDLC SERPL-MCNC: 58 MG/DL
HGB BLD-MCNC: 12.9 G/DL (ref 11.1–15.9)
IMM GRANULOCYTES # BLD AUTO: 0 X10E3/UL (ref 0–0.1)
IMM GRANULOCYTES NFR BLD AUTO: 0 %
IMP & REVIEW OF LAB RESULTS: NORMAL
LDLC SERPL CALC-MCNC: 156 MG/DL (ref 0–99)
LYMPHOCYTES # BLD AUTO: 2.5 X10E3/UL (ref 0.7–3.1)
LYMPHOCYTES NFR BLD AUTO: 49 %
MCH RBC QN AUTO: 31.9 PG (ref 26.6–33)
MCHC RBC AUTO-ENTMCNC: 33.8 G/DL (ref 31.5–35.7)
MCV RBC AUTO: 94 FL (ref 79–97)
MONOCYTES # BLD AUTO: 0.4 X10E3/UL (ref 0.1–0.9)
MONOCYTES NFR BLD AUTO: 8 %
NEUTROPHILS # BLD AUTO: 1.5 X10E3/UL (ref 1.4–7)
NEUTROPHILS NFR BLD AUTO: 29 %
PLATELET # BLD AUTO: 263 X10E3/UL (ref 150–450)
POTASSIUM SERPL-SCNC: 4.4 MMOL/L (ref 3.5–5.2)
PROT SERPL-MCNC: 7 G/DL (ref 6–8.5)
RBC # BLD AUTO: 4.05 X10E6/UL (ref 3.77–5.28)
SODIUM SERPL-SCNC: 143 MMOL/L (ref 134–144)
SPECIMEN STATUS REPORT, ROLRST: NORMAL
TRIGL SERPL-MCNC: 215 MG/DL (ref 0–149)
VLDLC SERPL CALC-MCNC: 39 MG/DL (ref 5–40)
WBC # BLD AUTO: 5.1 X10E3/UL (ref 3.4–10.8)

## 2021-06-24 ENCOUNTER — OFFICE VISIT (OUTPATIENT)
Dept: INTERNAL MEDICINE CLINIC | Age: 61
End: 2021-06-24
Payer: COMMERCIAL

## 2021-06-24 VITALS
OXYGEN SATURATION: 98 % | DIASTOLIC BLOOD PRESSURE: 72 MMHG | SYSTOLIC BLOOD PRESSURE: 110 MMHG | WEIGHT: 159 LBS | HEIGHT: 67 IN | RESPIRATION RATE: 16 BRPM | BODY MASS INDEX: 24.96 KG/M2 | TEMPERATURE: 97.5 F | HEART RATE: 71 BPM

## 2021-06-24 DIAGNOSIS — R13.19 ESOPHAGEAL DYSPHAGIA: Primary | ICD-10-CM

## 2021-06-24 DIAGNOSIS — K44.9 PARAESOPHAGEAL HIATAL HERNIA: ICD-10-CM

## 2021-06-24 DIAGNOSIS — Z98.890 S/P LAPAROSCOPIC FUNDOPLICATION: ICD-10-CM

## 2021-06-24 DIAGNOSIS — M47.812 CERVICAL SPONDYLOSIS: ICD-10-CM

## 2021-06-24 DIAGNOSIS — E55.9 VITAMIN D DEFICIENCY: ICD-10-CM

## 2021-06-24 DIAGNOSIS — F41.9 ANXIETY: ICD-10-CM

## 2021-06-24 DIAGNOSIS — F32.9 REACTIVE DEPRESSION: ICD-10-CM

## 2021-06-24 DIAGNOSIS — Z00.00 LABORATORY EXAMINATION ORDERED AS PART OF A COMPLETE PHYSICAL EXAMINATION: ICD-10-CM

## 2021-06-24 DIAGNOSIS — E78.5 HYPERLIPIDEMIA, UNSPECIFIED HYPERLIPIDEMIA TYPE: ICD-10-CM

## 2021-06-24 PROCEDURE — 99214 OFFICE O/P EST MOD 30 MIN: CPT | Performed by: INTERNAL MEDICINE

## 2021-06-24 RX ORDER — ALBUTEROL SULFATE 90 UG/1
1 AEROSOL, METERED RESPIRATORY (INHALATION)
Qty: 1 INHALER | Refills: 3 | Status: SHIPPED | OUTPATIENT
Start: 2021-06-24 | End: 2021-09-06

## 2021-06-24 RX ORDER — BUDESONIDE AND FORMOTEROL FUMARATE DIHYDRATE 160; 4.5 UG/1; UG/1
2 AEROSOL RESPIRATORY (INHALATION) 2 TIMES DAILY
Qty: 1 INHALER | Refills: 1 | Status: CANCELLED | OUTPATIENT
Start: 2021-06-24

## 2021-06-24 RX ORDER — TOPIRAMATE 25 MG/1
TABLET ORAL
Qty: 270 TABLET | Refills: 3 | Status: SHIPPED | OUTPATIENT
Start: 2021-06-24 | End: 2022-06-06

## 2021-06-24 RX ORDER — VENLAFAXINE HYDROCHLORIDE 150 MG/1
150 CAPSULE, EXTENDED RELEASE ORAL DAILY
Qty: 90 CAPSULE | Refills: 3 | Status: SHIPPED | OUTPATIENT
Start: 2021-06-24 | End: 2022-06-06

## 2021-06-24 RX ORDER — ALBUTEROL SULFATE 90 UG/1
1 AEROSOL, METERED RESPIRATORY (INHALATION)
Qty: 1 INHALER | Refills: 3 | Status: CANCELLED | OUTPATIENT
Start: 2021-06-24

## 2021-06-24 RX ORDER — BUDESONIDE AND FORMOTEROL FUMARATE DIHYDRATE 160; 4.5 UG/1; UG/1
2 AEROSOL RESPIRATORY (INHALATION) 2 TIMES DAILY
Qty: 3 INHALER | Refills: 3 | Status: SHIPPED | OUTPATIENT
Start: 2021-06-24 | End: 2021-07-01 | Stop reason: ALTCHOICE

## 2021-06-24 NOTE — PROGRESS NOTES
1. Have you been to the ER, urgent care clinic or hospitalized since your last visit? NO.     2. Have you seen or consulted any other health care providers outside of the [address] [place] [address] since your last visit (Include any pap smears or colon screening)?  Not but appointment scheduled with Vernon Mcfadden next week at [facility]

## 2021-06-27 NOTE — PROGRESS NOTES
HPI:   Dev Xavier is a 63y.o. year old female who presents today for a routine visit. She has a history of hyperlipidemia, mild intermittent asthma, GERD, hiatal hernia, hepatic steatosis, osteoarthritis, lumbar degenerative disease, insomnia, and anxiety. She reports that she is doing only fairly well. She has completed the Ken Peter COVID-19 vaccine series. She reports today that she is continuing to have difficulty with chest pain and inability to belch. She has problems with swallowing both solids and liquids, and will only have one protein drink which she drinks slowly throughout the day. She is continuing to eat little, and her weight has decreased 20 pounds since the surgery. However, she she has increased approximately 4 pounds since her last visit in 11/2020. She takes two Valium 5 mg tablets during the day with some relief. She reports that she has been referred by Dr. Severa Alberts to [facility] for further evaluation and consideration of surgery takedown given her persistent debilitating symptoms. She is otherwise without new complaints. Summary of prior hospitalizations and medical history:  On 9/25/2019, she underwent a chest CT scan which showed that her hiatal hernia had enlarged with the majority of the gastric body in the chest with thickened and nodular walls. She was referred to Dr. Severa Alberts for evaluation, and underwent upper endoscopy (10/17/2019) by Dr. Severa Alberts which showed a large [facility] with patchy linear ulceration at the [facility] hiatus in the fundus compatible with Devonte's lesions. She was started on omeprazole 20 mg daily, and an upper GI series was obtained (10/21/2019) showing moderate to large sized mixed type hiatal hernia with suspected ulceration within the supradiaphragmatic portion of the stomach; delayed transit of barium tablet and esophageal dysmotility.  She had a repeat upper endoscopy on 11/7/2019 which showed ulcer healing, and she had dilation of a benign intrinsic stricture at the GE junction. Biopsies of the esophagus were suggestive but not diagnostic for eosinophilic esophagitis. She was referred to Dr. Maik Castro and it was recommended that she undergo surgical repair. On 1/16/2020, she underwent laparoscopic HH repair with Toupet fundoplication. Post-operatively, she had difficulty tolerating po due to frequent belching and nausea. UGI showed prolonged passage of contrast into the stomach. She was managed non-operatively initially with the hope that this was post-operative edema, but she continued to fail to progress and eventually underwent an EGD with dilation on 1/24/20. Post-procedure, she felt much better, and her diet was slowly advanced to normal, and she was discharged on 1/27/2020. However, she continued to have difficulty with severe substernal chest pain with swallowing, and underwent repeat upper endoscopy with balloon dilation on 2/13/2020 without improvement. A barium swallow was obtained (3/16/2020) showing mild esophageal dysmotility but without significant obstruction and no evidence of contrast extravasation. On 5/1/2020, she underwent an esophageal motility study by Dr. Aj Cruz, showing ineffective esophageal motility due to impaired DCI in peristalsis with resultant severe liquid and viscous bolus transit delays; and short esophageal body. She underwent evaluation by Dr. Chelle Rand at [facility] on 5/18/2020, and it was felt that her symptoms were likely secondary to a tight fundoplication. She was treated with calcium channel blocker and  valium without improvement. On 7/31/2020, she underwent upper endoscopy by Dr. Chelle Rand, showing EGJ widely patent, paraesophageal hernia with intact wrap, and Esoflip dilation to 27 mm performed with therapeutic mucosal tear. However, she did not improve, and most recently was given a trial of nortriptyline without benefit. She was also treated with omeprazole 20 mg daily.  She reports that the final recommendation by Dr. Chelle Rand would be for consideration of repeat surgery with Vance barcenas. She has a history of hyperlipidemia, not previously treated with medication. She underwent a CT heart in 2/2014 with calcium score of 4 due to minimal calcium noted in the wall of the LAD. She remains active, and denies any chest pain, shortness of breath at rest or with exertion, palpitations, lightheadedness, or edema. She has a history of mild intermittent asthma, and will require use of albuterol on occasion. She has also been prescribed Symbicort, but uses this rarely. She has no prior history of smoking. She has a history of GERD with esophagitis, and required esophageal dilatation x 2 in the past by Dr. Leanne Miranda. She states that she occasionally will have difficulty swallowing with food \"getting stuck\", but states that it is not a frequent problem. She was previously treated by Nexium, but in 2003 developed acute sensorineural hearing loss and there was question at that time that it may be related to treatment with her PPI. She also has a history of a moderate hiatal hernia, noted on the cardiac CT scan in 2014, and marked hepatic steatosis was noted on an abdominal ultrasound in 11/2015. She had a screening colonoscopy in 3/2011 by Dr. Parth Escalante which was normal. Follow-up recommended for 10 years. She has a history of osteoarthritis, and underwent right hip replacement (10/2016) and left knee replacement (11/2017) by Dr. Mario White. In 12/2017, she presented with acute worsening of left knee pain after her surgery, and was found to have an acute tear of the medial retinacular of her quadriceps tendon which required surgical repair. She also has a history of low back pain, and in 8/2015 underwent a lumbar MRI showing multilevel degenerative changes which was most severe at L4-5 where there was significant canal stenosis due to a moderate posterior disc bulge; mild bilateral foraminal narrowing also seen throughout the lumbar spine.  In 9/2019, she complained of right neck and shoulder pain with radiation to her scapula and right fingers. She was evaluated initially by Dr. Naveed Calzada, and a right shoulder MRI (11/6/2019) showed only mild degenerative changes and tendinopathy involving the supraspinatus and biceps tendons. She also had a cervical RMI (11/6/2019) showing multilevel disc osteophyte complex and facet arthropathy but without evidence of significant central canal stenosis; multilevel foraminal stenoses. She was referred to Dr. Leanne Little, and underwent a right EMG/NCS (12/2019) which did not show findings to explain her complaints. She was treated with Topamax, Flexeril and received a right shoulder bursal injection. In 1/2015, while [address] in [facility], she sustained a laceration on the dorsal portion of right heel. She states that her  closed the wound with super glue, and she subsequently developed an infection and painful ulcer. She was seen at urgent care in early 2/2015 and given a dose of ceftriaxone and started on Levaquin. She was subsequently seen by YAMINI Lan on 2/6/2015 and right foot xray (2/6/2015) showed significant thickening and induration of plantar soft tissue at the right heel, due to acute edematous infiltration, but no soft tissue gas or foreign body noted, and no signs of osteomyelitis. Wound culture (2/6/2015) revealed Staph aureus, sensitive to Bactrim. She also had an arterial duplex ultrasound (2/27/2015) which was negative for PAD. After multiple courses of antibiotics and local wound care, the ulcer healed. She has a history of vasomotor instability, and was being treated with venlafaxine and Estrace as prescribed by Dr. Amilcar Woodall. She also has a history of chronic insomnia, treated previously with Ambien and melatonin.         Past Medical History:   Diagnosis Date    Allergic rhinitis     Anxiety 9/16/2019    Claustrophobia     Degenerative disc disease, lumbar 9/16/2019    Facet arthropathy, lumbar 9/16/2019    Gastroesophageal reflux disease with esophagitis 11/10/2014    Hepatic steatosis 9/16/2019    Herniation of intervertebral disc between L4 and L5 9/16/2019    Hiatal hernia 9/16/2019    History of EMG/NCV UEs 12/2019    no radiculopathy, compressive mononeuropathy    Hyperlipidemia     Insomnia 9/16/2019    Lumbar stenosis 9/16/2019    Mild intermittent asthma without complication 5/46/3321    Primary osteoarthritis involving multiple joints 9/16/2019    Quadriceps tendon rupture, left, sequela 12/11/2017    Medial retinacular tear following left total knee replacement; s/p left quadricep tendon repair 12/11/2017. Dr. Ailin Tapia.  S/P hip replacement, right 3/3/2016    Vitamin D deficiency 11/10/2014     Past Surgical History:   Procedure Laterality Date    ENDOSCOPY, COLON, DIAGNOSTIC  03-18-11    normal colon to cecum    HX BREAST REDUCTION      HX HEENT      skin cancer between eyes    HX HIP REPLACEMENT Right 10/2016    HX HYSTERECTOMY      HX KNEE REPLACEMENT      HX KNEE REPLACEMENT Left 11/2017    HX LAP CHOLECYSTECTOMY  11/2015    HX TONSIL AND ADENOIDECTOMY      HX TUBAL LIGATION       Current Outpatient Medications   Medication Sig    topiramate (Topamax) 25 mg tablet Take 1 tab in am and 2 tab in pm    venlafaxine-SR (EFFEXOR-XR) 150 mg capsule Take 1 Capsule by mouth daily.  budesonide-formoteroL (SYMBICORT) 160-4.5 mcg/actuation HFAA Take 2 Puffs by inhalation two (2) times a day.  albuterol (PROVENTIL HFA, VENTOLIN HFA, PROAIR HFA) 90 mcg/actuation inhaler Take 1 Puff by inhalation every four (4) hours as needed for Wheezing.  diazePAM (VALIUM) 5 mg tablet Take 5 mg by mouth two (2) times a day.  cyclobenzaprine (FLEXERIL) 10 mg tablet Take 1 Tab by mouth nightly as needed for Muscle Spasm(s). No current facility-administered medications for this visit. Allergies and Intolerances:    Allergies   Allergen Reactions    Other Plant, Animal, Environmental Anaphylaxis     poison ivy    Morphine Other (comments)           Albuterol Other (comments)     Able to use but causes to be slightly jittery (not severe and no other anaphylactoid sxs). Family History: Mother is Susan Badillo with history of a.fib and breast cancer. Son is Kezia Magallanes with congenital heart disease (transposition of the great vessels). Blue Colon is her stepfather. Family History   Problem Relation Age of Onset    Other Mother         atrial fibrillation    Colon Polyps Mother    [facility] Breast Cancer Mother     Colon Polyps Brother         half brother    Heart Disease Brother         a-fib    Heart Disease Sister         half sister ASHD    Heart Attack Sister     Other Son         transposition of the great vessels and surgery for this    Other Son         congestive heart failure     Social History:   She  reports that she has quit smoking. She has a 0.13 pack-year smoking history. She has never used smokeless tobacco. She is  with two children. 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 She drinks a glass of wine 3-4 times a month. Social History     Substance and Sexual Activity   Alcohol Use Yes    Alcohol/week: 1.0 standard drinks    Types: 1 Glasses of wine per week     Immunization History:  Immunization History   Administered Date(s) Administered    COVID-19, PFIZER, MRNA, LNP-S, PF, 30MCG/0.3ML DOSE 03/22/2021, 04/12/2021    Influenza Vaccine 10/12/2016, 10/21/2019    Influenza Vaccine (Quad) PF (>6 Mo Flulaval, Fluarix, and >3 Yrs Afluria, Fluzone 34879) 11/25/2020    Pneumococcal Polysaccharide (PPSV-23) 11/25/2020    Tdap 11/10/2014       Review of Systems:   As above included in HPI.   Otherwise 11 point review of systems negative including constitutional, skin, HENT, eyes, respiratory, cardiovascular, gastrointestinal, genitourinary, musculoskeletal, endocrine, hematologic, allergy, and neurologic. Physical:   Visit Vitals  /72 (BP 1 Location: Left arm, BP Patient Position: Sitting)   Pulse 71   Temp 97.5 °F (36.4 °C) (Temporal)   Resp 16   Ht 5' 7\" (1.702 m)   Wt 159 lb (72.1 kg)   SpO2 98%   BMI 24.90 kg/m²         Exam:   Patient appears in no apparent distress. Affect is appropriate. HEENT: PERRLA, anicteric, oropharynx clear, no JVD, adenopathy or thyromegaly. No carotid bruits or radiated murmur. Lungs: clear to auscultation, no wheezes, rhonchi, or rales. Heart: regular rate and rhythm. No murmur, rubs, gallops  Abdomen: soft, nontender, nondistended, normal bowel sounds, no hepatosplenomegaly or masses. Extremities: without edema. Review of Data:  Labs:  Hospital Outpatient Visit on 06/16/2021   Component Date Value Ref Range Status    [facility]. 06/16/2021 Specimens collected/sent to [facility] by Chantilly Lace. Please direct inquiries to ([phone]). Final     Lab Results   Component Value Date/Time    WBC 5.1 06/16/2021 12:00 AM    HGB 12.9 06/16/2021 12:00 AM    HCT 38.2 06/16/2021 12:00 AM    PLATELET 628 16/63/1411 12:00 AM    MCV 94 06/16/2021 12:00 AM     Lab Results   Component Value Date/Time    Sodium 143 06/16/2021 12:00 AM    Potassium 4.4 06/16/2021 12:00 AM    Chloride 109 (H) 06/16/2021 12:00 AM    CO2 19 (L) 06/16/2021 12:00 AM    Anion gap 4 11/04/2019 09:20 AM    Glucose 96 06/16/2021 12:00 AM    BUN 13 06/16/2021 12:00 AM    Creatinine 0.76 06/16/2021 12:00 AM    BUN/Creatinine ratio 17 06/16/2021 12:00 AM    GFR est AA 98 06/16/2021 12:00 AM    GFR est non-AA 85 06/16/2021 12:00 AM    Calcium 9.2 06/16/2021 12:00 AM    Bilirubin, total <0.2 06/16/2021 12:00 AM    Alk.  phosphatase 93 06/16/2021 12:00 AM    Protein, total 7.0 06/16/2021 12:00 AM    Albumin 4.6 06/16/2021 12:00 AM    Globulin 3.9 09/20/2019 11:00 AM    A-G Ratio 1.9 06/16/2021 12:00 AM    ALT (SGPT) 23 06/16/2021 12:00 AM    AST (SGOT) 25 06/16/2021 12:00 AM     Lab Results   Component Value Date/Time    Cholesterol, total 253 (H) 06/16/2021 12:00 AM    HDL Cholesterol 58 06/16/2021 12:00 AM    LDL, calculated 156 (H) 06/16/2021 12:00 AM    LDL, calculated 127 (H) 09/05/2019 07:02 AM    VLDL, calculated 39 06/16/2021 12:00 AM    VLDL, calculated 49 (H) 09/05/2019 07:02 AM    Triglyceride 215 (H) 06/16/2021 12:00 AM    CHOL/HDL Ratio 4.5 10/29/2010 07:03 AM     Lab Results   Component Value Date/Time    VITAMIN D, 25-HYDROXY 26.7 (L) 06/16/2021 12:00 AM             Calculated 10 year ASCVD risk score: 3.2 %    Health Maintenance:  Screening:    Mammogram: negative (2/2021)    PAP smear: well women exams with Dr. Govind Hanson (s/p [facility])   Colorectal: colonoscopy (3/2011) normal. Dr. Ty Verma. Due 3/2021.   Scheduling   Depression: none   DM (HbA1c/FPG): FPG 96 (6/2021)   Hepatitis C: negative (2/2013)   Falls: none   DEXA: within normal limits (6/2021)   Glaucoma: regular eye exams with Dr. Jessica Rider    Smoking: none   Vitamin D: 26.7 (6/2021)   Medicare Wellness: N/A        Impression:  Patient Active Problem List   Diagnosis Code    Allergic rhinitis 80    Hyperlipidemia E78.5    S/P total knee arthroplasty, left P77.927    Family history of colonic polyps Z83.71    Vitamin D deficiency E55.9    Gastroesophageal reflux disease with esophagitis K21.00    Family history of breast cancer Z80.3    S/P hip replacement, right Z96.641    Quadriceps tendon rupture, left, sequela S76.112S    Sensorineural hearing loss (SNHL) of both ears H90.3    Mild intermittent asthma without complication F38.63    Paraesophageal hiatal hernia K44.9    Anxiety F41.9    Primary osteoarthritis involving multiple joints M89.49    Degenerative disc disease, lumbar M51.36    Facet arthropathy, lumbar M47.816    Hepatic steatosis K76.0    Esophageal dysphagia R13.10    S/P laparoscopic fundoplication V60.155    Weight loss, unintentional R63.4    Cervical spondylosis M47.812  Reactive depression F32.9       Plan:  1. Large hiatal hernia, s/p Toupet fundoplication repair. Difficulty in the past with esophageal dysmotility and required dilatation of esophagus by Dr. Cecile Horn. Found to have large [facility] as incidental finding on chest CT scan in 9/2019 and gastric body appeared thickened and nodular. Underwent upper endoscopy by Dr. Cecile Horn and noted to have [facility], [facility] lesions at hiatus of hernia. Treated with omeprazole. Upper GI series (10/21/2019) showing moderate to large sized mixed type hiatal hernia with suspected ulceration within the supradiaphragmatic portion of the stomach; delayed transit of barium tablet and esophageal dysmotility. Repeat upper endoscopy (11/7/2019) showed ulcer healing, and she had dilation of a benign intrinsic stricture at the GE junction. Biopsies of the esophagus were suggestive but not diagnostic for eosinophilic esophagitis. She was referred to Dr. Farideh Pandey and it was recommended that she undergo surgical repair of her large HH due to risk of gastric volvulus. On 1/16/2020, she underwent laparoscopic HH repair with Toupet fundoplication. Since surgery, she has had debilitating difficulty with chest pain and inability to belch, and problems with swallowing both solids and liquids. She underwent multiple upper endoscopies with dilation without improvement. Esophageal motility study by Dr. Rosana Webb in 5/2020, showed a short esophageal body and ineffective esophageal motility due to impaired DCI in peristalsis with resultant severe liquid and viscous bolus transit delays. Trials of valium, calcium channel blocker, and nortriptyline have been ineffective. She underwent evaluation by [facility]. Monty [facility] [place] [facility], and it was his opinion that the fundoplication is likely too tight. Repeat upper endoscopy with esoFLIP dilation was also ineffective. Recommendation was to proceed with reversal of Toupet with surgical takedown. However, patient hesitant to proceed.  Reports that now referred to [address] [place] [address] for evaluation of possible surgical takedown. Continuing to have persistent discomfort. Using Valium to help with chest pain and spasms, and consuming protein drinks for caloric support. Continuing to follow closely with Dr. Joaquin Winters. 2. Hyperlipidemia. Calculated 10 year ASCVD risk is 3.2 %, which does not place her in one of the four statin benefit groups as per new AHA/ACC guidelines. She did have a CT heart for calcium in 2014 that showed a score of 4 with minimal calcification seen in the LAD. Given calculated risk of < 5%, would not recommend treatment with statin at this time. Continues to have elevation of LDL at 156 and HDL 58 despite poor po intake. Emphasized importance of lifestyle modifications, including diet and exercise. Will reassess next visit. 3. Cervical spondylosis and right brachial neuritis. Being treated with Topamax 25 mg qAM and 50 mg qHS with good control. Using cyclobenzaprine only as needed. Being followed by YAMINI Montejo and Dr. Jennifer Foreman. 4. Mild intermittent asthma. Patient reports intermittent wheezing, although can not confirm pattern. Has used inhalers in association with URTI in the past. Prescribed Symbicort and albuterol and will use occasionally for increased symptoms. 5. Hepatic steatosis, moderate to severe. Emphasized importance of lifestyle modifications, including diet and exercise. Liver function test remain normal today. Advised to avoid alcohol. We will continue to monitor. 6. Reactive depression. Appears to be related to current medical issues. On venlafaxine and dose increased last visit to 150 mg daily. Reports generally good control today and not feeling that dose needs to be increased further. We will continue to monitor. 7. Osteoarthritis. Significant difficulty over the years involving her bilateral knees, hips, and lumbar spine. Being followed by Dr. Samuel Hairston.  Complained of right great toe pain and swelling her last visit and right foot x-ray (11/25/2020) show diffuse soft tissue swelling without gas of the right great toe; no fracture or evidence of osteomyelitis. Underwent evaluation by Dr. Angelita Carmona and right foot MRI (2/5/2021) obtained showing edema of the first great toe with evidence of degenerative joint disease at the MTP and IP joints. Recommendation was to use meloxicam for discomfort. Patient reports that she is taking as needed. 8. Insomnia. Long term issue. Used melatonin and Ambien in past. No longer using any medication and seems to be improved. 9. Health maintenance. Completed the 5-year COVID-19 vaccine series. Already received Tdap and Pneumovax 23. Given script for Shingrix vaccine previously and urged to obtain. Mammogram completed. Estrace discontinued given her mother's history of breast cancer. No further pap smears given s/p THIEN. Colonoscopy due 3/2021 and patient reports that she is in the process of scheduling with Dr. Vita Yanes. Will readdress next visit. Continue regular eye exams with Dr. Jesus Mckoy. Vitamin D level remains low and advised to begin cholecalciferol 2000 units daily. Baseline bone density study obtained on 6/2021 and was normal.    Patient understands recommendations and agrees with plan. Follow-up in 6 months.

## 2021-06-29 ENCOUNTER — TELEPHONE (OUTPATIENT)
Dept: INTERNAL MEDICINE CLINIC | Age: 61
End: 2021-06-29

## 2021-06-29 NOTE — TELEPHONE ENCOUNTER
Pt calling, says pharmacy says they sent information that the Inhaler is not being covered. She didn't know the name of the correct one they want her to use. Wants to know if you got the fax? Also says Symbicort is costing her 400.00 in copays.  Anything else you can call in?

## 2021-06-30 NOTE — TELEPHONE ENCOUNTER
Called [facility]. Said brand Symbicort is covered but generic is not. But Symbicort is expensive. They are going to fax an appeal form to do authorization for the generic. Confirmed fax number for them to send today.

## 2021-07-01 NOTE — TELEPHONE ENCOUNTER
Spoke with [facility] albuterol is being filled as ProAir and is covered with an out of pocket cost of $25 for 90 day supply. Generic for Symbicort is not covered- PA was denied. Brand Symbicort is considered Tier 2 and would be covered with an out of pocket cost of $506 for 90 day supply. Maged Nelson can do an appeal or call and do a peer review. Will place letter on her desk. LMTCB to discuss with patient.

## 2021-07-01 NOTE — TELEPHONE ENCOUNTER
Pt aware of message below and verbalized understanding. Explained the pharmacy will be able to tell her the cost once they run it through insurance but it should be the cheaper option. No further questions or concerns from pt at this time.

## 2021-07-01 NOTE — TELEPHONE ENCOUNTER
Will change to the generic form of Advair (fluticasone-salmeterol) which is listed as Tier 1 and is same class of inhaler as Symbicort.      Script sent to [facility]

## 2021-09-06 RX ORDER — ALBUTEROL SULFATE 90 UG/1
AEROSOL, METERED RESPIRATORY (INHALATION)
Qty: 25.5 G | Refills: 3 | Status: SHIPPED | OUTPATIENT
Start: 2021-09-06

## 2021-09-07 ENCOUNTER — TELEPHONE (OUTPATIENT)
Dept: INTERNAL MEDICINE CLINIC | Age: 61
End: 2021-09-07

## 2021-09-07 NOTE — TELEPHONE ENCOUNTER
Pt called to speak to Dr. Marsha Gonzales she said its very important and personal. She can be reached at [phone]. Please advise.  Thank you!!!

## 2021-09-15 ENCOUNTER — TELEPHONE (OUTPATIENT)
Dept: INTERNAL MEDICINE CLINIC | Age: 61
End: 2021-09-15

## 2021-09-15 NOTE — TELEPHONE ENCOUNTER
Pt calling again she says Dr. Ulises Hernandez advised her to speak with Dr. Ml Jackson about her personal issues. Pt can be reached at [phone]. Please advise.  Thank you!!!

## 2021-09-15 NOTE — TELEPHONE ENCOUNTER
Spoke with patient. Discussed that she is applying for Social Security disability and long-term disability and is requiring records from our office. Advised that she would need to sign a release of information and she requested that it be mailed to her since she is now located in the [place]. Please mail her what is needed for her to sign in order for her records to be released. Thanks.

## 2021-11-04 ENCOUNTER — APPOINTMENT (OUTPATIENT)
Dept: URBAN - METROPOLITAN AREA SURGERY 9 | Age: 61
Setting detail: DERMATOLOGY
End: 2021-11-04

## 2021-11-04 DIAGNOSIS — L81.4 OTHER MELANIN HYPERPIGMENTATION: ICD-10-CM

## 2021-11-04 DIAGNOSIS — L57.8 OTHER SKIN CHANGES DUE TO CHRONIC EXPOSURE TO NONIONIZING RADIATION: ICD-10-CM

## 2021-11-04 PROBLEM — D48.5 NEOPLASM OF UNCERTAIN BEHAVIOR OF SKIN: Status: ACTIVE | Noted: 2021-11-04

## 2021-11-04 PROCEDURE — OTHER REASSURANCE: OTHER

## 2021-11-04 PROCEDURE — OTHER BIOPSY BY SHAVE METHOD: OTHER

## 2021-11-04 PROCEDURE — OTHER SUNSCREEN RECOMMENDATIONS: OTHER

## 2021-11-04 PROCEDURE — OTHER MIPS QUALITY: OTHER

## 2021-11-04 PROCEDURE — 99203 OFFICE O/P NEW LOW 30 MIN: CPT | Mod: 25

## 2021-11-04 PROCEDURE — 11102 TANGNTL BX SKIN SINGLE LES: CPT

## 2021-11-04 ASSESSMENT — LOCATION DETAILED DESCRIPTION DERM
LOCATION DETAILED: RIGHT SUPERIOR MEDIAL BUCCAL CHEEK
LOCATION DETAILED: LEFT SUPERIOR CENTRAL BUCCAL CHEEK

## 2021-11-04 ASSESSMENT — LOCATION SIMPLE DESCRIPTION DERM
LOCATION SIMPLE: RIGHT CHEEK
LOCATION SIMPLE: LEFT CHEEK

## 2021-11-04 ASSESSMENT — LOCATION ZONE DERM: LOCATION ZONE: FACE

## 2022-01-05 ENCOUNTER — APPOINTMENT (OUTPATIENT)
Dept: URBAN - METROPOLITAN AREA SURGERY 9 | Age: 62
Setting detail: DERMATOLOGY
End: 2022-01-05

## 2022-01-05 PROBLEM — C44.311 BASAL CELL CARCINOMA OF SKIN OF NOSE: Status: ACTIVE | Noted: 2022-01-05

## 2022-01-05 PROCEDURE — OTHER CONSULTATION FOR MOHS SURGERY: OTHER

## 2022-01-05 PROCEDURE — OTHER RETURN TO REFERRING PROVIDER: OTHER

## 2022-01-05 PROCEDURE — 17311 MOHS 1 STAGE H/N/HF/G: CPT

## 2022-01-05 PROCEDURE — OTHER MOHS SURGERY: OTHER

## 2022-01-05 NOTE — PROCEDURE: CONSULTATION FOR MOHS SURGERY
Detail Level: Detailed
Body Location Override (Optional - Billing Will Still Be Based On Selected Body Map Location If Applicable): left soft triangle of the nose
X Size Of Lesion In Cm (Optional): 0
Name Of The Referring Provider For Procedure: MITCHEL [place]
Incorporate Mauc In Note: Yes

## 2022-01-05 NOTE — PROCEDURE: MOHS SURGERY
WDL Full Thickness Lip Wedge Repair (Flap) Text: Given the location of the defect and the proximity to free margins a full thickness wedge repair was deemed most appropriate.  Using a sterile surgical marker, the appropriate repair was drawn incorporating the defect and placing the expected incisions perpendicular to the vermillion border.  The vermillion border was also meticulously outlined to ensure appropriate reapproximation during the repair.  The area thus outlined was incised through and through with a #15 scalpel blade.  The muscularis and dermis were reaproximated with deep sutures following hemostasis. Care was taken to realign the vermillion border before proceeding with the superficial closure.  Once the vermillion was realigned the superfical and mucosal closure was finished.

## 2022-01-05 NOTE — PROCEDURE: MOHS SURGERY
Body Location Override (Optional - Billing Will Still Be Based On Selected Body Map Location If Applicable): left soft triangle of the nose

## 2022-03-03 NOTE — PROCEDURE: MOHS SURGERY
Call received from [facility]lyss[facility] home care with an update on patient's blood sugar. Alyssa was out for a home care visit and patient's blood sugar was 314 when she arrived. Patient gave herself 6 units of Novolog. Patient had forgotten to take her insulin this morning. Blood sugar 15 minutes later was 307. Patient has a Dexcom. This is an FYI as blood sugar was outside of [place] home care parameters.    Consent (Spinal Accessory)/Introductory Paragraph: The rationale for Mohs was explained to the patient and consent was obtained. The risks, benefits and alternatives to therapy were discussed in detail. Specifically, the risks of damage to the spinal accessory nerve, infection, scarring, bleeding, prolonged wound healing, incomplete removal, allergy to anesthesia, and recurrence were addressed. Prior to the procedure, the treatment site was clearly identified and confirmed by the patient. All components of Universal Protocol/PAUSE Rule completed.

## 2022-03-09 LAB
COLOGUARD TEST, EXTERNAL: NEGATIVE
COLONOSCOPY, EXTERNAL: NORMAL

## 2022-03-18 PROBLEM — M15.9 PRIMARY OSTEOARTHRITIS INVOLVING MULTIPLE JOINTS: Status: ACTIVE | Noted: 2019-09-16

## 2022-03-18 PROBLEM — F41.9 ANXIETY: Status: ACTIVE | Noted: 2019-09-16

## 2022-03-18 PROBLEM — M15.0 PRIMARY OSTEOARTHRITIS INVOLVING MULTIPLE JOINTS: Status: ACTIVE | Noted: 2019-09-16

## 2022-03-19 PROBLEM — M47.816 FACET ARTHROPATHY, LUMBAR: Status: ACTIVE | Noted: 2019-09-16

## 2022-03-19 PROBLEM — S76.112S QUADRICEPS TENDON RUPTURE, LEFT, SEQUELA: Status: ACTIVE | Noted: 2017-12-11

## 2022-03-19 PROBLEM — M51.36 DEGENERATIVE DISC DISEASE, LUMBAR: Status: ACTIVE | Noted: 2019-09-16

## 2022-03-19 PROBLEM — M51.369 DEGENERATIVE DISC DISEASE, LUMBAR: Status: ACTIVE | Noted: 2019-09-16

## 2022-03-19 PROBLEM — R63.4 WEIGHT LOSS, UNINTENTIONAL: Status: ACTIVE | Noted: 2020-11-28

## 2022-03-19 PROBLEM — H90.3 SENSORINEURAL HEARING LOSS (SNHL) OF BOTH EARS: Status: ACTIVE | Noted: 2019-09-16

## 2022-03-19 PROBLEM — F32.9 REACTIVE DEPRESSION: Status: ACTIVE | Noted: 2020-11-28

## 2022-03-19 PROBLEM — K76.0 HEPATIC STEATOSIS: Status: ACTIVE | Noted: 2019-09-16

## 2022-03-19 PROBLEM — J45.20 MILD INTERMITTENT ASTHMA WITHOUT COMPLICATION: Status: ACTIVE | Noted: 2019-09-16

## 2022-03-20 PROBLEM — R13.19 ESOPHAGEAL DYSPHAGIA: Status: ACTIVE | Noted: 2020-11-28

## 2022-03-20 PROBLEM — M47.812 CERVICAL SPONDYLOSIS: Status: ACTIVE | Noted: 2020-11-28

## 2022-03-20 PROBLEM — K44.9 PARAESOPHAGEAL HIATAL HERNIA: Status: ACTIVE | Noted: 2019-09-16

## 2022-03-20 PROBLEM — Z98.890 S/P LAPAROSCOPIC FUNDOPLICATION: Status: ACTIVE | Noted: 2020-11-28

## 2022-04-26 NOTE — LETTER
12/11/19 Patient: Jes Joshi YOB: 1960 Date of Visit: 12/11/2019 Tejinder Mckoy MD 
[address] [place] [address] VIA In Basket MD Chino MooneySuzanne [address]ates [address] [place] [address] VIA In Basket Dear MD Adelita De La O MD, Thank you for referring Ms. Ankit Garzno to [facility] for evaluation. My notes for this consultation are attached. If you have questions, please do not hesitate to call me. I look forward to following your patient along with you.  
 
 
Sincerely, 
 
Iva Rose MD 
 
 Dupixent Pregnancy And Lactation Text: This medication likely crosses the placenta but the risk for the fetus is uncertain. This medication is excreted in breast milk.

## 2022-06-05 DIAGNOSIS — R13.19 ESOPHAGEAL DYSPHAGIA: ICD-10-CM

## 2022-06-05 DIAGNOSIS — R63.4 WEIGHT LOSS, UNINTENTIONAL: ICD-10-CM

## 2022-06-05 DIAGNOSIS — Z00.00 LABORATORY EXAMINATION ORDERED AS PART OF A COMPLETE PHYSICAL EXAMINATION: Primary | ICD-10-CM

## 2022-06-05 DIAGNOSIS — E78.5 HYPERLIPIDEMIA, UNSPECIFIED HYPERLIPIDEMIA TYPE: ICD-10-CM

## 2022-06-05 DIAGNOSIS — E55.9 VITAMIN D DEFICIENCY: ICD-10-CM

## 2022-06-06 RX ORDER — VENLAFAXINE HYDROCHLORIDE 150 MG/1
150 CAPSULE, EXTENDED RELEASE ORAL DAILY
Qty: 90 CAPSULE | Refills: 0 | Status: SHIPPED | OUTPATIENT
Start: 2022-06-06 | End: 2022-08-09 | Stop reason: ALTCHOICE

## 2022-06-06 RX ORDER — TOPIRAMATE 25 MG/1
TABLET ORAL
Qty: 270 TABLET | Refills: 0 | Status: SHIPPED | OUTPATIENT
Start: 2022-06-06 | End: 2022-08-30 | Stop reason: SDUPTHER

## 2022-06-07 NOTE — TELEPHONE ENCOUNTER
Appt scheduled for august. Please put in lab orders to be done at [facility]. Current orders  22.

## 2022-06-15 NOTE — PROCEDURE: MOHS SURGERY
None Known Rhombic Flap Text: The defect edges were debeveled with a #15 scalpel blade.  Given the location of the defect and the proximity to free margins a rhombic flap was deemed most appropriate.  Using a sterile surgical marker, an appropriate rhombic flap was drawn incorporating the defect.    The area thus outlined was incised deep to adipose tissue with a #15 scalpel blade.  The skin margins were undermined to an appropriate distance in all directions utilizing iris scissors.

## 2022-07-05 ENCOUNTER — APPOINTMENT (OUTPATIENT)
Dept: URBAN - METROPOLITAN AREA SURGERY 9 | Age: 62
Setting detail: DERMATOLOGY
End: 2022-07-05

## 2022-07-05 DIAGNOSIS — L82.1 OTHER SEBORRHEIC KERATOSIS: ICD-10-CM

## 2022-07-05 DIAGNOSIS — L81.4 OTHER MELANIN HYPERPIGMENTATION: ICD-10-CM

## 2022-07-05 DIAGNOSIS — D18.0 HEMANGIOMA: ICD-10-CM

## 2022-07-05 DIAGNOSIS — L57.8 OTHER SKIN CHANGES DUE TO CHRONIC EXPOSURE TO NONIONIZING RADIATION: ICD-10-CM

## 2022-07-05 DIAGNOSIS — Z85.828 PERSONAL HISTORY OF OTHER MALIGNANT NEOPLASM OF SKIN: ICD-10-CM

## 2022-07-05 DIAGNOSIS — D22 MELANOCYTIC NEVI: ICD-10-CM

## 2022-07-05 DIAGNOSIS — L72.0 EPIDERMAL CYST: ICD-10-CM

## 2022-07-05 PROBLEM — D22.5 MELANOCYTIC NEVI OF TRUNK: Status: ACTIVE | Noted: 2022-07-05

## 2022-07-05 PROBLEM — D18.01 HEMANGIOMA OF SKIN AND SUBCUTANEOUS TISSUE: Status: ACTIVE | Noted: 2022-07-05

## 2022-07-05 PROCEDURE — 99213 OFFICE O/P EST LOW 20 MIN: CPT

## 2022-07-05 PROCEDURE — OTHER REASSURANCE: OTHER

## 2022-07-05 PROCEDURE — OTHER COUNSELING: OTHER

## 2022-07-05 PROCEDURE — OTHER SUNSCREEN RECOMMENDATIONS: OTHER

## 2022-07-05 PROCEDURE — OTHER OTHER: OTHER

## 2022-07-05 ASSESSMENT — LOCATION DETAILED DESCRIPTION DERM
LOCATION DETAILED: SUPERIOR THORACIC SPINE
LOCATION DETAILED: LEFT DISTAL DORSAL FOREARM
LOCATION DETAILED: LEFT CENTRAL MALAR CHEEK
LOCATION DETAILED: NASAL TIP
LOCATION DETAILED: INFERIOR THORACIC SPINE
LOCATION DETAILED: RIGHT SUPERIOR MEDIAL UPPER BACK
LOCATION DETAILED: RIGHT DISTAL DORSAL FOREARM
LOCATION DETAILED: INFERIOR MID FOREHEAD

## 2022-07-05 ASSESSMENT — LOCATION SIMPLE DESCRIPTION DERM
LOCATION SIMPLE: INFERIOR FOREHEAD
LOCATION SIMPLE: UPPER BACK
LOCATION SIMPLE: LEFT CHEEK
LOCATION SIMPLE: RIGHT FOREARM
LOCATION SIMPLE: LEFT FOREARM
LOCATION SIMPLE: RIGHT UPPER BACK
LOCATION SIMPLE: NOSE

## 2022-07-05 ASSESSMENT — LOCATION ZONE DERM
LOCATION ZONE: ARM
LOCATION ZONE: TRUNK
LOCATION ZONE: NOSE
LOCATION ZONE: FACE

## 2022-07-05 NOTE — PROCEDURE: OTHER
Other (Free Text): For eyelid margin milia, would refer her out for treatment.
Render Risk Assessment In Note?: no
Detail Level: Simple
Note Text (......Xxx Chief Complaint.): This diagnosis correlates with the

## 2022-08-04 ENCOUNTER — HOSPITAL ENCOUNTER (OUTPATIENT)
Dept: LAB | Age: 62
Discharge: HOME | End: 2022-08-04

## 2022-08-04 PROCEDURE — 99001 SPECIMEN HANDLING PT-LAB: CPT

## 2022-08-05 LAB
25(OH)D3+25(OH)D2 SERPL-MCNC: 32.9 NG/ML (ref 30–100)
ALBUMIN SERPL-MCNC: 4.6 G/DL (ref 3.8–4.8)
ALBUMIN/GLOB SERPL: 1.8 {RATIO} (ref 1.2–2.2)
ALP SERPL-CCNC: 102 IU/L (ref 44–121)
ALT SERPL-CCNC: 22 IU/L (ref 0–32)
APPEARANCE UR: CLEAR
AST SERPL-CCNC: 31 IU/L (ref 0–40)
BACTERIA #/AREA URNS HPF: NORMAL /[HPF]
BASOPHILS # BLD AUTO: 0.1 X10E3/UL (ref 0–0.2)
BASOPHILS NFR BLD AUTO: 1 %
BILIRUB SERPL-MCNC: 0.3 MG/DL (ref 0–1.2)
BILIRUB UR QL STRIP: NEGATIVE
BUN SERPL-MCNC: 14 MG/DL (ref 8–27)
BUN/CREAT SERPL: 19 (ref 12–28)
CALCIUM SERPL-MCNC: 9 MG/DL (ref 8.7–10.3)
CASTS URNS QL MICRO: NORMAL /LPF
CHLORIDE SERPL-SCNC: 104 MMOL/L (ref 96–106)
CHOLEST SERPL-MCNC: 228 MG/DL (ref 100–199)
CO2 SERPL-SCNC: 21 MMOL/L (ref 20–29)
COLOR UR: YELLOW
CREAT SERPL-MCNC: 0.73 MG/DL (ref 0.57–1)
EGFR: 92 ML/MIN/1.73
EOSINOPHIL # BLD AUTO: 0.5 X10E3/UL (ref 0–0.4)
EOSINOPHIL NFR BLD AUTO: 8 %
EPI CELLS #/AREA URNS HPF: NORMAL /HPF (ref 0–10)
ERYTHROCYTE [DISTWIDTH] IN BLOOD BY AUTOMATED COUNT: 12.4 % (ref 11.7–15.4)
GLOBULIN SER CALC-MCNC: 2.5 G/DL (ref 1.5–4.5)
GLUCOSE SERPL-MCNC: 80 MG/DL (ref 65–99)
GLUCOSE UR QL STRIP: NEGATIVE
HCT VFR BLD AUTO: 41.2 % (ref 34–46.6)
HDLC SERPL-MCNC: 75 MG/DL
HGB BLD-MCNC: 13.5 G/DL (ref 11.1–15.9)
HGB UR QL STRIP: NEGATIVE
IMM GRANULOCYTES # BLD AUTO: 0 X10E3/UL (ref 0–0.1)
IMM GRANULOCYTES NFR BLD AUTO: 1 %
IMP & REVIEW OF LAB RESULTS: NORMAL
KETONES UR QL STRIP: NEGATIVE
LDLC SERPL CALC-MCNC: 127 MG/DL (ref 0–99)
LEUKOCYTE ESTERASE UR QL STRIP: NEGATIVE
LYMPHOCYTES # BLD AUTO: 2.5 X10E3/UL (ref 0.7–3.1)
LYMPHOCYTES NFR BLD AUTO: 43 %
MCH RBC QN AUTO: 31.7 PG (ref 26.6–33)
MCHC RBC AUTO-ENTMCNC: 32.8 G/DL (ref 31.5–35.7)
MCV RBC AUTO: 97 FL (ref 79–97)
MICRO URNS: NORMAL
MICRO URNS: NORMAL
MONOCYTES # BLD AUTO: 0.5 X10E3/UL (ref 0.1–0.9)
MONOCYTES NFR BLD AUTO: 8 %
NEUTROPHILS # BLD AUTO: 2.2 X10E3/UL (ref 1.4–7)
NEUTROPHILS NFR BLD AUTO: 39 %
NITRITE UR QL STRIP: NEGATIVE
PH UR STRIP: 6.5 [PH] (ref 5–7.5)
PLATELET # BLD AUTO: 252 X10E3/UL (ref 150–450)
POTASSIUM SERPL-SCNC: 4.5 MMOL/L (ref 3.5–5.2)
PROT SERPL-MCNC: 7.1 G/DL (ref 6–8.5)
PROT UR QL STRIP: NEGATIVE
RBC # BLD AUTO: 4.26 X10E6/UL (ref 3.77–5.28)
RBC #/AREA URNS HPF: NORMAL /HPF (ref 0–2)
SODIUM SERPL-SCNC: 140 MMOL/L (ref 134–144)
SP GR UR STRIP: 1.02 (ref 1–1.03)
TRIGL SERPL-MCNC: 148 MG/DL (ref 0–149)
UROBILINOGEN UR STRIP-MCNC: 0.2 MG/DL (ref 0.2–1)
VLDLC SERPL CALC-MCNC: 26 MG/DL (ref 5–40)
WBC # BLD AUTO: 5.7 X10E3/UL (ref 3.4–10.8)
WBC #/AREA URNS HPF: NORMAL /HPF (ref 0–5)

## 2022-08-06 LAB — XX-LABCORP SPECIMEN COL,LCBCF: NORMAL

## 2022-08-09 ENCOUNTER — OFFICE VISIT (OUTPATIENT)
Dept: INTERNAL MEDICINE CLINIC | Age: 62
End: 2022-08-09

## 2022-08-09 VITALS
HEIGHT: 67 IN | OXYGEN SATURATION: 97 % | SYSTOLIC BLOOD PRESSURE: 108 MMHG | DIASTOLIC BLOOD PRESSURE: 66 MMHG | RESPIRATION RATE: 16 BRPM | TEMPERATURE: 97 F | WEIGHT: 153 LBS | HEART RATE: 67 BPM | BODY MASS INDEX: 24.01 KG/M2

## 2022-08-09 DIAGNOSIS — R13.19 ESOPHAGEAL DYSPHAGIA: ICD-10-CM

## 2022-08-09 DIAGNOSIS — Z23 ENCOUNTER FOR IMMUNIZATION: ICD-10-CM

## 2022-08-09 DIAGNOSIS — Z00.00 LABORATORY EXAMINATION ORDERED AS PART OF A COMPLETE PHYSICAL EXAMINATION: ICD-10-CM

## 2022-08-09 DIAGNOSIS — J45.20 MILD INTERMITTENT ASTHMA WITHOUT COMPLICATION: ICD-10-CM

## 2022-08-09 DIAGNOSIS — E55.9 VITAMIN D DEFICIENCY: ICD-10-CM

## 2022-08-09 DIAGNOSIS — R63.4 WEIGHT LOSS, UNINTENTIONAL: ICD-10-CM

## 2022-08-09 DIAGNOSIS — Z98.890 S/P LAPAROSCOPIC FUNDOPLICATION: ICD-10-CM

## 2022-08-09 DIAGNOSIS — U09.9 POST-ACUTE SEQUELAE OF COVID-19 (PASC): ICD-10-CM

## 2022-08-09 DIAGNOSIS — F33.0 MILD EPISODE OF RECURRENT MAJOR DEPRESSIVE DISORDER (HCC): ICD-10-CM

## 2022-08-09 DIAGNOSIS — Z00.00 ENCOUNTER FOR ROUTINE HISTORY AND PHYSICAL EXAM IN FEMALE: Primary | ICD-10-CM

## 2022-08-09 DIAGNOSIS — Z86.16 HISTORY OF 2019 NOVEL CORONAVIRUS DISEASE (COVID-19): ICD-10-CM

## 2022-08-09 DIAGNOSIS — Z12.31 SCREENING MAMMOGRAM, ENCOUNTER FOR: ICD-10-CM

## 2022-08-09 DIAGNOSIS — E78.5 HYPERLIPIDEMIA, UNSPECIFIED HYPERLIPIDEMIA TYPE: ICD-10-CM

## 2022-08-09 DIAGNOSIS — K21.00 GASTROESOPHAGEAL REFLUX DISEASE WITH ESOPHAGITIS WITHOUT HEMORRHAGE: ICD-10-CM

## 2022-08-09 PROCEDURE — 99396 PREV VISIT EST AGE 40-64: CPT | Performed by: INTERNAL MEDICINE

## 2022-08-09 PROCEDURE — 90471 IMMUNIZATION ADMIN: CPT | Performed by: INTERNAL MEDICINE

## 2022-08-09 PROCEDURE — 90677 PCV20 VACCINE IM: CPT | Performed by: INTERNAL MEDICINE

## 2022-08-09 RX ORDER — PANTOPRAZOLE SODIUM 20 MG/1
20 TABLET, DELAYED RELEASE ORAL DAILY
COMMUNITY

## 2022-08-09 RX ORDER — VENLAFAXINE HYDROCHLORIDE 75 MG/1
CAPSULE, EXTENDED RELEASE ORAL
Qty: 21 CAPSULE | Refills: 0 | Status: SHIPPED | OUTPATIENT
Start: 2022-08-09 | End: 2022-09-06

## 2022-08-09 RX ORDER — SERTRALINE HYDROCHLORIDE 100 MG/1
TABLET, FILM COATED ORAL
Qty: 97 TABLET | Refills: 1 | Status: SHIPPED | OUTPATIENT
Start: 2022-08-23 | End: 2022-09-22

## 2022-08-09 NOTE — PATIENT INSTRUCTIONS
Heart-Healthy Diet: Care Instructions  Your Care Instructions     A heart-healthy diet has lots of vegetables, fruits, nuts, beans, and whole grains, and is low in salt. It limits foods that are high in saturated fat, such as meats, cheeses, and fried foods. It may be hard to change your diet, but even small changes can lower your risk of heart attack and heart disease. Follow-up care is a key part of your treatment and safety. Be sure to make and go to all appointments, and call your doctor if you are having problems. It's also a good idea to know your test results and keep a list of the medicines you take. How can you care for yourself at home? Watch your portions  Learn what a serving is. A \"serving\" and a \"portion\" are not always the same thing. Make sure that you are not eating larger portions than are recommended. For example, a serving of pasta is ½ cup. A serving size of meat is 2 to 3 ounces. A 3-ounce serving is about the size of a deck of cards. Measure serving sizes until you are good at [place]" them. Keep in mind that restaurants often serve portions that are 2 or 3 times the size of one serving. To keep your energy level up and keep you from feeling hungry, eat often but in smaller portions. Eat only the number of calories you need to stay at a healthy weight. If you need to lose weight, eat fewer calories than your body burns (through exercise and other physical activity). Eat more fruits and vegetables  Eat a variety of fruit and vegetables every day. Dark green, deep orange, red, or yellow fruits and vegetables are especially good for you. Examples include spinach, carrots, peaches, and berries. Keep carrots, celery, and other veggies handy for snacks. Buy fruit that is in season and store it where you can see it so that you will be tempted to eat it. Cook dishes that have a lot of veggies in them, such as stir-fries and soups.   Limit saturated and trans fat  Read food labels, and try to avoid saturated and trans fats. They increase your risk of heart disease. Use olive or canola oil when you cook. Bake, broil, grill, or steam foods instead of frying them. Choose lean meats instead of high-fat meats such as hot dogs and sausages. Cut off all visible fat when you prepare meat. Eat fish, skinless poultry, and meat alternatives such as soy products instead of high-fat meats. Soy products, such as tofu, may be especially good for your heart. Choose low-fat or fat-free milk and dairy products. Eat foods high in fiber  Eat a variety of grain products every day. Include whole-grain foods that have lots of fiber and nutrients. Examples of whole-grain foods include oats, whole wheat bread, and brown rice. Buy whole-grain breads and cereals, instead of white bread or pastries. Limit salt and sodium  Limit how much salt and sodium you eat to help lower your blood pressure. Taste food before you salt it. Add only a little salt when you think you need it. With time, your taste buds will adjust to less salt. Eat fewer snack items, fast foods, and other high-salt, processed foods. Check food labels for the amount of sodium in packaged foods. Choose low-sodium versions of canned goods (such as soups, vegetables, and beans). Limit sugar  Limit drinks and foods with added sugar. These include candy, desserts, and soda pop. Limit alcohol  Limit alcohol to no more than 2 drinks a day for men and 1 drink a day for women. Too much alcohol can cause health problems. When should you call for help? Watch closely for changes in your health, and be sure to contact your doctor if:    You would like help planning heart-healthy meals. Where can you learn more? Go to http://www.prince.com/  Enter V137 in the search box to learn more about \"Heart-Healthy Diet: Care Instructions. \"  Current as of: August 22, 2019               Content Version: 12.6  © 8318-4355 [employer]. Care instructions adapted under license by [employer] (which disclaims liability or warranty for this information). If you have questions about a medical condition or this instruction, always ask your healthcare professional. [address] any warranty or liability for your use of this information. High Cholesterol: Care Instructions  Your Care Instructions     Cholesterol is a type of fat in your blood. It is needed for many body functions, such as making new cells. Cholesterol is made by your body. It also comes from food you eat. High cholesterol means that you have too much of the fat in your blood. This raises your risk of a heart attack and stroke. LDL and HDL are part of your total cholesterol. LDL is the \"bad\" cholesterol. High LDL can raise your risk for heart disease, heart attack, and stroke. HDL is the \"good\" cholesterol. It helps clear bad cholesterol from the body. High HDL is linked with a lower risk of heart disease, heart attack, and stroke. Your cholesterol levels help your doctor find out your risk for having a heart attack or stroke. You and your doctor can talk about whether you need to lower your risk and what treatment is best for you. A heart-healthy lifestyle along with medicines can help lower your cholesterol and your risk. The way you choose to lower your risk will depend on how high your risk is for heart attack and stroke. It will also depend on how you feel about taking medicines. Follow-up care is a key part of your treatment and safety. Be sure to make and go to all appointments, and call your doctor if you are having problems. It's also a good idea to know your test results and keep a list of the medicines you take. How can you care for yourself at home? Eat a variety of foods every day.  Good choices include fruits, vegetables, whole grains (like oatmeal), dried beans and peas, nuts and seeds, soy products (like tofu), and fat-free or low-fat dairy products. Replace butter, margarine, and hydrogenated or partially hydrogenated oils with olive and canola oils. (Canola oil margarine without trans fat is fine.)  Replace red meat with fish, poultry, and soy protein (like tofu). Limit processed and packaged foods like chips, crackers, and cookies. Bake, broil, or steam foods. Don't iqbal them. Be physically active. Get at least 30 minutes of exercise on most days of the week. Walking is a good choice. You also may want to do other activities, such as running, swimming, cycling, or playing tennis or team sports. Stay at a healthy weight or lose weight by making the changes in eating and physical activity listed above. Losing just a small amount of weight, even 5 to 10 pounds, can reduce your risk for having a heart attack or stroke. Do not smoke. When should you call for help? Watch closely for changes in your health, and be sure to contact your doctor if:    You need help making lifestyle changes. You have questions about your medicine. Where can you learn more? Go to http://www.gray.com/  Enter V721 in the search box to learn more about \"High Cholesterol: Care Instructions. \"  Current as of: December 16, 2019               Content Version: 12.6  © 6528-5909 [employer]. Care instructions adapted under license by [employer] (which disclaims liability or warranty for this information). If you have questions about a medical condition or this instruction, always ask your healthcare professional. [place] [address] any warranty or liability for your use of this information.

## 2022-08-09 NOTE — LETTER
2022 3:34 PM    Ms. Joanne Casillas  [address] [address]  : 1960      To whom it may concern:    Ms. Maire Harada is a patient under my care at [facility]. She suffers from chronic pain following surgery and 2019 for repair of a large hiatal hernia. Due to the severity of the pain, she has difficulty swallowing and eating. Please have the patient contact our office if any further clarification is needed.     Sincerely,      Yobany Sanchez MD

## 2022-08-09 NOTE — PROGRESS NOTES
1. \"Have you been to the ER, urgent care clinic since your last visit? Hospitalized since your last visit? \" No    2. \"Have you seen or consulted any other health care providers outside of the [address] [place] [address] since your last visit? \" No     3. For patients aged 39-70: Has the patient had a colonoscopy / FIT/ Cologuard? Yes - no Care Gap present      If the patient is female:    4. For patients aged 41-77: Has the patient had a mammogram within the past 2 years? Yes - Care Gap present. Most recent result on file      5. For patients aged 21-65: Has the patient had a pap smear?  Yes - no Care Gap present

## 2022-08-14 PROBLEM — S76.112S QUADRICEPS TENDON RUPTURE, LEFT, SEQUELA: Status: RESOLVED | Noted: 2017-12-11 | Resolved: 2022-08-14

## 2022-08-14 PROBLEM — U09.9 POST-ACUTE SEQUELAE OF COVID-19 (PASC): Status: ACTIVE | Noted: 2022-08-14

## 2022-08-14 PROBLEM — K44.9 PARAESOPHAGEAL HIATAL HERNIA: Status: RESOLVED | Noted: 2019-09-16 | Resolved: 2022-08-14

## 2022-08-14 PROBLEM — F33.0 MILD EPISODE OF RECURRENT MAJOR DEPRESSIVE DISORDER (HCC): Status: ACTIVE | Noted: 2020-11-28

## 2022-08-14 PROBLEM — Z86.16 HISTORY OF 2019 NOVEL CORONAVIRUS DISEASE (COVID-19): Status: ACTIVE | Noted: 2022-08-14

## 2022-08-14 NOTE — PROGRESS NOTES
HPI:   Colton Goldmann is a 60y.o. year old female who presents today for a physical exam.  She has a history of hyperlipidemia, mild intermittent asthma, GERD, hiatal hernia, hepatic steatosis, osteoarthritis, lumbar degenerative disease, insomnia, and anxiety. She has completed the [facility] COVID-19 vaccine series and received one Pfizer booster dose. She reports that she is doing only fairly well. She reports today that she is continuing to have difficulty with chest discomfort and swallowing both solids and liquids. She underwent a repeat upper endoscopy on 2/8/2022 with Dr. Marck Dinh which was normal and dilation was performed. She was instructed to continue Protonix 40 mg daily she also underwent evaluation at [facility] for her continued difficulties and options of surgical reversal versus achalasia type balloon dilatation of the sphincter were discussed, but the patient deemed the risks to be too great and decided not to proceed. She states that she did receive a THC gummy from a friend and states that she found it to be quite effective in controlling her pain and increasing her appetite so that she was able to eat a full meal.  She inquires if a prescription for this could be written. she does describe increasing difficulty with irritability and depressive symptoms and does not feel that venlafaxine is continuing to be effective. She also reports that she developed COVID-19 infection in 12/2021 with symptoms of fever, nasal congestion, cough, shortness of breath, decreased taste/smell, and brain fog. She describes persistent difficulty with brain fog and states that her smell remains altered. She is otherwise without new complaints. Summary of prior hospitalizations and medical history:  On 9/25/2019, she underwent a chest CT scan which showed that her hiatal hernia had enlarged with the majority of the gastric body in the chest with thickened and nodular walls.  She was referred to Dr. Marck Dinh for evaluation, and underwent upper endoscopy (10/17/2019) by Dr. Jose Alcantara which showed a large [facility] with patchy linear ulceration at the [facility] hiatus in the fundus compatible with Devonte's lesions. She was started on omeprazole 20 mg daily, and an upper GI series was obtained (10/21/2019) showing moderate to large sized mixed type hiatal hernia with suspected ulceration within the supradiaphragmatic portion of the stomach; delayed transit of barium tablet and esophageal dysmotility. She had a repeat upper endoscopy on 11/7/2019 which showed ulcer healing, and she had dilation of a benign intrinsic stricture at the GE junction. Biopsies of the esophagus were suggestive but not diagnostic for eosinophilic esophagitis. She was referred to Dr. Chrissie Dexter and it was recommended that she undergo surgical repair. On 1/16/2020, she underwent laparoscopic HH repair with Toupet fundoplication. Post-operatively, she had difficulty tolerating po due to frequent belching and nausea. UGI showed prolonged passage of contrast into the stomach. She was managed non-operatively initially with the hope that this was post-operative edema, but she continued to fail to progress and eventually underwent an EGD with dilation on 1/24/20. Post-procedure, she felt much better, and her diet was slowly advanced to normal, and she was discharged on 1/27/2020. However, she continued to have difficulty with severe substernal chest pain with swallowing, and underwent repeat upper endoscopy with balloon dilation on 2/13/2020 without improvement. A barium swallow was obtained (3/16/2020) showing mild esophageal dysmotility but without significant obstruction and no evidence of contrast extravasation. On 5/1/2020, she underwent an esophageal motility study by Dr. Henrietta Oshea, showing ineffective esophageal motility due to impaired DCI in peristalsis with resultant severe liquid and viscous bolus transit delays; and short esophageal body.  She underwent evaluation by Dr. Kedar Valle at [facility] on 5/18/2020, and it was felt that her symptoms were likely secondary to a tight fundoplication. She was treated with calcium channel blocker and  valium without improvement. On 7/31/2020, she underwent upper endoscopy by Dr. Kedar Valle, showing EGJ widely patent, paraesophageal hernia with intact wrap, and Esoflip dilation to 27 mm performed with therapeutic mucosal tear. However, she did not improve, and most recently was given a trial of nortriptyline without benefit. She was also treated with omeprazole 20 mg daily. She reports that the final recommendation by Dr. Kedar Valle would be for consideration of repeat surgery with Toupet takedown. She has a history of hyperlipidemia, not previously treated with medication. She underwent a CT heart in 2/2014 with calcium score of 4 due to minimal calcium noted in the wall of the LAD. She remains active, and denies any chest pain, shortness of breath at rest or with exertion, palpitations, lightheadedness, or edema. She has a history of mild intermittent asthma, and will require use of albuterol on occasion. She has also been prescribed Symbicort, but uses this rarely. She has no prior history of smoking. She has a history of GERD with esophagitis, and required esophageal dilatation x 2 in the past by Dr. Ori Delgado. She states that she occasionally will have difficulty swallowing with food \"getting stuck\", but states that it is not a frequent problem. She was previously treated by Nexium, but in 2003 developed acute sensorineural hearing loss and there was question at that time that it may be related to treatment with her PPI. She also has a history of a moderate hiatal hernia, noted on the cardiac CT scan in 2014, and marked hepatic steatosis was noted on an abdominal ultrasound in 11/2015. She had a screening colonoscopy in 3/2011 by Dr. Jadyn Donis which was normal. Follow-up recommended for 10 years.      She has a history of osteoarthritis, and underwent right hip replacement (10/2016) and left knee replacement (11/2017) by Dr. Bennett Duran. In 12/2017, she presented with acute worsening of left knee pain after her surgery, and was found to have an acute tear of the medial retinacular of her quadriceps tendon which required surgical repair. She also has a history of low back pain, and in 8/2015 underwent a lumbar MRI showing multilevel degenerative changes which was most severe at L4-5 where there was significant canal stenosis due to a moderate posterior disc bulge; mild bilateral foraminal narrowing also seen throughout the lumbar spine. In 9/2019, she complained of right neck and shoulder pain with radiation to her scapula and right fingers. She was evaluated initially by Dr. Khari Cochran, and a right shoulder MRI (11/6/2019) showed only mild degenerative changes and tendinopathy involving the supraspinatus and biceps tendons. She also had a cervical RMI (11/6/2019) showing multilevel disc osteophyte complex and facet arthropathy but without evidence of significant central canal stenosis; multilevel foraminal stenoses. She was referred to Dr. Yeni Mcgarry, and underwent a right EMG/NCS (12/2019) which did not show findings to explain her complaints. She was treated with Topamax, Flexeril and received a right shoulder bursal injection. In 1/2015, while [address] in [place], she sustained a laceration on the dorsal portion of right heel. She states that her  closed the wound with super glue, and she subsequently developed an infection and painful ulcer. She was seen at urgent care in early 2/2015 and given a dose of ceftriaxone and started on Levaquin. She was subsequently seen by YAMINI Hawkins on 2/6/2015 and right foot xray (2/6/2015) showed significant thickening and induration of plantar soft tissue at the right heel, due to acute edematous infiltration, but no soft tissue gas or foreign body noted, and no signs of osteomyelitis.  Wound culture (2/6/2015) revealed Staph aureus, sensitive to Bactrim. She also had an arterial duplex ultrasound (2/27/2015) which was negative for PAD. After multiple courses of antibiotics and local wound care, the ulcer healed. She has a history of vasomotor instability, and was being treated with venlafaxine and Estrace as prescribed by Dr. Racquel Ramos. She also has a history of chronic insomnia, treated previously with Ambien and melatonin. Past Medical History:   Diagnosis Date    Allergic rhinitis     Anxiety 9/16/2019    Claustrophobia     Degenerative disc disease, lumbar 9/16/2019    Facet arthropathy, lumbar 9/16/2019    Gastroesophageal reflux disease with esophagitis 11/10/2014    Hepatic steatosis 9/16/2019    Herniation of intervertebral disc between L4 and L5 9/16/2019    Hiatal hernia 9/16/2019    History of EMG/NCV UEs 12/2019    no radiculopathy, compressive mononeuropathy    Hyperlipidemia     Insomnia 9/16/2019    Lumbar stenosis 9/16/2019    Mild intermittent asthma without complication 8/76/9349    Primary osteoarthritis involving multiple joints 9/16/2019    Quadriceps tendon rupture, left, sequela 12/11/2017    Medial retinacular tear following left total knee replacement; s/p left quadricep tendon repair 12/11/2017. Dr. Lulu Garnett. S/P hip replacement, right 3/3/2016    Vitamin D deficiency 11/10/2014     Past Surgical History:   Procedure Laterality Date    ENDOSCOPY, COLON, DIAGNOSTIC  03-18-11    normal colon to cecum    HX BREAST REDUCTION      HX HEENT      skin cancer between eyes    HX HIP REPLACEMENT Right 10/2016    HX HYSTERECTOMY      HX KNEE REPLACEMENT      HX KNEE REPLACEMENT Left 11/2017    HX LAP CHOLECYSTECTOMY  11/2015    HX TONSIL AND ADENOIDECTOMY      HX TUBAL LIGATION       Current Outpatient Medications   Medication Sig    pantoprazole (Protonix) 20 mg tablet Take 20 mg by mouth in the morning.     venlafaxine-SR (EFFEXOR-XR) 75 mg capsule Take 1 Capsule by mouth daily for 14 days, THEN 1 Capsule every other day for 14 days. Then discontinue. [START ON 8/23/2022] sertraline (ZOLOFT) 100 mg tablet Take 0.5 Tablets by mouth daily for 14 days, THEN 1 Tablet daily for 90 days. Begin taking on third week of venlafaxine taper (when venlafaxine dose is decreased to 1 capsule every other day, begin 0.5 tab of Zoloft). topiramate (TOPAMAX) 25 mg tablet TAKE 1 TABLET BY MOUTH IN  THE MORNING AND 2 TABLETS  BY MOUTH IN THE EVENING    albuterol (PROVENTIL HFA, VENTOLIN HFA, PROAIR HFA) 90 mcg/actuation inhaler USE 1 INHALATION BY MOUTH  EVERY 4 HOURS AS NEEDED FOR WHEEZING    fluticasone propion-salmeteroL 232-14 mcg/actuation aebs Take 1 Actuation(s) by inhalation two (2) times a day. diazePAM (VALIUM) 5 mg tablet Take 5 mg by mouth two (2) times a day. cyclobenzaprine (FLEXERIL) 10 mg tablet Take 1 Tab by mouth nightly as needed for Muscle Spasm(s). No current facility-administered medications for this visit. Allergies and Intolerances: Allergies   Allergen Reactions    Other Plant, Animal, Environmental Anaphylaxis     poison ivy    Morphine Other (comments)           Albuterol Other (comments)     Able to use but causes to be slightly jittery (not severe and no other anaphylactoid sxs). Family History: Mother is David George with history of a.fib and breast cancer. Son is Lizzy Villatoro with congenital heart disease (transposition of the great vessels). Robbie Garvey is her stepfather. Family History   Problem Relation Age of Onset    Other Mother         atrial fibrillation    Colon Polyps Mother     Breast Cancer Mother     Colon Polyps Brother         half brother    Heart Disease Brother         a-fib    Heart Disease Sister         half sister ASHD    Heart Attack Sister     Other Son         transposition of the great vessels and surgery for this    Other Son         congestive heart failure     Social History:   She  reports that she has quit smoking.  Her smoking use included cigarettes. She has a 0.13 pack-year smoking history. She has never used smokeless tobacco. She is  with two children. 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 She drinks a glass of wine 3-4 times a month. Social History     Substance and Sexual Activity   Alcohol Use Yes    Alcohol/week: 1.0 standard drink    Types: 1 Glasses of wine per week     Immunization History:  Immunization History   Administered Date(s) Administered    COVID-19, PFIZER PURPLE top, DILUTE for use, (age 15 y+), IM, 30mcg/0.3mL 03/22/2021, 04/12/2021    Influenza Vaccine 10/12/2016, 10/21/2019    Influenza Vaccine (Quad) PF (>6 Mo Flulaval, Fluarix, and >3 Yrs Afluria, Fluzone 66204) 11/25/2020    Pneumococcal Conjugate PCV20, PF (Prevnar 20) 08/09/2022    Pneumococcal Polysaccharide (PPSV-23) 11/25/2020    Tdap 11/10/2014       Review of Systems:   As above included in HPI. Otherwise 11 point review of systems negative including constitutional, skin, HENT, eyes, respiratory, cardiovascular, gastrointestinal, genitourinary, musculoskeletal, endocrine, hematologic, allergy, and neurologic. Physical:   Visit Vitals  /66 (BP 1 Location: Left upper arm, BP Patient Position: Sitting)   Pulse 67   Temp 97 °F (36.1 °C) (Temporal)   Resp 16   Ht 5' 7\" (1.702 m)   Wt 153 lb (69.4 kg)   SpO2 97%   BMI 23.96 kg/m²         Exam:   Patient appears in no apparent distress. Affect is appropriate. HEENT --Anicteric sclerae, tympanic membranes normal,  ear canals normal.  PERRL, EOMI, conjunctiva and lids normal.  No cervical lymphadenopathy. No thyromegaly, JVD, or bruits. Carotid pulses 2+ with normal upstroke. Lungs --Clear to auscultation. No wheezing or rales. Heart --Regular rate and rhythm, no murmurs, rubs, gallops, or clicks. Abdomen -- Soft and nontender, no hepatosplenomegaly or masses. Extremities -- Without cyanosis, clubbing, edema.  Normal looking digits, ROM intact  Neuro -- CN 2-12 intact, strength 5/5 with intact soft touch in all extremities  Derm - no obvious abnormalities noted, no rash        Review of Data:  Labs:  Hospital Outpatient Visit on 08/04/2022   Component Date Value Ref Range Status    [facility]. 08/04/2022 Specimens collected/sent to [facility]. Please direct inquiries to ([phone]). Final       Lab Results   Component Value Date/Time    WBC 5.7 08/04/2022 12:00 AM    HGB 13.5 08/04/2022 12:00 AM    HCT 41.2 08/04/2022 12:00 AM    PLATELET 122 71/89/8985 12:00 AM    MCV 97 08/04/2022 12:00 AM     Lab Results   Component Value Date/Time    Sodium 140 08/04/2022 12:00 AM    Potassium 4.5 08/04/2022 12:00 AM    Chloride 104 08/04/2022 12:00 AM    CO2 21 08/04/2022 12:00 AM    Anion gap 4 11/04/2019 09:20 AM    Glucose 80 08/04/2022 12:00 AM    BUN 14 08/04/2022 12:00 AM    Creatinine 0.73 08/04/2022 12:00 AM    BUN/Creatinine ratio 19 08/04/2022 12:00 AM    GFR est AA 98 06/16/2021 12:00 AM    GFR est non-AA 85 06/16/2021 12:00 AM    Calcium 9.0 08/04/2022 12:00 AM    Bilirubin, total 0.3 08/04/2022 12:00 AM    Alk. phosphatase 102 08/04/2022 12:00 AM    Protein, total 7.1 08/04/2022 12:00 AM    Albumin 4.6 08/04/2022 12:00 AM    Globulin 3.9 09/20/2019 11:00 AM    A-G Ratio 1.8 08/04/2022 12:00 AM    ALT (SGPT) 22 08/04/2022 12:00 AM    AST (SGOT) 31 08/04/2022 12:00 AM     Lab Results   Component Value Date/Time    Cholesterol, total 228 (H) 08/04/2022 12:00 AM    HDL Cholesterol 75 08/04/2022 12:00 AM    LDL, calculated 127 (H) 08/04/2022 12:00 AM    LDL, calculated 127 (H) 09/05/2019 07:02 AM    VLDL, calculated 26 08/04/2022 12:00 AM    VLDL, calculated 49 (H) 09/05/2019 07:02 AM    Triglyceride 148 08/04/2022 12:00 AM    CHOL/HDL Ratio 4.5 10/29/2010 07:03 AM     Lab Results   Component Value Date/Time    VITAMIN D, 25-HYDROXY 32.9 08/04/2022 12:00 AM       Urinalysis negative.       Calculated 10 year ASCVD risk score: 3.0 %    Health Maintenance:  Screening:    Mammogram: negative (2/2021)    PAP smear: well women exams with Dr. Vanita Linares (s/p [facility])   Colorectal: colonoscopy (3/2011) normal.  Cologuard negative (3/2022). Due 3/2025. Depression: none   DM (HbA1c/FPG): FPG 80 (8/2022)   Hepatitis C: negative (2/2013)   Falls: none   DEXA: within normal limits (6/2021)   Glaucoma: regular eye exams with Dr. Shivani Kent (last 9/2021)   Smoking: none   Vitamin D: 32.9 (8/2022)   Medicare Wellness: N/A        Impression:  Patient Active Problem List   Diagnosis Code    Allergic rhinitis 477    Hyperlipidemia E78.5    S/P total knee arthroplasty, left S76.301    Vitamin D deficiency E55.9    Gastroesophageal reflux disease with esophagitis K21.00    S/P hip replacement, right Z96.641    Sensorineural hearing loss (SNHL) of both ears H90.3    Mild intermittent asthma without complication C41.69    Anxiety F41.9    Primary osteoarthritis involving multiple joints M15.9    Degenerative disc disease, lumbar M51.36    Facet arthropathy, lumbar M47.816    Hepatic steatosis K76.0    Esophageal dysphagia R13.19    S/P laparoscopic fundoplication T99.401    Weight loss, unintentional R63.4    Cervical spondylosis M47.812    Mild episode of recurrent major depressive disorder ([facility] [address] F33.0    History of 2019 novel coronavirus disease (COVID-19) Z86.16    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 Large hiatal hernia, s/p Toupet fundoplication repair. Difficulty in the past with esophageal dysmotility and required dilatation of esophagus by Dr. Ambrosio Trujillo. Found to have large [facility] as incidental finding on chest CT scan in 9/2019 and gastric body appeared thickened and nodular. Underwent upper endoscopy by Dr. Ambrosio Trujillo and noted to have [facility] lesions at hiatus of hernia. Treated with omeprazole.  Upper GI series (10/21/2019) showing moderate to large sized mixed type hiatal hernia with suspected ulceration within the supradiaphragmatic portion of the stomach; delayed transit of barium tablet and esophageal dysmotility. Repeat upper endoscopy (11/7/2019) showed ulcer healing, and she had dilation of a benign intrinsic stricture at the GE junction. Biopsies of the esophagus were suggestive but not diagnostic for eosinophilic esophagitis. She was referred to Dr. Yojana St and it was recommended that she undergo surgical repair of her large HH due to risk of gastric volvulus. On 1/16/2020, she underwent laparoscopic HH repair with Toupet fundoplication. Since surgery, she has had debilitating difficulty with chest pain and inability to belch, and problems with swallowing both solids and liquids. She underwent multiple upper endoscopies with dilation without improvement. Esophageal motility study by Dr. Dulce Palacios in 5/2020, showed a short esophageal body and ineffective esophageal motility due to impaired DCI in peristalsis with resultant severe liquid and viscous bolus transit delays. Trials of valium, calcium channel blocker, and nortriptyline have been ineffective. She underwent evaluation by Dr. Omayra Alfred at [facility], and it was his opinion that the fundoplication is likely too tight. Repeat upper endoscopy with esoFLIP dilation was also ineffective. Recommendation was to proceed with reversal of Toupet with surgical takedown. However, patient not wishing to proceed. She underwent evaluation at [facility] and options presented included surgical reversal versus achalasia type balloon dilatation of the sphincter, but patient felt risks of both procedures was too high and she did not wish to proceed. Continuing to have persistent discomfort and weight decreased another 6 pounds since last visit in 6/2021. Attempting to maintain weight ranging 148-153 lbs. Not wishing to use daily Valium to help with chest pain and spasms as concerned regarding side effects, and consuming protein drinks for caloric support.   Underwent repeat upper endoscopy (2/2022) which was essentially normal and dilation was performed. On Protonix 40 mg daily. Requested letter documenting ongoing pain for consideration of obtaining THC Gummies. Continuing to follow closely with Dr. Paris Caruso. 2. Hyperlipidemia. Calculated 10 year ASCVD risk is 3.0 %, which does not place her in one of the four statin benefit groups as per new AHA/ACC guidelines. CT heart for calcium in 2014 that showed a score of 4 with minimal calcification seen in the LAD. Given calculated risk of < 5%, would not recommend treatment with statin at this time. Continues to have elevation of LDL improved today to 127 with HDL 75 which would be reasonable control in this patient. . Emphasized importance of continued attempts at lifestyle modifications, including heart healthy diet and regular exercise. 3. Cervical spondylosis and right brachial neuritis. Being treated with Topamax 25 mg qAM and 50 mg qHS and reports good control today. Using cyclobenzaprine only as needed. Being followed by YAMINI Montejo and Dr. Ivory Avelar. 4. Mild intermittent asthma. Patient reports intermittent wheezing, although can not confirm pattern. Has used inhalers in association with [facility]RTI in the past. Prescribed Symbicort and albuterol and reports only using seasonally for increased symptoms. 5. Hepatic steatosis, moderate to severe. Emphasized importance of lifestyle modifications, including heart healthy diet and regular exercise. Liver function tests remain normal today. Advised to avoid alcohol. Will continue to monitor. 6. Depression. Appears to be related to current medical issues and overall worsened today. Currently on venlafaxine 150 mg daily and feels not effective in controlling symptoms. Reports increased irritability and depressed mood. Discussed alternate therapy today and will wean venlafaxine and cross taper with initiation of Zoloft to 100 mg daily.   Advised to call if develops side effects or no improvement with change in therapy. 7. History of COVID-19 infection with PAS. Patient reporting experienced fever, nasal congestion, postnasal drainage, cough, shortness of breath, altered taste and smell, and brain fog in 12/2021 and tested positive for COVID-19. Reports gradually improved but experiencing persistent symptoms of brain fog and altered smell since that time. Unclear if may have also caused increasing anxiety and depression symptoms. Has completed the [facility] COVID-19 vaccine series and received one Pfizer booster dose. Will continue to monitor. 8. Osteoarthritis. Significant difficulty over the years involving her bilateral knees, hips, and lumbar spine. Being followed by Dr. Dougie Wilkins. Complained of right great toe pain and swelling and right foot x-ray (11/25/2020) show diffuse soft tissue swelling without gas of the right great toe; no fracture or evidence of osteomyelitis. Underwent evaluation by Dr. Ludwin Rhoades and right foot MRI (2/5/2021) obtained showing edema of the first great toe with evidence of degenerative joint disease at the MTP and IP joints and advised to begin meloxicam as needed for discomfort. Overall improved today. 9. Insomnia. Long term issue. Used melatonin and Ambien in past. No longer using any medication and appears to be overall improved. 10. Health maintenance. Completed the [facility] COVID-19 vaccine series and reports that she received a Pfizer booster dose. Requested that she provide date so that chart may be updated. Advised to proceed with the new booster dose this fall when available. Will give Prevnar 20 today. Given script for Shingrix vaccine previously and urged to obtain. Other immunizations up-to-date. Mammogram overdue and will place order. Estrace discontinued given her mother's history of breast cancer. No further pap smears given s/p THIEN. Completed Cologuard testing in 3/2022 with Dr. Zak Vines. Repeat due in 3 years. Continue regular eye exams with Dr. Domo Mcmanus.  Vitamin D level normalized on cholecalciferol 2000 units daily. Baseline bone density study normal in 6/2021. Patient understands recommendations and agrees with plan. Follow-up in 12 months.

## 2022-08-30 RX ORDER — TOPIRAMATE 25 MG/1
TABLET ORAL
Qty: 270 TABLET | Refills: 3 | Status: SHIPPED | OUTPATIENT
Start: 2022-08-30

## 2022-08-30 NOTE — TELEPHONE ENCOUNTER
Last Visit: 8/9/22 with MD Lidia Naranjo  Next Appointment: 8/10/23 with MD Lidia Naranjo  Previous Refill Encounter(s): 6/6/22 #[id number]    Requested Prescriptions     Pending Prescriptions Disp Refills    topiramate (TOPAMAX) 25 mg tablet 270 Tablet 3     Sig: Take 1 Tablet by mouth every morning AND 2 Tablets every evening. For [facility] in place:   Recommendation Provided To:    Intervention Detail: New Rx: 1, reason: Patient Preference  Gap Closed?:   Intervention Accepted By:   Time Spent (min): 5

## 2022-09-01 ENCOUNTER — TELEPHONE (OUTPATIENT)
Dept: INTERNAL MEDICINE CLINIC | Age: 62
End: 2022-09-01

## 2022-09-01 NOTE — TELEPHONE ENCOUNTER
Patient is just calling to let Dr. Vanita Emerson know that she tested positive for COVID on Tuesday.

## 2022-09-01 NOTE — TELEPHONE ENCOUNTER
Called and spoke with patient. Reports having symptoms of diarrhea, fatigue, nasal congestion, and lightheadedness and performed home COVID-19 antigen test on 8/30/2022 which was positive. Denies any fever or chills, denies any dyspnea. Patient has completed the [facility] COVID-19 vaccine series and received one Pfizer booster dose. Discussed treatment with Paxlovid, but patient not wishing to proceed. Advised to call back for any worsening symptoms. Answered all questions.

## 2022-09-20 ENCOUNTER — TELEPHONE (OUTPATIENT)
Dept: INTERNAL MEDICINE CLINIC | Age: 62
End: 2022-09-20

## 2022-09-20 NOTE — TELEPHONE ENCOUNTER
Pt calling re: Zoloft 50 mg She thinks it may need to be increased. Stated after being on it 1 month she is still very anxious.  Noticing clenching jaw and teeth, jaw is painful     Pharmacy is [facility]

## 2022-09-20 NOTE — TELEPHONE ENCOUNTER
Please advise that she should increase the dose of Zoloft to 100 mg daily. She was prescribed 100 mg tablets in 8/2022 and please advise her to take the full tablet for each dose.   Please ask that she call in 3-4 weeks if not improved

## 2022-09-22 ENCOUNTER — TELEPHONE (OUTPATIENT)
Dept: INTERNAL MEDICINE CLINIC | Age: 62
End: 2022-09-22

## 2022-09-22 RX ORDER — SERTRALINE HYDROCHLORIDE 100 MG/1
50 TABLET, FILM COATED ORAL DAILY
Qty: 4 TABLET | Refills: 0
Start: 2022-09-22 | End: 2022-09-29

## 2022-09-22 RX ORDER — ESCITALOPRAM OXALATE 20 MG/1
TABLET ORAL
Qty: 94 TABLET | Refills: 1 | Status: SHIPPED | OUTPATIENT
Start: 2022-09-22 | End: 2022-12-28

## 2022-09-22 NOTE — TELEPHONE ENCOUNTER
Called and spoke with patient. Reports experiencing increasing anxiety, agitation, irritability, and sadness on Zoloft 100 mg daily. Discussed options and agreeable to transitioning to Lexapro. Advised to take Zoloft 50 mg daily for 7 days while initiating Lexapro 10 mg daily. Discussed that may increase dose of Lexapro to 20 mg daily after 1-2 weeks if not noting improvement. Prescription sent to [facility].

## 2022-09-22 NOTE — TELEPHONE ENCOUNTER
----- Message from  47-7. Diana Maxwell sent at 9/22/2022  2:49 PM EDT -----  Regarding: Rx Zoloft  Im not sure if the Zoloft is making me feel shaky and jittery but I also have these symptoms as well. What other antidepressant besides Effexor or Zoloft would she recommend?

## 2022-09-27 ENCOUNTER — HOSPITAL ENCOUNTER (OUTPATIENT)
Dept: MAMMOGRAPHY | Age: 62
Discharge: HOME | End: 2022-09-27
Attending: INTERNAL MEDICINE
Payer: COMMERCIAL

## 2022-09-27 DIAGNOSIS — Z12.31 SCREENING MAMMOGRAM, ENCOUNTER FOR: ICD-10-CM

## 2022-09-27 PROCEDURE — 77063 BREAST TOMOSYNTHESIS BI: CPT

## 2022-11-10 NOTE — MR AVS SNAPSHOT
Visit Information Date & Time Provider Department Dept. Phone Encounter #  
 11/2/2017  3:00 PM Kendal Corbin [facility] [address] [id number] Your Appointments 12/1/2017  3:00 PM  
POST OP with Carla Springer PA-C  
[address] [facility], [address] and [facility] ([address]) Appt Note: LEFT TKR 11/13/17 [address], [address] [facility]  
[phone] [address] [place] [address], [address] Upcoming Health Maintenance Date Due INFLUENZA AGE 9 TO ADULT 8/1/2017 BREAST CANCER SCRN MAMMOGRAM 7/7/2018 COLONOSCOPY 3/18/2021 DTaP/Tdap/Td series (2 - Td) 11/10/2024 Allergies as of 11/2/2017  Review Complete On: 11/2/2017 By: Monroe Berrios Severity Noted Reaction Type Reactions Other Plant, Animal, Environmental High 06/05/2014   Systemic Anaphylaxis  
 poison ivy Current Immunizations  Reviewed on 11/10/2014 Name Date Influenza Vaccine 10/12/2016 Tdap 11/10/2014 Not reviewed this visit Vitals BP Pulse Temp Resp Height(growth percentile) Weight(growth percentile) 116/84 (BP 1 Location: Right arm, BP Patient Position: Sitting) 69 97.9 °F (36.6 °C) (Oral) 14 5' 7\" (1.702 m) 175 lb (79.4 kg) SpO2 BMI OB Status Smoking Status 94% 27.41 kg/m2 Hysterectomy Never Smoker Vitals History BMI and BSA Data Body Mass Index Body Surface Area  
 27.41 kg/m 2 1.94 m 2 Preferred Pharmacy Pharmacy Name Phone [address], [address] [address] [phone] Your Updated Medication List  
  
   
This list is accurate as of: 11/2/17  3:29 PM.  Always use your most recent med list.  
  
  
  
  
 albuterol 90 mcg/actuation inhaler Commonly known as:  PROVENTIL HFA, VENTOLIN HFA, PROAIR HFA Take 2 Puffs by inhalation every four (4) hours as needed for Wheezing. estradiol 1 mg tablet Commonly known as:  ESTRACE Take 1 mg by mouth every seven (7) days. Indications: Patient takes half tab daily HYDROcodone-acetaminophen 7.5-325 mg per tablet Commonly known as:  Melita Kind Take 1-2 Tabs by mouth every eight (8) hours as needed for Pain. Max Daily Amount: 6 Tabs. Indications: acute exacerbation of chronic left knee pain secondary to severe osteoarthritis  
  
 meloxicam 15 mg tablet Commonly known as:  MOBIC Take 1 Tab by mouth daily. venlafaxine-SR 75 mg capsule Commonly known as:  EFFEXOR-XR Take 37.5 mg by mouth daily. XANAX 0.25 mg tablet Generic drug:  ALPRAZolam  
Take 0.25 mg by mouth nightly as needed. PRN sleep  
  
 zolpidem 5 mg tablet Commonly known as:  AMBIEN Take 1 Tab by mouth nightly as needed for Sleep. Introducing [facility]! Dear Marcia Mitchell: Thank you for requesting a [employer] account. Our records indicate that you already have an active [employer] account. You can access your account anytime at [email or web address] Did you know that you can access your hospital and ER discharge instructions at any time in [employer]? You can also review all of your test results from your hospital stay or ER visit. Additional Information If you have questions, please visit the Frequently Asked Questions section of the [employer] website at [email or web address] Remember, [employer] is NOT to be used for urgent needs. For medical emergencies, dial 911. Now available from your iPhone and Android! Please provide this summary of care documentation to your next provider. Your primary care clinician is listed as Keshawn Castro. If you have any questions after today's visit, please call [phone]. Quality 110: Preventive Care And Screening: Influenza Immunization: Influenza Immunization not Administered because Patient Refused. Detail Level: Detailed

## 2022-12-05 ENCOUNTER — APPOINTMENT (OUTPATIENT)
Dept: URBAN - METROPOLITAN AREA SURGERY 9 | Age: 62
Setting detail: DERMATOLOGY
End: 2022-12-05

## 2022-12-05 DIAGNOSIS — L72.0 EPIDERMAL CYST: ICD-10-CM

## 2022-12-05 DIAGNOSIS — L81.4 OTHER MELANIN HYPERPIGMENTATION: ICD-10-CM

## 2022-12-05 DIAGNOSIS — L82.1 OTHER SEBORRHEIC KERATOSIS: ICD-10-CM

## 2022-12-05 DIAGNOSIS — D22 MELANOCYTIC NEVI: ICD-10-CM

## 2022-12-05 DIAGNOSIS — Z85.828 PERSONAL HISTORY OF OTHER MALIGNANT NEOPLASM OF SKIN: ICD-10-CM

## 2022-12-05 DIAGNOSIS — L57.8 OTHER SKIN CHANGES DUE TO CHRONIC EXPOSURE TO NONIONIZING RADIATION: ICD-10-CM

## 2022-12-05 PROBLEM — D48.5 NEOPLASM OF UNCERTAIN BEHAVIOR OF SKIN: Status: ACTIVE | Noted: 2022-12-05

## 2022-12-05 PROBLEM — D22.5 MELANOCYTIC NEVI OF TRUNK: Status: ACTIVE | Noted: 2022-12-05

## 2022-12-05 PROCEDURE — 99213 OFFICE O/P EST LOW 20 MIN: CPT | Mod: 25

## 2022-12-05 PROCEDURE — OTHER BIOPSY BY SHAVE METHOD: OTHER

## 2022-12-05 PROCEDURE — OTHER COUNSELING: OTHER

## 2022-12-05 PROCEDURE — OTHER OTHER: OTHER

## 2022-12-05 PROCEDURE — OTHER SUNSCREEN RECOMMENDATIONS: OTHER

## 2022-12-05 PROCEDURE — OTHER REASSURANCE: OTHER

## 2022-12-05 PROCEDURE — 11102 TANGNTL BX SKIN SINGLE LES: CPT

## 2022-12-05 PROCEDURE — OTHER MIPS QUALITY: OTHER

## 2022-12-05 ASSESSMENT — LOCATION SIMPLE DESCRIPTION DERM
LOCATION SIMPLE: NOSE
LOCATION SIMPLE: UPPER BACK
LOCATION SIMPLE: LEFT FOREARM
LOCATION SIMPLE: RIGHT UPPER BACK
LOCATION SIMPLE: LEFT LOWER LEG
LOCATION SIMPLE: INFERIOR FOREHEAD
LOCATION SIMPLE: RIGHT LIP
LOCATION SIMPLE: RIGHT FOREARM

## 2022-12-05 ASSESSMENT — LOCATION DETAILED DESCRIPTION DERM
LOCATION DETAILED: RIGHT SUPERIOR MEDIAL UPPER BACK
LOCATION DETAILED: RIGHT DISTAL DORSAL FOREARM
LOCATION DETAILED: NASAL TIP
LOCATION DETAILED: INFERIOR THORACIC SPINE
LOCATION DETAILED: SUPERIOR THORACIC SPINE
LOCATION DETAILED: INFERIOR MID FOREHEAD
LOCATION DETAILED: RIGHT LOWER CUTANEOUS LIP
LOCATION DETAILED: LEFT DISTAL DORSAL FOREARM
LOCATION DETAILED: LEFT PROXIMAL LATERAL PRETIBIAL REGION

## 2022-12-05 ASSESSMENT — LOCATION ZONE DERM
LOCATION ZONE: ARM
LOCATION ZONE: LEG
LOCATION ZONE: NOSE
LOCATION ZONE: FACE
LOCATION ZONE: LIP
LOCATION ZONE: TRUNK

## 2022-12-05 NOTE — PROCEDURE: OTHER
Other (Free Text): Comedonal Lesions located on the right lip were marsupialized with an 11 blade and comedo extractor.  Prior to removal the treatment areas were prepped in the usual fashion.  Consent was obtained and risks were reviewed including but not limited to scarring, infection, bleeding, scabbing, incomplete removal, and allergy to anesthesia.\\nNo charge
Detail Level: Zone
Render Risk Assessment In Note?: no
Note Text (......Xxx Chief Complaint.): This diagnosis correlates with the

## 2023-02-03 DIAGNOSIS — Z00.00 LABORATORY EXAMINATION ORDERED AS PART OF A COMPLETE PHYSICAL EXAMINATION: Primary | ICD-10-CM

## 2023-02-03 NOTE — ROUTINE PROCESS
Patient assisted to bedside commode voided 300 ml of naz urine . Patient c/o 4/10 incisional pain medicated with oxicodone, Surgical dressing to left knee dry and clean, hemovac intact draining serosanguinous liquid, CMS intact pedal pulses palpable. Terbinafine Counseling: Patient counseling regarding adverse effects of terbinafine including but not limited to headache, diarrhea, rash, upset stomach, liver function test abnormalities, itching, taste/smell disturbance, nausea, abdominal pain, and flatulence.  There is a rare possibility of liver failure that can occur when taking terbinafine.  The patient understands that a baseline LFT and kidney function test may be required. The patient verbalized understanding of the proper use and possible adverse effects of terbinafine.  All of the patient's questions and concerns were addressed.

## 2023-02-05 DIAGNOSIS — R63.4 WEIGHT LOSS, UNINTENTIONAL: ICD-10-CM

## 2023-02-05 DIAGNOSIS — Z00.00 LABORATORY EXAMINATION ORDERED AS PART OF A COMPLETE PHYSICAL EXAMINATION: Primary | ICD-10-CM

## 2023-02-06 DIAGNOSIS — E78.5 HYPERLIPIDEMIA, UNSPECIFIED HYPERLIPIDEMIA TYPE: Primary | ICD-10-CM

## 2023-02-06 DIAGNOSIS — Z00.00 LABORATORY EXAMINATION ORDERED AS PART OF A COMPLETE PHYSICAL EXAMINATION: ICD-10-CM

## 2023-02-07 DIAGNOSIS — E55.9 VITAMIN D DEFICIENCY: ICD-10-CM

## 2023-02-07 DIAGNOSIS — Z00.00 LABORATORY EXAMINATION ORDERED AS PART OF A COMPLETE PHYSICAL EXAMINATION: Primary | ICD-10-CM

## 2023-03-14 ENCOUNTER — PATIENT MESSAGE (OUTPATIENT)
Age: 63
End: 2023-03-14

## 2023-03-15 RX ORDER — VENLAFAXINE HYDROCHLORIDE 75 MG/1
150 CAPSULE, EXTENDED RELEASE ORAL DAILY
Qty: 60 CAPSULE | Refills: 0 | Status: SHIPPED | OUTPATIENT
Start: 2023-03-15

## 2023-03-15 NOTE — TELEPHONE ENCOUNTER
Called and spoke with patient. Reports having increased irritability and anxiety and not finding Lexapro to be effective. Discussed options and wishing to change back to venlafaxine. Given instructions to cross taper Lexapro and begin venlafaxine XR and titrate dose to 150 mg daily over the next 4 weeks. Will call after titration complete with final dose and new prescription will be sent in at that time. Answered all questions. Prescription for venlafaxine sent to [facility].

## 2023-04-04 ENCOUNTER — TELEPHONE (OUTPATIENT)
Dept: PRIMARY CARE | Facility: CLINIC | Age: 63
End: 2023-04-04

## 2023-04-04 ENCOUNTER — OFFICE VISIT (OUTPATIENT)
Dept: PRIMARY CARE | Facility: CLINIC | Age: 63
End: 2023-04-04
Payer: COMMERCIAL

## 2023-04-04 VITALS
HEART RATE: 79 BPM | OXYGEN SATURATION: 97 % | WEIGHT: 166.7 LBS | DIASTOLIC BLOOD PRESSURE: 78 MMHG | BODY MASS INDEX: 31.47 KG/M2 | HEIGHT: 61 IN | SYSTOLIC BLOOD PRESSURE: 132 MMHG

## 2023-04-04 DIAGNOSIS — I73.9 CLAUDICATION (CMS-HCC): ICD-10-CM

## 2023-04-04 DIAGNOSIS — R20.0 NUMBNESS: Primary | ICD-10-CM

## 2023-04-04 DIAGNOSIS — R53.83 FATIGUE, UNSPECIFIED TYPE: ICD-10-CM

## 2023-04-04 PROBLEM — K21.9 GERD (GASTROESOPHAGEAL REFLUX DISEASE): Status: ACTIVE | Noted: 2023-04-04

## 2023-04-04 PROBLEM — M17.11 ARTHRITIS OF RIGHT KNEE: Status: ACTIVE | Noted: 2023-04-04

## 2023-04-04 PROBLEM — M25.561 RIGHT KNEE PAIN: Status: ACTIVE | Noted: 2023-04-04

## 2023-04-04 PROBLEM — E78.00 HYPERCHOLESTEROLEMIA: Status: ACTIVE | Noted: 2023-04-04

## 2023-04-04 PROBLEM — D48.9 NEOPLASM OF UNCERTAIN BEHAVIOR: Status: ACTIVE | Noted: 2023-04-04

## 2023-04-04 PROCEDURE — 1036F TOBACCO NON-USER: CPT | Performed by: FAMILY MEDICINE

## 2023-04-04 PROCEDURE — 99214 OFFICE O/P EST MOD 30 MIN: CPT | Performed by: FAMILY MEDICINE

## 2023-04-04 RX ORDER — ATORVASTATIN CALCIUM 40 MG/1
1 TABLET, FILM COATED ORAL DAILY
COMMUNITY
Start: 2020-09-28 | End: 2023-11-07 | Stop reason: SDUPTHER

## 2023-04-04 RX ORDER — HYDROCORTISONE ACETATE 25 MG/1
25 SUPPOSITORY RECTAL 2 TIMES DAILY
COMMUNITY
Start: 2022-10-25

## 2023-04-04 RX ORDER — PANTOPRAZOLE SODIUM 40 MG/1
1 TABLET, DELAYED RELEASE ORAL DAILY
COMMUNITY
Start: 2020-06-20 | End: 2023-11-07 | Stop reason: SDUPTHER

## 2023-04-04 ASSESSMENT — ENCOUNTER SYMPTOMS
MYALGIAS: 1
BACK PAIN: 0
WEAKNESS: 1
SHORTNESS OF BREATH: 0
PALPITATIONS: 0
LEG PAIN: 1
ARTHRALGIAS: 1
NUMBNESS: 1
DIZZINESS: 0

## 2023-04-04 ASSESSMENT — PATIENT HEALTH QUESTIONNAIRE - PHQ9
SUM OF ALL RESPONSES TO PHQ9 QUESTIONS 1 AND 2: 0
2. FEELING DOWN, DEPRESSED OR HOPELESS: NOT AT ALL
1. LITTLE INTEREST OR PLEASURE IN DOING THINGS: NOT AT ALL

## 2023-04-04 NOTE — PROGRESS NOTES
"Subjective   Patient ID: Josephine Christianson is a 62 y.o. female who presents for Leg Pain.    Leg Pain   Associated symptoms include numbness.   Lower Extremity Issue  Associated symptoms include arthralgias, myalgias, numbness and weakness. Pertinent negatives include no chest pain or rash.      Chronic leg cramps.  Numbness legs, weakness legs, claudication like symptoms, but can go 6,000 steps before weakness.  No LBP    Exam both legs ok.    Check b12 and cbc and cmp.  Tsh ok 6 months ago.  Vasc US with IAIN. ? Claudication  Add K+ if not taking.  She might be taking b12 now. Does take Mg+.        Review of Systems   Respiratory:  Negative for shortness of breath.    Cardiovascular:  Negative for chest pain and palpitations.   Musculoskeletal:  Positive for arthralgias and myalgias. Negative for back pain and gait problem.   Skin:  Negative for rash.   Neurological:  Positive for weakness and numbness. Negative for dizziness.       Objective   /78   Pulse 79   Ht 1.549 m (5' 1\")   Wt 75.6 kg (166 lb 11.2 oz)   SpO2 97%   BMI 31.50 kg/m²     Physical Exam  General:  Alert and oriented, No acute distress.         Ambulation status: With steady gait.         Appearance: Well nourished, Calm.         Behavior: Cooperative.    Integumentary:  Warm, Dry, Pink, Intact.    Psychiatric:  Cooperative, Appropriate mood & affect, Normal judgment.  See hpi    Assessment/Plan   Problem List Items Addressed This Visit    None  Visit Diagnoses       Numbness    -  Primary    Fatigue, unspecified type        Claudication (CMS/HCC)                   "

## 2023-04-05 ENCOUNTER — LAB (OUTPATIENT)
Dept: LAB | Facility: LAB | Age: 63
End: 2023-04-05
Payer: COMMERCIAL

## 2023-04-05 DIAGNOSIS — R20.0 NUMBNESS: ICD-10-CM

## 2023-04-05 DIAGNOSIS — R53.83 FATIGUE, UNSPECIFIED TYPE: ICD-10-CM

## 2023-04-05 DIAGNOSIS — I73.9 CLAUDICATION (CMS-HCC): ICD-10-CM

## 2023-04-05 LAB
ALANINE AMINOTRANSFERASE (SGPT) (U/L) IN SER/PLAS: 25 U/L (ref 7–45)
ALBUMIN (G/DL) IN SER/PLAS: 4 G/DL (ref 3.4–5)
ALKALINE PHOSPHATASE (U/L) IN SER/PLAS: 142 U/L (ref 33–136)
ANION GAP IN SER/PLAS: 9 MMOL/L (ref 10–20)
ASPARTATE AMINOTRANSFERASE (SGOT) (U/L) IN SER/PLAS: 21 U/L (ref 9–39)
BILIRUBIN TOTAL (MG/DL) IN SER/PLAS: 0.4 MG/DL (ref 0–1.2)
CALCIUM (MG/DL) IN SER/PLAS: 8.9 MG/DL (ref 8.6–10.3)
CARBON DIOXIDE, TOTAL (MMOL/L) IN SER/PLAS: 31 MMOL/L (ref 21–32)
CHLORIDE (MMOL/L) IN SER/PLAS: 102 MMOL/L (ref 98–107)
COBALAMIN (VITAMIN B12) (PG/ML) IN SER/PLAS: 1136 PG/ML (ref 211–911)
CREATININE (MG/DL) IN SER/PLAS: 0.94 MG/DL (ref 0.5–1.05)
ERYTHROCYTE DISTRIBUTION WIDTH (RATIO) BY AUTOMATED COUNT: 13.5 % (ref 11.5–14.5)
ERYTHROCYTE MEAN CORPUSCULAR HEMOGLOBIN CONCENTRATION (G/DL) BY AUTOMATED: 31.9 G/DL (ref 32–36)
ERYTHROCYTE MEAN CORPUSCULAR VOLUME (FL) BY AUTOMATED COUNT: 93 FL (ref 80–100)
ERYTHROCYTES (10*6/UL) IN BLOOD BY AUTOMATED COUNT: 4.61 X10E12/L (ref 4–5.2)
GFR FEMALE: 68 ML/MIN/1.73M2
GLUCOSE (MG/DL) IN SER/PLAS: 85 MG/DL (ref 74–99)
HEMATOCRIT (%) IN BLOOD BY AUTOMATED COUNT: 42.9 % (ref 36–46)
HEMOGLOBIN (G/DL) IN BLOOD: 13.7 G/DL (ref 12–16)
LEUKOCYTES (10*3/UL) IN BLOOD BY AUTOMATED COUNT: 9.6 X10E9/L (ref 4.4–11.3)
PLATELETS (10*3/UL) IN BLOOD AUTOMATED COUNT: 345 X10E9/L (ref 150–450)
POTASSIUM (MMOL/L) IN SER/PLAS: 3.9 MMOL/L (ref 3.5–5.3)
PROTEIN TOTAL: 6.1 G/DL (ref 6.4–8.2)
SODIUM (MMOL/L) IN SER/PLAS: 138 MMOL/L (ref 136–145)
UREA NITROGEN (MG/DL) IN SER/PLAS: 14 MG/DL (ref 6–23)

## 2023-04-05 PROCEDURE — 82607 VITAMIN B-12: CPT

## 2023-04-05 PROCEDURE — 36415 COLL VENOUS BLD VENIPUNCTURE: CPT

## 2023-04-05 PROCEDURE — 80053 COMPREHEN METABOLIC PANEL: CPT

## 2023-04-05 PROCEDURE — 85027 COMPLETE CBC AUTOMATED: CPT

## 2023-04-06 ENCOUNTER — TELEPHONE (OUTPATIENT)
Dept: PRIMARY CARE | Facility: CLINIC | Age: 63
End: 2023-04-06

## 2023-04-13 ENCOUNTER — TELEPHONE (OUTPATIENT)
Dept: PRIMARY CARE | Facility: CLINIC | Age: 63
End: 2023-04-13

## 2023-04-13 NOTE — TELEPHONE ENCOUNTER
----- Message from Tim Barrientos MD sent at 4/13/2023  9:22 AM EDT -----  Notify circulation test for her legs is ok.    Rec an OV in  1 month to reevaluate leg symptoms.

## 2023-06-21 ENCOUNTER — TELEPHONE (OUTPATIENT)
Age: 63
End: 2023-06-21

## 2023-06-21 RX ORDER — TOPIRAMATE 25 MG/1
TABLET ORAL
Qty: 270 TABLET | Refills: 1 | Status: SHIPPED | OUTPATIENT
Start: 2023-06-21

## 2023-06-21 RX ORDER — VENLAFAXINE HYDROCHLORIDE 150 MG/1
150 CAPSULE, EXTENDED RELEASE ORAL DAILY
Qty: 90 CAPSULE | Refills: 3 | Status: SHIPPED | OUTPATIENT
Start: 2023-06-21

## 2023-06-21 NOTE — TELEPHONE ENCOUNTER
Patient stating she has been taking two Venlafaxine and that is working well for her. She is requesting an rx for 150 mg be sent to [facility] on [address]. She also needs a refill on  topiramate (TOPAMAX) 25 MG tablet   8/30/2022     Sig - Route:  Take by mouth - Oral

## 2023-10-10 ENCOUNTER — TELEPHONE (OUTPATIENT)
Age: 63
End: 2023-10-10

## 2023-10-10 NOTE — TELEPHONE ENCOUNTER
Patient called and states Saturday she woke up with a rash with raised bumps on the outer side of her left breast. She states the underside of her breast is swollen and her ribs hurt. She has redness and bruising, states she has not hit or bumped into anything so she isn't sure what the bruising is from but she is very sore. She feels what is like lines under her skin on her breast, states it isn't necessarily lumps or bumps but its ridged. She was calling to request an appt, no appts available, please advise. Patient can be reached at [phone]. Thank you.

## 2023-11-07 ENCOUNTER — OFFICE VISIT (OUTPATIENT)
Dept: PRIMARY CARE | Facility: CLINIC | Age: 63
End: 2023-11-07
Payer: COMMERCIAL

## 2023-11-07 VITALS
HEIGHT: 61 IN | SYSTOLIC BLOOD PRESSURE: 122 MMHG | WEIGHT: 164.3 LBS | DIASTOLIC BLOOD PRESSURE: 58 MMHG | HEART RATE: 81 BPM | BODY MASS INDEX: 31.02 KG/M2 | OXYGEN SATURATION: 94 %

## 2023-11-07 DIAGNOSIS — E78.00 HYPERCHOLESTEROLEMIA: ICD-10-CM

## 2023-11-07 DIAGNOSIS — Z12.31 ENCOUNTER FOR SCREENING MAMMOGRAM FOR BREAST CANCER: ICD-10-CM

## 2023-11-07 DIAGNOSIS — Z12.11 ENCOUNTER FOR SCREENING FOR MALIGNANT NEOPLASM OF COLON: ICD-10-CM

## 2023-11-07 DIAGNOSIS — R05.1 ACUTE COUGH: Primary | ICD-10-CM

## 2023-11-07 DIAGNOSIS — K21.9 GASTROESOPHAGEAL REFLUX DISEASE WITHOUT ESOPHAGITIS: ICD-10-CM

## 2023-11-07 PROBLEM — M25.561 RIGHT KNEE PAIN: Status: RESOLVED | Noted: 2023-04-04 | Resolved: 2023-11-07

## 2023-11-07 PROBLEM — D48.9 NEOPLASM OF UNCERTAIN BEHAVIOR: Status: RESOLVED | Noted: 2023-04-04 | Resolved: 2023-11-07

## 2023-11-07 PROBLEM — M17.11 ARTHRITIS OF RIGHT KNEE: Status: RESOLVED | Noted: 2023-04-04 | Resolved: 2023-11-07

## 2023-11-07 PROCEDURE — 99214 OFFICE O/P EST MOD 30 MIN: CPT | Performed by: FAMILY MEDICINE

## 2023-11-07 PROCEDURE — 1036F TOBACCO NON-USER: CPT | Performed by: FAMILY MEDICINE

## 2023-11-07 RX ORDER — PANTOPRAZOLE SODIUM 40 MG/1
40 TABLET, DELAYED RELEASE ORAL DAILY
Qty: 90 TABLET | Refills: 3 | Status: SHIPPED | OUTPATIENT
Start: 2023-11-07 | End: 2024-11-06

## 2023-11-07 RX ORDER — AZITHROMYCIN 250 MG/1
TABLET, FILM COATED ORAL
Qty: 6 TABLET | Refills: 0 | Status: SHIPPED | OUTPATIENT
Start: 2023-11-07 | End: 2023-11-11

## 2023-11-07 RX ORDER — PREDNISONE 20 MG/1
40 TABLET ORAL DAILY
Qty: 10 TABLET | Refills: 0 | Status: SHIPPED | OUTPATIENT
Start: 2023-11-07 | End: 2023-11-12

## 2023-11-07 RX ORDER — ATORVASTATIN CALCIUM 40 MG/1
40 TABLET, FILM COATED ORAL DAILY
Qty: 90 TABLET | Refills: 3 | Status: SHIPPED | OUTPATIENT
Start: 2023-11-07 | End: 2024-11-06

## 2023-11-07 ASSESSMENT — ENCOUNTER SYMPTOMS
DIARRHEA: 0
ABDOMINAL PAIN: 0
WEIGHT LOSS: 0
HEADACHES: 0
SORE THROAT: 0
MYALGIAS: 1
SWEATS: 0
CONSTIPATION: 0
COUGH: 1
SHORTNESS OF BREATH: 0
CHILLS: 0
HEARTBURN: 0
RHINORRHEA: 0
WHEEZING: 0
FEVER: 0
BLOOD IN STOOL: 0
HEMOPTYSIS: 0

## 2023-11-07 ASSESSMENT — PATIENT HEALTH QUESTIONNAIRE - PHQ9
2. FEELING DOWN, DEPRESSED OR HOPELESS: NOT AT ALL
1. LITTLE INTEREST OR PLEASURE IN DOING THINGS: NOT AT ALL
SUM OF ALL RESPONSES TO PHQ9 QUESTIONS 1 AND 2: 0

## 2023-11-07 NOTE — PROGRESS NOTES
"Subjective   Patient ID: Josephine Christianson is a 63 y.o. female who presents for Annual Exam and Cough (x2wks).    Cough  This is a new problem. The current episode started 1 to 4 weeks ago. The problem has been waxing and waning. The problem occurs every few minutes. The cough is Productive of purulent sputum. Associated symptoms include myalgias, nasal congestion and postnasal drip. Pertinent negatives include no chest pain, chills, ear congestion, ear pain, fever, headaches, heartburn, hemoptysis, rash, rhinorrhea, sore throat, shortness of breath, sweats, weight loss or wheezing.   Maybe has a little bit of mild allergies but definitely feeling sick.  Also cleaning out a house for there is been a lot of dust.  Is been a problem for the last 2 and half weeks not getting any better may be getting a little bit worse.  Cholesterol stable on medication  Heartburn stable on medication  CBC CMP done 2023 are good.  No labs needed today.  She stopped her over-the-counter B12 supplements and the leg cramps went away.    Z-Moose  Prednisone for 5 days  Mammogram ordered done today  Cologuard ordered done today    Review of Systems   Constitutional:  Negative for chills, fever and weight loss.   HENT:  Positive for postnasal drip. Negative for ear pain, rhinorrhea and sore throat.    Respiratory:  Positive for cough. Negative for hemoptysis, shortness of breath and wheezing.    Cardiovascular:  Negative for chest pain.   Gastrointestinal:  Negative for abdominal pain, blood in stool, constipation, diarrhea and heartburn.   Musculoskeletal:  Positive for myalgias.   Skin:  Negative for rash.   Neurological:  Negative for headaches.       Objective   /58   Pulse 81   Ht 1.549 m (5' 1\")   Wt 74.5 kg (164 lb 4.8 oz)   SpO2 94%   BMI 31.04 kg/m²     Physical Exam  Constitutional:       Appearance: Normal appearance.   HENT:      Head: Normocephalic and atraumatic.   Cardiovascular:      Rate and Rhythm: Normal rate and " regular rhythm.      Heart sounds: Normal heart sounds.   Pulmonary:      Effort: Pulmonary effort is normal.      Breath sounds: Normal breath sounds.   Skin:     General: Skin is warm and dry.   Neurological:      General: No focal deficit present.      Mental Status: She is alert and oriented to person, place, and time.   Psychiatric:         Mood and Affect: Mood normal.         Behavior: Behavior normal.         Thought Content: Thought content normal.         Judgment: Judgment normal.         Assessment/Plan   Problem List Items Addressed This Visit             ICD-10-CM    GERD (gastroesophageal reflux disease) K21.9    Relevant Medications    pantoprazole (ProtoNix) 40 mg EC tablet    Hypercholesterolemia E78.00    Relevant Medications    atorvastatin (Lipitor) 40 mg tablet     Other Visit Diagnoses         Codes    Acute cough    -  Primary R05.1    Relevant Medications    azithromycin (Zithromax) 250 mg tablet    predniSONE (Deltasone) 20 mg tablet    Encounter for screening mammogram for breast cancer     Z12.31    Relevant Orders    BI mammo bilateral screening tomosynthesis    Encounter for screening for malignant neoplasm of colon     Z12.11    Relevant Orders    Cologuard® colon cancer screening

## 2023-11-09 ENCOUNTER — ANCILLARY PROCEDURE (OUTPATIENT)
Dept: RADIOLOGY | Facility: CLINIC | Age: 63
End: 2023-11-09
Payer: COMMERCIAL

## 2023-11-09 DIAGNOSIS — Z12.31 ENCOUNTER FOR SCREENING MAMMOGRAM FOR BREAST CANCER: ICD-10-CM

## 2023-11-09 PROCEDURE — 77063 BREAST TOMOSYNTHESIS BI: CPT

## 2023-11-09 PROCEDURE — 77063 BREAST TOMOSYNTHESIS BI: CPT | Performed by: RADIOLOGY

## 2023-11-09 PROCEDURE — 77067 SCR MAMMO BI INCL CAD: CPT | Performed by: RADIOLOGY

## 2023-11-21 LAB — NONINV COLON CA DNA+OCC BLD SCRN STL QL: NEGATIVE

## 2023-12-05 ENCOUNTER — APPOINTMENT (OUTPATIENT)
Dept: URBAN - METROPOLITAN AREA SURGERY 9 | Age: 63
Setting detail: DERMATOLOGY
End: 2023-12-05

## 2023-12-05 DIAGNOSIS — D22 MELANOCYTIC NEVI: ICD-10-CM

## 2023-12-05 DIAGNOSIS — L81.4 OTHER MELANIN HYPERPIGMENTATION: ICD-10-CM

## 2023-12-05 DIAGNOSIS — L57.8 OTHER SKIN CHANGES DUE TO CHRONIC EXPOSURE TO NONIONIZING RADIATION: ICD-10-CM

## 2023-12-05 DIAGNOSIS — L82.1 OTHER SEBORRHEIC KERATOSIS: ICD-10-CM

## 2023-12-05 DIAGNOSIS — Z85.828 PERSONAL HISTORY OF OTHER MALIGNANT NEOPLASM OF SKIN: ICD-10-CM

## 2023-12-05 PROBLEM — D22.5 MELANOCYTIC NEVI OF TRUNK: Status: ACTIVE | Noted: 2023-12-05

## 2023-12-05 PROBLEM — D22.39 MELANOCYTIC NEVI OF OTHER PARTS OF FACE: Status: ACTIVE | Noted: 2023-12-05

## 2023-12-05 PROCEDURE — OTHER SUNSCREEN RECOMMENDATIONS: OTHER

## 2023-12-05 PROCEDURE — OTHER MIPS QUALITY: OTHER

## 2023-12-05 PROCEDURE — OTHER REASSURANCE: OTHER

## 2023-12-05 PROCEDURE — 99213 OFFICE O/P EST LOW 20 MIN: CPT

## 2023-12-05 PROCEDURE — OTHER COUNSELING: OTHER

## 2023-12-05 ASSESSMENT — LOCATION SIMPLE DESCRIPTION DERM
LOCATION SIMPLE: RIGHT UPPER BACK
LOCATION SIMPLE: LEFT FOREARM
LOCATION SIMPLE: LEFT CHEEK
LOCATION SIMPLE: INFERIOR FOREHEAD
LOCATION SIMPLE: UPPER BACK
LOCATION SIMPLE: RIGHT FOREARM
LOCATION SIMPLE: NOSE

## 2023-12-05 ASSESSMENT — LOCATION DETAILED DESCRIPTION DERM
LOCATION DETAILED: RIGHT SUPERIOR MEDIAL UPPER BACK
LOCATION DETAILED: INFERIOR MID FOREHEAD
LOCATION DETAILED: LEFT INFERIOR CENTRAL MALAR CHEEK
LOCATION DETAILED: NASAL TIP
LOCATION DETAILED: RIGHT DISTAL DORSAL FOREARM
LOCATION DETAILED: INFERIOR THORACIC SPINE
LOCATION DETAILED: SUPERIOR THORACIC SPINE
LOCATION DETAILED: LEFT DISTAL DORSAL FOREARM

## 2023-12-05 ASSESSMENT — LOCATION ZONE DERM
LOCATION ZONE: FACE
LOCATION ZONE: TRUNK
LOCATION ZONE: NOSE
LOCATION ZONE: ARM

## 2023-12-05 NOTE — HPI: EVALUATION OF SKIN LESION(S)
Hpi Title: Evaluation of Skin Lesions
Additional History: Spot concern on left cheek, says it feels ‘lumpy’. Noticed for years. Denies any pain, irritation or itchiness.

## 2024-02-21 NOTE — TELEPHONE ENCOUNTER
PCP: Cammy Colon MD    LAST REFILL PER CHART:  Medication:topiramate (TOPAMAX) 25 MG tablet   Ordered On:06/21/2023  Instructions:Take 1 Tablet by mouth every morning AND 2 Tablets every evening  Dispense:270 tablets  Refills:1  Pharmacy: [facility] [id number] [address] - P [phone] - F [phone]     No future appointments.

## 2024-02-22 ENCOUNTER — APPOINTMENT (OUTPATIENT)
Facility: HOSPITAL | Age: 64
End: 2024-02-22

## 2024-02-22 ENCOUNTER — HOSPITAL ENCOUNTER (EMERGENCY)
Facility: HOSPITAL | Age: 64
Discharge: HOME | End: 2024-02-22
Attending: EMERGENCY MEDICINE

## 2024-02-22 VITALS
HEIGHT: 67 IN | TEMPERATURE: 97.9 F | BODY MASS INDEX: 24.64 KG/M2 | OXYGEN SATURATION: 98 % | DIASTOLIC BLOOD PRESSURE: 97 MMHG | SYSTOLIC BLOOD PRESSURE: 146 MMHG | HEART RATE: 81 BPM | RESPIRATION RATE: 18 BRPM | WEIGHT: 157 LBS

## 2024-02-22 DIAGNOSIS — S92.901A FRACTURE, FOOT, RIGHT, CLOSED, INITIAL ENCOUNTER: Primary | ICD-10-CM

## 2024-02-22 PROCEDURE — 73630 X-RAY EXAM OF FOOT: CPT

## 2024-02-22 PROCEDURE — 99284 EMERGENCY DEPT VISIT MOD MDM: CPT

## 2024-02-22 PROCEDURE — 6360000002 HC RX W HCPCS: Performed by: EMERGENCY MEDICINE

## 2024-02-22 PROCEDURE — 96372 THER/PROPH/DIAG INJ SC/IM: CPT

## 2024-02-22 PROCEDURE — 6370000000 HC RX 637 (ALT 250 FOR IP): Performed by: EMERGENCY MEDICINE

## 2024-02-22 PROCEDURE — 73660 X-RAY EXAM OF TOE(S): CPT

## 2024-02-22 RX ORDER — OXYCODONE HYDROCHLORIDE AND ACETAMINOPHEN 5; 325 MG/1; MG/1
1 TABLET ORAL EVERY 6 HOURS PRN
Qty: 12 TABLET | Refills: 0 | Status: SHIPPED | OUTPATIENT
Start: 2024-02-22 | End: 2024-02-25

## 2024-02-22 RX ORDER — KETOROLAC TROMETHAMINE 15 MG/ML
15 INJECTION, SOLUTION INTRAMUSCULAR; INTRAVENOUS
Status: COMPLETED | OUTPATIENT
Start: 2024-02-22 | End: 2024-02-22

## 2024-02-22 RX ORDER — OXYCODONE HYDROCHLORIDE AND ACETAMINOPHEN 5; 325 MG/1; MG/1
1 TABLET ORAL
Status: COMPLETED | OUTPATIENT
Start: 2024-02-22 | End: 2024-02-22

## 2024-02-22 RX ADMIN — KETOROLAC TROMETHAMINE 15 MG: 15 INJECTION, SOLUTION INTRAMUSCULAR; INTRAVENOUS at 21:17

## 2024-02-22 RX ADMIN — OXYCODONE AND ACETAMINOPHEN 1 TABLET: 5; 325 TABLET ORAL at 20:34

## 2024-02-22 ASSESSMENT — PAIN DESCRIPTION - ORIENTATION: ORIENTATION: RIGHT;LEFT

## 2024-02-22 ASSESSMENT — PAIN SCALES - GENERAL: PAINLEVEL_OUTOF10: 10

## 2024-02-22 ASSESSMENT — PAIN - FUNCTIONAL ASSESSMENT: PAIN_FUNCTIONAL_ASSESSMENT: 0-10

## 2024-02-22 ASSESSMENT — PAIN DESCRIPTION - PAIN TYPE: TYPE: ACUTE PAIN

## 2024-02-22 ASSESSMENT — ENCOUNTER SYMPTOMS
RESPIRATORY NEGATIVE: 1
GASTROINTESTINAL NEGATIVE: 1

## 2024-02-22 ASSESSMENT — PAIN DESCRIPTION - DESCRIPTORS: DESCRIPTORS: ACHING

## 2024-02-22 ASSESSMENT — PAIN DESCRIPTION - LOCATION: LOCATION: FOOT

## 2024-02-22 ASSESSMENT — LIFESTYLE VARIABLES
HOW MANY STANDARD DRINKS CONTAINING ALCOHOL DO YOU HAVE ON A TYPICAL DAY: PATIENT DOES NOT DRINK
HOW OFTEN DO YOU HAVE A DRINK CONTAINING ALCOHOL: NEVER

## 2024-02-23 ENCOUNTER — TELEPHONE (OUTPATIENT)
Age: 64
End: 2024-02-23

## 2024-02-23 RX ORDER — TOPIRAMATE 25 MG/1
TABLET ORAL
Qty: 270 TABLET | Refills: 1 | Status: SHIPPED | OUTPATIENT
Start: 2024-02-23

## 2024-02-23 NOTE — TELEPHONE ENCOUNTER
Patient called and would like to make an appointment for a right foot fract. Patient was seen at [facility] ED on 2/22/24          Please call and advise patient of appointment date and time  [phone]

## 2024-02-23 NOTE — ED NOTES
Pt states that she feels better and is ready to go home. Pt provided with crutches, educated on their use and demonstrated understanding. Discharge instructions reviewed with patient.  Patient verbalized understanding.  Patient advised to follow up as directed on discharge instructions.  Patient denies questions, needs or concerns at this time.  Patient verbalized understanding. No s/sx of distress noted.

## 2024-02-27 ENCOUNTER — OFFICE VISIT (OUTPATIENT)
Age: 64
End: 2024-02-27

## 2024-02-27 VITALS — BODY MASS INDEX: 24.59 KG/M2 | HEIGHT: 67 IN

## 2024-02-27 DIAGNOSIS — S92.354A CLOSED NONDISPLACED FRACTURE OF FIFTH METATARSAL BONE OF RIGHT FOOT, INITIAL ENCOUNTER: Primary | ICD-10-CM

## 2024-02-27 DIAGNOSIS — M25.571 ACUTE RIGHT ANKLE PAIN: ICD-10-CM

## 2024-02-27 DIAGNOSIS — S93.411A SPRAIN OF CALCANEOFIBULAR LIGAMENT OF RIGHT ANKLE, INITIAL ENCOUNTER: ICD-10-CM

## 2024-02-27 RX ORDER — HYDROCODONE BITARTRATE AND ACETAMINOPHEN 7.5; 325 MG/1; MG/1
1 TABLET ORAL EVERY 6 HOURS PRN
Qty: 28 TABLET | Refills: 0 | Status: SHIPPED | OUTPATIENT
Start: 2024-02-27 | End: 2024-03-05

## 2024-02-27 RX ORDER — HYDROCODONE BITARTRATE AND ACETAMINOPHEN 5; 325 MG/1; MG/1
1 TABLET ORAL 3 TIMES DAILY
Qty: 21 TABLET | Refills: 0 | Status: CANCELLED | OUTPATIENT
Start: 2024-02-27 | End: 2024-03-05

## 2024-02-27 NOTE — PROGRESS NOTES
AMBULATORY PROGRESS NOTE      Patient: Juana Ford             MRN: [id number]     SSN: [id number] Body mass index is 24.59 kg/m².  YOB: 1960     AGE: 63 y.o.       EX: female    PCP: Cammy Colon MD       IMPRESSION //  DIAGNOSIS AND TREATMENT PLAN      Juana Ford has a diagnosis of:      She does have a she does have a nondisplaced nonangulated right fifth metatarsal base fracture, zone 1, metaphyseal region.  Explained to her that these fractures have delayed healing potential, due to the microcirculation issues in this region in general.    DIAGNOSES    1. Closed nondisplaced fracture of fifth metatarsal bone of right foot, initial encounter    2. Acute right ankle pain    3. Sprain of calcaneofibular ligament of right ankle, initial encounter          PLAN:    1. Continue wearing short CAM boot, patient will remain NWB with crutches on this time but OK to place pressure on heel only for balance.   2. Prescribed Norco 7.5-325 mg 1 PO BID PRN   3. Recommended icing the foot regularly for swelling and pain  4. Recommended taking Aspirin 81 mg 1 PO QD and OTC Vitamin D supplements  5.  Posterior splint we made for her to use at nighttime just to hold her foot in neutral not to allow her foot to go plantarflexed and inverted.    RTO 1 week x-rays, 3 views, right foot, AP lateral bleak    Orders Placed This Encounter    [22624] Ankle Min 3V     right    CT CLOSED TX METATARSAL FRACTURE W/O MANIPULATION    HYDROcodone-acetaminophen (NORCO) 7.5-325 MG per tablet     Sig: Take 1 tablet by mouth every 6 hours as needed for Pain for up to 7 days. Max Daily Amount: 4 tablets     Dispense:  28 tablet     Refill:  0     Reduce doses taken as pain becomes manageable        Patient Instructions   Follow up in one week  Aspirin 81mg once a day  Recommended icing the foot regularly for swelling and pain       Please follow up with your PCP for any health maintenance as

## 2024-02-27 NOTE — PATIENT INSTRUCTIONS
Follow up in one week  Aspirin 81mg once a day  Recommended icing the foot regularly for swelling and pain

## 2024-03-06 ENCOUNTER — OFFICE VISIT (OUTPATIENT)
Age: 64
End: 2024-03-06
Payer: COMMERCIAL

## 2024-03-06 ENCOUNTER — TELEPHONE (OUTPATIENT)
Age: 64
End: 2024-03-06

## 2024-03-06 VITALS — HEIGHT: 67 IN | BODY MASS INDEX: 24.59 KG/M2

## 2024-03-06 DIAGNOSIS — S92.351D CLOSED DISPLACED FRACTURE OF FIFTH METATARSAL BONE OF RIGHT FOOT WITH ROUTINE HEALING, SUBSEQUENT ENCOUNTER: ICD-10-CM

## 2024-03-06 DIAGNOSIS — S92.354A CLOSED NONDISPLACED FRACTURE OF FIFTH METATARSAL BONE OF RIGHT FOOT, INITIAL ENCOUNTER: Primary | ICD-10-CM

## 2024-03-06 DIAGNOSIS — M25.512 ACUTE PAIN OF LEFT SHOULDER: ICD-10-CM

## 2024-03-06 PROCEDURE — 73630 X-RAY EXAM OF FOOT: CPT | Performed by: ORTHOPAEDIC SURGERY

## 2024-03-06 PROCEDURE — 99024 POSTOP FOLLOW-UP VISIT: CPT | Performed by: ORTHOPAEDIC SURGERY

## 2024-03-06 NOTE — PROGRESS NOTES
AMBULATORY PROGRESS NOTE      Patient: Juana Ford             MRN: [id number]     SSN: [id number] Body mass index is 24.59 kg/m².  YOB: 1960     AGE: 63 y.o.       EX: female  OFFICE VISIT DATE: 3/6/2024      PCP: Cammy Colon MD       IMPRESSION //  DIAGNOSIS AND TREATMENT PLAN  3/6/2024       Juana Ford has a diagnosis of:      DIAGNOSES    1. Closed nondisplaced fracture of fifth metatarsal bone of right foot, initial encounter    2. Closed displaced fracture of fifth metatarsal bone of right foot with routine healing, subsequent encounter    3. Acute pain of left shoulder          PLAN:    1.  Healing, right fifth metatarsal, zone 1 base fracture: Nondisplaced, nonangulated, recommendation, is weightbearing, and the heel, tandem type walking, with a short cam walker boot.  3-week follow-up, x-rays 3 views right foot, upon next visit, nonweightbearing: She understands that should her fracture shift change, she may require ORIF.  2.  Left shoulder pain: After falling, recommendation, see Dr. GRAMAJO, she seen them before, and request to see Dr. GRAMAJO.    RTO 3 weeks    Orders Placed This Encounter    [95530] Foot Min 3V     right    [83321] Shoulder 2V or more     left    [facility] - Francisco Gramajo MD, Orthopedic Surgery(General/Shoulder & Elbow), [place] ([facility])     Referral Priority:   Routine     Referral Type:   Eval and Treat     Referral Reason:   Specialty Services Required     Referred to Provider:   Francisco Gramajo MD     Requested Specialty:   Orthopedic Surgery     Number of Visits Requested:   1        There are no Patient Instructions on file for this visit.      Please follow up with your PCP for any health maintenance as recommended.       Juana Ford  expresses understanding of the diagnosis, treatment plan, and all of their proposed questions were answered to their satisfaction. Patient education has been provided re the

## 2024-03-07 NOTE — TELEPHONE ENCOUNTER
Patient called again and said that she saw  yesterday for her Right foot. That she saw him at the end of the day.     Patient said that  told her that he was going to send some Percocet Medication to her pharmacy , but he must have forgotten, because the pharmacy still has not received it.   Patient is asking that the medication be sent to the pharmacy.    [facility] pharmacy [facility]  Tel. [phone]    Patient tel. [phone].

## 2024-03-08 DIAGNOSIS — S92.354A CLOSED NONDISPLACED FRACTURE OF FIFTH METATARSAL BONE OF RIGHT FOOT, INITIAL ENCOUNTER: Primary | ICD-10-CM

## 2024-03-08 RX ORDER — OXYCODONE HYDROCHLORIDE AND ACETAMINOPHEN 5; 325 MG/1; MG/1
1 TABLET ORAL EVERY 8 HOURS PRN
Qty: 21 TABLET | Refills: 0 | Status: SHIPPED | OUTPATIENT
Start: 2024-03-08 | End: 2024-03-15

## 2024-03-26 ENCOUNTER — OFFICE VISIT (OUTPATIENT)
Age: 64
End: 2024-03-26
Payer: COMMERCIAL

## 2024-03-26 VITALS — HEIGHT: 67 IN | BODY MASS INDEX: 24.59 KG/M2

## 2024-03-26 DIAGNOSIS — S92.351D CLOSED DISPLACED FRACTURE OF FIFTH METATARSAL BONE OF RIGHT FOOT WITH ROUTINE HEALING, SUBSEQUENT ENCOUNTER: Primary | ICD-10-CM

## 2024-03-26 PROCEDURE — 73630 X-RAY EXAM OF FOOT: CPT | Performed by: ORTHOPAEDIC SURGERY

## 2024-03-26 PROCEDURE — 99024 POSTOP FOLLOW-UP VISIT: CPT | Performed by: ORTHOPAEDIC SURGERY

## 2024-03-26 RX ORDER — HYDROCODONE BITARTRATE AND ACETAMINOPHEN 7.5; 325 MG/1; MG/1
1 TABLET ORAL EVERY 8 HOURS PRN
Qty: 21 TABLET | Refills: 0 | Status: SHIPPED | OUTPATIENT
Start: 2024-03-26 | End: 2024-04-02

## 2024-03-26 NOTE — PROGRESS NOTES
Juana Ford   [address]  [facility] [address]       DIAGNOSTIC IMAGING      Orders Placed This Encounter   Procedures    [19485] Foot Min 3V     right        FOOT X RAYS 3 VIEWS Right   3/26/2024    RIGHT FOOT X-rays,  NON WEIGHT BEARING 3 views, AP/LAT/OBL completed  3/26/2024 AT [facility]     X-rays reveal Osseous: HEALING ZONE 1 5TH MET BASE FX, NO dislocations. No focal osteolytic or osteoblastic process. Bone Spurs: No significant bone spurs. Overall alignment is  acceptable. Soft tissue swelling is not noted, No radiopaque foreign body and No abnormal calcific densities to soft tissues. No osteolytic or osteoblastic lesions noted, no projection x-ray images. Mineralization suggests no osteopenia. Degenerative changes/Joint Condition: No Significant OA is not noted. Calcified vessels are not present.     I have personally reviewed the results of the above study and the interpretation of this study is my professional opinion     Nick Bauer MD  3/26/2024  9:47 AM

## 2024-03-26 NOTE — PROGRESS NOTES
AMBULATORY PROGRESS NOTE      Patient: Juana Ford             MRN: [id number]     SSN: [id number] Body mass index is 24.59 kg/m².  YOB: 1960     AGE: 63 y.o.       EX: female    PCP: Cammy Colon MD       IMPRESSION //  DIAGNOSIS AND TREATMENT PLAN      Juana Ford has a diagnosis of:      I reemphasized to her and her , these fractures can take upwards of 8 to 12 weeks to heal, and despite proper calcium metabolic, nutrition and proper protection of the cam walker boot.    OBICI in 4 weeks obtain x-rays of her right foot, should there be no significant signs of healing, then I recommend ORIF and bone grafting with Arthrex hook type plate.    DIAGNOSES    1. Closed displaced fracture of fifth metatarsal bone of right foot with routine healing, subsequent encounter          PLAN:    1. Continue weightbearing on the heel only in short CAM walker boot, advised leaving the front flap off at this time  2. Will refill [place] 7.5-325 mg  pain medications. Continue taking Vitamin D supplements, stop taking Aspirin 81 at this time  3. Recommended resting the foot as much as possible   4. Recommended OTC Voltaren gel for right foot pain    RTO 4 weeks     Orders Placed This Encounter    [53670] Foot Min 3V     right    HYDROcodone-acetaminophen (NORCO) 7.5-325 MG per tablet     Sig: Take 1 tablet by mouth every 8 hours as needed for Pain for up to 7 days. Max Daily Amount: 3 tablets     Dispense:  21 tablet     Refill:  0     Reduce doses taken as pain becomes manageable        Patient Instructions   Follow up in 4 weeks      Please follow up with your PCP for any health maintenance as recommended.         Juana Ford  expresses understanding of the diagnosis, treatment plan, and all of their proposed questions were answered to their satisfaction. Patient education has been provided re the diagnoses.         HPI //  OBJECTIVE EXAMINATION      Juana Franco

## 2024-04-23 ENCOUNTER — OFFICE VISIT (OUTPATIENT)
Age: 64
End: 2024-04-23

## 2024-04-23 VITALS — WEIGHT: 157 LBS | BODY MASS INDEX: 24.64 KG/M2 | HEIGHT: 67 IN

## 2024-04-23 DIAGNOSIS — S92.351D CLOSED DISPLACED FRACTURE OF FIFTH METATARSAL BONE OF RIGHT FOOT WITH ROUTINE HEALING, SUBSEQUENT ENCOUNTER: Primary | ICD-10-CM

## 2024-04-23 NOTE — PROGRESS NOTES
reviewing the previous notes, reviewing diagnostic studies [Advanced  Imaging, Diagnostic test results (x-rays)] and had a direct face to face with the patient discussing the diagnosis and importance of compliance with the treatment and plan.  The treatment plan is listed in the above plan section of this Office encounter. I  answered all of her questions, as well as documenting patient care coordination for this individual on the day of the visit.      Disclaimer:     Sections of this note are dictated using utilizing voice recognition software, which may have resulted in some phonetic based errors in grammar and contents. Even though attempts were made to correct all the mistakes, some may have been missed, and remained in the body of the document. If questions arise, please contact our department.     An electronic signature was used to authenticate this note.    Juana Ford may have a reminder for a \"due or due soon\" health maintenance. I have asked that she contact her primary care provider for follow-up on this health maintenance.         Documentation by tylor Langston, as dictated by Nick Bauer MD on 4/23/2024.

## 2024-04-23 NOTE — PATIENT INSTRUCTIONS
Plan will be for surgery, fixation of metatarsal base fracture, does show no signs of significant progressive signs of healing.    She was given Lea Herring's card

## 2024-04-25 ENCOUNTER — TELEPHONE (OUTPATIENT)
Age: 64
End: 2024-04-25

## 2024-04-25 DIAGNOSIS — Z01.818 PREOP TESTING: Primary | ICD-10-CM

## 2024-04-25 NOTE — TELEPHONE ENCOUNTER
Pt requesting a refill on the [place].    States it was not sent to her pharmacy as discussed at her last office visit.

## 2024-04-26 DIAGNOSIS — S92.351D CLOSED DISPLACED FRACTURE OF FIFTH METATARSAL BONE OF RIGHT FOOT WITH ROUTINE HEALING, SUBSEQUENT ENCOUNTER: Primary | ICD-10-CM

## 2024-04-26 RX ORDER — HYDROCODONE BITARTRATE AND ACETAMINOPHEN 5; 325 MG/1; MG/1
1 TABLET ORAL EVERY 8 HOURS PRN
Qty: 21 TABLET | Refills: 0 | Status: SHIPPED | OUTPATIENT
Start: 2024-04-26 | End: 2024-05-03

## 2024-05-09 ENCOUNTER — TELEPHONE (OUTPATIENT)
Age: 64
End: 2024-05-09

## 2024-05-09 NOTE — TELEPHONE ENCOUNTER
Received fax from [facility] Nick Bauer, asking for pre op clearance for patient. Patient has not been seen since 2022. Please schedule patient for a pre op appointment.

## 2024-05-09 NOTE — TELEPHONE ENCOUNTER
[facility] - voice mail contained the urgency in which appointment needs to be scheduled

## 2024-05-14 NOTE — PERIOP NOTE
arrangements for a responsible adult (18 years or older) to be with you for 24 hours after your surgery.   17. ONE VISITOR will be allowed in the waiting area during your surgery.  Exceptions may be made for surgical admissions, per nursing unit guidelines      Special Instructions:      Bring a list of CURRENT medications.  Follow instructions from the office regarding Blood Thinners and/or Insulin  Follow instructions from the office regarding medications to take the morning of surgery.   Bring inhaler.    If you have a history of recreational drug use, you may be required to submit a urine sample for drug testing the day of your procedure, as some recreational drugs can interact with anesthetics and increase your surgical risk.    On day of surgery if you are running late, unable to make procedure time, or sick, please call the Pre-op department at [phone]    These surgical instructions were reviewed with patient during the PAT phone call.

## 2024-05-15 ENCOUNTER — HOSPITAL ENCOUNTER (OUTPATIENT)
Facility: HOSPITAL | Age: 64
Setting detail: SPECIMEN
Discharge: HOME | End: 2024-05-18
Payer: COMMERCIAL

## 2024-05-15 DIAGNOSIS — Z01.818 PREOP TESTING: ICD-10-CM

## 2024-05-15 LAB
ALBUMIN SERPL-MCNC: 4.1 G/DL (ref 3.4–5)
ALBUMIN/GLOB SERPL: 1.2 (ref 0.8–1.7)
ALP SERPL-CCNC: 110 U/L (ref 45–117)
ALT SERPL-CCNC: 56 U/L (ref 13–56)
ANION GAP SERPL CALC-SCNC: 5 MMOL/L (ref 3–18)
AST SERPL-CCNC: 60 U/L (ref 10–38)
BASOPHILS # BLD: 0 K/UL (ref 0–0.1)
BASOPHILS NFR BLD: 1 % (ref 0–2)
BILIRUB SERPL-MCNC: 0.6 MG/DL (ref 0.2–1)
BUN SERPL-MCNC: 12 MG/DL (ref 7–18)
BUN/CREAT SERPL: 14 (ref 12–20)
CALCIUM SERPL-MCNC: 9.6 MG/DL (ref 8.5–10.1)
CHLORIDE SERPL-SCNC: 106 MMOL/L (ref 100–111)
CO2 SERPL-SCNC: 26 MMOL/L (ref 21–32)
CREAT SERPL-MCNC: 0.87 MG/DL (ref 0.6–1.3)
DIFFERENTIAL METHOD BLD: ABNORMAL
EOSINOPHIL # BLD: 0.3 K/UL (ref 0–0.4)
EOSINOPHIL NFR BLD: 5 % (ref 0–5)
ERYTHROCYTE [DISTWIDTH] IN BLOOD BY AUTOMATED COUNT: 13.2 % (ref 11.6–14.5)
GLOBULIN SER CALC-MCNC: 3.5 G/DL (ref 2–4)
GLUCOSE SERPL-MCNC: 135 MG/DL (ref 74–99)
HCT VFR BLD AUTO: 40.8 % (ref 35–45)
HGB BLD-MCNC: 13 G/DL (ref 12–16)
IMM GRANULOCYTES # BLD AUTO: 0 K/UL (ref 0–0.04)
IMM GRANULOCYTES NFR BLD AUTO: 0 % (ref 0–0.5)
LYMPHOCYTES # BLD: 2.7 K/UL (ref 0.9–3.6)
LYMPHOCYTES NFR BLD: 49 % (ref 21–52)
MCH RBC QN AUTO: 32.3 PG (ref 24–34)
MCHC RBC AUTO-ENTMCNC: 31.9 G/DL (ref 31–37)
MCV RBC AUTO: 101.5 FL (ref 78–100)
MONOCYTES # BLD: 0.4 K/UL (ref 0.05–1.2)
MONOCYTES NFR BLD: 7 % (ref 3–10)
NEUTS SEG # BLD: 2.1 K/UL (ref 1.8–8)
NEUTS SEG NFR BLD: 38 % (ref 40–73)
NRBC # BLD: 0 K/UL (ref 0–0.01)
NRBC BLD-RTO: 0 PER 100 WBC
PLATELET # BLD AUTO: 288 K/UL (ref 135–420)
PMV BLD AUTO: 10.5 FL (ref 9.2–11.8)
POTASSIUM SERPL-SCNC: 4.6 MMOL/L (ref 3.5–5.5)
PROT SERPL-MCNC: 7.6 G/DL (ref 6.4–8.2)
RBC # BLD AUTO: 4.02 M/UL (ref 4.2–5.3)
SODIUM SERPL-SCNC: 137 MMOL/L (ref 136–145)
WBC # BLD AUTO: 5.5 K/UL (ref 4.6–13.2)

## 2024-05-15 PROCEDURE — 36415 COLL VENOUS BLD VENIPUNCTURE: CPT

## 2024-05-15 PROCEDURE — 85025 COMPLETE CBC W/AUTO DIFF WBC: CPT

## 2024-05-15 PROCEDURE — 80053 COMPREHEN METABOLIC PANEL: CPT

## 2024-05-16 ENCOUNTER — OFFICE VISIT (OUTPATIENT)
Age: 64
End: 2024-05-16
Payer: COMMERCIAL

## 2024-05-16 VITALS
BODY MASS INDEX: 25.11 KG/M2 | OXYGEN SATURATION: 95 % | TEMPERATURE: 98.2 F | HEIGHT: 67 IN | SYSTOLIC BLOOD PRESSURE: 120 MMHG | WEIGHT: 160 LBS | DIASTOLIC BLOOD PRESSURE: 73 MMHG | HEART RATE: 87 BPM | RESPIRATION RATE: 18 BRPM

## 2024-05-16 DIAGNOSIS — I45.10 RBBB (RIGHT BUNDLE BRANCH BLOCK): ICD-10-CM

## 2024-05-16 DIAGNOSIS — Z01.818 PREOPERATIVE EVALUATION TO RULE OUT SURGICAL CONTRAINDICATION: Primary | ICD-10-CM

## 2024-05-16 DIAGNOSIS — S92.351K CLOSED FRACTURE OF BASE OF FIFTH METATARSAL BONE OF RIGHT FOOT WITH NONUNION: ICD-10-CM

## 2024-05-16 DIAGNOSIS — K76.0 HEPATIC STEATOSIS: ICD-10-CM

## 2024-05-16 DIAGNOSIS — R73.03 PREDIABETES: ICD-10-CM

## 2024-05-16 DIAGNOSIS — R79.89 ELEVATED LFTS: ICD-10-CM

## 2024-05-16 DIAGNOSIS — E78.00 PURE HYPERCHOLESTEROLEMIA: ICD-10-CM

## 2024-05-16 DIAGNOSIS — Z98.890 S/P LAPAROSCOPIC FUNDOPLICATION: ICD-10-CM

## 2024-05-16 DIAGNOSIS — E55.9 VITAMIN D DEFICIENCY: ICD-10-CM

## 2024-05-16 DIAGNOSIS — E66.3 OVERWEIGHT (BMI 25.0-29.9): ICD-10-CM

## 2024-05-16 PROBLEM — R63.4 WEIGHT LOSS, UNINTENTIONAL: Status: RESOLVED | Noted: 2020-11-28 | Resolved: 2024-05-16

## 2024-05-16 PROCEDURE — 99215 OFFICE O/P EST HI 40 MIN: CPT | Performed by: INTERNAL MEDICINE

## 2024-05-16 PROCEDURE — 93000 ELECTROCARDIOGRAM COMPLETE: CPT | Performed by: INTERNAL MEDICINE

## 2024-05-16 SDOH — ECONOMIC STABILITY: FOOD INSECURITY: WITHIN THE PAST 12 MONTHS, THE FOOD YOU BOUGHT JUST DIDN'T LAST AND YOU DIDN'T HAVE MONEY TO GET MORE.: NEVER TRUE

## 2024-05-16 SDOH — ECONOMIC STABILITY: FOOD INSECURITY: WITHIN THE PAST 12 MONTHS, YOU WORRIED THAT YOUR FOOD WOULD RUN OUT BEFORE YOU GOT MONEY TO BUY MORE.: NEVER TRUE

## 2024-05-16 SDOH — ECONOMIC STABILITY: HOUSING INSECURITY
IN THE LAST 12 MONTHS, WAS THERE A TIME WHEN YOU DID NOT HAVE A STEADY PLACE TO SLEEP OR SLEPT IN A SHELTER (INCLUDING NOW)?: NO

## 2024-05-16 SDOH — ECONOMIC STABILITY: INCOME INSECURITY: HOW HARD IS IT FOR YOU TO PAY FOR THE VERY BASICS LIKE FOOD, HOUSING, MEDICAL CARE, AND HEATING?: NOT HARD AT ALL

## 2024-05-16 NOTE — PATIENT INSTRUCTIONS
injuries, health problems, or other reasons that may make it easy for you to fall at home, it is a good idea to learn how to get up safely after a fall. Learning how to get up correctly can help you avoid making an injury worse.  Also, knowing what to do if you cannot get up can help you stay safe until help arrives.  Follow-up care is a key part of your treatment and safety. Be sure to make and go to all appointments, and call your doctor if you are having problems. It's also a good idea to know your test results and keep a list of the medicines you take.  How can you care for yourself after a fall?  If you think you can get up  First lie still for a few minutes and think about how you feel. If your body feels okay and you think you can get up safely, follow the rest of the steps below:  Look for a chair or other piece of furniture that is close to you.  Roll onto your side and rest. Roll by turning your head in the direction you want to roll, move your shoulder and arm, then hip and leg in the same direction.  Lie still for a moment to let your blood pressure adjust.  Slowly push your upper body up, lift your head, and take a moment to rest.  Slowly get up on your hands and knees, and crawl to the chair or other stable piece of furniture.  Put your hands on the chair.  Move one foot forward, and place it flat on the floor. Your other leg should be bent with the knee on the floor.  Rise slowly, turn your body, and sit in the chair. Stay seated for a bit and think about how you feel. Call for help. Even if you feel okay, let someone know what happened to you. You might not know that you have a serious injury.  If you cannot get up  If you think you are injured after a fall or you cannot get up, try not to panic.  Call out for help.  If you have a phone within reach or you have an emergency call device, use it to call for help.  If you do not have a phone within reach, try to slide yourself toward it. If you cannot

## 2024-05-16 NOTE — PROGRESS NOTES
HPI:   Juana Ford is a 63 y.o. year old female who presents today for preoperative evaluation.  She has a history of hyperlipidemia, mild intermittent asthma, GERD, hiatal hernia, hepatic steatosis, osteoarthritis, lumbar degenerative disease, insomnia, and anxiety.  She reports that she is doing reasonably well.      On 2/22/2024, she experienced a fall at home and presented for evaluation of right foot pain and left great toe pain.  Right foot x-ray showed a nondisplaced transverse fracture of the base of the fifth metatarsal; and left toe x-ray showed no acute findings and mild first MTP degenerative changes.  She received a Toradol 15 mg injection and was discharged with Percocet for pain.  She underwent evaluation by Dr. Bauer on 2/27/2024 and was diagnosed with a sprain of the calcaneofibular ligament of the right ankle in addition to the fifth metatarsal bone nondisplaced fracture.  She was placed in a short cam boot and advised to be nonweightbearing with crutches.  She continued to be followed every 2 weeks, and on 4/23/2024 was noted to still have a persistent fracture at the base of the fifth metatarsal with mild soft tissue swelling.  She reported persistent pain and recommendation was to proceed with surgery for open reduction internal fixation with autograft bone grafting and external bone stimulator.  She states that she is scheduled on 5/23/2024 at [facility].    She reports that she has been feeling well overall and denies any change in exercise tolerance.  She states that she is continuing to have difficulty swallowing but has been able to maintain her weight.  She is continuing to use diazepam 5 mg and pantoprazole 20 mg daily as needed. She also continues on venlafaxine 150 mg daily for depression and reports good control.    She states that she and her  recently returned from a trip to [facility] and does report drinking alcohol while on vacation.  She also states that she was drinking wine

## 2024-05-16 NOTE — PROGRESS NOTES
Juana Ford presents today for pre op.              1. \"Have you been to the ER, urgent care clinic since your last visit?  Hospitalized since your last visit?\" no    2. \"Have you seen or consulted any other health care providers outside of the [facility] since your last visit?\" no     3. For patients aged 45-75: Has the patient had a colonoscopy / FIT/ Cologuard? Yes - no Care Gap present      If the patient is female:    4. For patients aged 40-74: Has the patient had a mammogram within the past 2 years? No      5. For patients aged 21-65: Has the patient had a pap smear? Hysterectomy.

## 2024-05-21 ENCOUNTER — ANESTHESIA EVENT (OUTPATIENT)
Facility: HOSPITAL | Age: 64
End: 2024-05-21
Payer: COMMERCIAL

## 2024-05-21 DIAGNOSIS — S92.351D CLOSED DISPLACED FRACTURE OF FIFTH METATARSAL BONE OF RIGHT FOOT WITH ROUTINE HEALING, SUBSEQUENT ENCOUNTER: Primary | ICD-10-CM

## 2024-05-21 RX ORDER — OXYCODONE HYDROCHLORIDE 5 MG/1
5 TABLET ORAL EVERY 4 HOURS PRN
Qty: 28 TABLET | Refills: 0 | Status: SHIPPED | OUTPATIENT
Start: 2024-05-21 | End: 2024-05-28

## 2024-05-21 RX ORDER — CEPHALEXIN 500 MG/1
500 CAPSULE ORAL 4 TIMES DAILY
Qty: 20 CAPSULE | Refills: 0 | Status: SHIPPED | OUTPATIENT
Start: 2024-05-21

## 2024-05-21 RX ORDER — ONDANSETRON 4 MG/1
4 TABLET, FILM COATED ORAL EVERY 8 HOURS PRN
Qty: 10 TABLET | Refills: 0 | Status: SHIPPED | OUTPATIENT
Start: 2024-05-21

## 2024-05-21 NOTE — DISCHARGE INSTRUCTIONS
tingling, coldness or increase pain          [x]  No smoking/ No tobacco products/ Avoid exposure to second hand smoke    5.DIET:  regular diet   OTC (Nutritional supplements/multivitamins/calcium w/ Vitamin  D     6. ACTIVITY: No Driving, No lifting, twisting, squatting, deep bending.      7. INCISION CARE/DRESSINGS: Keep wound clean and dry ,Keep the current dressings on and in place. There is no need to change these current dressings    Keep all pets away from  any wound present in order to prevent infection.    8. PAIN CONTROL:  Start taking your pain medications when you get home.     9. VTE prophylaxis : Start Aspirin  81 MG  ONE PO EACH DAY on date. START ON 5/24/2024 AT 8AM    10. ANTIBIOTICS: Keflex 500 mg one po QID for 5 days (#20) . START ON 5/24/2024 AT 8AM    11. DME/ PRESCRIPTIONS WRITTEN ALREADY WRITTEN:      Medications below, sent to the pharmacy             Orders Placed This Encounter    oxyCODONE (ROXICODONE) 5 MG immediate release tablet       Sig: Take 1 tablet by mouth every 4 hours as needed for Pain for up to 7 days. Max Daily Amount: 30 mg       Dispense:  28 tablet       Refill:  0       Reduce doses taken as pain becomes manageable    ondansetron (ZOFRAN) 4 MG tablet       Sig: Take 1 tablet by mouth every 8 hours as needed for Nausea or Vomiting       Dispense:  10 tablet       Refill:  0    cephALEXin (KEFLEX) 500 MG capsule       Sig: Take 1 capsule by mouth 4 times daily       Dispense:  20 capsule       Refill:  0        Nick Bauer MD  5/21/2024  6:05 PM                Nick Bauer MD  5/23/2024  9:15 AM

## 2024-05-21 NOTE — H&P
FOOT AND ANKLE HISTORY AND PHYSICAL      Patient: Juana Ford                   MRN: [id number]         SSN: [id number]  YOB: 1960            AGE: 63 y.o.          SEX: female  Date of examination: 5/21/2024     Patient scheduled for: Open reduction internal fixation of the right Zaldivar fracture, autograft bone grafting, external bone stimulator, possible allograft bone grafting  Date of surgery: 4/23/2024   Surgical Time: 60 minutes  Consults: Cammy Colon MD   Special Equipment: Arthrex  Location of Surgery:   [facility]  Surgeon: Nick Bauer MD  ANESTHESIA TYPE:  General,   Regional block        PRESCRIPTIONS AND/OR ORDERS PROVIDED DURING H&P:    Rx sent to :  [facility] [id number] [address] [phone] - F [phone]    ondansetron (ZOFRAN) 4 MG tablet  Take 1 tablet by mouth every 8 hours as needed for Nausea or Vomiting, Disp-10 tablet, R-0  Normal    oxyCODONE (ROXICODONE) 5 MG immediate release tablet  Take 1 tablet by mouth every 4 hours as needed for Pain for up to 7 days. Max Daily Amount: 30 mg, Disp-28 tablet, R-0  Normal, Maximum MME/Day: 45 MME/Day for this order        [facility] [id number] [address] [phone] - F [phone]             HISTORY AND INFORMED CONSENT      The patient was seen in the office today for a preoperative history and physical for an upcoming above listed surgery.  The patient is a pleasant 63 y.o. female who has a history of colon she sustained a right fifth metatarsal base fracture zone 1, and remains symptomatic, this injury occurred February 22, 2024.  She is yet to heal this fracture [place], continues to have pain discomfort.  She is also seen, for a diagnosis of the deep peroneal nerve neuritis.  She does take vitamin D supplementation, denies smoking or tobacco or steroid history.    She is

## 2024-05-23 ENCOUNTER — APPOINTMENT (OUTPATIENT)
Facility: HOSPITAL | Age: 64
End: 2024-05-23
Attending: ORTHOPAEDIC SURGERY
Payer: COMMERCIAL

## 2024-05-23 ENCOUNTER — ANESTHESIA (OUTPATIENT)
Facility: HOSPITAL | Age: 64
End: 2024-05-23
Payer: COMMERCIAL

## 2024-05-23 ENCOUNTER — HOSPITAL ENCOUNTER (OUTPATIENT)
Facility: HOSPITAL | Age: 64
Setting detail: OUTPATIENT SURGERY
Discharge: HOME | End: 2024-05-23
Attending: ORTHOPAEDIC SURGERY | Admitting: ORTHOPAEDIC SURGERY
Payer: COMMERCIAL

## 2024-05-23 VITALS
TEMPERATURE: 96.9 F | DIASTOLIC BLOOD PRESSURE: 62 MMHG | BODY MASS INDEX: 25.43 KG/M2 | HEART RATE: 76 BPM | RESPIRATION RATE: 16 BRPM | OXYGEN SATURATION: 92 % | SYSTOLIC BLOOD PRESSURE: 96 MMHG | WEIGHT: 162 LBS | HEIGHT: 67 IN

## 2024-05-23 DIAGNOSIS — S92.901K CLOSED FRACTURE OF RIGHT FOOT WITH NONUNION, SUBSEQUENT ENCOUNTER: Primary | ICD-10-CM

## 2024-05-23 PROCEDURE — 7100000000 HC PACU RECOVERY - FIRST 15 MIN: Performed by: ORTHOPAEDIC SURGERY

## 2024-05-23 PROCEDURE — 2580000003 HC RX 258: Performed by: ORTHOPAEDIC SURGERY

## 2024-05-23 PROCEDURE — 2709999900 HC NON-CHARGEABLE SUPPLY: Performed by: ORTHOPAEDIC SURGERY

## 2024-05-23 PROCEDURE — 6360000002 HC RX W HCPCS: Performed by: ANESTHESIOLOGY

## 2024-05-23 PROCEDURE — C1713 ANCHOR/SCREW BN/BN,TIS/BN: HCPCS | Performed by: ORTHOPAEDIC SURGERY

## 2024-05-23 PROCEDURE — 3700000001 HC ADD 15 MINUTES (ANESTHESIA): Performed by: ORTHOPAEDIC SURGERY

## 2024-05-23 PROCEDURE — A4217 STERILE WATER/SALINE, 500 ML: HCPCS | Performed by: ORTHOPAEDIC SURGERY

## 2024-05-23 PROCEDURE — 6360000002 HC RX W HCPCS: Performed by: NURSE ANESTHETIST, CERTIFIED REGISTERED

## 2024-05-23 PROCEDURE — 3600000012 HC SURGERY LEVEL 2 ADDTL 15MIN: Performed by: ORTHOPAEDIC SURGERY

## 2024-05-23 PROCEDURE — 3700000000 HC ANESTHESIA ATTENDED CARE: Performed by: ORTHOPAEDIC SURGERY

## 2024-05-23 PROCEDURE — 2500000003 HC RX 250 WO HCPCS: Performed by: NURSE ANESTHETIST, CERTIFIED REGISTERED

## 2024-05-23 PROCEDURE — 7100000011 HC PHASE II RECOVERY - ADDTL 15 MIN: Performed by: ORTHOPAEDIC SURGERY

## 2024-05-23 PROCEDURE — 73620 X-RAY EXAM OF FOOT: CPT

## 2024-05-23 PROCEDURE — 64445 NJX AA&/STRD SCIATIC NRV IMG: CPT | Performed by: ANESTHESIOLOGY

## 2024-05-23 PROCEDURE — 6360000002 HC RX W HCPCS: Performed by: ORTHOPAEDIC SURGERY

## 2024-05-23 PROCEDURE — 3600000002 HC SURGERY LEVEL 2 BASE: Performed by: ORTHOPAEDIC SURGERY

## 2024-05-23 PROCEDURE — 2780000010 HC IMPLANT OTHER: Performed by: ORTHOPAEDIC SURGERY

## 2024-05-23 PROCEDURE — 6370000000 HC RX 637 (ALT 250 FOR IP): Performed by: NURSE ANESTHETIST, CERTIFIED REGISTERED

## 2024-05-23 PROCEDURE — 7100000010 HC PHASE II RECOVERY - FIRST 15 MIN: Performed by: ORTHOPAEDIC SURGERY

## 2024-05-23 PROCEDURE — 7100000001 HC PACU RECOVERY - ADDTL 15 MIN: Performed by: ORTHOPAEDIC SURGERY

## 2024-05-23 PROCEDURE — 2580000003 HC RX 258: Performed by: NURSE ANESTHETIST, CERTIFIED REGISTERED

## 2024-05-23 DEVICE — IMPLANTABLE DEVICE: Type: IMPLANTABLE DEVICE | Site: FOOT | Status: FUNCTIONAL

## 2024-05-23 DEVICE — SCREW BNE L12MM DIA2.4MM CORT LOK LO PROF FOR COMPHSVE FT: Type: IMPLANTABLE DEVICE | Site: FOOT | Status: FUNCTIONAL

## 2024-05-23 DEVICE — SCREW TI VAL KREULOCK   2.4X12: Type: IMPLANTABLE DEVICE | Site: FOOT | Status: FUNCTIONAL

## 2024-05-23 DEVICE — ANCHOR FIX DISP FOR ANK FRAC SYS BB-TAK: Type: IMPLANTABLE DEVICE | Site: FOOT | Status: FUNCTIONAL

## 2024-05-23 RX ORDER — SODIUM CHLORIDE 0.9 % (FLUSH) 0.9 %
5-40 SYRINGE (ML) INJECTION PRN
Status: DISCONTINUED | OUTPATIENT
Start: 2024-05-23 | End: 2024-05-23 | Stop reason: HOSPADM

## 2024-05-23 RX ORDER — LIDOCAINE HYDROCHLORIDE 10 MG/ML
1 INJECTION, SOLUTION EPIDURAL; INFILTRATION; INTRACAUDAL; PERINEURAL
Status: COMPLETED | OUTPATIENT
Start: 2024-05-23 | End: 2024-05-23

## 2024-05-23 RX ORDER — DEXAMETHASONE SODIUM PHOSPHATE 4 MG/ML
INJECTION, SOLUTION INTRA-ARTICULAR; INTRALESIONAL; INTRAMUSCULAR; INTRAVENOUS; SOFT TISSUE PRN
Status: DISCONTINUED | OUTPATIENT
Start: 2024-05-23 | End: 2024-05-23 | Stop reason: SDUPTHER

## 2024-05-23 RX ORDER — MIDAZOLAM HYDROCHLORIDE 2 MG/2ML
2 INJECTION, SOLUTION INTRAMUSCULAR; INTRAVENOUS ONCE
Status: COMPLETED | OUTPATIENT
Start: 2024-05-23 | End: 2024-05-23

## 2024-05-23 RX ORDER — FAMOTIDINE 20 MG/1
20 TABLET, FILM COATED ORAL ONCE
Status: COMPLETED | OUTPATIENT
Start: 2024-05-23 | End: 2024-05-23

## 2024-05-23 RX ORDER — GLYCOPYRROLATE 0.2 MG/ML
INJECTION INTRAMUSCULAR; INTRAVENOUS PRN
Status: DISCONTINUED | OUTPATIENT
Start: 2024-05-23 | End: 2024-05-23 | Stop reason: SDUPTHER

## 2024-05-23 RX ORDER — MAGNESIUM SULFATE HEPTAHYDRATE 500 MG/ML
INJECTION, SOLUTION INTRAMUSCULAR; INTRAVENOUS PRN
Status: DISCONTINUED | OUTPATIENT
Start: 2024-05-23 | End: 2024-05-23 | Stop reason: SDUPTHER

## 2024-05-23 RX ORDER — ROPIVACAINE HYDROCHLORIDE 5 MG/ML
30 INJECTION, SOLUTION EPIDURAL; INFILTRATION; PERINEURAL ONCE
Status: COMPLETED | OUTPATIENT
Start: 2024-05-23 | End: 2024-05-23

## 2024-05-23 RX ORDER — SODIUM CHLORIDE, SODIUM LACTATE, POTASSIUM CHLORIDE, CALCIUM CHLORIDE 600; 310; 30; 20 MG/100ML; MG/100ML; MG/100ML; MG/100ML
INJECTION, SOLUTION INTRAVENOUS CONTINUOUS
Status: DISCONTINUED | OUTPATIENT
Start: 2024-05-23 | End: 2024-05-23 | Stop reason: HOSPADM

## 2024-05-23 RX ORDER — ROCURONIUM BROMIDE 10 MG/ML
INJECTION, SOLUTION INTRAVENOUS PRN
Status: DISCONTINUED | OUTPATIENT
Start: 2024-05-23 | End: 2024-05-23 | Stop reason: SDUPTHER

## 2024-05-23 RX ORDER — NALOXONE HYDROCHLORIDE 0.4 MG/ML
INJECTION, SOLUTION INTRAMUSCULAR; INTRAVENOUS; SUBCUTANEOUS PRN
Status: DISCONTINUED | OUTPATIENT
Start: 2024-05-23 | End: 2024-05-23 | Stop reason: HOSPADM

## 2024-05-23 RX ORDER — SODIUM CHLORIDE 0.9 % (FLUSH) 0.9 %
5-40 SYRINGE (ML) INJECTION EVERY 12 HOURS SCHEDULED
Status: DISCONTINUED | OUTPATIENT
Start: 2024-05-23 | End: 2024-05-23 | Stop reason: HOSPADM

## 2024-05-23 RX ORDER — ONDANSETRON 2 MG/ML
INJECTION INTRAMUSCULAR; INTRAVENOUS PRN
Status: DISCONTINUED | OUTPATIENT
Start: 2024-05-23 | End: 2024-05-23 | Stop reason: SDUPTHER

## 2024-05-23 RX ORDER — SUCCINYLCHOLINE/SOD CL,ISO/PF 100 MG/5ML
SYRINGE (ML) INTRAVENOUS PRN
Status: DISCONTINUED | OUTPATIENT
Start: 2024-05-23 | End: 2024-05-23 | Stop reason: SDUPTHER

## 2024-05-23 RX ORDER — LIDOCAINE HYDROCHLORIDE 20 MG/ML
INJECTION, SOLUTION EPIDURAL; INFILTRATION; INTRACAUDAL; PERINEURAL PRN
Status: DISCONTINUED | OUTPATIENT
Start: 2024-05-23 | End: 2024-05-23 | Stop reason: SDUPTHER

## 2024-05-23 RX ORDER — PROPOFOL 10 MG/ML
INJECTION, EMULSION INTRAVENOUS PRN
Status: DISCONTINUED | OUTPATIENT
Start: 2024-05-23 | End: 2024-05-23 | Stop reason: SDUPTHER

## 2024-05-23 RX ORDER — FENTANYL CITRATE 50 UG/ML
100 INJECTION, SOLUTION INTRAMUSCULAR; INTRAVENOUS ONCE
Status: COMPLETED | OUTPATIENT
Start: 2024-05-23 | End: 2024-05-23

## 2024-05-23 RX ORDER — KETOROLAC TROMETHAMINE 15 MG/ML
INJECTION, SOLUTION INTRAMUSCULAR; INTRAVENOUS PRN
Status: DISCONTINUED | OUTPATIENT
Start: 2024-05-23 | End: 2024-05-23 | Stop reason: SDUPTHER

## 2024-05-23 RX ORDER — SODIUM CHLORIDE 9 MG/ML
INJECTION, SOLUTION INTRAVENOUS PRN
Status: DISCONTINUED | OUTPATIENT
Start: 2024-05-23 | End: 2024-05-23 | Stop reason: HOSPADM

## 2024-05-23 RX ORDER — PROCHLORPERAZINE EDISYLATE 5 MG/ML
5 INJECTION INTRAMUSCULAR; INTRAVENOUS ONCE
Status: COMPLETED | OUTPATIENT
Start: 2024-05-23 | End: 2024-05-23

## 2024-05-23 RX ORDER — NEOSTIGMINE METHYLSULFATE 1 MG/ML
INJECTION, SOLUTION INTRAVENOUS PRN
Status: DISCONTINUED | OUTPATIENT
Start: 2024-05-23 | End: 2024-05-23 | Stop reason: SDUPTHER

## 2024-05-23 RX ORDER — ROPIVACAINE HYDROCHLORIDE 5 MG/ML
INJECTION, SOLUTION EPIDURAL; INFILTRATION; PERINEURAL
Status: COMPLETED | OUTPATIENT
Start: 2024-05-23 | End: 2024-05-23

## 2024-05-23 RX ORDER — DEXMEDETOMIDINE HYDROCHLORIDE 100 UG/ML
INJECTION, SOLUTION INTRAVENOUS PRN
Status: DISCONTINUED | OUTPATIENT
Start: 2024-05-23 | End: 2024-05-23 | Stop reason: SDUPTHER

## 2024-05-23 RX ORDER — ONDANSETRON 2 MG/ML
4 INJECTION INTRAMUSCULAR; INTRAVENOUS
Status: COMPLETED | OUTPATIENT
Start: 2024-05-23 | End: 2024-05-23

## 2024-05-23 RX ADMIN — HYDROMORPHONE HYDROCHLORIDE 0.5 MG: 1 INJECTION, SOLUTION INTRAMUSCULAR; INTRAVENOUS; SUBCUTANEOUS at 09:58

## 2024-05-23 RX ADMIN — DEXMEDETOMIDINE HYDROCHLORIDE 6 MCG: 100 INJECTION, SOLUTION INTRAVENOUS at 08:53

## 2024-05-23 RX ADMIN — PROPOFOL 150 MG: 10 INJECTION, EMULSION INTRAVENOUS at 07:37

## 2024-05-23 RX ADMIN — GLYCOPYRROLATE 0.4 MG: 0.2 INJECTION INTRAMUSCULAR; INTRAVENOUS at 08:41

## 2024-05-23 RX ADMIN — ROCURONIUM BROMIDE 20 MG: 50 INJECTION INTRAVENOUS at 07:54

## 2024-05-23 RX ADMIN — ONDANSETRON 4 MG: 2 INJECTION INTRAMUSCULAR; INTRAVENOUS at 09:40

## 2024-05-23 RX ADMIN — MIDAZOLAM 2 MG: 1 INJECTION INTRAMUSCULAR; INTRAVENOUS at 07:19

## 2024-05-23 RX ADMIN — SODIUM CHLORIDE, POTASSIUM CHLORIDE, SODIUM LACTATE AND CALCIUM CHLORIDE: 600; 310; 30; 20 INJECTION, SOLUTION INTRAVENOUS at 06:54

## 2024-05-23 RX ADMIN — ROPIVACAINE HYDROCHLORIDE 30 ML: 5 INJECTION EPIDURAL; INFILTRATION; PERINEURAL at 07:20

## 2024-05-23 RX ADMIN — FAMOTIDINE 20 MG: 20 TABLET ORAL at 06:54

## 2024-05-23 RX ADMIN — WATER 2000 MG: 1 INJECTION, SOLUTION INTRAMUSCULAR; INTRAVENOUS; SUBCUTANEOUS at 07:45

## 2024-05-23 RX ADMIN — DEXAMETHASONE SODIUM PHOSPHATE 4 MG: 4 INJECTION INTRA-ARTICULAR; INTRALESIONAL; INTRAMUSCULAR; INTRAVENOUS; SOFT TISSUE at 07:37

## 2024-05-23 RX ADMIN — LIDOCAINE HYDROCHLORIDE 1 ML: 10 INJECTION, SOLUTION EPIDURAL; INFILTRATION; INTRACAUDAL; PERINEURAL at 07:19

## 2024-05-23 RX ADMIN — MAGNESIUM SULFATE HEPTAHYDRATE 1 G: 500 INJECTION, SOLUTION INTRAMUSCULAR; INTRAVENOUS at 07:37

## 2024-05-23 RX ADMIN — FENTANYL CITRATE 100 MCG: 50 INJECTION INTRAMUSCULAR; INTRAVENOUS at 07:19

## 2024-05-23 RX ADMIN — ONDANSETRON 4 MG: 2 INJECTION INTRAMUSCULAR; INTRAVENOUS at 07:37

## 2024-05-23 RX ADMIN — LIDOCAINE HYDROCHLORIDE 100 MG: 20 INJECTION, SOLUTION EPIDURAL; INFILTRATION; INTRACAUDAL; PERINEURAL at 07:37

## 2024-05-23 RX ADMIN — PROCHLORPERAZINE EDISYLATE 5 MG: 5 INJECTION INTRAMUSCULAR; INTRAVENOUS at 10:19

## 2024-05-23 RX ADMIN — KETOROLAC TROMETHAMINE 15 MG: 15 INJECTION, SOLUTION INTRAMUSCULAR; INTRAVENOUS at 08:41

## 2024-05-23 RX ADMIN — Medication 100 MG: at 07:37

## 2024-05-23 RX ADMIN — DEXMEDETOMIDINE HYDROCHLORIDE 6 MCG: 100 INJECTION, SOLUTION INTRAVENOUS at 08:55

## 2024-05-23 RX ADMIN — NEOSTIGMINE METHYLSULFATE 3 MG: 1 INJECTION, SOLUTION INTRAVENOUS at 08:41

## 2024-05-23 RX ADMIN — ROPIVACAINE HYDROCHLORIDE 20 ML: 5 INJECTION, SOLUTION EPIDURAL; INFILTRATION; PERINEURAL at 07:19

## 2024-05-23 ASSESSMENT — PAIN SCALES - GENERAL: PAINLEVEL_OUTOF10: 8

## 2024-05-23 ASSESSMENT — PAIN DESCRIPTION - ONSET: ONSET: ON-GOING

## 2024-05-23 ASSESSMENT — PAIN DESCRIPTION - ORIENTATION: ORIENTATION: RIGHT

## 2024-05-23 ASSESSMENT — PAIN DESCRIPTION - FREQUENCY: FREQUENCY: CONTINUOUS

## 2024-05-23 ASSESSMENT — PAIN DESCRIPTION - DESCRIPTORS: DESCRIPTORS: BURNING;SHOOTING;SHARP

## 2024-05-23 ASSESSMENT — PAIN - FUNCTIONAL ASSESSMENT
PAIN_FUNCTIONAL_ASSESSMENT: ACTIVITIES ARE NOT PREVENTED
PAIN_FUNCTIONAL_ASSESSMENT: 0-10

## 2024-05-23 ASSESSMENT — PAIN DESCRIPTION - LOCATION: LOCATION: FOOT

## 2024-05-23 ASSESSMENT — PAIN DESCRIPTION - PAIN TYPE: TYPE: SURGICAL PAIN

## 2024-05-23 NOTE — OP NOTE
She is also seen, for a diagnosis of the deep peroneal nerve neuritis.  She does take vitamin D supplementation, denies smoking or tobacco or steroid history.     Patient has failed the following conservative measures: CAM Walker boot, active modification, vitamin D supplementation  Patient's pain rating: Painful right fifth metatarsal base fracture     Due to the current findings, affected activity of daily living and continued pain and discomfort, surgical intervention is indicated. The alternatives, risks, and complications, including but not limited to infection, blood loss, need for blood transfusion, neurovascular damage, juan f-incisional numbness, subcutaneous hematoma, bone fracture, anesthetic complications, DVT, PE, death, RSD, postoperative stiffness and pain, possible surgical scar, delayed healing and nonhealing, reflexive sympathetic dystrophy, damage to blood vessels and nerves, need for more surgery, MI, and stroke,  failure of hardware, gait disturbances, limb length discrepancy, prosthesis longevity, recurrence of delayed union nonunion, the rare event of fat embolism syndrome.  All of these have been specifically discussed with individual, in full detail, and a shared, informed decision was made at this time, for surgical intervention having failed all conservative measures. The patient understands and wishes to proceed with surgery.     OPERATIVE NOTE:     Juana Ford  was taken to the operating room today, on 5/23/2024. Juana Ford was positioned supine, general anesthesia was conducted. A tourniquet was placed to the right upper thigh. Juana Ford was then positioned supine with soft bump to right hip so as to neutralize rotation of the right leg.    Right leg was then fully prepped with chlorhexidine gluconate soap and chlorhexidine gluconate yellow prep stick. A formal time-out was conducted identifying correct the patient, correct limb, correct location for surgery,

## 2024-05-23 NOTE — INTERVAL H&P NOTE
Update History & Physical    The patient's History and Physical of  5/23/2024 7:05 AM   was reviewed with the patient and I examined the patient. There was no change. The surgical site was confirmed by the patient and me.     Plan: The risks, benefits, expected outcome, and alternative to the recommended procedure have been discussed with the patient. Patient understands and wants to proceed with the procedure.     Electronically signed by Nick Bauer MD on 5/23/2024 at 7:05 AM

## 2024-05-23 NOTE — BRIEF OP NOTE
Brief Postoperative Note      Patient: Juana Ford  YOB: 1960  MRN: [id number]    Date of Procedure: 5/23/2024    Pre-Op Diagnosis Codes:     * Closed displaced fracture of fifth metatarsal bone of right foot with routine healing [S92.351D]    Post-Op Diagnosis: Same  Delayed RIGHT Union 5th metatarsal base fracture       Procedure(s):  OPEN REDUCTION INTERNAL FIXATION RIGHT TOVAR FRACTURE, AUTOGRAFT BONE GRAFTING, POSSIBLE ALLOGRAFT, EXTERNAL BONE STIMULATOR    Surgeon(s):  Nick Bauer MD    Assistant:  Surgical Assistant: Ramsey Patterson    Anesthesia: General and Regional Anesthesia    IV FLUIDS: 700 ml  TOURNIQUET TIME:  39  minutes @ 300 mg HG.    Estimated Blood Loss (mL):  20 ml    Complications: None    Specimens:   * No specimens in log *    Implants:  Implant Name Type Inv. Item Serial No.  Lot No. LRB No. Used Action   5TH MET HOOK PLATE UNIVERSAL    [employer]_CR [id number] Right 1 Implanted   SCREW BNE L12MM DIA2.4MM YONI PHILIP LO PROF FOR COMPHSVE FT - [id number]  SCREW BNE L12MM DIA2.4MM YONI PHILIP LO PROF FOR COMPHSVE FT  [employer]WD [id number] Right 1 Implanted   SCREW TI EDGAR KREULOCK   2.4X12 - [id number]  SCREW TI EDGAR KREULOCK   2.4X12  [employer]-WD [id number] Right 2 Implanted   ANCHOR FIX DISP FOR ANK FRAC SYS BB-LEONOR - [id number]  ANCHOR FIX DISP FOR ANK FRAC SYS BB-LEONOR  [employer]WD  Right 2 Implanted   SCREW TI EDGAR KREULOCK   2.4X10 - [id number]  SCREW TI EDGAR KREULOCK   2.4X10  [employer]-WD [id number] Right 1 Implanted         Drains: * No LDAs found *    Findings:  Infection Present At Time Of Surgery (PATOS) (choose all levels that have infection present):    No infection present       Electronically signed by Nick Bauer MD on 5/23/2024 at 8:57 AM

## 2024-05-23 NOTE — ANESTHESIA PRE PROCEDURE
Department of Anesthesiology  Preprocedure Note       Name:  Juana Ford   Age:  63 y.o.  :  1960                                          MRN:  [id number]         Date:  2024      Surgeon: Surgeon(s):  Nick Bauer MD    Procedure: Procedure(s):  OPEN REDUCTION INTERNAL FIXATION RIGHT TOVAR FRACTURE, AUTOGRAFT BONE GRAFTING, EXTERNAL BONE STIMULATOR; MINI C-ARM; [[employer]]; REGIONAL BLOCK  *SEE COMMENTS    Medications prior to admission:   Prior to Admission medications    Medication Sig Start Date End Date Taking? Authorizing Provider   oxyCODONE (ROXICODONE) 5 MG immediate release tablet Take 1 tablet by mouth every 4 hours as needed for Pain for up to 7 days. Max Daily Amount: 30 mg 24  Nick Bauer MD   ondansetron (ZOFRAN) 4 MG tablet Take 1 tablet by mouth every 8 hours as needed for Nausea or Vomiting 24   Nick Bauer MD   cephALEXin (KEFLEX) 500 MG capsule Take 1 capsule by mouth 4 times daily 24   iNck Bauer MD   topiramate (TOPAMAX) 25 MG tablet TAKE ONE TABLET BY MOUTH EVERY MORNING AND TAKE TWO TABLETS BY MOUTH EVERY EVENING 24   Cammy Colon MD   venlafaxine (EFFEXOR XR) 150 MG extended release capsule Take 1 capsule by mouth daily 23   Cammy Colon MD   albuterol sulfate HFA (PROVENTIL;VENTOLIN;PROAIR) 108 (90 Base) MCG/ACT inhaler USE 1 INHALATION BY MOUTH  EVERY 4 HOURS AS NEEDED FOR WHEEZING 21   Automatic Reconciliation, Ar   cyclobenzaprine (FLEXERIL) 10 MG tablet Take 1 tablet by mouth 19   Automatic Reconciliation, Ar   diazePAM (VALIUM) 5 MG tablet Take 1 tablet by mouth 2 times daily as needed.    Automatic Reconciliation, Ar   Fluticasone-Salmeterol,sensor, 232-14 MCG/ACT AEPB Inhale 1 Act into the lungs 2 times daily 21   Automatic Reconciliation, Ar   pantoprazole (PROTONIX) 20 MG tablet Take 1 tablet by mouth daily as needed    Automatic Reconciliation, Ar       Current

## 2024-05-23 NOTE — ANESTHESIA PROCEDURE NOTES
Peripheral Block    Patient location during procedure: holding area  Reason for block: post-op pain management and at surgeon's request  Start time: 5/23/2024 7:19 AM  End time: 5/23/2024 7:27 AM  Staffing  Performed: anesthesiologist   Anesthesiologist: Wendy Hubbard MD  Performed by: Wendy Hubbard MD  Authorized by: Wendy Hubbard MD    Preanesthetic Checklist  Completed: patient identified, IV checked, site marked, risks and benefits discussed, surgical/procedural consents, equipment checked, pre-op evaluation, timeout performed, anesthesia consent given, oxygen available, monitors applied/VS acknowledged, fire risk safety assessment completed and verbalized and blood product R/B/A discussed and consented  Peripheral Block   Patient position: prone  Prep: ChloraPrep  Provider prep: mask and sterile gloves  Patient monitoring: cardiac monitor, continuous pulse ox, frequent blood pressure checks, IV access, oxygen and responsive to questions  Block type: Sciatic  Popliteal  Laterality: right  Injection technique: single-shot  Guidance: nerve stimulator and ultrasound guided  Local infiltration: lidocaine  Infiltration strength: 1 %  Local infiltration: lidocaine  Dose: 1 mL    Needle   Needle type: insulated echogenic nerve stimulator needle   Needle gauge: 20 G  Needle localization: nerve stimulator and ultrasound guidance  Needle length: 8 cm  Assessment   Injection assessment: negative aspiration for heme, no paresthesia on injection, local visualized surrounding nerve on ultrasound and no intravascular symptoms  Slow fractionated injection: yes  Hemodynamics: stable  Outcomes: uncomplicated    Medications Administered  ropivacaine (NAROPIN) injection 0.5% - Perineural   20 mL - 5/23/2024 7:19:00 AM

## 2024-05-23 NOTE — ANESTHESIA POSTPROCEDURE EVALUATION
Department of Anesthesiology  Postprocedure Note    Patient: Juana Ford  MRN: [id number]  YOB: 1960  Date of evaluation: 5/23/2024    Procedure Summary       Date: 05/23/24 Room / Location: [facility] MAIN 06 / [facility] MAIN OR    Anesthesia Start: 0733 Anesthesia Stop: 0927    Procedure: OPEN REDUCTION INTERNAL FIXATION RIGHT TOVAR FRACTURE, AUTOGRAFT BONE GRAFTING  EXTERNAL BONE STIMULATOR (Right: Foot) Diagnosis:       Closed displaced fracture of fifth metatarsal bone of right foot with routine healing      (Closed displaced fracture of fifth metatarsal bone of right foot with routine healing [S92.351D])    Surgeons: Nick Bauer MD Responsible Provider: Wendy Hubbard MD    Anesthesia Type: General, Regional ASA Status: 2            Anesthesia Type: General, Regional    Miranda Phase I: Miranda Score: 10    Miranda Phase II: Miranda Score: 10    Anesthesia Post Evaluation    Patient location during evaluation: PACU  Patient participation: complete - patient participated  Level of consciousness: awake and alert  Pain score: 0  Airway patency: patent  Nausea & Vomiting: no nausea and no vomiting  Cardiovascular status: hemodynamically stable  Respiratory status: acceptable  Hydration status: euvolemic  Multimodal analgesia pain management approach  Pain management: adequate    No notable events documented.

## 2024-05-28 ENCOUNTER — OFFICE VISIT (OUTPATIENT)
Age: 64
End: 2024-05-28
Payer: COMMERCIAL

## 2024-05-28 VITALS — HEIGHT: 67 IN | BODY MASS INDEX: 25.37 KG/M2

## 2024-05-28 DIAGNOSIS — Z98.890 POST-OPERATIVE STATE: ICD-10-CM

## 2024-05-28 DIAGNOSIS — S92.351D CLOSED DISPLACED FRACTURE OF FIFTH METATARSAL BONE OF RIGHT FOOT WITH ROUTINE HEALING, SUBSEQUENT ENCOUNTER: Primary | ICD-10-CM

## 2024-05-28 PROCEDURE — 99024 POSTOP FOLLOW-UP VISIT: CPT | Performed by: ORTHOPAEDIC SURGERY

## 2024-05-28 PROCEDURE — 73630 X-RAY EXAM OF FOOT: CPT | Performed by: ORTHOPAEDIC SURGERY

## 2024-05-28 RX ORDER — OXYCODONE HYDROCHLORIDE 5 MG/1
5 TABLET ORAL 3 TIMES DAILY
Qty: 21 TABLET | Refills: 0 | Status: SHIPPED | OUTPATIENT
Start: 2024-05-28 | End: 2024-06-04

## 2024-05-28 NOTE — PROGRESS NOTES
Open Reduction Internal Fixation Right Zaldivar Fracture, Autograft Bone Grafting  External Bone Stimulator - Right   SURGERY DATE: 5/23/2024   DAYS SINCE SURGERY: 5       ICD-10-CM    1. Closed displaced fracture of fifth metatarsal bone of right foot with routine healing, subsequent encounter  S92.351D AMB POC XRAY, FOOT; COMPLETE, 3+ VIEW     oxyCODONE (ROXICODONE) 5 MG immediate release tablet      2. Post-operative state  Z98.890 AMB POC XRAY, FOOT; COMPLETE, 3+ VIEW     oxyCODONE (ROXICODONE) 5 MG immediate release tablet         Orders Placed This Encounter    AMB POC XRAY, FOOT; COMPLETE, 3+ VIEW     right    oxyCODONE (ROXICODONE) 5 MG immediate release tablet     Sig: Take 1 tablet by mouth in the morning, at noon, and at bedtime for 7 days. Intended supply: 7 days. Take lowest dose possible to manage pain Max Daily Amount: 15 mg     Dispense:  21 tablet     Refill:  0     Reduce doses taken as pain becomes manageable          SUBJECTIVE: Juana Ford is a 63 y.o. female is seen for a routine postop check.    Reports having pain to the foot, has no problems with the wound or other issues. Complains of shooting pain in the heel. Additionally, she had zinging nerve pain in the toes prior to the surgery when laying down. She now has tingling in the toes, mentions the nerve block from the surgery was helpful for the stinging pain.     Activity, diet and bowels are normal. No pain.  No chest pain fever shakes chills night sweats, no calf pain.    OBJECTIVE: Appears well.  Incision is healing without complications or infection, some bruising to the surgical site to be expected. Normal sensation to the toes.    Current Outpatient Medications   Medication Instructions    albuterol sulfate HFA (PROVENTIL;VENTOLIN;PROAIR) 108 (90 Base) MCG/ACT inhaler USE 1 INHALATION BY MOUTH  EVERY 4 HOURS AS NEEDED FOR WHEEZING    cephALEXin (KEFLEX) 500 mg, Oral, 4 TIMES DAILY    cyclobenzaprine (FLEXERIL) 10

## 2024-05-28 NOTE — PATIENT INSTRUCTIONS
WRAPUP INSTRUCTIONS FOR GARY TEAM         [x] Follow-up with Nick Bauer MD  in 6/17/2024 weeks.

## 2024-05-31 ENCOUNTER — TELEPHONE (OUTPATIENT)
Age: 64
End: 2024-05-31

## 2024-05-31 NOTE — TELEPHONE ENCOUNTER
Per Dr. Bauer, pt is to keep sutures DRY.  She cannot get them wet.  LVM for pt informing her of this.

## 2024-05-31 NOTE — TELEPHONE ENCOUNTER
Pt is 8 days post op and is wanting to know if she can get the sutures wet because she'd like to take a shower.  They wouldn't be submerged in water, but they would have water running off of them.  Please advise.

## 2024-06-07 ENCOUNTER — TELEPHONE (OUTPATIENT)
Age: 64
End: 2024-06-07

## 2024-06-07 DIAGNOSIS — S92.351D CLOSED DISPLACED FRACTURE OF FIFTH METATARSAL BONE OF RIGHT FOOT WITH ROUTINE HEALING, SUBSEQUENT ENCOUNTER: Primary | ICD-10-CM

## 2024-06-07 DIAGNOSIS — Z98.890 POST-OPERATIVE STATE: ICD-10-CM

## 2024-06-07 RX ORDER — HYDROCODONE BITARTRATE AND ACETAMINOPHEN 5; 325 MG/1; MG/1
1-2 TABLET ORAL EVERY 8 HOURS PRN
Qty: 28 TABLET | Refills: 0 | Status: SHIPPED | OUTPATIENT
Start: 2024-06-07 | End: 2024-06-14

## 2024-06-07 NOTE — TELEPHONE ENCOUNTER
Pt requesting a refill of pain medication.  However, she does not want the Percocet anymore.  She'd like to wean down to [place] 7.5.  Pt uses [facility] ([facility])

## 2024-06-17 ENCOUNTER — OFFICE VISIT (OUTPATIENT)
Age: 64
End: 2024-06-17
Payer: COMMERCIAL

## 2024-06-17 DIAGNOSIS — S92.351D CLOSED DISPLACED FRACTURE OF FIFTH METATARSAL BONE OF RIGHT FOOT WITH ROUTINE HEALING, SUBSEQUENT ENCOUNTER: Primary | ICD-10-CM

## 2024-06-17 DIAGNOSIS — Z98.890 POST-OPERATIVE STATE: ICD-10-CM

## 2024-06-17 PROCEDURE — 73630 X-RAY EXAM OF FOOT: CPT

## 2024-06-17 PROCEDURE — 99024 POSTOP FOLLOW-UP VISIT: CPT

## 2024-06-17 RX ORDER — HYDROCODONE BITARTRATE AND ACETAMINOPHEN 5; 325 MG/1; MG/1
1 TABLET ORAL EVERY 6 HOURS PRN
Qty: 18 TABLET | Refills: 0 | Status: SHIPPED | OUTPATIENT
Start: 2024-06-17 | End: 2024-06-22

## 2024-06-17 NOTE — PROGRESS NOTES
Patient: Juana Ford                MRN: [id number]       SSN: [id number]  YOB: 1960        AGE: 63 y.o.        SEX: female  BMI: There is no height or weight on file to calculate BMI.    PCP: Cammy Colon MD  06/17/24    Chief Complaint: Post-Op Check (Right foot)      1. Closed displaced fracture of fifth metatarsal bone of right foot with routine healing, subsequent encounter  -     AMB POC XRAY, FOOT; COMPLETE, 3+ VIEW  -     Cam Boot  -     HYDROcodone-acetaminophen (NORCO) 5-325 MG per tablet; Take 1 tablet by mouth every 6 hours as needed for Pain for up to 5 days. Intended supply: 7 days. Take lowest dose possible to manage pain Max Daily Amount: 4 tablets, Disp-18 tablet, R-0Normal  2. Post-operative state  -     Cam Boot  -     HYDROcodone-acetaminophen (NORCO) 5-325 MG per tablet; Take 1 tablet by mouth every 6 hours as needed for Pain for up to 5 days. Intended supply: 7 days. Take lowest dose possible to manage pain Max Daily Amount: 4 tablets, Disp-18 tablet, R-0Normal        HPI:  Juana Ford is a 63 y.o. female with chief complaint of   Chief Complaint   Patient presents with    Post-Op Check     Right foot       Dr. Bauer patient that is POD 25 s/p ORIF right naranjo fracture, autograft bone grafting with external bone simulator placement.    She is here today for suture removal and repeat NWB x-rays of her right foot. She has been receiving oxycodone 5 mg for post op pain from Dr. Bauer and recently received 5-325 of norco on 6/07 from SHIRA Dwyer. She is currently partial WB on her heel only in hard soles with crutches. She presents to the office today 75% WB on operative foot in post op shoe without crutches. Dr. Bauer would like her to be placed in a CAM boot today. She continues to use the bone stimulator.    IMAGING:  Imaging read by myself and interpreted as follows:    June 17, 2024:  3 view x-rays of the right foot including AP, lateral, and

## 2024-07-01 NOTE — PROGRESS NOTES
Problem: Mobility Impaired (Adult and Pediatric)  Goal: *Acute Goals and Plan of Care (Insert Text)  STG's to be addressed within 3 days:  1. Bed mobility:  Supine to sit to supine S with HR for meals. 2. Activity tolerance: Tolerate up in chair 1-2 hrs for ADLs. 3. Transfers:  Sit to stand to chair S with LRAD for ADL's. LTG's to be addressed within 7 days:  1. Standing/Ambulation Balance:  Increase to Good with LRAD for safe transfers and gait. 2. Ambulation:  Ambulate > 200 ft. S with LRAD for home mobility. 3. Patient Education:  Independent with HEP for home safety. 4. Stairs:  Up/Down 4 steps CGA with HR for home entry. physical Therapy TREATMENT    Patient: Cedric Reynoso (14 y.o. female)  Date: 11/14/2017  Diagnosis: Osteoarthritis of left knee, unspecified osteoarthritis type [M17.12] <principal problem not specified>  Procedure(s) (LRB):  LEFT TOTAL KNEE ARTHROPLASTY/ELVIS/SANTORO TO ASSIST/FEMORAL NERVE BLOCK (Left) 1 Day Post-Op  Precautions: Fall, WBAT  Chart, physical therapy assessment, plan of care and goals were reviewed. ASSESSMENT:  Pt presents with cont'd functional progress highlighted by completion of all goals for functional mobility. Pt is receptive to all education and is compliant with HEP recommendations. Pt and daughter present and attentive to all education regarding HEP,ROM, and positioning to ensure optimal ROM. Pt instructed to perform 10-20 heel slides and quad sets, with ankle on towel roll, per hour (pt has been compliant throughout the day). Progression toward goals:  [x]      Improving appropriately and progressing toward goals  []      Improving slowly and progressing toward goals  []      Not making progress toward goals and plan of care will be adjusted     PLAN:  Patient continues to benefit from skilled intervention to address the above impairments. Continue treatment per established plan of care.   Discharge Recommendations:  Home Health  Further Subjective   History of Present Illness  14-year-old female brought in by family reporting that she went indoor rockclimbing to 3 days ago is now having pain to the left upper chest and right upper chest region.  She denies injury or trauma.  Caregiver who is present wanted to make sure that patient does not have a pneumonia.  Requesting x-ray.        Review of Systems   All other systems reviewed and are negative.      History reviewed. No pertinent past medical history.    No Known Allergies    History reviewed. No pertinent surgical history.    History reviewed. No pertinent family history.    Social History     Socioeconomic History    Marital status: Single   Tobacco Use    Smoking status: Never    Smokeless tobacco: Never   Vaping Use    Vaping status: Never Used   Substance and Sexual Activity    Alcohol use: Never    Drug use: Never    Sexual activity: Defer           Objective   Physical Exam  Vitals and nursing note reviewed.   Constitutional:       Appearance: Normal appearance. She is normal weight.   HENT:      Head: Normocephalic and atraumatic.      Right Ear: Tympanic membrane and ear canal normal.      Left Ear: Tympanic membrane and ear canal normal.      Nose: Nose normal.      Mouth/Throat:      Mouth: Mucous membranes are moist.      Pharynx: Oropharynx is clear.   Eyes:      Extraocular Movements: Extraocular movements intact.      Conjunctiva/sclera: Conjunctivae normal.      Pupils: Pupils are equal, round, and reactive to light.   Cardiovascular:      Rate and Rhythm: Normal rate.      Pulses: Normal pulses.   Pulmonary:      Effort: Pulmonary effort is normal. No respiratory distress.      Breath sounds: Normal breath sounds. No stridor. No wheezing, rhonchi or rales.   Chest:      Chest wall: No tenderness.   Abdominal:      General: Abdomen is flat. Bowel sounds are normal.      Palpations: Abdomen is soft.   Musculoskeletal:         General: Normal range of motion.      Cervical back:  Equipment Recommendations for Discharge:  rolling walker and N/A     G-CODES:     Mobility   Goal  CI= 1-19%. The severity rating is based on the Level of Assistance required for Functional Mobility and ADLs. Mobility [id number] Current  CI= 1-19%. The severity rating is based on the Level of Assistance required for Functional Mobility and ADLs. SUBJECTIVE:   Patient stated I feel a little loopy because of the pain medicine. I didn't have any earlier.     OBJECTIVE DATA SUMMARY:   Critical Behavior:  Neurologic State: Alert  Orientation Level: Oriented X4  Cognition: Appropriate decision making, Follows commands  Safety/Judgement: Awareness of environment, Fall prevention  Functional Mobility Training:  Bed Mobility:  Rolling: Supervision  Supine to Sit: Supervision  Sit to Supine: Supervision  Scooting: Supervision  Transfers:  Sit to Stand: Modified independent  Stand to Sit: Supervision (cues for safety with approach to chair)  Bed to Chair: Supervision;Modified independent  Other: stand step with RW  Balance:  Sitting: Intact  Standing: With support; Intact  Ambulation/Gait Training:  Distance (ft): 200 Feet (ft)  Assistive Device: Walker, rolling  Ambulation - Level of Assistance: Supervision;Modified independent  Gait Abnormalities: Decreased step clearance; Antalgic (step through pattern)  Left Side Weight Bearing: As tolerated  Speed/Felicia: Pace decreased (<100 feet/min)  Interventions: Verbal cues    Stairs:  Number of Stairs Trained: 12  Stairs - Level of Assistance: Stand-by asssistance  Rail Use: Both    Pain:  Pt reports 4/10 pain or discomfort prior to treatment.    Pt reports 3/10 pain or discomfort post treatment. Activity Tolerance:   good  Please refer to the flowsheet for vital signs taken during this treatment.   After treatment:   [x] Patient left in no apparent distress sitting up in chair  [] Patient left in no apparent distress in bed  [x] Call bell left within reach  [] Nursing Normal range of motion and neck supple.   Skin:     General: Skin is warm.      Capillary Refill: Capillary refill takes less than 2 seconds.   Neurological:      General: No focal deficit present.      Mental Status: She is alert and oriented to person, place, and time. Mental status is at baseline.         Procedures           ED Course  ED Course as of 06/30/24 2212   Sun Jun 30, 2024 2135 Chest x-ray Impression:  No active disease.   [WF]      ED Course User Index  [WF] Ha Martinez Jr., OMAR                                           Medical Decision Making  Chest x-ray is negative for acute findings right upper chest wall anterior region shows minimal reproducible tenderness with light palpation.    Talking with the patient and hearing that she went rockclimbing recently suspect muscle soreness related to rockclimbing.  No concerns for bronchitis or pneumonia.    Problems Addressed:  Muscle strain of chest wall, initial encounter: complicated acute illness or injury    Amount and/or Complexity of Data Reviewed  Radiology: ordered.        Final diagnoses:   Muscle strain of chest wall, initial encounter       ED Disposition  ED Disposition       ED Disposition   Discharge    Condition   Stable    Comment   --               Yenny Grullon, OMAR  [address] [address]  [phone]          Yenny Grullon, OMAR  [address] [address]  [phone]               Medication List      No changes were made to your prescriptions during this visit.          notified  [x] Caregiver present  [] Bed alarm activated      Josse Sheriff, RAMIREZ   Time Calculation: 23 mins

## 2024-07-15 ENCOUNTER — OFFICE VISIT (OUTPATIENT)
Age: 64
End: 2024-07-15
Payer: COMMERCIAL

## 2024-07-15 VITALS — HEIGHT: 67 IN | BODY MASS INDEX: 25.37 KG/M2

## 2024-07-15 DIAGNOSIS — M79.2 NEURITIS: ICD-10-CM

## 2024-07-15 DIAGNOSIS — M79.671 RIGHT FOOT PAIN: ICD-10-CM

## 2024-07-15 DIAGNOSIS — S92.351D CLOSED DISPLACED FRACTURE OF FIFTH METATARSAL BONE OF RIGHT FOOT WITH ROUTINE HEALING, SUBSEQUENT ENCOUNTER: Primary | ICD-10-CM

## 2024-07-15 PROCEDURE — 73630 X-RAY EXAM OF FOOT: CPT | Performed by: ORTHOPAEDIC SURGERY

## 2024-07-15 PROCEDURE — 99024 POSTOP FOLLOW-UP VISIT: CPT | Performed by: ORTHOPAEDIC SURGERY

## 2024-07-15 NOTE — PROGRESS NOTES
Open Reduction Internal Fixation Right Zaldivar Fracture, Autograft Bone Grafting  External Bone Stimulator - Right   SURGERY DATE: 5/23/2024   DAYS SINCE SURGERY: 53     ICD-10-CM    1. Closed displaced fracture of fifth metatarsal bone of right foot with routine healing, subsequent encounter  S92.351D       2. Neuritis  M79.2       3. Right foot pain  M79.671 [57709] Foot Min 3V         Orders Placed This Encounter    [41307] Foot Min 3V          SUBJECTIVE: Juana Ford is a 63 y.o. female is seen for a routine postop check.    Reports developing pain to the surgical site last week and swelling into the ankle at night. Mentions this pain is different compared to before and has the sensation of \"something sticking out\". Describes having tingling sensitivity across the dorsolateral foot that is worse when sleeping, states this tingling sensation was apparent prior to the surgery. She can only walk around the home without the CAM boot about 120 steps before developing this tingling. She takes OTC Motrin as needed for the pain. Additionally, she occasionally has episodes of the foot turning \"purple\" in hue with weightbearing. These episodes are transient and spontaneous, usually onsets after being very active. Denies calf pain, discoloration into the ankle and calf, or smoking tobacco.      Activity, diet and bowels are normal. No pain.  No chest pain fever shakes chills night sweats, no calf pain.    OBJECTIVE: Appears well.  Incision is healed without complications or infection. Slight residual swelling to the surgical site but no redness or erythema. Some soreness into the heel with palpations. Normal foot and ankle pulses.     Current Outpatient Medications   Medication Instructions    albuterol sulfate HFA (PROVENTIL;VENTOLIN;PROAIR) 108 (90 Base) MCG/ACT inhaler USE 1 INHALATION BY MOUTH  EVERY 4 HOURS AS NEEDED FOR WHEEZING    cephALEXin (KEFLEX) 500 mg, Oral, 4 TIMES DAILY

## 2024-07-15 NOTE — PROGRESS NOTES
Juana Ford   [address]  [facility] [address]       DIAGNOSTIC IMAGING      Orders Placed This Encounter   Procedures    [07088] Foot Min 3V      RIGHT FOOT 3 views, AP/Lateral/Oblique and ANKLE X-rays 2 views, AP/Mortise, NON WEIGHT BEARING    [facility]    X-rays reveal Osseous:  healing 5th met base fx no acute fracture//there are no dislocation or subluxation noted. Overall alignment is  acceptable. Soft tissue swelling is mild noted. No osteolytic or osteoblastic lesions noted. Mineralization suggests no osteopenia. Degenerative changes are not noted. Calcified vessels are not present.     I have personally reviewed the images of the above study. The interpretation of this study is my professional opinion         Nick Bauer MD  7/15/2024  1:05 PM

## 2024-07-19 ENCOUNTER — HOSPITAL ENCOUNTER (OUTPATIENT)
Facility: HOSPITAL | Age: 64
Discharge: HOME | End: 2024-07-22

## 2024-07-19 LAB — LABCORP SPECIMEN COLLECTION: NORMAL

## 2024-07-19 PROCEDURE — 99001 SPECIMEN HANDLING PT-LAB: CPT

## 2024-07-20 LAB
25(OH)D3+25(OH)D2 SERPL-MCNC: 53.4 NG/ML (ref 30–100)
ALBUMIN SERPL-MCNC: 4.5 G/DL (ref 3.9–4.9)
ALP SERPL-CCNC: 99 IU/L (ref 44–121)
ALT SERPL-CCNC: 41 IU/L (ref 0–32)
APPEARANCE UR: CLEAR
AST SERPL-CCNC: 39 IU/L (ref 0–40)
BACTERIA #/AREA URNS HPF: ABNORMAL /[HPF]
BASOPHILS # BLD AUTO: 0 X10E3/UL (ref 0–0.2)
BASOPHILS NFR BLD AUTO: 1 %
BILIRUB SERPL-MCNC: 0.4 MG/DL (ref 0–1.2)
BILIRUB UR QL STRIP: NEGATIVE
BUN SERPL-MCNC: 12 MG/DL (ref 8–27)
BUN/CREAT SERPL: 15 (ref 12–28)
CALCIUM SERPL-MCNC: 9.8 MG/DL (ref 8.7–10.3)
CASTS URNS QL MICRO: ABNORMAL /LPF
CHLORIDE SERPL-SCNC: 104 MMOL/L (ref 96–106)
CHOLEST SERPL-MCNC: 197 MG/DL (ref 100–199)
CO2 SERPL-SCNC: 22 MMOL/L (ref 20–29)
COLOR UR: YELLOW
CREAT SERPL-MCNC: 0.79 MG/DL (ref 0.57–1)
CRYSTALS URNS MICRO: ABNORMAL
EGFRCR SERPLBLD CKD-EPI 2021: 83 ML/MIN/1.73
EOSINOPHIL # BLD AUTO: 0.3 X10E3/UL (ref 0–0.4)
EOSINOPHIL NFR BLD AUTO: 6 %
EPI CELLS #/AREA URNS HPF: ABNORMAL /HPF (ref 0–10)
ERYTHROCYTE [DISTWIDTH] IN BLOOD BY AUTOMATED COUNT: 12.6 % (ref 11.7–15.4)
GLOBULIN SER CALC-MCNC: 2.6 G/DL (ref 1.5–4.5)
GLUCOSE SERPL-MCNC: 94 MG/DL (ref 70–99)
GLUCOSE UR QL STRIP: NEGATIVE
HBA1C MFR BLD: 5.6 % (ref 4.8–5.6)
HCT VFR BLD AUTO: 41.8 % (ref 34–46.6)
HDLC SERPL-MCNC: 59 MG/DL
HGB BLD-MCNC: 13.7 G/DL (ref 11.1–15.9)
HGB UR QL STRIP: NEGATIVE
IMM GRANULOCYTES # BLD AUTO: 0 X10E3/UL (ref 0–0.1)
IMM GRANULOCYTES NFR BLD AUTO: 0 %
KETONES UR QL STRIP: NEGATIVE
LDLC SERPL CALC-MCNC: 108 MG/DL (ref 0–99)
LEUKOCYTE ESTERASE UR QL STRIP: NEGATIVE
LYMPHOCYTES # BLD AUTO: 2.4 X10E3/UL (ref 0.7–3.1)
LYMPHOCYTES NFR BLD AUTO: 47 %
MCH RBC QN AUTO: 32.2 PG (ref 26.6–33)
MCHC RBC AUTO-ENTMCNC: 32.8 G/DL (ref 31.5–35.7)
MCV RBC AUTO: 98 FL (ref 79–97)
MICRO URNS: NORMAL
MICRO URNS: NORMAL
MONOCYTES # BLD AUTO: 0.3 X10E3/UL (ref 0.1–0.9)
MONOCYTES NFR BLD AUTO: 6 %
NEUTROPHILS # BLD AUTO: 2 X10E3/UL (ref 1.4–7)
NEUTROPHILS NFR BLD AUTO: 40 %
NITRITE UR QL STRIP: NEGATIVE
PH UR STRIP: 6 [PH] (ref 5–7.5)
PLATELET # BLD AUTO: 286 X10E3/UL (ref 150–450)
POTASSIUM SERPL-SCNC: 4.2 MMOL/L (ref 3.5–5.2)
PROT SERPL-MCNC: 7.1 G/DL (ref 6–8.5)
PROT UR QL STRIP: NEGATIVE
RBC # BLD AUTO: 4.26 X10E6/UL (ref 3.77–5.28)
RBC #/AREA URNS HPF: ABNORMAL /HPF (ref 0–2)
SODIUM SERPL-SCNC: 140 MMOL/L (ref 134–144)
SP GR UR STRIP: 1.02 (ref 1–1.03)
SPECIMEN STATUS REPORT: NORMAL
TRIGL SERPL-MCNC: 176 MG/DL (ref 0–149)
TSH SERPL DL<=0.005 MIU/L-ACNC: 2.27 UIU/ML (ref 0.45–4.5)
UNIDENT CRYS URNS QL MICRO: PRESENT
UROBILINOGEN UR STRIP-MCNC: 0.2 MG/DL (ref 0.2–1)
VLDLC SERPL CALC-MCNC: 30 MG/DL (ref 5–40)
WBC # BLD AUTO: 5 X10E3/UL (ref 3.4–10.8)
WBC #/AREA URNS HPF: ABNORMAL /HPF (ref 0–5)

## 2024-07-24 ENCOUNTER — OFFICE VISIT (OUTPATIENT)
Facility: CLINIC | Age: 64
End: 2024-07-24

## 2024-07-24 VITALS
DIASTOLIC BLOOD PRESSURE: 90 MMHG | SYSTOLIC BLOOD PRESSURE: 122 MMHG | OXYGEN SATURATION: 97 % | HEIGHT: 67 IN | WEIGHT: 162 LBS | TEMPERATURE: 98.8 F | BODY MASS INDEX: 25.43 KG/M2 | HEART RATE: 92 BPM | RESPIRATION RATE: 14 BRPM

## 2024-07-24 DIAGNOSIS — Z98.890 S/P LAPAROSCOPIC FUNDOPLICATION: ICD-10-CM

## 2024-07-24 DIAGNOSIS — R73.01 IMPAIRED FASTING GLUCOSE: ICD-10-CM

## 2024-07-24 DIAGNOSIS — E55.9 VITAMIN D DEFICIENCY: ICD-10-CM

## 2024-07-24 DIAGNOSIS — R79.89 ELEVATED LFTS: ICD-10-CM

## 2024-07-24 DIAGNOSIS — R13.19 ESOPHAGEAL DYSPHAGIA: Primary | ICD-10-CM

## 2024-07-24 DIAGNOSIS — E78.00 PURE HYPERCHOLESTEROLEMIA: ICD-10-CM

## 2024-07-24 DIAGNOSIS — Z78.0 POSTMENOPAUSAL: ICD-10-CM

## 2024-07-24 DIAGNOSIS — F33.0 MILD EPISODE OF RECURRENT MAJOR DEPRESSIVE DISORDER (HCC): ICD-10-CM

## 2024-07-24 DIAGNOSIS — Z12.31 SCREENING MAMMOGRAM FOR BREAST CANCER: ICD-10-CM

## 2024-07-24 DIAGNOSIS — Z00.00 WELCOME TO MEDICARE PREVENTIVE VISIT: ICD-10-CM

## 2024-07-24 DIAGNOSIS — K76.0 HEPATIC STEATOSIS: ICD-10-CM

## 2024-07-24 RX ORDER — VENLAFAXINE HYDROCHLORIDE 75 MG/1
75 CAPSULE, EXTENDED RELEASE ORAL DAILY
Qty: 90 CAPSULE | Refills: 1 | Status: SHIPPED | OUTPATIENT
Start: 2024-07-24

## 2024-07-24 RX ORDER — PANTOPRAZOLE SODIUM 40 MG/1
40 TABLET, DELAYED RELEASE ORAL
Qty: 90 TABLET | Refills: 3 | Status: SHIPPED | OUTPATIENT
Start: 2024-07-24

## 2024-07-24 ASSESSMENT — VISUAL ACUITY
OD_CC: 20/20
OS_CC: 20/25

## 2024-07-24 NOTE — PROGRESS NOTES
Juana Ford presents today for   Chief Complaint   Patient presents with    Medicare AWV       \"Have you been to the ER, urgent care clinic since your last visit?  Hospitalized since your last visit?\"    NO    “Have you seen or consulted any other health care providers outside of [facility] since your last visit?”    NO       Have you had a mammogram?”   NO    Date of last Mammogram: 9/27/2022

## 2024-07-24 NOTE — PROGRESS NOTES
HPI:   Juana Ford is a 64 y.o. year old female who presents today for a physical exam.  She has a history of hyperlipidemia, mild intermittent asthma, GERD, hiatal hernia, hepatic steatosis, osteoarthritis, lumbar degenerative disease, insomnia, and anxiety.  She reports that she is doing only fairly well.      On 2/22/2024, she experienced a fall at home and presented for evaluation of right foot pain and left great toe pain.  Right foot x-ray showed a nondisplaced transverse fracture of the base of the fifth metatarsal; and left toe x-ray showed no acute findings and mild first MTP degenerative changes.  She received a Toradol 15 mg injection and was discharged with Percocet for pain.  She underwent evaluation by Dr. Bauer on 2/27/2024 and was diagnosed with a sprain of the calcaneofibular ligament of the right ankle in addition to the fifth metatarsal bone nondisplaced fracture.  She was placed in a short cam boot and advised to be nonweightbearing with crutches.  She continued to be followed every 2 weeks, and on 4/23/2024 was noted to still have a persistent fracture at the base of the fifth metatarsal with mild soft tissue swelling.  She reported persistent pain and recommendation was to proceed with surgery for open reduction internal fixation with autograft bone grafting and external bone stimulator.  Surgery was performed on 5/23/2024 at [facility] by Dr. Bauer without complications.  She does continue to follow with Dr. Bauer with last visit on 7/15/2024 and she is now weightbearing with use of a CAM boot.  She states that she is continuing to wear the bone stimulator and her last x-ray (7/15/2024) did show evidence of healing of the fifth metatarsal base fracture. She does complain of intermittent shooting pain in her heel as well as tingling involving the sole of her foot.  She states that she is using ibuprofen and/or Tylenol for discomfort as needed.  She states that she was advised she may

## 2024-07-24 NOTE — PATIENT INSTRUCTIONS
160
What's holding you back?  Getting started.  Have a goal, but break it into easy tasks. Small steps build into big accomplishments.  Staying motivated.  If you feel like skipping your activity, remember your goal. Maybe you want to move better and stay independent. Every activity gets you one step closer.  Not feeling your best.  Start with 5 minutes of an activity you enjoy. Prove to yourself you can do it. As you get comfortable, increase your time.  You may not be where you want to be. But you're in the process of getting there. Everyone starts somewhere.  How can you find safe ways to stay active?  Talk with your doctor about any physical challenges you're facing. Make a plan with your doctor if you have a health problem or aren't sure how to get started with activity.  If you're already active, ask your doctor if there is anything you should change to stay safe as your body and health change.  If you tend to feel dizzy after you take medicine, avoid activity at that time. Try being active before you take your medicine. This will reduce your risk of falls.  If you plan to be active at home, make sure to clear your space before you get started. Remove things like TV cords, coffee tables, and throw rugs. It's safest to have plenty of space to move freely.  The key to getting more active is to take it slow and steady. Try to improve only a little bit at a time. Pick just one area to improve on at first. And if an activity hurts, stop and talk to your doctor.  Where can you learn more?  Go to https://www.[employer].net/patientEd and enter P600 to learn more about \"Learning About Being Active as an Older Adult.\"  Current as of: June 5, 2023  Content Version: 14.1  © 1474-2972 [employer].   Care instructions adapted under license by [employer]. If you have questions about a medical condition or this instruction, always ask your healthcare professional. [employer] disclaims any warranty or

## 2024-07-25 NOTE — PROGRESS NOTES
Medicare Annual Wellness Visit    Juana Ford is here for Medicare AWV    Assessment & Plan   Esophageal dysphagia  S/P laparoscopic fundoplication  Pure hypercholesterolemia  -     CBC with Auto Differential; Future  -     Comprehensive Metabolic Panel; Future  -     Lipid Panel; Future  Impaired fasting glucose  -     Comprehensive Metabolic Panel; Future  -     Hemoglobin A1C; Future  -     Microalbumin / Creatinine Urine Ratio; Future  -     Urinalysis with Microscopic; Future  Hepatic steatosis  -     Comprehensive Metabolic Panel; Future  Elevated LFTs  -     Comprehensive Metabolic Panel; Future  Mild episode of recurrent major depressive disorder (HCC)  Vitamin D deficiency  -     Vitamin D 25 Hydroxy; Future  Welcome to Medicare preventive visit  Screening mammogram for breast cancer  -     JOSE DIGITAL SCREEN W OR WO CAD BILATERAL; Future  Postmenopausal  -     DEXA BONE DENSITY AXIAL SKELETON; Future    Recommendations for Preventive Services Due: see orders and patient instructions/AVS.  Recommended screening schedule for the next 5-10 years is provided to the patient in written form: see Patient Instructions/AVS.     Return in about 1 year (around 7/24/2025), or if symptoms worsen or fail to improve.     Subjective   See office progress note for details.      Patient's complete Health Risk Assessment and screening values have been reviewed and are found in Flowsheets. The following problems were reviewed today and where indicated follow up appointments were made and/or referrals ordered.    Positive Risk Factor Screenings with Interventions:    Fall Risk:  Do you feel unsteady or are you worried about falling? : (!) yes  2 or more falls in past year?: no  Fall with injury in past year?: no     Interventions:    Reviewed medications, home hazards, visual acuity, and co-morbidities that can increase risk for falls  Fall precautions stressed     Depression:  PHQ-2 Score: 2  PHQ-9 Total Score:

## 2024-07-27 PROBLEM — R73.01 IMPAIRED FASTING GLUCOSE: Status: ACTIVE | Noted: 2024-07-27

## 2024-07-27 PROBLEM — U09.9 POST-ACUTE SEQUELAE OF COVID-19 (PASC): Status: RESOLVED | Noted: 2022-08-14 | Resolved: 2024-07-27

## 2024-07-27 PROBLEM — R73.03 PREDIABETES: Status: RESOLVED | Noted: 2024-05-16 | Resolved: 2024-07-27

## 2024-07-28 PROBLEM — R79.89 ELEVATED LFTS: Status: ACTIVE | Noted: 2024-07-28

## 2024-07-31 ENCOUNTER — HOSPITAL ENCOUNTER (OUTPATIENT)
Facility: HOSPITAL | Age: 64
Discharge: HOME | End: 2024-08-03
Attending: INTERNAL MEDICINE
Payer: MEDICARE

## 2024-07-31 ENCOUNTER — OFFICE VISIT (OUTPATIENT)
Age: 64
End: 2024-07-31
Payer: COMMERCIAL

## 2024-07-31 ENCOUNTER — HOSPITAL ENCOUNTER (OUTPATIENT)
Facility: HOSPITAL | Age: 64
Discharge: HOME | End: 2024-08-03
Attending: INTERNAL MEDICINE
Payer: COMMERCIAL

## 2024-07-31 VITALS — WEIGHT: 163 LBS | HEIGHT: 67 IN | BODY MASS INDEX: 25.58 KG/M2

## 2024-07-31 DIAGNOSIS — Z78.0 POSTMENOPAUSAL: ICD-10-CM

## 2024-07-31 DIAGNOSIS — S92.351D CLOSED DISPLACED FRACTURE OF FIFTH METATARSAL BONE OF RIGHT FOOT WITH ROUTINE HEALING, SUBSEQUENT ENCOUNTER: ICD-10-CM

## 2024-07-31 DIAGNOSIS — M79.671 RIGHT FOOT PAIN: ICD-10-CM

## 2024-07-31 DIAGNOSIS — Z98.890 POST-OPERATIVE STATE: ICD-10-CM

## 2024-07-31 DIAGNOSIS — Z12.31 SCREENING MAMMOGRAM FOR BREAST CANCER: ICD-10-CM

## 2024-07-31 DIAGNOSIS — G62.9 NEUROPATHY: Primary | ICD-10-CM

## 2024-07-31 PROCEDURE — 77063 BREAST TOMOSYNTHESIS BI: CPT

## 2024-07-31 PROCEDURE — 99024 POSTOP FOLLOW-UP VISIT: CPT | Performed by: ORTHOPAEDIC SURGERY

## 2024-07-31 PROCEDURE — 73630 X-RAY EXAM OF FOOT: CPT | Performed by: ORTHOPAEDIC SURGERY

## 2024-07-31 PROCEDURE — 77080 DXA BONE DENSITY AXIAL: CPT

## 2024-07-31 RX ORDER — GABAPENTIN 300 MG/1
300 CAPSULE ORAL 2 TIMES DAILY
Qty: 60 CAPSULE | Refills: 0 | Status: CANCELLED | OUTPATIENT
Start: 2024-07-31 | End: 2024-08-30

## 2024-07-31 RX ORDER — PREGABALIN 25 MG/1
25 CAPSULE ORAL 2 TIMES DAILY
Qty: 60 CAPSULE | Refills: 3 | Status: SHIPPED | OUTPATIENT
Start: 2024-07-31 | End: 2024-11-28

## 2024-07-31 NOTE — PROGRESS NOTES
Open Reduction Internal Fixation Right Zaldivar Fracture, Autograft Bone Grafting  External Bone Stimulator - Right   SURGERY DATE: 5/23/2024   DAYS SINCE SURGERY: 69     ICD-10-CM    1. Neuropathy  G62.9 pregabalin (LYRICA) 25 MG capsule      2. Closed displaced fracture of fifth metatarsal bone of right foot with routine healing, subsequent encounter  S92.351D [06640] Foot Min 3V     MRI FOOT RIGHT WO CONTRAST     C-Reactive Protein     Sedimentation Rate      3. Right foot pain  M79.671 [22778] Foot Min 3V      4. Post-operative state  Z98.890 [44560] Foot Min 3V     MRI FOOT RIGHT WO CONTRAST     C-Reactive Protein     Sedimentation Rate         Orders Placed This Encounter    [07180] Foot Min 3V    MRI FOOT RIGHT WO CONTRAST     Standing Status:   Future     Standing Expiration Date:   7/31/2025     Order Specific Question:   What is the area of interest?     Answer:   Forefoot     Order Specific Question:   Reason for exam:     Answer:   increased foot pain     Order Specific Question:   What is the sedation requirement?     Answer:   None    C-Reactive Protein     Standing Status:   Future     Standing Expiration Date:   7/31/2025    Sedimentation Rate     Standing Status:   Future     Standing Expiration Date:   7/31/2025    pregabalin (LYRICA) 25 MG capsule     Sig: Take 1 capsule by mouth 2 times daily for 120 days. Max Daily Amount: 50 mg     Dispense:  60 capsule     Refill:  3     Take 1, p.o. nightly, for 7 days, then take 1 p.o. twice daily thereafter          SUBJECTIVE: Juana Ford is a 64 y.o. female is seen for a routine postop check.    Reports developing terrible night pain and swelling into the entire foot dorsally and 5th toe. The pain is worse when weightbearing. She shows digital photographs over the past couple days with swelling across the midfoot, forefoot, and lateral ankle. She has not slept in about 2 nights due to the pain. She still has persistent tingling across

## 2024-08-15 ENCOUNTER — HOSPITAL ENCOUNTER (OUTPATIENT)
Facility: HOSPITAL | Age: 64
Discharge: HOME | End: 2024-08-15
Attending: ORTHOPAEDIC SURGERY
Payer: MEDICARE

## 2024-08-15 DIAGNOSIS — Z98.890 POST-OPERATIVE STATE: ICD-10-CM

## 2024-08-15 DIAGNOSIS — S92.351D CLOSED DISPLACED FRACTURE OF FIFTH METATARSAL BONE OF RIGHT FOOT WITH ROUTINE HEALING, SUBSEQUENT ENCOUNTER: ICD-10-CM

## 2024-08-15 PROCEDURE — 73718 MRI LOWER EXTREMITY W/O DYE: CPT

## 2024-08-19 ENCOUNTER — CLINICAL DOCUMENTATION (OUTPATIENT)
Facility: CLINIC | Age: 64
End: 2024-08-19

## 2024-08-19 NOTE — PROGRESS NOTES
Reviewed bone density study from 7/31/2024 showing T-scores:  femoral neck left +0.5, left distal radius -1.9, and lumbar +0.1. Calculated FRAX score estimates her 10 year risk of a major osteoporetic fracture at 10% and hip fracture at 0.2%, which are not an indication for biphosphonate treatment.  There has been some decline since her prior bone density study in 6/2021.    Will let the patient know that her bone density study shows evidence of osteopenia only in her left distal radius, but there has been some worsening from her prior bone density study.  Will recommend that she attempt to achieve 1200 mg of calcium daily through dietary intake and supplements if needed, and continue Vitamin D supplementation. Also, will encourage her to exercise regularly, particularly weight bearing activities, which may help to prevent progression.     DNA SEQ message sent with results.

## 2024-08-21 NOTE — ED PROVIDER NOTES
Procedure Laterality Date    BREAST REDUCTION SURGERY      CHOLECYSTECTOMY, LAPAROSCOPIC  11/2015    COLONOSCOPY  03-18-11    normal colon to cecum    HEENT      skin cancer between eyes    HYSTERECTOMY (CERVIX STATUS UNKNOWN)      TONSILLECTOMY AND ADENOIDECTOMY      TOTAL HIP ARTHROPLASTY Right 10/2016    TOTAL KNEE ARTHROPLASTY      TOTAL KNEE ARTHROPLASTY Left 11/2017    TUBAL LIGATION           CURRENTMEDICATIONS       Previous Medications    ALBUTEROL SULFATE HFA (PROVENTIL;VENTOLIN;PROAIR) 108 (90 BASE) MCG/ACT INHALER    USE 1 INHALATION BY MOUTH  EVERY 4 HOURS AS NEEDED FOR WHEEZING    CYCLOBENZAPRINE (FLEXERIL) 10 MG TABLET    Take 10 mg by mouth    DIAZEPAM (VALIUM) 5 MG TABLET    Take 1 tablet by mouth 2 times daily.    ESCITALOPRAM (LEXAPRO) 20 MG TABLET    Take 20 mg by mouth daily    FLUTICASONE-SALMETEROL,SENSOR, 232-14 MCG/ACT AEPB    Inhale 1 Act into the lungs 2 times daily    PANTOPRAZOLE (PROTONIX) 20 MG TABLET    Take 1 tablet by mouth daily    TOPIRAMATE (TOPAMAX) 25 MG TABLET    Take 1 Tablet by mouth every morning AND 2 Tablets every evening    VENLAFAXINE (EFFEXOR XR) 150 MG EXTENDED RELEASE CAPSULE    Take 1 capsule by mouth daily       ALLERGIES     Morphine and Albuterol    FAMILY HISTORY       Family History   Problem Relation Age of Onset    Colon Polyps Brother         half brother    Breast Cancer Mother     Colon Polyps Mother     Other Mother         atrial fibrillation    Heart Attack Sister     Heart Disease Sister         half sister ASHD    Other Son         congestive heart failure    Other Son         transposition of the great vessels and surgery for this    Heart Disease Brother         a-fib          SOCIAL HISTORY       Social History     Socioeconomic History    Marital status:    Tobacco Use    Smoking status: Former     Current packs/day: 0.25     Types: Cigarettes    Smokeless tobacco: Never   Substance and Sexual Activity    Alcohol use: Yes      21-Aug-2024 05:13

## 2024-08-27 ENCOUNTER — OFFICE VISIT (OUTPATIENT)
Age: 64
End: 2024-08-27
Payer: MEDICARE

## 2024-08-27 DIAGNOSIS — S92.351D CLOSED DISPLACED FRACTURE OF FIFTH METATARSAL BONE OF RIGHT FOOT WITH ROUTINE HEALING, SUBSEQUENT ENCOUNTER: Primary | ICD-10-CM

## 2024-08-27 DIAGNOSIS — Z98.890 POST-OPERATIVE STATE: ICD-10-CM

## 2024-08-27 PROCEDURE — 1123F ACP DISCUSS/DSCN MKR DOCD: CPT | Performed by: ORTHOPAEDIC SURGERY

## 2024-08-27 PROCEDURE — 99214 OFFICE O/P EST MOD 30 MIN: CPT | Performed by: ORTHOPAEDIC SURGERY

## 2024-08-27 RX ORDER — ACETAMINOPHEN 500 MG
500 TABLET ORAL 2 TIMES DAILY PRN
Qty: 60 TABLET | Refills: 0 | Status: SHIPPED | OUTPATIENT
Start: 2024-08-27

## 2024-08-27 NOTE — PATIENT INSTRUCTIONS
[facility]  [address],   [place] [address] [phone]          Please follow up with your PCP for any health maintenance as recommended.

## 2024-08-27 NOTE — PROGRESS NOTES
AMBULATORY PROGRESS NOTE      Patient: Juana Ford             MRN: [id number]     SSN: [id number] There is no height or weight on file to calculate BMI.  YOB: 1960     AGE: 64 y.o.       EX: female    PCP: Cammy Colon MD       IMPRESSION //  DIAGNOSIS AND TREATMENT PLAN      Juana Ford has a diagnosis of:      We have impression, significant balance her hindfoot, keeping the left her lateral hindfoot more posterior, due to her pes cavus alignment IRG, distal lateral and less discomfort along her lateral hindfoot.  She states her discomfort starts laterally and fifth metatarsal base extends of the peroneal tendons and extends posterior to the Achilles tendon.  Next I did review the MRI did show pertinent images MRI does show progressive healing to the fifth metatarsal base no hardware failure etc. emanations, continue conservative care with plans listed as below.    DIAGNOSES    1. Closed displaced fracture of fifth metatarsal bone of right foot with routine healing, subsequent encounter    2. Post-operative state          PLAN:    1. DME: right laterally posted trilaminar orthotic from [facility]      RTO 10 weeks    Orders Placed This Encounter    acetaminophen (TYLENOL) 500 MG tablet     Sig: Take 1 tablet by mouth 2 times daily as needed for Pain     Dispense:  60 tablet     Refill:  0    DME Order for (Specify) as OP     - CUSTOM DME device ordered -  Right laterally POSTED TRILAMINAR ORTHOTIC  - Diagnosis: s/p ORIF 5th met  - Length of Need: Lifetime    This item, is of medical necessity, in order to support the medial column medial hindfoot in this individual, who has progressive collapsing foot deformity/posterior tibial tendinitis/posterior tibial dysfunction.  This will raise her arch, improve her mechanics, and balance her posterior tibial tendon and peroneal brevis tendons as these are antagonistic tendons.    DAPHNIE Bauer MD  8/27/2024 2:11 PM

## 2024-08-27 NOTE — PROGRESS NOTES
Juana Franco Ford   [address] [facility] [address]       DIAGNOSTIC IMAGING      No orders of the defined types were placed in this encounter.     \MRI Result (most recent):  MRI FOOT RIGHT WO CONTRAST 08/15/2024    Narrative  EXAM: MRI FOOT RIGHT WO CONTRAST    CLINICAL INDICATION/HISTORY: Displaced fracture of fifth metatarsal bone, right  foot, subsequent encounter for fracture with routine healing; Other specified  postprocedural states    COMPARISON: 5/23/2024, 2/22/2024 right foot radiograph. 2/5/2021 MR right foot.    TECHNIQUE: Multiplanar, multisequence MR imaging of the right foot.    FINDINGS: Inherent limitation from the hardware, which creates inhomogeneous fat  saturation, susceptibility artifact, and some image warping.    Bones and Joints: Expected postsurgical changes at the base of the fifth  metatarsal with incompletely healed nondisplaced fracture at the base of the  fifth metatarsal.    Similar moderate degenerative changes at the first toe interphalangeal joint  with joint space narrowing, subchondral cysts, and edema-like signal across the  interphalangeal joint. Stable moderate degenerative changes at the first MTP  joint with subchondral sclerosis of the base of the first toe phalanx proximal  and edema signal at the medial sesamoid without discrete fracture. Broad segment  of full-thickness cartilage defect at the lateral first toe interphalangeal  joint. Focal fissuring at the lateral first MTP joint. No joint effusion.    Tendons: Intact flexor and extensor tendons.    Ligaments: Intact.    Muscles: No muscle edema or atrophy.    Other: Soft tissue edema along the lateral/dorsal aspect of the fifth metatarsal  and the fifth toe. No discrete fluid collection or mass.    Impression  1. Stable fixation hardware at the fifth metatarsal base with progression of  healing in the interval. However, within limitations of hardware artifact,  deemed not yet complete given

## 2024-09-25 DIAGNOSIS — S92.351D CLOSED DISPLACED FRACTURE OF FIFTH METATARSAL BONE OF RIGHT FOOT WITH ROUTINE HEALING, SUBSEQUENT ENCOUNTER: ICD-10-CM

## 2024-09-25 RX ORDER — VENLAFAXINE HYDROCHLORIDE 150 MG/1
150 CAPSULE, EXTENDED RELEASE ORAL DAILY
Qty: 90 CAPSULE | Refills: 3 | Status: SHIPPED | OUTPATIENT
Start: 2024-09-25

## 2024-09-26 RX ORDER — ONDANSETRON 4 MG/1
TABLET, FILM COATED ORAL
Qty: 10 TABLET | Refills: 0 | Status: SHIPPED | OUTPATIENT
Start: 2024-09-26

## 2024-10-21 ENCOUNTER — TELEPHONE (OUTPATIENT)
Facility: CLINIC | Age: 64
End: 2024-10-21

## 2024-10-21 NOTE — TELEPHONE ENCOUNTER
Pt and  called : pt was seen on July 24 she received a Bill and was told  that Dr Colon is not in network ,  please resubmit   Insurance co states DR Colon needs to update her file with them

## 2024-11-14 ENCOUNTER — HOSPITAL ENCOUNTER (EMERGENCY)
Facility: HOSPITAL | Age: 64
Discharge: HOME | End: 2024-11-14
Payer: MEDICARE

## 2024-11-14 VITALS
HEART RATE: 88 BPM | OXYGEN SATURATION: 94 % | DIASTOLIC BLOOD PRESSURE: 68 MMHG | TEMPERATURE: 98.9 F | BODY MASS INDEX: 25.11 KG/M2 | HEIGHT: 67 IN | SYSTOLIC BLOOD PRESSURE: 116 MMHG | WEIGHT: 160 LBS | RESPIRATION RATE: 18 BRPM

## 2024-11-14 DIAGNOSIS — L03.114 CELLULITIS OF LEFT UPPER EXTREMITY: Primary | ICD-10-CM

## 2024-11-14 LAB
ALBUMIN SERPL-MCNC: 4 G/DL (ref 3.4–5)
ALBUMIN/GLOB SERPL: 1.2 (ref 0.8–1.7)
ALP SERPL-CCNC: 96 U/L (ref 45–117)
ALT SERPL-CCNC: 47 U/L (ref 13–56)
ANION GAP SERPL CALC-SCNC: 10 MMOL/L (ref 3–18)
AST SERPL-CCNC: 36 U/L (ref 10–38)
BASOPHILS # BLD: 0.1 K/UL (ref 0–0.1)
BASOPHILS NFR BLD: 1 % (ref 0–2)
BILIRUB SERPL-MCNC: 0.4 MG/DL (ref 0.2–1)
BUN SERPL-MCNC: 10 MG/DL (ref 7–18)
BUN/CREAT SERPL: 12 (ref 12–20)
CALCIUM SERPL-MCNC: 9.2 MG/DL (ref 8.5–10.1)
CHLORIDE SERPL-SCNC: 106 MMOL/L (ref 100–111)
CO2 SERPL-SCNC: 24 MMOL/L (ref 21–32)
CREAT SERPL-MCNC: 0.86 MG/DL (ref 0.6–1.3)
DIFFERENTIAL METHOD BLD: ABNORMAL
EOSINOPHIL # BLD: 0.4 K/UL (ref 0–0.4)
EOSINOPHIL NFR BLD: 7 % (ref 0–5)
ERYTHROCYTE [DISTWIDTH] IN BLOOD BY AUTOMATED COUNT: 11.9 % (ref 11.6–14.5)
GLOBULIN SER CALC-MCNC: 3.4 G/DL (ref 2–4)
GLUCOSE SERPL-MCNC: 105 MG/DL (ref 74–99)
HCT VFR BLD AUTO: 41.7 % (ref 35–45)
HGB BLD-MCNC: 13.7 G/DL (ref 12–16)
IMM GRANULOCYTES # BLD AUTO: 0 K/UL (ref 0–0.04)
IMM GRANULOCYTES NFR BLD AUTO: 0 % (ref 0–0.5)
LACTATE BLD-SCNC: 1.93 MMOL/L (ref 0.4–2)
LYMPHOCYTES # BLD: 2.4 K/UL (ref 0.9–3.6)
LYMPHOCYTES NFR BLD: 41 % (ref 21–52)
MCH RBC QN AUTO: 31.4 PG (ref 24–34)
MCHC RBC AUTO-ENTMCNC: 32.9 G/DL (ref 31–37)
MCV RBC AUTO: 95.6 FL (ref 78–100)
MONOCYTES # BLD: 0.5 K/UL (ref 0.05–1.2)
MONOCYTES NFR BLD: 8 % (ref 3–10)
NEUTS SEG # BLD: 2.6 K/UL (ref 1.8–8)
NEUTS SEG NFR BLD: 43 % (ref 40–73)
NRBC # BLD: 0 K/UL (ref 0–0.01)
NRBC BLD-RTO: 0 PER 100 WBC
PLATELET # BLD AUTO: 298 K/UL (ref 135–420)
PMV BLD AUTO: 9.9 FL (ref 9.2–11.8)
POTASSIUM SERPL-SCNC: 3.8 MMOL/L (ref 3.5–5.5)
PROT SERPL-MCNC: 7.4 G/DL (ref 6.4–8.2)
RBC # BLD AUTO: 4.36 M/UL (ref 4.2–5.3)
SODIUM SERPL-SCNC: 140 MMOL/L (ref 136–145)
WBC # BLD AUTO: 6 K/UL (ref 4.6–13.2)

## 2024-11-14 PROCEDURE — 87070 CULTURE OTHR SPECIMN AEROBIC: CPT

## 2024-11-14 PROCEDURE — 99284 EMERGENCY DEPT VISIT MOD MDM: CPT

## 2024-11-14 PROCEDURE — 85025 COMPLETE CBC W/AUTO DIFF WBC: CPT

## 2024-11-14 PROCEDURE — 6360000002 HC RX W HCPCS

## 2024-11-14 PROCEDURE — 96365 THER/PROPH/DIAG IV INF INIT: CPT

## 2024-11-14 PROCEDURE — 2580000003 HC RX 258

## 2024-11-14 PROCEDURE — 87205 SMEAR GRAM STAIN: CPT

## 2024-11-14 PROCEDURE — 80053 COMPREHEN METABOLIC PANEL: CPT

## 2024-11-14 PROCEDURE — 6370000000 HC RX 637 (ALT 250 FOR IP)

## 2024-11-14 PROCEDURE — 96366 THER/PROPH/DIAG IV INF ADDON: CPT

## 2024-11-14 PROCEDURE — 6360000002 HC RX W HCPCS: Performed by: EMERGENCY MEDICINE

## 2024-11-14 PROCEDURE — 2580000003 HC RX 258: Performed by: EMERGENCY MEDICINE

## 2024-11-14 PROCEDURE — 96375 TX/PRO/DX INJ NEW DRUG ADDON: CPT

## 2024-11-14 PROCEDURE — 83605 ASSAY OF LACTIC ACID: CPT

## 2024-11-14 RX ORDER — DIPHENHYDRAMINE HYDROCHLORIDE 50 MG/ML
25 INJECTION INTRAMUSCULAR; INTRAVENOUS
Status: COMPLETED | OUTPATIENT
Start: 2024-11-14 | End: 2024-11-14

## 2024-11-14 RX ORDER — MORPHINE SULFATE 2 MG/ML
2 INJECTION, SOLUTION INTRAMUSCULAR; INTRAVENOUS
Status: COMPLETED | OUTPATIENT
Start: 2024-11-14 | End: 2024-11-14

## 2024-11-14 RX ORDER — HYDROXYZINE PAMOATE 50 MG/1
50 CAPSULE ORAL
Status: COMPLETED | OUTPATIENT
Start: 2024-11-14 | End: 2024-11-14

## 2024-11-14 RX ORDER — DOXYCYCLINE 100 MG/1
100 CAPSULE ORAL 2 TIMES DAILY
Qty: 20 CAPSULE | Refills: 0 | Status: SHIPPED | OUTPATIENT
Start: 2024-11-14 | End: 2024-11-14 | Stop reason: ALTCHOICE

## 2024-11-14 RX ORDER — KETOROLAC TROMETHAMINE 15 MG/ML
15 INJECTION, SOLUTION INTRAMUSCULAR; INTRAVENOUS ONCE
Status: COMPLETED | OUTPATIENT
Start: 2024-11-14 | End: 2024-11-14

## 2024-11-14 RX ORDER — CLINDAMYCIN HYDROCHLORIDE 300 MG/1
300 CAPSULE ORAL 3 TIMES DAILY
Qty: 21 CAPSULE | Refills: 0 | Status: SHIPPED | OUTPATIENT
Start: 2024-11-14 | End: 2024-11-21

## 2024-11-14 RX ORDER — ONDANSETRON 2 MG/ML
4 INJECTION INTRAMUSCULAR; INTRAVENOUS
Status: COMPLETED | OUTPATIENT
Start: 2024-11-14 | End: 2024-11-14

## 2024-11-14 RX ORDER — HYDROXYZINE HYDROCHLORIDE 25 MG/1
25 TABLET, FILM COATED ORAL EVERY 8 HOURS PRN
Qty: 20 TABLET | Refills: 0 | Status: SHIPPED | OUTPATIENT
Start: 2024-11-14 | End: 2024-11-24

## 2024-11-14 RX ADMIN — VANCOMYCIN HYDROCHLORIDE 1000 MG: 1 INJECTION, POWDER, LYOPHILIZED, FOR SOLUTION INTRAVENOUS at 14:47

## 2024-11-14 RX ADMIN — KETOROLAC TROMETHAMINE 15 MG: 15 INJECTION, SOLUTION INTRAMUSCULAR; INTRAVENOUS at 14:10

## 2024-11-14 RX ADMIN — DIPHENHYDRAMINE HYDROCHLORIDE 25 MG: 50 INJECTION INTRAMUSCULAR; INTRAVENOUS at 13:27

## 2024-11-14 RX ADMIN — ONDANSETRON 4 MG: 2 INJECTION, SOLUTION INTRAMUSCULAR; INTRAVENOUS at 13:10

## 2024-11-14 RX ADMIN — HYDROXYZINE PAMOATE 50 MG: 50 CAPSULE ORAL at 16:39

## 2024-11-14 RX ADMIN — VANCOMYCIN HYDROCHLORIDE 750 MG: 750 INJECTION, POWDER, LYOPHILIZED, FOR SOLUTION INTRAVENOUS at 16:07

## 2024-11-14 RX ADMIN — WATER 125 MG: 1 INJECTION INTRAMUSCULAR; INTRAVENOUS; SUBCUTANEOUS at 13:27

## 2024-11-14 RX ADMIN — MORPHINE SULFATE 2 MG: 2 INJECTION, SOLUTION INTRAMUSCULAR; INTRAVENOUS at 13:10

## 2024-11-14 RX ADMIN — WATER 1000 MG: 1 INJECTION INTRAMUSCULAR; INTRAVENOUS; SUBCUTANEOUS at 14:10

## 2024-11-14 ASSESSMENT — ENCOUNTER SYMPTOMS
CHOKING: 0
NAUSEA: 1
RECTAL PAIN: 0
SHORTNESS OF BREATH: 0
COUGH: 0
CONSTIPATION: 0
VOMITING: 0
RHINORRHEA: 0
SORE THROAT: 0
ABDOMINAL PAIN: 0
CHEST TIGHTNESS: 0
DIARRHEA: 1
BACK PAIN: 0

## 2024-11-14 ASSESSMENT — PAIN SCALES - GENERAL
PAINLEVEL_OUTOF10: 7
PAINLEVEL_OUTOF10: 8

## 2024-11-14 ASSESSMENT — PAIN - FUNCTIONAL ASSESSMENT
PAIN_FUNCTIONAL_ASSESSMENT: 0-10
PAIN_FUNCTIONAL_ASSESSMENT: NONE - DENIES PAIN

## 2024-11-14 NOTE — ED PROVIDER NOTES
OR GREATER THAN 5 YEARS  01/23/2020    XR MIDLINE EQUAL OR GREATER THAN 5 YEARS 1/23/2020         CURRENT MEDICATIONS       Discharge Medication List as of 11/14/2024  5:32 PM        CONTINUE these medications which have NOT CHANGED    Details   ondansetron (ZOFRAN) 4 MG tablet TAKE 1 TABLET BY MOUTH EVERY 8 HOURS AS NEEDED FOR NAUSEA AND/OR VOMITING, Disp-10 tablet, R-0Normal      !! venlafaxine (EFFEXOR XR) 150 MG extended release capsule TAKE 1 CAPSULE BY MOUTH DAILY, Disp-90 capsule, R-3Normal      acetaminophen (TYLENOL) 500 MG tablet Take 1 tablet by mouth 2 times daily as needed for Pain, Disp-60 tablet, R-0Normal      pregabalin (LYRICA) 25 MG capsule Take 1 capsule by mouth 2 times daily for 120 days. Max Daily Amount: 50 mg, Disp-60 capsule, R-3Normal      !! venlafaxine (EFFEXOR XR) 75 MG extended release capsule Take 1 capsule by mouth daily Take with the 150 mg capsule, Disp-90 capsule, R-1Normal      pantoprazole (PROTONIX) 40 MG tablet Take 1 tablet by mouth every morning (before breakfast), Disp-90 tablet, R-3Normal      topiramate (TOPAMAX) 25 MG tablet TAKE ONE TABLET BY MOUTH EVERY MORNING AND TAKE TWO TABLETS BY MOUTH EVERY EVENING, Disp-270 tablet, R-1Normal      albuterol sulfate HFA (PROVENTIL;VENTOLIN;PROAIR) 108 (90 Base) MCG/ACT inhaler USE 1 INHALATION BY MOUTH  EVERY 4 HOURS AS NEEDED FOR WHEEZINGHistorical Med      cyclobenzaprine (FLEXERIL) 10 MG tablet Take 1 tablet by mouthHistorical Med      Fluticasone-Salmeterol,sensor, 232-14 MCG/ACT AEPB Inhale 1 Act into the lungs 2 times dailyHistorical Med       !! - Potential duplicate medications found. Please discuss with provider.          ALLERGIES     Morphine and Albuterol    FAMILY HISTORY       Family History   Problem Relation Age of Onset    Colon Polyps Brother         half brother    Breast Cancer Mother     Colon Polyps Mother     Other Mother         atrial fibrillation    Heart Attack Sister     Heart Disease Sister          oropharyngeal erythema.   Eyes:      General: No scleral icterus.     Extraocular Movements: Extraocular movements intact.      Conjunctiva/sclera: Conjunctivae normal.   Cardiovascular:      Rate and Rhythm: Normal rate and regular rhythm.      Pulses: Normal pulses.      Heart sounds: Normal heart sounds.   Pulmonary:      Effort: Pulmonary effort is normal.      Breath sounds: Normal breath sounds. No wheezing.   Abdominal:      General: Bowel sounds are normal.      Palpations: Abdomen is soft.      Tenderness: There is no abdominal tenderness.   Musculoskeletal:         General: No tenderness. Normal range of motion.      Cervical back: Normal range of motion and neck supple. No tenderness.      Right lower leg: No edema.      Left lower leg: No edema.   Lymphadenopathy:      Cervical: No cervical adenopathy.   Skin:     General: Skin is warm.      Capillary Refill: Capillary refill takes less than 2 seconds.      Coloration: Skin is not jaundiced.      Findings: Erythema and rash present. Rash is pustular.      Comments: Left 3rd digit, left hand, left forearm erythema, swelling, no crepitus/ indurated/ fluctuance present. No streaking present. There is a single pustular to left middle distal phalange.   Neurological:      General: No focal deficit present.      Mental Status: She is alert and oriented to person, place, and time. Mental status is at baseline.      Cranial Nerves: No cranial nerve deficit.      Sensory: No sensory deficit.      Motor: No weakness.      Coordination: Coordination normal.   Psychiatric:         Mood and Affect: Mood normal.         Behavior: Behavior normal.         DIAGNOSTIC RESULTS         Interpretation per the Radiologist below, if available at the time of this note:    No orders to display         ED BEDSIDE ULTRASOUND:   Performed by ED Physician - none    LABS:  Labs Reviewed   CBC WITH AUTO DIFFERENTIAL   COMPREHENSIVE METABOLIC PANEL   URINALYSIS       All other labs

## 2024-11-14 NOTE — ED TRIAGE NOTES
Pt was gardening 2 days ago. That evening she noted redness and swelling of her left 3rd finger and wrist. It has spread up her arm to her elbow now. She did not feel anything bite her or see anything.

## 2024-11-14 NOTE — DISCHARGE INSTRUCTIONS
Call your PCP for follow-up in  office for check-up Monday.   Take your medications as prescribed.   Probitics over the counter.  Return to ED if you have increased redness, swelling, fever or chills. Return to ED for new or worsening/ concerning symptoms.

## 2024-11-16 LAB
BACTERIA SPEC CULT: NORMAL
GRAM STN SPEC: NORMAL
GRAM STN SPEC: NORMAL
SERVICE CMNT-IMP: NORMAL

## 2024-11-19 ENCOUNTER — APPOINTMENT (OUTPATIENT)
Age: 64
End: 2024-11-19
Payer: COMMERCIAL

## 2024-11-19 VITALS
HEART RATE: 71 BPM | SYSTOLIC BLOOD PRESSURE: 124 MMHG | HEIGHT: 62 IN | DIASTOLIC BLOOD PRESSURE: 78 MMHG | BODY MASS INDEX: 30.03 KG/M2 | OXYGEN SATURATION: 98 % | WEIGHT: 163.2 LBS

## 2024-11-19 DIAGNOSIS — K21.9 GASTROESOPHAGEAL REFLUX DISEASE WITHOUT ESOPHAGITIS: ICD-10-CM

## 2024-11-19 DIAGNOSIS — E78.00 HYPERCHOLESTEROLEMIA: ICD-10-CM

## 2024-11-19 DIAGNOSIS — Z00.00 ROUTINE GENERAL MEDICAL EXAMINATION AT HEALTH CARE FACILITY: Primary | ICD-10-CM

## 2024-11-19 DIAGNOSIS — Z23 ENCOUNTER FOR IMMUNIZATION: ICD-10-CM

## 2024-11-19 PROCEDURE — 90471 IMMUNIZATION ADMIN: CPT | Performed by: FAMILY MEDICINE

## 2024-11-19 PROCEDURE — 90472 IMMUNIZATION ADMIN EACH ADD: CPT | Performed by: FAMILY MEDICINE

## 2024-11-19 PROCEDURE — 3008F BODY MASS INDEX DOCD: CPT | Performed by: FAMILY MEDICINE

## 2024-11-19 PROCEDURE — 1160F RVW MEDS BY RX/DR IN RCRD: CPT | Performed by: FAMILY MEDICINE

## 2024-11-19 PROCEDURE — 1124F ACP DISCUSS-NO DSCNMKR DOCD: CPT | Performed by: FAMILY MEDICINE

## 2024-11-19 PROCEDURE — 1159F MED LIST DOCD IN RCRD: CPT | Performed by: FAMILY MEDICINE

## 2024-11-19 PROCEDURE — 90673 RIV3 VACCINE NO PRESERV IM: CPT | Performed by: FAMILY MEDICINE

## 2024-11-19 PROCEDURE — 99214 OFFICE O/P EST MOD 30 MIN: CPT | Performed by: FAMILY MEDICINE

## 2024-11-19 PROCEDURE — 1036F TOBACCO NON-USER: CPT | Performed by: FAMILY MEDICINE

## 2024-11-19 PROCEDURE — 1170F FXNL STATUS ASSESSED: CPT | Performed by: FAMILY MEDICINE

## 2024-11-19 PROCEDURE — 90677 PCV20 VACCINE IM: CPT | Performed by: FAMILY MEDICINE

## 2024-11-19 PROCEDURE — G0402 INITIAL PREVENTIVE EXAM: HCPCS | Performed by: FAMILY MEDICINE

## 2024-11-19 RX ORDER — PANTOPRAZOLE SODIUM 40 MG/1
40 TABLET, DELAYED RELEASE ORAL DAILY
Qty: 90 TABLET | Refills: 3 | Status: SHIPPED | OUTPATIENT
Start: 2024-11-19 | End: 2025-11-19

## 2024-11-19 RX ORDER — ATORVASTATIN CALCIUM 40 MG/1
40 TABLET, FILM COATED ORAL DAILY
Qty: 90 TABLET | Refills: 3 | Status: SHIPPED | OUTPATIENT
Start: 2024-11-19 | End: 2025-11-19

## 2024-11-19 ASSESSMENT — PATIENT HEALTH QUESTIONNAIRE - PHQ9
2. FEELING DOWN, DEPRESSED OR HOPELESS: NOT AT ALL
SUM OF ALL RESPONSES TO PHQ9 QUESTIONS 1 AND 2: 0
1. LITTLE INTEREST OR PLEASURE IN DOING THINGS: NOT AT ALL

## 2024-11-19 ASSESSMENT — ACTIVITIES OF DAILY LIVING (ADL)
DRESSING: INDEPENDENT
MANAGING_FINANCES: INDEPENDENT
BATHING: INDEPENDENT
DOING_HOUSEWORK: INDEPENDENT
GROCERY_SHOPPING: INDEPENDENT
TAKING_MEDICATION: INDEPENDENT

## 2024-11-19 ASSESSMENT — ENCOUNTER SYMPTOMS
PALPITATIONS: 0
CHEST TIGHTNESS: 0
SHORTNESS OF BREATH: 0
CONSTIPATION: 0
HEADACHES: 0
FATIGUE: 0
ABDOMINAL PAIN: 0
COUGH: 0
DIARRHEA: 0

## 2024-11-19 NOTE — PROGRESS NOTES
"Subjective   Reason for Visit: Josephine Christianson is an 64 y.o. female here for a Medicare Wellness visit.     Past Medical, Surgical, and Family History reviewed and updated in chart.    Reviewed all medications by prescribing practitioner or clinical pharmacist (such as prescriptions, OTCs, herbal therapies and supplements) and documented in the medical record.    HPI  MMG 2023  Cologuard 2023  Stressed out with some stomach issues with her  being sick possible prostate cancer.  Labs last year were good will not repeat at this time.  Flu and Prevnar 20 shots today.    Patient Care Team:  Tim Barrientos MD as PCP - General (Family Medicine)     Review of Systems   Constitutional:  Negative for fatigue.   Eyes:  Negative for visual disturbance.   Respiratory:  Negative for cough, chest tightness and shortness of breath.    Cardiovascular:  Negative for chest pain and palpitations.   Gastrointestinal:  Negative for abdominal pain, constipation and diarrhea.   Skin:  Negative for rash.   Neurological:  Negative for headaches.       Objective   Vitals:  /78   Pulse 71   Ht 1.575 m (5' 2\")   Wt 74 kg (163 lb 3.2 oz)   SpO2 98%   BMI 29.85 kg/m²       Physical Exam  Constitutional:       Appearance: Normal appearance.   HENT:      Head: Normocephalic and atraumatic.   Cardiovascular:      Rate and Rhythm: Normal rate and regular rhythm.      Heart sounds: Normal heart sounds.   Pulmonary:      Effort: Pulmonary effort is normal.      Breath sounds: Normal breath sounds.   Skin:     General: Skin is warm and dry.   Neurological:      General: No focal deficit present.      Mental Status: She is alert and oriented to person, place, and time.   Psychiatric:         Mood and Affect: Mood normal.         Behavior: Behavior normal.         Thought Content: Thought content normal.         Judgment: Judgment normal.         Assessment & Plan  Hypercholesterolemia    Orders:    atorvastatin (Lipitor) 40 mg " tablet; Take 1 tablet (40 mg) by mouth once daily.    Gastroesophageal reflux disease without esophagitis    Orders:    pantoprazole (ProtoNix) 40 mg EC tablet; Take 1 tablet (40 mg) by mouth once daily.    Routine general medical examination at health care facility    Orders:    1 Year Follow Up In Primary Care - Wellness Exam; Future

## 2024-11-20 ENCOUNTER — OFFICE VISIT (OUTPATIENT)
Facility: CLINIC | Age: 64
End: 2024-11-20

## 2024-11-20 VITALS
HEART RATE: 91 BPM | BODY MASS INDEX: 26.06 KG/M2 | HEIGHT: 67 IN | WEIGHT: 166 LBS | RESPIRATION RATE: 16 BRPM | DIASTOLIC BLOOD PRESSURE: 84 MMHG | TEMPERATURE: 98.3 F | OXYGEN SATURATION: 95 % | SYSTOLIC BLOOD PRESSURE: 112 MMHG

## 2024-11-20 DIAGNOSIS — Z09 HOSPITAL DISCHARGE FOLLOW-UP: Primary | ICD-10-CM

## 2024-11-20 DIAGNOSIS — L03.114 CELLULITIS OF LEFT UPPER EXTREMITY: ICD-10-CM

## 2024-11-20 DIAGNOSIS — F33.42 RECURRENT MAJOR DEPRESSIVE DISORDER, IN FULL REMISSION (HCC): ICD-10-CM

## 2024-11-20 DIAGNOSIS — J45.20 MILD INTERMITTENT ASTHMA WITHOUT COMPLICATION: ICD-10-CM

## 2024-11-20 DIAGNOSIS — E66.3 OVERWEIGHT (BMI 25.0-29.9): ICD-10-CM

## 2024-11-20 DIAGNOSIS — F41.9 ANXIETY: ICD-10-CM

## 2024-11-20 DIAGNOSIS — R13.19 ESOPHAGEAL DYSPHAGIA: ICD-10-CM

## 2024-11-20 DIAGNOSIS — Z98.890 S/P LAPAROSCOPIC FUNDOPLICATION: ICD-10-CM

## 2024-11-20 DIAGNOSIS — M85.89 OSTEOPENIA OF MULTIPLE SITES: ICD-10-CM

## 2024-11-20 DIAGNOSIS — T78.2XXD ANAPHYLACTIC REACTION, SUBSEQUENT ENCOUNTER: ICD-10-CM

## 2024-11-20 RX ORDER — FLUTICASONE PROPIONATE AND SALMETEROL 250; 50 UG/1; UG/1
1 POWDER RESPIRATORY (INHALATION) EVERY 12 HOURS
Qty: 60 EACH | Refills: 3 | Status: SHIPPED | OUTPATIENT
Start: 2024-11-20

## 2024-11-20 RX ORDER — EPINEPHRINE 0.3 MG/.3ML
0.3 INJECTION SUBCUTANEOUS PRN
COMMUNITY
Start: 2024-10-30

## 2024-11-20 RX ORDER — DIAZEPAM 5 MG/1
5 TABLET ORAL PRN
COMMUNITY
Start: 2024-10-02

## 2024-11-20 RX ORDER — LINACLOTIDE 72 UG/1
CAPSULE, GELATIN COATED ORAL
COMMUNITY
Start: 2024-10-08

## 2024-11-20 NOTE — PROGRESS NOTES
Juana Ford presents today for   Chief Complaint   Patient presents with    Follow-Up from Hospital       \"Have you been to the ER, urgent care clinic since your last visit?  Hospitalized since your last visit?\"    Yes  10/29/2024  [facility]   Allergic reaction   11/14/2024  [facility]  Arm swelling    “Have you seen or consulted any other health care providers outside of [facility] since your last visit?”    NO            
transit of barium tablet and esophageal dysmotility. She had a repeat upper endoscopy on 11/7/2019 which showed ulcer healing, and she had dilation of a benign intrinsic stricture at the GE junction. Biopsies of the esophagus were suggestive but not diagnostic for eosinophilic esophagitis. She was referred to Dr. Abelardo Weir and it was recommended that she undergo surgical repair. On 1/16/2020, she underwent laparoscopic HH repair with Toupet fundoplication. Post-operatively, she had difficulty tolerating po due to frequent belching and nausea. UGI showed prolonged passage of contrast into the stomach. She was managed non-operatively initially with the hope that this was post-operative edema, but she continued to fail to progress and eventually underwent an EGD with dilation on 1/24/20. Post-procedure, she felt much better, and her diet was slowly advanced to normal, and she was discharged on 1/27/2020. However, she continued to have difficulty with severe substernal chest pain with swallowing, and underwent repeat upper endoscopy with balloon dilation on 2/13/2020 without improvement. A barium swallow was obtained (3/16/2020) showing mild esophageal dysmotility but without significant obstruction and no evidence of contrast extravasation. On 5/1/2020, she underwent an esophageal motility study by Dr. George, showing ineffective esophageal motility due to impaired DCI in peristalsis with resultant severe liquid and viscous bolus transit delays; and short esophageal body. She underwent evaluation by Dr. Denis at [facility] on 5/18/2020, and it was felt that her symptoms were likely secondary to a tight fundoplication. She was treated with calcium channel blocker and  valium without improvement. On 7/31/2020, she underwent upper endoscopy by Dr. Denis, showing EGJ widely patent, paraesophageal hernia with intact wrap, and Esoflip dilation to 27 mm performed with therapeutic mucosal tear. However, she did not improve,

## 2024-11-22 ENCOUNTER — TELEPHONE (OUTPATIENT)
Age: 64
End: 2024-11-22
Payer: COMMERCIAL

## 2024-11-22 DIAGNOSIS — Z00.00 ROUTINE GENERAL MEDICAL EXAMINATION AT A HEALTH CARE FACILITY: Primary | ICD-10-CM

## 2024-11-22 RX ORDER — SERTRALINE HYDROCHLORIDE 25 MG/1
25 TABLET, FILM COATED ORAL DAILY
Qty: 30 TABLET | Refills: 11 | Status: SHIPPED | OUTPATIENT
Start: 2024-11-22 | End: 2025-11-22

## 2024-11-24 PROBLEM — F33.9 RECURRENT MAJOR DEPRESSIVE DISORDER (HCC): Status: ACTIVE | Noted: 2020-11-28

## 2024-11-24 PROBLEM — M85.80 OSTEOPENIA: Status: ACTIVE | Noted: 2024-11-24

## 2024-11-24 PROBLEM — M85.89 OSTEOPENIA OF MULTIPLE SITES: Status: ACTIVE | Noted: 2024-11-24

## 2024-11-24 PROBLEM — R79.89 ELEVATED LFTS: Status: RESOLVED | Noted: 2024-07-28 | Resolved: 2024-11-24

## 2024-11-24 PROBLEM — E66.3 OVERWEIGHT: Status: ACTIVE | Noted: 2024-11-24

## 2024-12-05 ENCOUNTER — APPOINTMENT (OUTPATIENT)
Dept: URBAN - METROPOLITAN AREA SURGERY 9 | Age: 64
Setting detail: DERMATOLOGY
End: 2024-12-05

## 2024-12-05 DIAGNOSIS — L82.1 OTHER SEBORRHEIC KERATOSIS: ICD-10-CM

## 2024-12-05 DIAGNOSIS — D18.0 HEMANGIOMA: ICD-10-CM

## 2024-12-05 DIAGNOSIS — L81.4 OTHER MELANIN HYPERPIGMENTATION: ICD-10-CM

## 2024-12-05 DIAGNOSIS — L57.0 ACTINIC KERATOSIS: ICD-10-CM

## 2024-12-05 DIAGNOSIS — L57.8 OTHER SKIN CHANGES DUE TO CHRONIC EXPOSURE TO NONIONIZING RADIATION: ICD-10-CM

## 2024-12-05 DIAGNOSIS — Z85.828 PERSONAL HISTORY OF OTHER MALIGNANT NEOPLASM OF SKIN: ICD-10-CM

## 2024-12-05 DIAGNOSIS — L82.0 INFLAMED SEBORRHEIC KERATOSIS: ICD-10-CM

## 2024-12-05 DIAGNOSIS — D22 MELANOCYTIC NEVI: ICD-10-CM

## 2024-12-05 PROBLEM — D22.5 MELANOCYTIC NEVI OF TRUNK: Status: ACTIVE | Noted: 2024-12-05

## 2024-12-05 PROBLEM — D18.01 HEMANGIOMA OF SKIN AND SUBCUTANEOUS TISSUE: Status: ACTIVE | Noted: 2024-12-05

## 2024-12-05 PROCEDURE — OTHER MIPS QUALITY: OTHER

## 2024-12-05 PROCEDURE — 17000 DESTRUCT PREMALG LESION: CPT

## 2024-12-05 PROCEDURE — OTHER COUNSELING: OTHER

## 2024-12-05 PROCEDURE — OTHER SUNSCREEN RECOMMENDATIONS: OTHER

## 2024-12-05 PROCEDURE — 17003 DESTRUCT PREMALG LES 2-14: CPT

## 2024-12-05 PROCEDURE — 99213 OFFICE O/P EST LOW 20 MIN: CPT | Mod: 25

## 2024-12-05 PROCEDURE — OTHER LIQUID NITROGEN: OTHER

## 2024-12-05 PROCEDURE — OTHER REASSURANCE: OTHER

## 2024-12-05 ASSESSMENT — LOCATION SIMPLE DESCRIPTION DERM
LOCATION SIMPLE: UPPER BACK
LOCATION SIMPLE: LEFT FOREARM
LOCATION SIMPLE: RIGHT UPPER BACK
LOCATION SIMPLE: LEFT POSTERIOR UPPER ARM
LOCATION SIMPLE: CHEST
LOCATION SIMPLE: RIGHT FOREARM
LOCATION SIMPLE: NOSE
LOCATION SIMPLE: INFERIOR FOREHEAD

## 2024-12-05 ASSESSMENT — LOCATION DETAILED DESCRIPTION DERM
LOCATION DETAILED: RIGHT DISTAL DORSAL FOREARM
LOCATION DETAILED: SUPERIOR THORACIC SPINE
LOCATION DETAILED: NASAL DORSUM
LOCATION DETAILED: NASAL TIP
LOCATION DETAILED: LEFT DISTAL DORSAL FOREARM
LOCATION DETAILED: INFERIOR THORACIC SPINE
LOCATION DETAILED: INFERIOR MID FOREHEAD
LOCATION DETAILED: RIGHT SUPERIOR MEDIAL UPPER BACK
LOCATION DETAILED: RIGHT MEDIAL SUPERIOR CHEST
LOCATION DETAILED: LEFT PROXIMAL POSTERIOR UPPER ARM

## 2024-12-05 ASSESSMENT — LOCATION ZONE DERM
LOCATION ZONE: NOSE
LOCATION ZONE: ARM
LOCATION ZONE: FACE
LOCATION ZONE: TRUNK

## 2024-12-05 NOTE — PROCEDURE: LIQUID NITROGEN
Detail Level: Zone
Post-Care Instructions: I reviewed with the patient in detail post-care instructions. Patient is to wear sunprotection, and avoid picking at any of the treated lesions. Pt may apply Vaseline to crusted or scabbing areas.
Consent: The patient's consent was obtained including but not limited to risks of crusting, scabbing, blistering, scarring, darker or lighter pigmentary change, recurrence, incomplete removal and infection.
Render In Bullet Format When Appropriate: No
Total Number Of Aks Treated: 2
Duration Of Freeze Thaw-Cycle (Seconds): 5

## 2024-12-05 NOTE — HPI: EVALUATION OF SKIN LESION(S)
Hpi Title: Evaluation of Skin Lesions
Additional History: Patient complains of dryness on left side of nose and a bump on her left arm that she picks at

## 2024-12-20 ENCOUNTER — OFFICE VISIT (OUTPATIENT)
Age: 64
End: 2024-12-20

## 2024-12-20 VITALS — BODY MASS INDEX: 26.06 KG/M2 | HEIGHT: 67 IN | WEIGHT: 166 LBS

## 2024-12-20 DIAGNOSIS — M25.561 CHRONIC PAIN OF RIGHT KNEE: ICD-10-CM

## 2024-12-20 DIAGNOSIS — M17.11 PRIMARY OSTEOARTHRITIS OF RIGHT KNEE: Primary | ICD-10-CM

## 2024-12-20 DIAGNOSIS — G89.29 CHRONIC PAIN OF RIGHT KNEE: ICD-10-CM

## 2024-12-20 RX ORDER — MELOXICAM 15 MG/1
TABLET ORAL
Qty: 30 TABLET | Refills: 1 | Status: SHIPPED | OUTPATIENT
Start: 2024-12-20

## 2024-12-20 RX ORDER — BETAMETHASONE SODIUM PHOSPHATE AND BETAMETHASONE ACETATE 3; 3 MG/ML; MG/ML
6 INJECTION, SUSPENSION INTRA-ARTICULAR; INTRALESIONAL; INTRAMUSCULAR; SOFT TISSUE ONCE
Status: COMPLETED | OUTPATIENT
Start: 2024-12-20 | End: 2024-12-20

## 2024-12-20 RX ADMIN — BETAMETHASONE SODIUM PHOSPHATE AND BETAMETHASONE ACETATE 6 MG: 3; 3 INJECTION, SUSPENSION INTRA-ARTICULAR; INTRALESIONAL; INTRAMUSCULAR; SOFT TISSUE at 10:44

## 2024-12-20 NOTE — PROGRESS NOTES
Patient: Juana Ford                MRN: [id number]       SSN: [id number]  YOB: 1960        AGE: 64 y.o.        SEX: female  Body mass index is 26 kg/m².    PCP: Cammy Colon MD  12/20/24    Ms. Ford returns for evaluation of right knee pain she is happy with her hip replacement happy with her knee replacement and the right knee is stiff when she gets up from a chair at night pain is not a major feature with prolonged shopping it can be quite tender for her denies fevers or chills and otherwise has been feeling well and the exam today she stands in varus she has a mild to moderate effusion but not severely so missing about 3 degrees extension bends at about 115 and Homans' sign is negative both hips rotate nicely and her knee replacement looks terrific calf nontender Homans' sign is negative her weight looks very good    I did review her x-rays with her today she had 4 views of the right knee on 12/20/2024 confirming severe arthritis right knee    Treatment options given she would like to have a cortisone injection which was performed as per protocol on the right side Mobic prescription generated with usual precautions and return to see us in about 3 months time sooner if there is any problem thank you    REVIEW OF SYSTEMS:      CON: negative  EYE: negative   ENT: negative  RESP: negative  GI:    negative   :  negative  MSK: Positive  A twelve point review of systems was completed, positives noted and all other systems were reviewed and are negative      [facility]  OFFICE PROCEDURE PROGRESS NOTE      Chart reviewed for the following:  Miguel Angel DURAN M.D., have reviewed the History, Physical and updated the Allergic reactions for Juana Ford?    TIME OUT performed immediately prior to start of procedure:  Miguel Angel DURAN M.D., have performed the following reviews on Juana Ford prior to the start of the

## 2024-12-23 RX ORDER — TOPIRAMATE 25 MG/1
TABLET, FILM COATED ORAL
Qty: 270 TABLET | Refills: 1 | Status: SHIPPED | OUTPATIENT
Start: 2024-12-23

## 2024-12-23 NOTE — TELEPHONE ENCOUNTER
PCP: Cammy Colon MD    LAST OFFICE VISIT: 11/20/2024    LAST REFILL PER CHART:  Medication:topiramate (TOPAMAX) 25 MG tablet   Ordered On:02/23/2024  Instructions:: TAKE ONE TABLET BY MOUTH EVERY MORNING AND TAKE TWO TABLETS BY MOUTH EVERY EVENING   Dispense:270 tablets  Refills:1      Future Appointments   Date Time Provider Department Center   7/17/2025  9:00 AM IOC LAB VISIT [facility] DEP   7/24/2025 11:00 AM Cammy Colon MD [facility] DEP

## 2025-01-23 ENCOUNTER — TELEPHONE (OUTPATIENT)
Facility: CLINIC | Age: 65
End: 2025-01-23

## 2025-01-23 DIAGNOSIS — R13.19 ESOPHAGEAL DYSPHAGIA: Primary | ICD-10-CM

## 2025-01-23 RX ORDER — DIAZEPAM 5 MG/1
5 TABLET ORAL EVERY 8 HOURS PRN
Qty: 30 TABLET | Refills: 0 | Status: SHIPPED | OUTPATIENT
Start: 2025-01-23 | End: 2025-02-22

## 2025-01-23 NOTE — TELEPHONE ENCOUNTER
The patient is requesting a refill on diazePAM. This medication was being filled by a different provider, but the patient would like her PCP to take over this prescription refills.      [facility] [id number] - [address] - P [phone] - F [phone] [[id number]]

## 2025-01-23 NOTE — TELEPHONE ENCOUNTER
[facility] reviewed    The last fill date was 10/02/2024 for a 10 d/s qty 30 prescribed by Merlyn Vazquez

## 2025-02-17 RX ORDER — VENLAFAXINE HYDROCHLORIDE 75 MG/1
CAPSULE, EXTENDED RELEASE ORAL
Qty: 90 CAPSULE | Refills: 3 | Status: SHIPPED | OUTPATIENT
Start: 2025-02-17

## 2025-02-17 NOTE — TELEPHONE ENCOUNTER
PCP: Cammy Colon MD    LAST OFFICE VISIT: 11/20/2024    LAST REFILL PER CHART:  Medication:venlafaxine (EFFEXOR XR) 75 MG extended release capsule   Ordered On:07/24/2024  Instructions:Take 1 capsule by mouth daily Take with the 150 mg capsule   Dispense: 90 capsules  Refills:1      Future Appointments   Date Time Provider Department Center   7/17/2025  9:00 AM C LAB VISIT [facility] DEP   7/24/2025 11:00 AM Cammy Colon MD [facility] DEP

## 2025-03-07 ENCOUNTER — OFFICE VISIT (OUTPATIENT)
Age: 65
End: 2025-03-07

## 2025-03-07 VITALS — RESPIRATION RATE: 16 BRPM | HEIGHT: 67 IN | BODY MASS INDEX: 26 KG/M2

## 2025-03-07 DIAGNOSIS — M17.11 PRIMARY OSTEOARTHRITIS OF RIGHT KNEE: Primary | ICD-10-CM

## 2025-03-07 RX ORDER — BETAMETHASONE SODIUM PHOSPHATE AND BETAMETHASONE ACETATE 3; 3 MG/ML; MG/ML
6 INJECTION, SUSPENSION INTRA-ARTICULAR; INTRALESIONAL; INTRAMUSCULAR; SOFT TISSUE ONCE
Status: COMPLETED | OUTPATIENT
Start: 2025-03-07 | End: 2025-03-07

## 2025-03-07 RX ADMIN — BETAMETHASONE SODIUM PHOSPHATE AND BETAMETHASONE ACETATE 6 MG: 3; 3 INJECTION, SUSPENSION INTRA-ARTICULAR; INTRALESIONAL; INTRAMUSCULAR; SOFT TISSUE at 14:59

## 2025-03-07 NOTE — PROGRESS NOTES
Patient: Juana Ford                MRN: [id number]       SSN: [id number]  YOB: 1960        AGE: 64 y.o.        SEX: female  Body mass index is 26 kg/m².    PCP: Cammy Colon MD  03/07/25            REVIEW OF SYSTEMS:  Constitutional: Negative for fever, chills, weight loss and malaise/fatigue.   HENT: Negative.    Eyes: Negative.    Respiratory: Negative.   Cardiovascular: Negative.   Gastrointestinal: No bowel incontinence or constipation.  Genitourinary: No bladder incontinence or saddle anesthesia.  Skin: Negative.   Neurological: Negative.    Endo/Heme/Allergies: Negative.    Psychiatric/Behavioral: Negative.  Musculoskeletal: As per HPI above.     Past Medical History:   Diagnosis Date    Allergic rhinitis     Anxiety 9/16/2019    Claustrophobia     Degenerative disc disease, lumbar 9/16/2019    Facet arthropathy, lumbar 9/16/2019    Gastroesophageal reflux disease with esophagitis 11/10/2014    Hepatic steatosis 9/16/2019    Herniation of intervertebral disc between L4 and L5 9/16/2019    Hiatal hernia 9/16/2019    resolved    History of EMG 12/2019    no radiculopathy, compressive mononeuropathy    Hyperlipidemia     never been on medication    Insomnia 9/16/2019    Lumbar stenosis 9/16/2019    Mild intermittent asthma without complication 9/16/2019    Primary osteoarthritis involving multiple joints 9/16/2019    Quadriceps tendon rupture, left, sequela 12/11/2017    Medial retinacular tear following left total knee replacement; s/p left quadricep tendon repair 12/11/2017. Dr. Ramirez.    S/P hip replacement, right 3/3/2016    Vitamin D deficiency 11/10/2014         Current Outpatient Medications:     venlafaxine (EFFEXOR XR) 75 MG extended release capsule, TAKE 1 CAPSULE BY MOUTH DAILY WITH  MG CAPSULE, Disp: 90 capsule, Rfl: 3    topiramate (TOPAMAX) 25 MG tablet, TAKE 1 TABLET BY MOUTH EVERY MORNING AND TAKE TWO TABLETS BY MOUTH EVERY EVENING, Disp: 270 tablet,

## 2025-04-22 ENCOUNTER — TELEPHONE (OUTPATIENT)
Facility: CLINIC | Age: 65
End: 2025-04-22

## 2025-04-22 DIAGNOSIS — R13.19 ESOPHAGEAL DYSPHAGIA: Primary | ICD-10-CM

## 2025-04-22 RX ORDER — DIAZEPAM 5 MG/1
5 TABLET ORAL EVERY 12 HOURS PRN
Qty: 30 TABLET | Refills: 0 | Status: SHIPPED | OUTPATIENT
Start: 2025-04-22 | End: 2025-05-22

## 2025-04-22 NOTE — TELEPHONE ENCOUNTER
Refill request via phone    Medication: diazePAM (VALIUM) 5 MG tablet   Quantity: 30  Pharmacy:   [facility] [id number] - [place], [place] - [address] [phone] -  [phone]  [facility] [facility] [address]     Last Fill: 1/23/2025    PCP: Cammy Colon MD    LAST OFFICE VISIT: 03/07/2025      Future Appointments   Date Time Provider Department Center   7/17/2025  9:00 AM IOC LAB VISIT [facility] DEP   7/24/2025 11:00 AM Cammy Colon MD [facility] DEP

## 2025-04-30 ENCOUNTER — TELEPHONE (OUTPATIENT)
Facility: CLINIC | Age: 65
End: 2025-04-30

## 2025-04-30 ENCOUNTER — HOSPITAL ENCOUNTER (EMERGENCY)
Facility: HOSPITAL | Age: 65
Discharge: HOME | End: 2025-04-30
Attending: STUDENT IN AN ORGANIZED HEALTH CARE EDUCATION/TRAINING PROGRAM
Payer: MEDICARE

## 2025-04-30 ENCOUNTER — APPOINTMENT (OUTPATIENT)
Facility: HOSPITAL | Age: 65
End: 2025-04-30
Attending: STUDENT IN AN ORGANIZED HEALTH CARE EDUCATION/TRAINING PROGRAM
Payer: MEDICARE

## 2025-04-30 VITALS
DIASTOLIC BLOOD PRESSURE: 79 MMHG | OXYGEN SATURATION: 92 % | SYSTOLIC BLOOD PRESSURE: 106 MMHG | TEMPERATURE: 97.6 F | RESPIRATION RATE: 18 BRPM | HEART RATE: 90 BPM | BODY MASS INDEX: 25.43 KG/M2 | HEIGHT: 67 IN | WEIGHT: 162 LBS

## 2025-04-30 DIAGNOSIS — S60.569A INSECT BITE OF HAND, UNSPECIFIED LATERALITY, INITIAL ENCOUNTER: ICD-10-CM
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
## 2025-04-30 DIAGNOSIS — T78.40XA ALLERGIC REACTION, INITIAL ENCOUNTER: Primary | ICD-10-CM

## 2025-04-30 LAB
ANION GAP SERPL CALC-SCNC: 5 MMOL/L (ref 3–18)
BASOPHILS # BLD: 0.04 K/UL (ref 0–0.1)
BASOPHILS NFR BLD: 0.7 % (ref 0–2)
BUN SERPL-MCNC: 11 MG/DL (ref 7–18)
BUN/CREAT SERPL: 14 (ref 12–20)
CALCIUM SERPL-MCNC: 9.8 MG/DL (ref 8.5–10.1)
CHLORIDE SERPL-SCNC: 107 MMOL/L (ref 100–111)
CO2 SERPL-SCNC: 27 MMOL/L (ref 21–32)
CREAT SERPL-MCNC: 0.79 MG/DL (ref 0.6–1.3)
DIFFERENTIAL METHOD BLD: ABNORMAL
EKG ATRIAL RATE: 85 BPM
EKG DIAGNOSIS: NORMAL
EKG P AXIS: 61 DEGREES
EKG P-R INTERVAL: 174 MS
EKG Q-T INTERVAL: 394 MS
EKG QRS DURATION: 110 MS
EKG QTC CALCULATION (BAZETT): 468 MS
EKG R AXIS: 1 DEGREES
EKG T AXIS: 39 DEGREES
EKG VENTRICULAR RATE: 85 BPM
EOSINOPHIL # BLD: 0.78 K/UL (ref 0–0.4)
EOSINOPHIL NFR BLD: 13.3 % (ref 0–5)
ERYTHROCYTE [DISTWIDTH] IN BLOOD BY AUTOMATED COUNT: 12.6 % (ref 11.6–14.5)
GLUCOSE BLD STRIP.AUTO-MCNC: 100 MG/DL (ref 70–110)
GLUCOSE SERPL-MCNC: 102 MG/DL (ref 74–99)
HCT VFR BLD AUTO: 39.3 % (ref 35–45)
HGB BLD-MCNC: 13 G/DL (ref 12–16)
IMM GRANULOCYTES # BLD AUTO: 0.01 K/UL (ref 0–0.04)
IMM GRANULOCYTES NFR BLD AUTO: 0.2 % (ref 0–0.5)
LYMPHOCYTES # BLD: 2.44 K/UL (ref 0.9–3.6)
LYMPHOCYTES NFR BLD: 41.6 % (ref 21–52)
MCH RBC QN AUTO: 31.3 PG (ref 24–34)
MCHC RBC AUTO-ENTMCNC: 33.1 G/DL (ref 31–37)
MCV RBC AUTO: 94.5 FL (ref 78–100)
MONOCYTES # BLD: 0.36 K/UL (ref 0.05–1.2)
MONOCYTES NFR BLD: 6.1 % (ref 3–10)
NEUTS SEG # BLD: 2.23 K/UL (ref 1.8–8)
NEUTS SEG NFR BLD: 38.1 % (ref 40–73)
NRBC # BLD: 0 K/UL (ref 0–0.01)
NRBC BLD-RTO: 0 PER 100 WBC
PLATELET # BLD AUTO: 294 K/UL (ref 135–420)
PMV BLD AUTO: 9.8 FL (ref 9.2–11.8)
POTASSIUM SERPL-SCNC: 3.7 MMOL/L (ref 3.5–5.5)
RBC # BLD AUTO: 4.16 M/UL (ref 4.2–5.3)
SODIUM SERPL-SCNC: 139 MMOL/L (ref 136–145)
TROPONIN T SERPL HS-MCNC: 7.5 NG/L (ref 0–14)
WBC # BLD AUTO: 5.9 K/UL (ref 4.6–13.2)

## 2025-04-30 PROCEDURE — 84484 ASSAY OF TROPONIN QUANT: CPT

## 2025-04-30 PROCEDURE — 6370000000 HC RX 637 (ALT 250 FOR IP): Performed by: STUDENT IN AN ORGANIZED HEALTH CARE EDUCATION/TRAINING PROGRAM

## 2025-04-30 PROCEDURE — 6360000002 HC RX W HCPCS: Performed by: STUDENT IN AN ORGANIZED HEALTH CARE EDUCATION/TRAINING PROGRAM

## 2025-04-30 PROCEDURE — 96372 THER/PROPH/DIAG INJ SC/IM: CPT

## 2025-04-30 PROCEDURE — 99285 EMERGENCY DEPT VISIT HI MDM: CPT

## 2025-04-30 PROCEDURE — 96375 TX/PRO/DX INJ NEW DRUG ADDON: CPT

## 2025-04-30 PROCEDURE — 80048 BASIC METABOLIC PNL TOTAL CA: CPT

## 2025-04-30 PROCEDURE — 82962 GLUCOSE BLOOD TEST: CPT

## 2025-04-30 PROCEDURE — 71045 X-RAY EXAM CHEST 1 VIEW: CPT

## 2025-04-30 PROCEDURE — 85025 COMPLETE CBC W/AUTO DIFF WBC: CPT

## 2025-04-30 PROCEDURE — 96376 TX/PRO/DX INJ SAME DRUG ADON: CPT

## 2025-04-30 PROCEDURE — 93010 ELECTROCARDIOGRAM REPORT: CPT | Performed by: INTERNAL MEDICINE

## 2025-04-30 PROCEDURE — 2580000003 HC RX 258: Performed by: STUDENT IN AN ORGANIZED HEALTH CARE EDUCATION/TRAINING PROGRAM

## 2025-04-30 PROCEDURE — 2500000003 HC RX 250 WO HCPCS: Performed by: STUDENT IN AN ORGANIZED HEALTH CARE EDUCATION/TRAINING PROGRAM

## 2025-04-30 PROCEDURE — 96374 THER/PROPH/DIAG INJ IV PUSH: CPT

## 2025-04-30 PROCEDURE — 96361 HYDRATE IV INFUSION ADD-ON: CPT

## 2025-04-30 PROCEDURE — 93005 ELECTROCARDIOGRAM TRACING: CPT | Performed by: STUDENT IN AN ORGANIZED HEALTH CARE EDUCATION/TRAINING PROGRAM

## 2025-04-30 RX ORDER — IPRATROPIUM BROMIDE AND ALBUTEROL SULFATE 2.5; .5 MG/3ML; MG/3ML
1 SOLUTION RESPIRATORY (INHALATION)
Status: COMPLETED | OUTPATIENT
Start: 2025-04-30 | End: 2025-04-30

## 2025-04-30 RX ORDER — 0.9 % SODIUM CHLORIDE 0.9 %
1000 INTRAVENOUS SOLUTION INTRAVENOUS ONCE
Status: COMPLETED | OUTPATIENT
Start: 2025-04-30 | End: 2025-04-30

## 2025-04-30 RX ORDER — DIPHENHYDRAMINE HYDROCHLORIDE 50 MG/ML
25 INJECTION, SOLUTION INTRAMUSCULAR; INTRAVENOUS
Status: COMPLETED | OUTPATIENT
Start: 2025-04-30 | End: 2025-04-30

## 2025-04-30 RX ORDER — HYDROXYZINE HYDROCHLORIDE 25 MG/1
25 TABLET, FILM COATED ORAL EVERY 8 HOURS PRN
Qty: 30 TABLET | Refills: 0 | Status: SHIPPED | OUTPATIENT
Start: 2025-04-30 | End: 2025-05-10

## 2025-04-30 RX ORDER — METHYLPREDNISOLONE 4 MG/1
TABLET ORAL
Qty: 1 KIT | Refills: 0 | Status: SHIPPED | OUTPATIENT
Start: 2025-04-30 | End: 2025-05-06

## 2025-04-30 RX ORDER — EPINEPHRINE 1 MG/ML
0.3 INJECTION, SOLUTION INTRAMUSCULAR; SUBCUTANEOUS
Status: COMPLETED | OUTPATIENT
Start: 2025-04-30 | End: 2025-04-30

## 2025-04-30 RX ORDER — BENZOCAINE/MENTHOL 6 MG-10 MG
LOZENGE MUCOUS MEMBRANE 2 TIMES DAILY
Status: DISCONTINUED | OUTPATIENT
Start: 2025-04-30 | End: 2025-04-30 | Stop reason: HOSPADM

## 2025-04-30 RX ORDER — ALBUTEROL SULFATE 90 UG/1
2 INHALANT RESPIRATORY (INHALATION) 4 TIMES DAILY PRN
Qty: 18 G | Refills: 0 | Status: SHIPPED | OUTPATIENT
Start: 2025-04-30

## 2025-04-30 RX ORDER — ALBUTEROL SULFATE 0.83 MG/ML
2.5 SOLUTION RESPIRATORY (INHALATION)
Status: COMPLETED | OUTPATIENT
Start: 2025-04-30 | End: 2025-04-30

## 2025-04-30 RX ADMIN — HYDROCORTISONE: 1 CREAM TOPICAL at 14:32

## 2025-04-30 RX ADMIN — IPRATROPIUM BROMIDE AND ALBUTEROL SULFATE 1 DOSE: .5; 3 SOLUTION RESPIRATORY (INHALATION) at 13:42

## 2025-04-30 RX ADMIN — SODIUM CHLORIDE 1000 ML: 9 INJECTION, SOLUTION INTRAVENOUS at 12:04

## 2025-04-30 RX ADMIN — METHYLPREDNISOLONE SODIUM SUCCINATE 125 MG: 125 INJECTION INTRAMUSCULAR; INTRAVENOUS at 12:15

## 2025-04-30 RX ADMIN — ALBUTEROL SULFATE 2.5 MG: 2.5 SOLUTION RESPIRATORY (INHALATION) at 14:40

## 2025-04-30 RX ADMIN — DIPHENHYDRAMINE HYDROCHLORIDE 25 MG: 50 INJECTION INTRAMUSCULAR; INTRAVENOUS at 12:54

## 2025-04-30 RX ADMIN — EPINEPHRINE 0.3 MG: 1 INJECTION INTRAMUSCULAR; INTRAVENOUS; SUBCUTANEOUS at 12:09

## 2025-04-30 RX ADMIN — IPRATROPIUM BROMIDE AND ALBUTEROL SULFATE 1 DOSE: .5; 3 SOLUTION RESPIRATORY (INHALATION) at 12:05

## 2025-04-30 RX ADMIN — DIPHENHYDRAMINE HYDROCHLORIDE 25 MG: 50 INJECTION INTRAMUSCULAR; INTRAVENOUS at 12:11

## 2025-04-30 RX ADMIN — FAMOTIDINE 20 MG: 10 INJECTION, SOLUTION INTRAVENOUS at 12:13

## 2025-04-30 ASSESSMENT — ENCOUNTER SYMPTOMS
CHEST TIGHTNESS: 0
NAUSEA: 0
SHORTNESS OF BREATH: 1
WHEEZING: 1
ABDOMINAL PAIN: 0
VOMITING: 0
DIARRHEA: 0

## 2025-04-30 ASSESSMENT — LIFESTYLE VARIABLES
HOW OFTEN DO YOU HAVE A DRINK CONTAINING ALCOHOL: NEVER
HOW MANY STANDARD DRINKS CONTAINING ALCOHOL DO YOU HAVE ON A TYPICAL DAY: PATIENT DOES NOT DRINK

## 2025-04-30 ASSESSMENT — PAIN SCALES - GENERAL: PAINLEVEL_OUTOF10: 10

## 2025-04-30 ASSESSMENT — PAIN - FUNCTIONAL ASSESSMENT: PAIN_FUNCTIONAL_ASSESSMENT: 0-10

## 2025-04-30 NOTE — ED TRIAGE NOTES
Patient presents to ER with c/o being bit by multiple ants yesterday evening. Patient presents with shortness of breath and wheezing.

## 2025-04-30 NOTE — ED PROVIDER NOTES
results found for this or any previous visit (from the past 12 hours).    Radiologic Studies -   Non-plain film images such as CT, Ultrasound and MRI are read by the radiologist. Plain radiographic images are visualized and preliminarily interpreted by the emergency physician.    XR CHEST PORTABLE   Final Result   No acute pulmonary infiltrates are demonstrated.      Electronically signed by Jluis Price              Medical Decision Making   I am the first provider for this patient.    I reviewed the vital signs, available nursing notes, past medical history, past surgical history, family history and social history.      Vital Signs-Reviewed the patient's vital signs.    EKG: All EKG's are interpreted by the Emergency Department Physician who either signs or Co-signs this chart in the absence of a cardiologist.  Normal sinus rhythm.  Incomplete right bundle branch block.  No STEMI.  As interpreted by me.             Interpretation per the Radiologist below, if available at the time of this note:    ED Course: Progress Notes, Reevaluation, and Consults:    Provider Notes (Medical Decision Making):       MDM  Number of Diagnoses or Management Options  Allergic reaction, initial encounter  Insect bite of hand, unspecified laterality, initial encounter  Diagnosis management comments: Patient does have some expiratory wheezing on exam. A&Ox4. No acute distress No oropharyngeal edema. Vital signs are stable. Patient received solumedrol, epinephrine, benadryl. Labs are unremarkable. Patient monitored for over 4 hours. Symptoms improving. Patient reassured and advised on keeping her epipen with her at all times. Discussed continued medications at home. Advised on follow up with PCP and allergist. ED return precautions advised. Patient verbalizes good understanding and agreement with discharge plan. Stable for discharge.                 Procedures          Diagnosis     Clinical Impression:   1. Allergic reaction,

## 2025-04-30 NOTE — TELEPHONE ENCOUNTER
Pt called requesting a script to be sent for steroids pt stated that she was bit by black ants yesterday and her arm is swollen and blusters. Pt stated that she has take benadryl and it has not help pt stated that she has been up all night.        Please advise       [facility] [id number] - [address] - P [phone] - F [phone] [[id number]]

## 2025-04-30 NOTE — ED NOTES
D/C instructions given to pt. Reviewed meds purpose, dose, frequency, and side effects.  Pt asked questions and I answered all questions until pt verbalized understanding using teach back. Pt denies any complaints or concerns at this time.

## 2025-04-30 NOTE — ED NOTES
Pt c/o continues itching to top of left hand , right lower leg, right index finger. Ice chips given per pt request

## 2025-04-30 NOTE — DISCHARGE INSTRUCTIONS
Take the prescribed steroids and hydroxyzine.  Use the albuterol inhaler as needed.  Ice your hands to help with the itching.  Follow-up with your primary care doctor.  Return to the emergency department for any new or worsening symptoms.  Make sure to keep your EpiPen with you at all times

## 2025-05-19 ENCOUNTER — TELEPHONE (OUTPATIENT)
Age: 65
End: 2025-05-19

## 2025-05-19 NOTE — TELEPHONE ENCOUNTER
Patient had surgery on right foot last year and is now reporting severe pain on left foot/great toe.  No injury/trauma but she thinks that last year we mentioned she may eventually have some sort of arthritis flare up in left foot.  She was at ortho/hand specialist appointment this morning with her  and says they advised her to contact us for appointment due to pain and swelling in toe.  Patient leaves for [place] on Tuesday and is asking to be seen before then.

## 2025-05-21 ENCOUNTER — OFFICE VISIT (OUTPATIENT)
Age: 65
End: 2025-05-21
Payer: MEDICARE

## 2025-05-21 DIAGNOSIS — M19.072 ARTHRITIS OF LEFT MIDFOOT: Primary | ICD-10-CM

## 2025-05-21 DIAGNOSIS — M79.672 BILATERAL FOOT PAIN: ICD-10-CM

## 2025-05-21 DIAGNOSIS — M79.671 BILATERAL FOOT PAIN: ICD-10-CM

## 2025-05-21 PROCEDURE — 99213 OFFICE O/P EST LOW 20 MIN: CPT | Performed by: ORTHOPAEDIC SURGERY

## 2025-05-21 PROCEDURE — 1123F ACP DISCUSS/DSCN MKR DOCD: CPT | Performed by: ORTHOPAEDIC SURGERY

## 2025-05-21 PROCEDURE — 73630 X-RAY EXAM OF FOOT: CPT | Performed by: ORTHOPAEDIC SURGERY

## 2025-05-21 RX ORDER — ETODOLAC 400 MG/1
400 TABLET, FILM COATED ORAL 2 TIMES DAILY
Qty: 60 TABLET | Refills: 3 | Status: SHIPPED | OUTPATIENT
Start: 2025-05-21 | End: 2026-05-21

## 2025-05-21 NOTE — PROGRESS NOTES
Juana Franco Ford   [address] [facility] [address]       DIAGNOSTIC IMAGING      Orders Placed This Encounter   Procedures    [27158] Foot Min 3V    [15238] Foot Min 3V        FOOT X RAYS 3 VIEWS Right  // AP, lateral, oblique images  5/21/2025    WEIGHT BEARING    X RAYS AT [facility]  5/21/2025      Bones: Healed right fifth metatarsal base fracture, with a hook plate.  Ghost hole, in the calcaneus, where she had autogenous bone grafting.  Autogenous bone grafting from the calcaneus to the fifth metatarsal base.  I see no new fractures or dislocations. No focal osteolytic or osteoblastic process     Bone Spurs: No significant bone spurs  Foot Alignment: Evidence of some mild metatarsus adductus  Joint Condition: Right midfoot OA changes, are seen.  Soft Tissues: Normal, No radiopaque foreign body and No abnormal calcific densities to soft tissues   No ankle joint effusion in lateral projection.  Mineralization: Suggests  no Osteopenia    I have personally reviewed the results of the above study and the interpretation of this study is my professional opinion            FOOT X RAYS 3 VIEWS LEFT//  AP, lateral, oblique images  5/21/2025    WEIGHT BEARING    X RAYS AT [facility]  5/21/2025      Bones: No fractures or dislocations. No focal osteolytic or osteoblastic process     Bone Spurs: No significant bone spurs  Foot Alignment: WNL  Joint Condition: Patient early OA of the left great toe first MTP joint with some valgus alignment with some midfoot, OA, there is OA of the left #2 3 TMT joints  Soft Tissues: Normal, No radiopaque foreign body and No abnormal calcific densities to soft tissues   No ankle joint effusion in lateral projection.  Mineralization: Suggests  no Osteopenia    I have personally reviewed the results of the above study and the interpretation of this study is my professional opinion     Nick Bauer MD  5/21/2025  9:33 AM       
pain is worse when weightbearing. The swelling is much worse by end of day. She has pain regardless of footwear. Mentions night pain that causes restlessness. She takes Mobic for pain which is only somewhat helpful.     PMHx of right Zaldivar fracture ORIF 5/23/2024, lumbar DDD and stenosis, L4-L5 herniated disc, osteopenia, and hepatic steatosis.     Patient is employed at: [employer]      There were no vitals taken for this visit.    Appearance: Alert, well appearing and pleasant patient who is in no distress, oriented to person, place/time, and who follows commands. Normal dress/motor activity/thought processes/memory. This patient presents in the examination room by herself. Patient arrives to office via: without assistive device. Footwear: Crocs     Psychiatric:  Normal Affect/mood.  Judgement, behavior, and conduct are appropriate. Speech normal in context and clarity, memory intact grossly, no involuntary movements - tremors.   H EENT (2): Head normocephalic & atraumatic.  Eye: pupils are round// EOM are intact // Neck: ROM WNL  // Hearings Intact  Respiratory: Breathing non labored     BILATERAL ANKLE/FOOT    Gait:  antalgic  Tenderness: LEFT: moderate tenderness across midfoot. Positive midfoot stress test.   RIGHT: mild tenderness to great toe MTP joint. Nontender to midfoot. Negative midfoot stress test.   Cutaneous: definite swelling to the left midfoot compared to the right   Joint Motion: motion not tested to left foot, right ankle and hindfoot motion WNL   Joint / Tendon Stability: Difficulty with double calf raise due to left foot pain.  No Ankle or Subtalar instability or joint laxity.   No peroneal sublux ability or dislocation  Alignment: pes cavus, valgus alignment of left great toe compared to right   Neuro Motor/Sensory: NL/NL  Vascular: NL foot/ankle pulses,   Lymphatics: No extremity lymphedema, No calf swelling, no tenderness to calf muscles.    RADIOGRAPHS// IMAGING//DIAGNOSTIC DATA     Orders

## 2025-05-29 ENCOUNTER — TELEPHONE (OUTPATIENT)
Age: 65
End: 2025-05-29

## 2025-05-29 DIAGNOSIS — M19.072 ARTHRITIS OF LEFT MIDFOOT: Primary | ICD-10-CM

## 2025-05-29 NOTE — TELEPHONE ENCOUNTER
Patient called and states she was supposed to have an order placed for a CT but nothing has been placed yet.        Please call and advise patient at  [phone]

## 2025-05-29 NOTE — TELEPHONE ENCOUNTER
I did not see order for CT in patient's chart but it is dictated in Dr. Bauer' note. I placed order for CT Scan of left foot WO contrast.

## 2025-06-09 ENCOUNTER — HOSPITAL ENCOUNTER (OUTPATIENT)
Age: 65
Discharge: HOME | End: 2025-06-12
Payer: MEDICARE

## 2025-06-09 DIAGNOSIS — M19.072 ARTHRITIS OF LEFT MIDFOOT: ICD-10-CM

## 2025-06-09 PROCEDURE — 73700 CT LOWER EXTREMITY W/O DYE: CPT

## 2025-06-25 DIAGNOSIS — R13.19 ESOPHAGEAL DYSPHAGIA: ICD-10-CM

## 2025-06-25 RX ORDER — DIAZEPAM 5 MG/1
TABLET ORAL
Qty: 30 TABLET | Refills: 0 | Status: SHIPPED | OUTPATIENT
Start: 2025-06-25 | End: 2025-07-25

## 2025-06-25 NOTE — TELEPHONE ENCOUNTER
PCP: Cammy Colon MD    LAST OFFICE VISIT: 11/20/2024  [facility]  report [facility]  The last fill date was 04/23/2025 for a 30 d/s qty 30      LAST REFILL PER CHART:  Medication:diazePAM (VALIUM) 5 MG tablet   Ordered On:04/22/2025  Instructions:Take 1 tablet by mouth every 12 hours as needed (pain when swallowing) for up to 30 days. Max Daily Amount: 10 mg   Dispense:30 tablets  Refills:0    Future Appointments   Date Time Provider Department Center   7/17/2025  9:00 AM [facility] LAB VISIT [facility] DE[facility]   7/24/2025 11:00 AM Cammy Colon MD [facility] DEP

## 2025-07-03 DIAGNOSIS — J45.20 MILD INTERMITTENT ASTHMA WITHOUT COMPLICATION: ICD-10-CM

## 2025-07-03 RX ORDER — FLUTICASONE PROPIONATE AND SALMETEROL 250; 50 UG/1; UG/1
1 POWDER RESPIRATORY (INHALATION) EVERY 12 HOURS
Qty: 60 EACH | Refills: 5 | Status: SHIPPED | OUTPATIENT
Start: 2025-07-03

## 2025-07-17 ENCOUNTER — LAB (OUTPATIENT)
Facility: CLINIC | Age: 65
End: 2025-07-17

## 2025-07-17 DIAGNOSIS — E78.00 PURE HYPERCHOLESTEROLEMIA: ICD-10-CM

## 2025-07-17 DIAGNOSIS — R79.89 ELEVATED LFTS: ICD-10-CM

## 2025-07-17 DIAGNOSIS — S92.351D CLOSED DISPLACED FRACTURE OF FIFTH METATARSAL BONE OF RIGHT FOOT WITH ROUTINE HEALING, SUBSEQUENT ENCOUNTER: ICD-10-CM

## 2025-07-17 DIAGNOSIS — Z98.890 POST-OPERATIVE STATE: ICD-10-CM

## 2025-07-17 DIAGNOSIS — K76.0 HEPATIC STEATOSIS: ICD-10-CM

## 2025-07-17 DIAGNOSIS — R73.01 IMPAIRED FASTING GLUCOSE: ICD-10-CM

## 2025-07-17 DIAGNOSIS — E55.9 VITAMIN D DEFICIENCY: ICD-10-CM

## 2025-07-18 LAB
25(OH)D3+25(OH)D2 SERPL-MCNC: 53.7 NG/ML (ref 30–100)
ALBUMIN SERPL-MCNC: 4.7 G/DL (ref 3.9–4.9)
ALBUMIN/CREAT UR: 14 MG/G CREAT (ref 0–29)
ALP SERPL-CCNC: 90 IU/L (ref 44–121)
ALT SERPL-CCNC: 38 IU/L (ref 0–32)
APPEARANCE UR: CLEAR
AST SERPL-CCNC: 37 IU/L (ref 0–40)
BACTERIA #/AREA URNS HPF: NORMAL /[HPF]
BASOPHILS # BLD AUTO: 0.1 X10E3/UL (ref 0–0.2)
BASOPHILS NFR BLD AUTO: 1 %
BILIRUB SERPL-MCNC: 0.3 MG/DL (ref 0–1.2)
BILIRUB UR QL STRIP: NEGATIVE
BUN SERPL-MCNC: 13 MG/DL (ref 8–27)
BUN/CREAT SERPL: 17 (ref 12–28)
CALCIUM SERPL-MCNC: 9.7 MG/DL (ref 8.7–10.3)
CASTS URNS QL MICRO: NORMAL /LPF
CHLORIDE SERPL-SCNC: 104 MMOL/L (ref 96–106)
CHOLEST SERPL-MCNC: 225 MG/DL (ref 100–199)
CO2 SERPL-SCNC: 21 MMOL/L (ref 20–29)
COLOR UR: YELLOW
CREAT SERPL-MCNC: 0.78 MG/DL (ref 0.57–1)
CREAT UR-MCNC: 258.1 MG/DL
CRP SERPL HS-MCNC: 1.9 MG/L (ref 0–3)
EGFRCR SERPLBLD CKD-EPI 2021: 84 ML/MIN/1.73
EOSINOPHIL # BLD AUTO: 0.6 X10E3/UL (ref 0–0.4)
EOSINOPHIL NFR BLD AUTO: 10 %
EPI CELLS #/AREA URNS HPF: NORMAL /HPF (ref 0–10)
ERYTHROCYTE [DISTWIDTH] IN BLOOD BY AUTOMATED COUNT: 12.4 % (ref 11.7–15.4)
ERYTHROCYTE [SEDIMENTATION RATE] IN BLOOD BY WESTERGREN METHOD: 19 MM/HR (ref 0–40)
GLOBULIN SER CALC-MCNC: 2.7 G/DL (ref 1.5–4.5)
GLUCOSE SERPL-MCNC: 104 MG/DL (ref 70–99)
GLUCOSE UR QL STRIP: NEGATIVE
HBA1C MFR BLD: 5.4 % (ref 4.8–5.6)
HCT VFR BLD AUTO: 43.5 % (ref 34–46.6)
HDLC SERPL-MCNC: 63 MG/DL
HGB BLD-MCNC: 13.9 G/DL (ref 11.1–15.9)
HGB UR QL STRIP: NEGATIVE
IMM GRANULOCYTES # BLD AUTO: 0 X10E3/UL (ref 0–0.1)
IMM GRANULOCYTES NFR BLD AUTO: 0 %
KETONES UR QL STRIP: ABNORMAL
LDLC SERPL CALC-MCNC: 127 MG/DL (ref 0–99)
LEUKOCYTE ESTERASE UR QL STRIP: ABNORMAL
LYMPHOCYTES # BLD AUTO: 2.5 X10E3/UL (ref 0.7–3.1)
LYMPHOCYTES NFR BLD AUTO: 41 %
MCH RBC QN AUTO: 32 PG (ref 26.6–33)
MCHC RBC AUTO-ENTMCNC: 32 G/DL (ref 31.5–35.7)
MCV RBC AUTO: 100 FL (ref 79–97)
MICRO URNS: ABNORMAL
MICROALBUMIN UR-MCNC: 35.8 UG/ML
MONOCYTES # BLD AUTO: 0.4 X10E3/UL (ref 0.1–0.9)
MONOCYTES NFR BLD AUTO: 7 %
NEUTROPHILS # BLD AUTO: 2.5 X10E3/UL (ref 1.4–7)
NEUTROPHILS NFR BLD AUTO: 41 %
NITRITE UR QL STRIP: NEGATIVE
PH UR STRIP: 6 [PH] (ref 5–7.5)
PLATELET # BLD AUTO: 307 X10E3/UL (ref 150–450)
POTASSIUM SERPL-SCNC: 4.9 MMOL/L (ref 3.5–5.2)
PROT SERPL-MCNC: 7.4 G/DL (ref 6–8.5)
PROT UR QL STRIP: ABNORMAL
RBC # BLD AUTO: 4.35 X10E6/UL (ref 3.77–5.28)
RBC #/AREA URNS HPF: NORMAL /HPF (ref 0–2)
SODIUM SERPL-SCNC: 140 MMOL/L (ref 134–144)
SP GR UR STRIP: 1.02 (ref 1–1.03)
TRIGL SERPL-MCNC: 200 MG/DL (ref 0–149)
UROBILINOGEN UR STRIP-MCNC: 1 MG/DL (ref 0.2–1)
VLDLC SERPL CALC-MCNC: 35 MG/DL (ref 5–40)
WBC # BLD AUTO: 6.1 X10E3/UL (ref 3.4–10.8)
WBC #/AREA URNS HPF: NORMAL /HPF (ref 0–5)

## 2025-07-23 SDOH — HEALTH STABILITY: PHYSICAL HEALTH: ON AVERAGE, HOW MANY DAYS PER WEEK DO YOU ENGAGE IN MODERATE TO STRENUOUS EXERCISE (LIKE A BRISK WALK)?: 3 DAYS

## 2025-07-23 SDOH — ECONOMIC STABILITY: INCOME INSECURITY: IN THE LAST 12 MONTHS, WAS THERE A TIME WHEN YOU WERE NOT ABLE TO PAY THE MORTGAGE OR RENT ON TIME?: NO

## 2025-07-23 SDOH — ECONOMIC STABILITY: FOOD INSECURITY: WITHIN THE PAST 12 MONTHS, THE FOOD YOU BOUGHT JUST DIDN'T LAST AND YOU DIDN'T HAVE MONEY TO GET MORE.: NEVER TRUE

## 2025-07-23 SDOH — ECONOMIC STABILITY: FOOD INSECURITY: WITHIN THE PAST 12 MONTHS, YOU WORRIED THAT YOUR FOOD WOULD RUN OUT BEFORE YOU GOT MONEY TO BUY MORE.: NEVER TRUE

## 2025-07-23 SDOH — HEALTH STABILITY: PHYSICAL HEALTH: ON AVERAGE, HOW MANY MINUTES DO YOU ENGAGE IN EXERCISE AT THIS LEVEL?: 20 MIN

## 2025-07-23 SDOH — ECONOMIC STABILITY: TRANSPORTATION INSECURITY
IN THE PAST 12 MONTHS, HAS LACK OF TRANSPORTATION KEPT YOU FROM MEETINGS, WORK, OR FROM GETTING THINGS NEEDED FOR DAILY LIVING?: NO

## 2025-07-23 SDOH — ECONOMIC STABILITY: TRANSPORTATION INSECURITY
IN THE PAST 12 MONTHS, HAS THE LACK OF TRANSPORTATION KEPT YOU FROM MEDICAL APPOINTMENTS OR FROM GETTING MEDICATIONS?: NO

## 2025-07-23 ASSESSMENT — PATIENT HEALTH QUESTIONNAIRE - PHQ9
8. MOVING OR SPEAKING SO SLOWLY THAT OTHER PEOPLE COULD HAVE NOTICED. OR THE OPPOSITE, BEING SO FIGETY OR RESTLESS THAT YOU HAVE BEEN MOVING AROUND A LOT MORE THAN USUAL: NOT AT ALL
SUM OF ALL RESPONSES TO PHQ QUESTIONS 1-9: 0
9. THOUGHTS THAT YOU WOULD BE BETTER OFF DEAD, OR OF HURTING YOURSELF: NOT AT ALL
2. FEELING DOWN, DEPRESSED OR HOPELESS: NOT AT ALL
1. LITTLE INTEREST OR PLEASURE IN DOING THINGS: NOT AT ALL
SUM OF ALL RESPONSES TO PHQ QUESTIONS 1-9: 0
SUM OF ALL RESPONSES TO PHQ QUESTIONS 1-9: 0
7. TROUBLE CONCENTRATING ON THINGS, SUCH AS READING THE NEWSPAPER OR WATCHING TELEVISION: NOT AT ALL
3. TROUBLE FALLING OR STAYING ASLEEP: NOT AT ALL
SUM OF ALL RESPONSES TO PHQ QUESTIONS 1-9: 0
6. FEELING BAD ABOUT YOURSELF - OR THAT YOU ARE A FAILURE OR HAVE LET YOURSELF OR YOUR FAMILY DOWN: NOT AT ALL
4. FEELING TIRED OR HAVING LITTLE ENERGY: NOT AT ALL
5. POOR APPETITE OR OVEREATING: NOT AT ALL

## 2025-07-23 ASSESSMENT — LIFESTYLE VARIABLES
HAVE YOU OR SOMEONE ELSE BEEN INJURED AS A RESULT OF YOUR DRINKING: NO
HOW OFTEN DURING THE LAST YEAR HAVE YOU FAILED TO DO WHAT WAS NORMALLY EXPECTED FROM YOU BECAUSE OF DRINKING: NEVER
HOW OFTEN DO YOU HAVE SIX OR MORE DRINKS ON ONE OCCASION: 2
HOW OFTEN DURING THE LAST YEAR HAVE YOU HAD A FEELING OF GUILT OR REMORSE AFTER DRINKING: NEVER
HAS A RELATIVE, FRIEND, DOCTOR, OR ANOTHER HEALTH PROFESSIONAL EXPRESSED CONCERN ABOUT YOUR DRINKING OR SUGGESTED YOU CUT DOWN: NO
HOW OFTEN DURING THE LAST YEAR HAVE YOU NEEDED AN ALCOHOLIC DRINK FIRST THING IN THE MORNING TO GET YOURSELF GOING AFTER A NIGHT OF HEAVY DRINKING: NEVER
HOW OFTEN DURING THE LAST YEAR HAVE YOU BEEN UNABLE TO REMEMBER WHAT HAPPENED THE NIGHT BEFORE BECAUSE YOU HAD BEEN DRINKING: NEVER
HAVE YOU OR SOMEONE ELSE BEEN INJURED AS A RESULT OF YOUR DRINKING: NO
HOW OFTEN DURING THE LAST YEAR HAVE YOU HAD A FEELING OF GUILT OR REMORSE AFTER DRINKING: NEVER
HAS A RELATIVE, FRIEND, DOCTOR, OR ANOTHER HEALTH PROFESSIONAL EXPRESSED CONCERN ABOUT YOUR DRINKING OR SUGGESTED YOU CUT DOWN: NO
HOW MANY STANDARD DRINKS CONTAINING ALCOHOL DO YOU HAVE ON A TYPICAL DAY: 1 OR 2
HOW OFTEN DURING THE LAST YEAR HAVE YOU NEEDED AN ALCOHOLIC DRINK FIRST THING IN THE MORNING TO GET YOURSELF GOING AFTER A NIGHT OF HEAVY DRINKING: NEVER
HOW OFTEN DURING THE LAST YEAR HAVE YOU FOUND THAT YOU WERE NOT ABLE TO STOP DRINKING ONCE YOU HAD STARTED: NEVER
HOW OFTEN DO YOU HAVE A DRINK CONTAINING ALCOHOL: 2-3 TIMES A WEEK
HOW OFTEN DURING THE LAST YEAR HAVE YOU FAILED TO DO WHAT WAS NORMALLY EXPECTED FROM YOU BECAUSE OF DRINKING: NEVER
HOW MANY STANDARD DRINKS CONTAINING ALCOHOL DO YOU HAVE ON A TYPICAL DAY: 1
HOW OFTEN DURING THE LAST YEAR HAVE YOU FOUND THAT YOU WERE NOT ABLE TO STOP DRINKING ONCE YOU HAD STARTED: NEVER
HOW OFTEN DO YOU HAVE A DRINK CONTAINING ALCOHOL: 4
HOW OFTEN DURING THE LAST YEAR HAVE YOU BEEN UNABLE TO REMEMBER WHAT HAPPENED THE NIGHT BEFORE BECAUSE YOU HAD BEEN DRINKING: NEVER

## 2025-07-24 ENCOUNTER — OFFICE VISIT (OUTPATIENT)
Facility: CLINIC | Age: 65
End: 2025-07-24

## 2025-07-24 VITALS
TEMPERATURE: 98.3 F | HEART RATE: 89 BPM | OXYGEN SATURATION: 93 % | BODY MASS INDEX: 25.74 KG/M2 | HEIGHT: 67 IN | DIASTOLIC BLOOD PRESSURE: 84 MMHG | SYSTOLIC BLOOD PRESSURE: 124 MMHG | WEIGHT: 164 LBS | RESPIRATION RATE: 14 BRPM

## 2025-07-24 DIAGNOSIS — Z00.00 INITIAL MEDICARE ANNUAL WELLNESS VISIT: ICD-10-CM

## 2025-07-24 DIAGNOSIS — M85.89 OSTEOPENIA OF MULTIPLE SITES: ICD-10-CM

## 2025-07-24 DIAGNOSIS — R79.89 ELEVATED LFTS: ICD-10-CM

## 2025-07-24 DIAGNOSIS — E66.3 OVERWEIGHT (BMI 25.0-29.9): ICD-10-CM

## 2025-07-24 DIAGNOSIS — Z98.890 S/P LAPAROSCOPIC FUNDOPLICATION: ICD-10-CM

## 2025-07-24 DIAGNOSIS — E78.00 PURE HYPERCHOLESTEROLEMIA: Primary | ICD-10-CM

## 2025-07-24 DIAGNOSIS — Z71.89 ACP (ADVANCE CARE PLANNING): ICD-10-CM

## 2025-07-24 DIAGNOSIS — R73.01 IMPAIRED FASTING GLUCOSE: ICD-10-CM

## 2025-07-24 DIAGNOSIS — J45.30 MILD PERSISTENT ASTHMA WITHOUT COMPLICATION: ICD-10-CM

## 2025-07-24 DIAGNOSIS — Z12.31 SCREENING MAMMOGRAM FOR BREAST CANCER: ICD-10-CM

## 2025-07-24 DIAGNOSIS — F33.42 RECURRENT MAJOR DEPRESSIVE DISORDER, IN FULL REMISSION: ICD-10-CM

## 2025-07-24 DIAGNOSIS — K76.0 METABOLIC DYSFUNCTION-ASSOCIATED STEATOTIC LIVER DISEASE (MASLD): ICD-10-CM

## 2025-07-24 DIAGNOSIS — R13.19 ESOPHAGEAL DYSPHAGIA: ICD-10-CM

## 2025-07-24 DIAGNOSIS — M15.0 PRIMARY OSTEOARTHRITIS INVOLVING MULTIPLE JOINTS: ICD-10-CM

## 2025-07-24 RX ORDER — PANTOPRAZOLE SODIUM 40 MG/1
40 TABLET, DELAYED RELEASE ORAL DAILY PRN
Qty: 90 TABLET | Refills: 3 | Status: SHIPPED | OUTPATIENT
Start: 2025-07-24

## 2025-07-24 NOTE — PROGRESS NOTES
Medicare Annual Wellness Visit    Juana Ford is here for Medicare AWV    Assessment & Plan   Pure hypercholesterolemia  -     CBC with Auto Differential; Future  -     Lipid Panel; Future  -     TSH; Future  Metabolic dysfunction-associated steatotic liver disease (MASLD)  -     US ORGAN ELASTOGRAPHY; Future  -     Comprehensive Metabolic Panel; Future  Elevated LFTs  -     US ORGAN ELASTOGRAPHY; Future  -     Comprehensive Metabolic Panel; Future  Impaired fasting glucose  -     Hemoglobin A1C; Future  -     TSH; Future  -     Albumin/Creatinine Ratio, Urine; Future  Esophageal dysphagia  S/P laparoscopic fundoplication  Mild persistent asthma without complication  Primary osteoarthritis involving multiple joints  Osteopenia of multiple sites  Recurrent major depressive disorder, in full remission  Overweight (BMI 25.0-29.9)  Initial Medicare annual wellness visit  ACP (advance care planning)  Screening mammogram for breast cancer  -     JOSE DIGITAL SCREEN W OR WO CAD BILATERAL; Future       Return in about 6 months (around 1/24/2026), or if symptoms worsen or fail to improve.     Subjective   See office progress note for details.      Patient's complete Health Risk Assessment and screening values have been reviewed and are found in Flowsheets. The following problems were reviewed today and where indicated follow up appointments were made and/or referrals ordered.    Positive Risk Factor Screenings with Interventions:       Alcohol Screening:  AUDIT-C Score: 4  AUDIT Total Score: 4  Total Score Interpretation: 0-7 suggests low risk alcohol consumption but assess individual risks   Interventions:  Advised to minimize alcohol consumption.  Will perform FibroScan to assess liver given elevated LFTs and MASLD                 Vision Screen:  Do you have difficulty driving, watching TV, or doing any of your daily activities because of your eyesight?: No  Have you had an eye exam within the past year?: (!)

## 2025-07-24 NOTE — PROGRESS NOTES
Juana Ford presents today for   Chief Complaint   Patient presents with    Medicare AWV       \"Have you been to the ER, urgent care clinic since your last visit?  Hospitalized since your last visit?\"    04/30/2025  [facility]  Allergic reaction/ant bites    “Have you seen or consulted any other health care providers outside of [facility] since your last visit?”    NO    “Have you had a colorectal cancer screening such as a colonoscopy/FIT/Cologuard?    NO    Date of last Colonoscopy: 3/9/2022  Date of last Cologuard: 3/9/2022  No FIT/FOBT on file   No flexible sigmoidoscopy on file

## 2025-07-24 NOTE — ACP (ADVANCE CARE PLANNING)
Advance Care Planning     Advance Care Planning (ACP) Physician/NP/PA Conversation    Date of Conversation: 7/24/2025  Conducted with: Patient with Decision Making Capacity    Healthcare Decision Maker:      Primary Decision Maker: Diaz Ford - Spouse - [phone]    Secondary Decision Maker: Aleah Burns - Child - [phone]    Click here to complete Healthcare Decision Makers including selection of the Healthcare Decision Maker Relationship (ie \"Primary\")  Today we confirmed healthcare decision makers as her  and daughter    Care Preferences:    Hospitalization:  \"If your health worsens and it becomes clear that your chance of recovery is unlikely, what would be your preference regarding hospitalization?\"  The patient would prefer hospitalization.    Ventilation:  \"If you were unable to breath on your own and your chance of recovery was unlikely, what would be your preference about the use of a ventilator (breathing machine) if it was available to you?\"  The patient would desire the use of a ventilator.    Resuscitation:  \"In the event your heart stopped as a result of an underlying serious health condition, would you want attempts made to restart your heart, or would you prefer a natural death?\"  Yes, attempt to resuscitate.    treatment goals, benefit/burden of treatment options, ventilation preferences, hospitalization preferences, resuscitation preferences, and end of life care preferences (vegetative state/imminent death)    Conversation Outcomes / Follow-Up Plan:  ACP in process - information provided, considering goals and options. Patient reports that they are currently meeting with an  in order to complete their will as well as a DURABLE POWER OF  and medical advanced directive .  Requested she provide copy to office once completed to scan into chart. Patient states that they would wish resuscitation efforts only as long as meaningful recovery is considered possible and

## 2025-07-24 NOTE — PROGRESS NOTES
HPI:   Juana Ford is a 65 y.o. year old female who presents today for a routine visit.  She has a history of hyperlipidemia, mild intermittent asthma, GERD, hiatal hernia, hepatic steatosis, osteoarthritis, lumbar degenerative disease, insomnia, and anxiety.  She reports that she is doing reasonably well.      She discusses that she did not schedule an appointment with Dr. Prieto for evaluation of her allergic episodes.  She states that she has not had a recurrence of anaphylaxis although it remains unclear as to the trigger.  She states that she will use Advair occasionally for increased wheezing and shortness of breath, and discusses that she recently took a Medrol Dosepak due to increased cough and chest congestion.  She has not needed to use her albuterol inhaler since the episode.  She states that she does not wish to schedule with allergy/immunology given her stability since the episodes.  She does acknowledge that she reacts strongly to insect bites but does manage with conservative measures.    She reports that she is continuing on venlafaxine 225 mg daily and feels that it is providing benefit.  She is pleased that her right foot fracture has healed and she has been able to become more active.  She underwent evaluation by Dr. Bauer for increasing left foot pain on 5/21/2025.  Left foot x-ray revealed osteoarthritis of the first MTP joint, valgus alignment, and midfoot osteoarthritis most prominent at the 2nd and 3rd TMT joints.  She was started on Lodine 400 mg twice daily and advised to take Protonix.  A left foot CT scan was obtained (6/9/2025) showing polyarticular moderate osteoarthrosis in the midfoot most pronounced at the 2nd and 3rd TMT joints, and moderate first MTP osteoarthrosis.  She states that after seeing the results of her CT scan, she canceled her follow-up appointment with Dr. Bauer.  She has been attempting to purchase footwear with better arch support but does report ongoing

## 2025-07-27 PROBLEM — K76.0 METABOLIC DYSFUNCTION-ASSOCIATED STEATOTIC LIVER DISEASE (MASLD): Status: ACTIVE | Noted: 2019-09-16

## 2025-07-29 ENCOUNTER — HOSPITAL ENCOUNTER (OUTPATIENT)
Facility: HOSPITAL | Age: 65
Discharge: HOME | End: 2025-08-01
Payer: MEDICARE

## 2025-07-29 DIAGNOSIS — K76.0 METABOLIC DYSFUNCTION-ASSOCIATED STEATOTIC LIVER DISEASE (MASLD): ICD-10-CM

## 2025-07-29 DIAGNOSIS — R79.89 ELEVATED LFTS: ICD-10-CM

## 2025-07-29 PROCEDURE — 76981 USE PARENCHYMA: CPT

## 2025-08-19 ENCOUNTER — TELEPHONE (OUTPATIENT)
Facility: CLINIC | Age: 65
End: 2025-08-19

## 2025-08-19 DIAGNOSIS — R13.19 ESOPHAGEAL DYSPHAGIA: Primary | ICD-10-CM

## 2025-08-20 RX ORDER — DIAZEPAM 5 MG/1
5 TABLET ORAL EVERY 12 HOURS PRN
Qty: 30 TABLET | Refills: 0 | Status: SHIPPED | OUTPATIENT
Start: 2025-08-20 | End: 2025-09-19

## 2025-11-20 ENCOUNTER — APPOINTMENT (OUTPATIENT)
Age: 65
End: 2025-11-20
Payer: COMMERCIAL

## (undated) DEVICE — Z DISCONTINUED USE 2744636  DRESSING AQUACEL 14 IN ALG W3.5XL14IN POLYUR FLM CVR W/ HYDRCOLL

## (undated) DEVICE — BANDAGE COMPR W6INXL3YD EXSANGUATION 1 PLY ESMARCH

## (undated) DEVICE — HANDPIECE SET WITH HIGH FLOW TIP AND SUCTION TUBE: Brand: INTERPULSE

## (undated) DEVICE — 3 ML SYRINGE WITH HYPODERMIC SAFETY NEEDLE: Brand: MAGELLAN

## (undated) DEVICE — 3M™ COBAN™ NL STERILE NON-LATEX SELF-ADHERENT WRAP, 2086S, 6 IN X 5 YD (15 CM X 4,5 M), 12 ROLLS/CASE: Brand: 3M™ COBAN™

## (undated) DEVICE — DERMACEA GAUZE ROLL: Brand: DERMACEA

## (undated) DEVICE — NEEDLE NRV BLK 22GA L2IN 30DEG INSUL BVL EXTN SET STIMUPLEX

## (undated) DEVICE — INTENDED FOR TISSUE SEPARATION, AND OTHER PROCEDURES THAT REQUIRE A SHARP SURGICAL BLADE TO PUNCTURE OR CUT.: Brand: BARD-PARKER SAFETY BLADES SIZE 15, STERILE

## (undated) DEVICE — SLIM BODY SKIN STAPLER: Brand: APPOSE ULC

## (undated) DEVICE — NEEDLE HYPO 21GA L1.5IN INTRAMUSCULAR S STL LATCH BVL UP

## (undated) DEVICE — BANDAGE COMPR SELF ADH 5 YDX4 IN TAN STRL PREMIERPRO LF

## (undated) DEVICE — Z DISCONTINUED BY MEDLINE USE 2711682 TRAY SKIN PREP DRY W/ PREM GLV

## (undated) DEVICE — SUTURE ETHILON SZ 3-0 L18IN NONABSORBABLE BLK L19MM PC-5 3/8 1893G

## (undated) DEVICE — 4-PORT MANIFOLD: Brand: NEPTUNE 2

## (undated) DEVICE — KIT CLN UP BON SECOURS MARYV

## (undated) DEVICE — WRAP COMPR W4INXL5YD NONSTERILE TAN SELF ADH COBAN

## (undated) DEVICE — BANDAGE COMPR EXSANGUATION SGL LAYERED NO CLSR 9FT LEN 4IN W

## (undated) DEVICE — INTENDED FOR TISSUE SEPARATION, AND OTHER PROCEDURES THAT REQUIRE A SHARP SURGICAL BLADE TO PUNCTURE OR CUT.: Brand: BARD-PARKER ® STAINLESS STEEL BLADES

## (undated) DEVICE — SOLUTION IRRIG 3000ML 0.9% SOD CHL FLX CONT 0797208] ICU MEDICAL INC]

## (undated) DEVICE — ELASTIC BANDAGE 6": Brand: CARDINAL HEALTH

## (undated) DEVICE — SYR LR LCK 1ML GRAD NSAF 30ML --

## (undated) DEVICE — NEEDLE SPNL 22GA L3.5IN BLK HUB S STL REG WALL FIT STYL W/

## (undated) DEVICE — NEEDLE HYPO 20GA L1.5IN YEL POLYPR HUB S STL REG BVL STR

## (undated) DEVICE — SUTURE FIBERWIRE SZ 2 W/ TAPERED NEEDLE BLUE L38IN NONABSORB BLU L26.5MM 1/2 CIRCLE AR7200

## (undated) DEVICE — BANDAGE COBAN 4 IN COMPR W4INXL5YD FOAM COHESIVE QUIK STK SELF ADH SFT

## (undated) DEVICE — SMARTSLEEVE SURGICAL GOWN, 3XL LONG: Brand: CONVERTORS

## (undated) DEVICE — KENDALL SCD EXPRESS SLEEVES, KNEE LENGTH, MEDIUM: Brand: KENDALL SCD

## (undated) DEVICE — BLADE CLIPPER GEN PURP NS

## (undated) DEVICE — SOLUTION IV 1000ML 0.9% SOD CHL

## (undated) DEVICE — PACK SURG BSHR TOT KNEE LF

## (undated) DEVICE — APPLICATOR MEDICATED 26 CC SOLUTION HI LT ORNG CHLORAPREP

## (undated) DEVICE — GOWN,REINFORCED,POLY,AURORA,XXLARGE,STR: Brand: MEDLINE

## (undated) DEVICE — PADDING CAST W4INXL4YD ST COT COHESIVE HND TEARABLE SPEC

## (undated) DEVICE — AGENT HEMSTAT W2XL3IN OXIDIZED REGENERATED CELOS ABSRB

## (undated) DEVICE — SKIN MARKER,REGULAR TIP WITH RULER AND LABELS: Brand: DEVON

## (undated) DEVICE — 3M™ STERI-DRAPE™ INSTRUMENT POUCH 1018: Brand: STERI-DRAPE™

## (undated) DEVICE — SUTURE ETHLN SZ 3-0 L30IN NONABSORBABLE BLK L30MM PSLX 3/8 1691H

## (undated) DEVICE — THREE-QUARTER SHEET: Brand: CONVERTORS

## (undated) DEVICE — (D)GLOVE SURG TRIFLX 8 PWD LTX -- DISC BY MFR USE ITEM 302994

## (undated) DEVICE — DRESSING,GAUZE,XEROFORM,CURAD,1"X8",ST: Brand: CURAD

## (undated) DEVICE — REM POLYHESIVE ADULT PATIENT RETURN ELECTRODE: Brand: VALLEYLAB

## (undated) DEVICE — DRSG PATCH ANTIMIC 1INX4.0MM -- CONVERT TO ITEM 356053

## (undated) DEVICE — BIT DRL DIA1.7MM LNG FT ANK FOR COMPHSVE SYS

## (undated) DEVICE — (D)PREP SKN CHLRAPRP APPL 26ML -- CONVERT TO ITEM 371833

## (undated) DEVICE — SUTURE VCRL SZ 0 L36IN ABSRB UD L36MM CT-1 1/2 CIR J946H

## (undated) DEVICE — SUTURE VCRL SZ 2 L27IN ABSRB VLT L65MM TP-1 1/2 CIR J649G

## (undated) DEVICE — KIT OR TURNOVER

## (undated) DEVICE — STERILE LATEX POWDER-FREE SURGICAL GLOVESWITH NITRILE COATING: Brand: PROTEXIS

## (undated) DEVICE — ABDOMINAL PAD: Brand: DERMACEA

## (undated) DEVICE — BOWL AND CEMENT CARTRIDGE WITH BREAKAWAY FEMORAL NOZZLE: Brand: ACM

## (undated) DEVICE — GAUZE SPONGES,USP TYPE VII GAUZE, 12 PLY: Brand: CURITY

## (undated) DEVICE — INTENDED FOR TISSUE SEPARATION, AND OTHER PROCEDURES THAT REQUIRE A SHARP SURGICAL BLADE TO PUNCTURE OR CUT.: Brand: BARD-PARKER SAFETY BLADES SIZE 10, STERILE

## (undated) DEVICE — COVER LT HNDL BLU STRL -- MEDICHOICE

## (undated) DEVICE — STRYKER PERFORMANCE SERIES SAGITTAL BLADE: Brand: STRYKER PERFORMANCE SERIES

## (undated) DEVICE — 3M™ BAIR PAWS FLEX™ WARMING GOWN, STANDARD, 20 PER CASE 81003: Brand: BAIR PAWS™

## (undated) DEVICE — SPLINT ORTH AD L24IN FOR 29IN THGH UNIV KNEE FOAM

## (undated) DEVICE — STAPLER SKIN H3.9MM WIRE DIA0.58MM CRWN 6.9MM 35 STPL ROT

## (undated) DEVICE — SYRINGE MED 3ML NDL 22GA L1 1/2IN REG BVL SFGLDE

## (undated) DEVICE — X-RAY SPONGES,12 PLY: Brand: DERMACEA

## (undated) DEVICE — PREP SKN CHLRAPRP APPL 10.5ML --

## (undated) DEVICE — FLEX ADVANTAGE 3000CC: Brand: FLEX ADVANTAGE

## (undated) DEVICE — PADDING CAST W6INXL4YD ST COT COHESIVE HND TEARABLE SPEC

## (undated) DEVICE — Device

## (undated) DEVICE — BLADE RMFG DBL RECIP DBL SD --

## (undated) DEVICE — (D)SYR 10ML 1/5ML GRAD NSAF -- PKGING CHANGE USE ITEM 338027

## (undated) DEVICE — SUTURE VCRL SZ 0 L27IN ABSRB UD L36MM CT-1 1/2 CIR J260H

## (undated) DEVICE — OSTEOAUGER BONE GRAFT HARVESTER 6MM

## (undated) DEVICE — GAUZE,SPONGE,4"X4",16PLY,STRL,LF,10/TRAY: Brand: MEDLINE

## (undated) DEVICE — JP 3-SPRING RES W/15FR PVC DRAIN/TR: Brand: CARDINAL HEALTH

## (undated) DEVICE — IMMOBILIZER KNEE PREMIER PRO TRI PNL 24INCH FOAM TIETEX PAT

## (undated) DEVICE — TABLE COVER: Brand: CONVERTORS

## (undated) DEVICE — Z DISCONTINUED USE 2220190 SUTURE VICRYL SZ 3-0 L27IN ABSRB UD L26MM SH 1/2 CIR J416H

## (undated) DEVICE — SUTURE MCRYL SZ 2-0 L36IN ABSRB UD L36MM CT-1 1/2 CIR Y945H

## (undated) DEVICE — ZIMMER® STERILE DISPOSABLE TOURNIQUET CUFF WITH PLC, DUAL PORT, SINGLE BLADDER, 34 IN. (86 CM)

## (undated) DEVICE — PAD,ABDOMINAL,8"X10",ST,LF: Brand: MEDLINE

## (undated) DEVICE — DRAPE C ARM UNIV W41XL74IN CLR PLAS XR VELC CLSR POLY STRP

## (undated) DEVICE — STOCKING COMPR M L16-18IN LNG 19MMHG ANK 8-9IN CALF 12-15IN

## (undated) DEVICE — SOLUTION SCRB 4OZ 4% CHG CLN BASE FOR PT SKIN ANTISEPSIS

## (undated) DEVICE — SUTURE MONOCRYL SZ 3-0 L27IN ABSRB UD L26MM SH 1/2 CIR Y416H

## (undated) DEVICE — BIPOLAR SEALER 23-301-1 AQM MBS: Brand: AQUAMANTYS™

## (undated) DEVICE — SOFT SILICONE HYDROCELLULAR SACRUM DRESSING WITH LOCK AWAY LAYER: Brand: ALLEVYN LIFE SACRUM (LARGE) PACK OF 10

## (undated) DEVICE — PREMIUM DRY TRAY LF: Brand: MEDLINE INDUSTRIES, INC.

## (undated) DEVICE — DISPOSABLE TOURNIQUET CUFF SINGLE BLADDER, DUAL PORT AND QUICK CONNECT CONNECTOR: Brand: COLOR CUFF

## (undated) DEVICE — T5 HOOD WITH PEEL AWAY FACE SHIELD

## (undated) DEVICE — 3M™ TEGADERM™ TRANSPARENT FILM DRESSING FRAME STYLE, 1626W, 4 IN X 4-3/4 IN (10 CM X 12 CM), 50/CT 4CT/CASE: Brand: 3M™ TEGADERM™

## (undated) DEVICE — BIPOLAR SEALER 23-112-1 AQM 6.0: Brand: AQUAMANTYS ®

## (undated) DEVICE — SUTURE VICRYL SZ 2-0 L27IN ABSRB UD L26MM SH 1/2 CIR J417H

## (undated) DEVICE — (D)GLOVE SURG 8.5 PWD LTX -- DISC BY MFR USE ITEM 110051

## (undated) DEVICE — STERILE POLYISOPRENE POWDER-FREE SURGICAL GLOVES: Brand: PROTEXIS

## (undated) DEVICE — NEEDLE HYPO 18GA L1.5IN PNK S STL HUB POLYPR SHLD REG BVL

## (undated) DEVICE — STIMULATOR BONE GROWTH ELEC NONINVASIVE FOR OSTEOGENESIS

## (undated) DEVICE — BANDAGE,GAUZE,BULKEE II,4.5"X4.1YD,STRL: Brand: MEDLINE

## (undated) DEVICE — SOLUTION IRRIG 1000ML 0.9% SOD CHL USP POUR PLAS BTL